# Patient Record
Sex: MALE | Race: WHITE | HISPANIC OR LATINO
[De-identification: names, ages, dates, MRNs, and addresses within clinical notes are randomized per-mention and may not be internally consistent; named-entity substitution may affect disease eponyms.]

---

## 2017-01-19 ENCOUNTER — APPOINTMENT (OUTPATIENT)
Dept: HEART AND VASCULAR | Facility: CLINIC | Age: 57
End: 2017-01-19

## 2017-01-22 ENCOUNTER — TRANSCRIPTION ENCOUNTER (OUTPATIENT)
Age: 57
End: 2017-01-22

## 2017-02-06 ENCOUNTER — APPOINTMENT (OUTPATIENT)
Dept: HEART AND VASCULAR | Facility: CLINIC | Age: 57
End: 2017-02-06

## 2017-08-28 ENCOUNTER — APPOINTMENT (OUTPATIENT)
Dept: HEART AND VASCULAR | Facility: CLINIC | Age: 57
End: 2017-08-28
Payer: COMMERCIAL

## 2017-08-28 ENCOUNTER — LABORATORY RESULT (OUTPATIENT)
Age: 57
End: 2017-08-28

## 2017-08-28 VITALS
HEART RATE: 78 BPM | TEMPERATURE: 98.4 F | HEIGHT: 68 IN | BODY MASS INDEX: 25.61 KG/M2 | WEIGHT: 169 LBS | RESPIRATION RATE: 15 BRPM | SYSTOLIC BLOOD PRESSURE: 160 MMHG | DIASTOLIC BLOOD PRESSURE: 84 MMHG | OXYGEN SATURATION: 97 %

## 2017-08-28 DIAGNOSIS — E78.5 HYPERLIPIDEMIA, UNSPECIFIED: ICD-10-CM

## 2017-08-28 DIAGNOSIS — K63.5 POLYP OF COLON: ICD-10-CM

## 2017-08-28 PROCEDURE — 99214 OFFICE O/P EST MOD 30 MIN: CPT | Mod: 25

## 2017-08-28 PROCEDURE — 96372 THER/PROPH/DIAG INJ SC/IM: CPT

## 2017-08-28 PROCEDURE — 93000 ELECTROCARDIOGRAM COMPLETE: CPT

## 2017-08-28 PROCEDURE — 36415 COLL VENOUS BLD VENIPUNCTURE: CPT

## 2017-08-29 PROBLEM — K63.5 HYPERPLASTIC POLYP OF LARGE INTESTINE: Status: ACTIVE | Noted: 2017-08-29

## 2017-08-29 LAB
25(OH)D3 SERPL-MCNC: 33.6 NG/ML
ALBUMIN SERPL ELPH-MCNC: 4.7 G/DL
ALP BLD-CCNC: 53 U/L
ALT SERPL-CCNC: 18 U/L
ANION GAP SERPL CALC-SCNC: 22 MMOL/L
APPEARANCE: CLEAR
AST SERPL-CCNC: 16 U/L
BASOPHILS # BLD AUTO: 0.02 K/UL
BASOPHILS NFR BLD AUTO: 0.3 %
BILIRUB SERPL-MCNC: 0.3 MG/DL
BILIRUBIN URINE: NEGATIVE
BLOOD URINE: ABNORMAL
BUN SERPL-MCNC: 9 MG/DL
CALCIUM SERPL-MCNC: 9.1 MG/DL
CHLORIDE SERPL-SCNC: 102 MMOL/L
CHOLEST SERPL-MCNC: 170 MG/DL
CHOLEST/HDLC SERPL: 3.9 RATIO
CO2 SERPL-SCNC: 18 MMOL/L
COLOR: YELLOW
CREAT SERPL-MCNC: 0.74 MG/DL
CRP SERPL HS-MCNC: 1 MG/L
EOSINOPHIL # BLD AUTO: 0.12 K/UL
EOSINOPHIL NFR BLD AUTO: 1.7 %
FOLATE SERPL-MCNC: 17.2 NG/ML
GLUCOSE QUALITATIVE U: NORMAL MG/DL
GLUCOSE SERPL-MCNC: 94 MG/DL
HBA1C MFR BLD HPLC: 5.8 %
HCT VFR BLD CALC: 41.6 %
HDLC SERPL-MCNC: 44 MG/DL
HGB BLD-MCNC: 13.6 G/DL
IMM GRANULOCYTES NFR BLD AUTO: 0.1 %
KETONES URINE: NEGATIVE
LDLC SERPL CALC-MCNC: 85 MG/DL
LEUKOCYTE ESTERASE URINE: NEGATIVE
LYMPHOCYTES # BLD AUTO: 1.34 K/UL
LYMPHOCYTES NFR BLD AUTO: 18.5 %
MAGNESIUM SERPL-MCNC: 2.2 MG/DL
MAN DIFF?: NORMAL
MCHC RBC-ENTMCNC: 29.7 PG
MCHC RBC-ENTMCNC: 32.7 GM/DL
MCV RBC AUTO: 90.8 FL
MONOCYTES # BLD AUTO: 0.59 K/UL
MONOCYTES NFR BLD AUTO: 8.1 %
NEUTROPHILS # BLD AUTO: 5.18 K/UL
NEUTROPHILS NFR BLD AUTO: 71.3 %
NITRITE URINE: NEGATIVE
PH URINE: 6
PLATELET # BLD AUTO: 309 K/UL
POTASSIUM SERPL-SCNC: 4.3 MMOL/L
PROT SERPL-MCNC: 7.4 G/DL
PROTEIN URINE: NEGATIVE MG/DL
PSA FREE FLD-MCNC: 25.5
PSA FREE SERPL-MCNC: 0.14 NG/ML
PSA SERPL-MCNC: 0.55 NG/ML
RBC # BLD: 4.58 M/UL
RBC # FLD: 13.8 %
SODIUM SERPL-SCNC: 142 MMOL/L
SPECIFIC GRAVITY URINE: 1.01
TRIGL SERPL-MCNC: 207 MG/DL
TSH SERPL-ACNC: 0.55 UIU/ML
UROBILINOGEN URINE: NORMAL MG/DL
VIT B12 SERPL-MCNC: 978 PG/ML
WBC # FLD AUTO: 7.26 K/UL

## 2017-08-30 ENCOUNTER — RX RENEWAL (OUTPATIENT)
Age: 57
End: 2017-08-30

## 2017-09-02 LAB — VIT C SERPL-MCNC: 1.2 MG/DL

## 2018-05-23 ENCOUNTER — RX RENEWAL (OUTPATIENT)
Age: 58
End: 2018-05-23

## 2018-11-19 ENCOUNTER — APPOINTMENT (OUTPATIENT)
Dept: HEART AND VASCULAR | Facility: CLINIC | Age: 58
End: 2018-11-19
Payer: COMMERCIAL

## 2018-11-19 VITALS
RESPIRATION RATE: 15 BRPM | HEART RATE: 84 BPM | BODY MASS INDEX: 26.37 KG/M2 | TEMPERATURE: 98 F | HEIGHT: 67 IN | SYSTOLIC BLOOD PRESSURE: 110 MMHG | WEIGHT: 168 LBS | DIASTOLIC BLOOD PRESSURE: 60 MMHG | OXYGEN SATURATION: 96 %

## 2018-11-19 DIAGNOSIS — K63.5 POLYP OF COLON: ICD-10-CM

## 2018-11-19 DIAGNOSIS — R20.0 ANESTHESIA OF SKIN: ICD-10-CM

## 2018-11-19 PROCEDURE — 93880 EXTRACRANIAL BILAT STUDY: CPT

## 2018-11-19 PROCEDURE — 99214 OFFICE O/P EST MOD 30 MIN: CPT | Mod: 25

## 2018-11-19 PROCEDURE — 36415 COLL VENOUS BLD VENIPUNCTURE: CPT

## 2018-11-19 PROCEDURE — 93306 TTE W/DOPPLER COMPLETE: CPT

## 2018-11-19 PROCEDURE — 93000 ELECTROCARDIOGRAM COMPLETE: CPT

## 2018-11-19 PROCEDURE — 90686 IIV4 VACC NO PRSV 0.5 ML IM: CPT

## 2018-11-19 PROCEDURE — G0008: CPT

## 2018-11-23 PROBLEM — K63.5 HYPERPLASTIC POLYP OF INTESTINE: Status: RESOLVED | Noted: 2018-11-23 | Resolved: 2018-11-23

## 2018-11-23 LAB
25(OH)D3 SERPL-MCNC: 31.6 NG/ML
ALBUMIN SERPL ELPH-MCNC: 4.7 G/DL
ALP BLD-CCNC: 52 U/L
ALT SERPL-CCNC: 17 U/L
ANION GAP SERPL CALC-SCNC: 12 MMOL/L
APPEARANCE: CLEAR
AST SERPL-CCNC: 14 U/L
BASOPHILS # BLD AUTO: 0.03 K/UL
BASOPHILS NFR BLD AUTO: 0.6 %
BILIRUB SERPL-MCNC: 0.3 MG/DL
BILIRUBIN URINE: NEGATIVE
BLOOD URINE: NEGATIVE
BUN SERPL-MCNC: 8 MG/DL
CALCIUM SERPL-MCNC: 9.7 MG/DL
CHLORIDE SERPL-SCNC: 102 MMOL/L
CHOLEST SERPL-MCNC: 178 MG/DL
CHOLEST/HDLC SERPL: 3.2 RATIO
CO2 SERPL-SCNC: 27 MMOL/L
COLOR: YELLOW
CREAT SERPL-MCNC: 0.75 MG/DL
EOSINOPHIL # BLD AUTO: 0.1 K/UL
EOSINOPHIL NFR BLD AUTO: 1.8 %
FOLATE SERPL-MCNC: 18.6 NG/ML
GLUCOSE QUALITATIVE U: NEGATIVE MG/DL
GLUCOSE SERPL-MCNC: 120 MG/DL
HBA1C MFR BLD HPLC: 5.7 %
HCT VFR BLD CALC: 46.2 %
HDLC SERPL-MCNC: 55 MG/DL
HGB BLD-MCNC: 14.4 G/DL
IMM GRANULOCYTES NFR BLD AUTO: 0.2 %
KETONES URINE: NEGATIVE
LDLC SERPL CALC-MCNC: 110 MG/DL
LEUKOCYTE ESTERASE URINE: NEGATIVE
LYMPHOCYTES # BLD AUTO: 1.46 K/UL
LYMPHOCYTES NFR BLD AUTO: 26.9 %
MAGNESIUM SERPL-MCNC: 2.3 MG/DL
MAN DIFF?: NORMAL
MCHC RBC-ENTMCNC: 30.6 PG
MCHC RBC-ENTMCNC: 31.2 GM/DL
MCV RBC AUTO: 98.3 FL
MONOCYTES # BLD AUTO: 0.44 K/UL
MONOCYTES NFR BLD AUTO: 8.1 %
NEUTROPHILS # BLD AUTO: 3.38 K/UL
NEUTROPHILS NFR BLD AUTO: 62.4 %
NITRITE URINE: NEGATIVE
PH URINE: 5.5
PLATELET # BLD AUTO: 287 K/UL
POTASSIUM SERPL-SCNC: 5.2 MMOL/L
PROT SERPL-MCNC: 7.5 G/DL
PROTEIN URINE: NEGATIVE MG/DL
PSA FREE FLD-MCNC: 23
PSA FREE SERPL-MCNC: 0.2 NG/ML
PSA SERPL-MCNC: 0.87 NG/ML
RBC # BLD: 4.7 M/UL
RBC # FLD: 13.1 %
SODIUM SERPL-SCNC: 141 MMOL/L
SPECIFIC GRAVITY URINE: 1.02
TRIGL SERPL-MCNC: 65 MG/DL
TSH SERPL-ACNC: 0.47 UIU/ML
URATE SERPL-MCNC: 5.9 MG/DL
UROBILINOGEN URINE: NEGATIVE MG/DL
VIT B12 SERPL-MCNC: 1067 PG/ML
WBC # FLD AUTO: 5.42 K/UL

## 2018-11-23 RX ORDER — LISINOPRIL 20 MG/1
20 TABLET ORAL
Qty: 30 | Refills: 6 | Status: DISCONTINUED | COMMUNITY
Start: 2017-08-30 | End: 2018-11-23

## 2018-11-23 NOTE — ASSESSMENT
[FreeTextEntry1] : Hypertensive heart disease \par \par chronic smoking addiction, refuses to quit\par \par hyperplastic colon polyps with family hx of colon cancer\par \par dyspnea on exertion

## 2018-11-23 NOTE — REASON FOR VISIT
[Follow-Up - Clinic] : a clinic follow-up of [Dyspnea] : dyspnea [Hyperlipidemia] : hyperlipidemia [Hypertension] : hypertension [Medication Management] : Medication management [FreeTextEntry1] : 58   year old  male active smoker with  HTN, hyperlipidemia and prediabetes HAS  low vitamin D low vitamin C  and B 12 deficiency with symptoms.  Not motivated to quit smoking despite my numerous appeals. \par \par He has no fevers but a chronic cough without hemoptysis. Last colonoscopy was in November 2018 showing  - 5 and 7  mm  hyperplastic polyps which  were  resected pathology results are pending. \par \par Hx of low B 12, vitamin C, vitamin D \par \par \par  \par \par \par \par

## 2018-11-23 NOTE — PHYSICAL EXAM
[General Appearance - Well Developed] : well developed [Normal Appearance] : normal appearance [Well Groomed] : well groomed [General Appearance - Well Nourished] : well nourished [No Deformities] : no deformities [General Appearance - In No Acute Distress] : no acute distress [Normal Conjunctiva] : the conjunctiva exhibited no abnormalities [Eyelids - No Xanthelasma] : the eyelids demonstrated no xanthelasmas [FreeTextEntry1] : anicteric  [Normal Oral Mucosa] : normal oral mucosa [No Oral Pallor] : no oral pallor [No Oral Cyanosis] : no oral cyanosis [Normal Jugular Venous A Waves Present] : normal jugular venous A waves present [Normal Jugular Venous V Waves Present] : normal jugular venous V waves present [No Jugular Venous Ordoñez A Waves] : no jugular venous ordoñez A waves [Respiration, Rhythm And Depth] : normal respiratory rhythm and effort [Exaggerated Use Of Accessory Muscles For Inspiration] : no accessory muscle use [Auscultation Breath Sounds / Voice Sounds] : lungs were clear to auscultation bilaterally [Heart Rate And Rhythm] : heart rate and rhythm were normal [Heart Sounds] : normal S1 and S2 [Murmurs] : no murmurs present [Edema] : no peripheral edema present [Veins - Varicosity Changes] : no varicosital changes were noted in the lower extremities [Bowel Sounds] : normal bowel sounds [Abdomen Soft] : soft [Abdomen Tenderness] : non-tender [Abdomen Mass (___ Cm)] : no abdominal mass palpated [Abnormal Walk] : normal gait [Gait - Sufficient For Exercise Testing] : the gait was sufficient for exercise testing [Nail Clubbing] : no clubbing of the fingernails [Cyanosis, Localized] : no localized cyanosis [Petechial Hemorrhages (___cm)] : no petechial hemorrhages [Skin Color & Pigmentation] : normal skin color and pigmentation [Skin Turgor] : normal skin turgor [] : no rash [No Venous Stasis] : no venous stasis [Skin Lesions] : no skin lesions [No Skin Ulcers] : no skin ulcer [No Xanthoma] : no  xanthoma was observed [Oriented To Time, Place, And Person] : oriented to person, place, and time [Affect] : the affect was normal [Mood] : the mood was normal [No Anxiety] : not feeling anxious

## 2018-11-23 NOTE — HISTORY OF PRESENT ILLNESS
[FreeTextEntry1] : EKG shows NSR 64 bpm without ectopy, ischemia or LVH \par \par No hematuria , syncope, claudication pains  his main complaint is dyspnea on exertion \par \par \par Needs flu vaccine and blood work today \par \par He continues to have some numbness of his upper extremities

## 2018-11-23 NOTE — DISCUSSION/SUMMARY
[Essential Hypertension] : essential hypertension [Stable] : stable [Responding to Treatment] : responding to treatment [None] : none [Smoking Cessation] : smoking cessation [___ Month(s)] : [unfilled] month(s) [With Me] : with me [FreeTextEntry1] : venipuncture performed with lipids, Hgba1c, vitamin D, vitamin B 12/folate, etc \par \par medications reconciled \par \par results of echocardiogram and carotid duplex reviewed with patient \par \par Flu vaccine administered right upper extremity by [facility] without incident

## 2018-11-26 LAB — UBIQUINONE10 SERPL-MCNC: 0.83 MG/L

## 2019-01-27 ENCOUNTER — FORM ENCOUNTER (OUTPATIENT)
Age: 59
End: 2019-01-27

## 2019-01-28 ENCOUNTER — OUTPATIENT (OUTPATIENT)
Dept: OUTPATIENT SERVICES | Facility: HOSPITAL | Age: 59
LOS: 1 days | End: 2019-01-28
Payer: COMMERCIAL

## 2019-01-28 ENCOUNTER — APPOINTMENT (OUTPATIENT)
Dept: HEART AND VASCULAR | Facility: CLINIC | Age: 59
End: 2019-01-28
Payer: COMMERCIAL

## 2019-01-28 VITALS
WEIGHT: 168 LBS | HEART RATE: 73 BPM | HEIGHT: 67 IN | TEMPERATURE: 98.2 F | DIASTOLIC BLOOD PRESSURE: 80 MMHG | OXYGEN SATURATION: 95 % | BODY MASS INDEX: 26.37 KG/M2 | SYSTOLIC BLOOD PRESSURE: 110 MMHG

## 2019-01-28 DIAGNOSIS — Z01.818 ENCOUNTER FOR OTHER PREPROCEDURAL EXAMINATION: ICD-10-CM

## 2019-01-28 PROCEDURE — 71046 X-RAY EXAM CHEST 2 VIEWS: CPT

## 2019-01-28 PROCEDURE — 71046 X-RAY EXAM CHEST 2 VIEWS: CPT | Mod: 26

## 2019-01-28 PROCEDURE — 99214 OFFICE O/P EST MOD 30 MIN: CPT | Mod: 57

## 2019-01-28 PROCEDURE — 93000 ELECTROCARDIOGRAM COMPLETE: CPT

## 2019-01-28 PROCEDURE — 36415 COLL VENOUS BLD VENIPUNCTURE: CPT

## 2019-01-28 NOTE — DISCUSSION/SUMMARY
[Procedure Low Risk] : the procedure risk is low [Patient Low Risk] : the patient is a low surgical risk [FreeTextEntry3] : hold aspirin for 7 days prior to surgery [FreeTextEntry1] : venipuncture performed \par \par Preop CXR requisition provided

## 2019-01-28 NOTE — REVIEW OF SYSTEMS
[Cough] : cough [Joint Pain] : joint pain [Joint Swelling] : joint swelling [Negative] : Heme/Lymph [Dyspnea on exertion] : not dyspnea during exertion [Joint Stiffness] : no joint stiffness [Muscle Cramps] : no muscle cramps [Limb Weakness (Paresis)] : no limb weakness

## 2019-01-28 NOTE — HISTORY OF PRESENT ILLNESS
[Preoperative Visit] : for a medical evaluation prior to surgery [Scheduled Procedure ___] : a [unfilled] [Date of Surgery ___] : on [unfilled] [Surgeon Name ___] : surgeon: [unfilled] [Stable] : Stable [Cough] : cough [Anti-Platelet Agents] : anti-platelet agents [Nicotine Dependence] : nicotine dependence [Alcohol Use] : alcohol use [Frequent Aspirin Use] : frequent aspirin use [Prior Anesthesia] : Prior anesthesia [Electrocardiogram] : ~T an ECG ~C was performed [Echocardiogram] : ~T an echocardiogram ~C was performed [Cardiovascular Stress Test] : a cardiac stress test ~T ~C was performed [Good] : Good [Fever] : no fever [Chills] : no chills [Fatigue] : no fatigue [Chest Pain] : no chest pain [Dyspnea] : no dyspnea [Dysuria] : no dysuria [Urinary Frequency] : no urinary frequency [Nausea] : no nausea [Vomiting] : no vomiting [Diarrhea] : no diarrhea [Abdominal Pain] : no abdominal pain [Easy Bruising] : no easy bruising [Lower Extremity Swelling] : no lower extremity swelling [Poor Exercise Tolerance] : no poor exercise tolerance [Diabetes] : no diabetes [Cardiovascular Disease] : no cardiovascular disease [Pulmonary Disease] : no pulmonary disease [Renal Disease] : no renal disease [GI Disease] : no gastrointestinal disease [Sleep Apnea] : no sleep apnea [Thromboembolic Problems] : no thromboembolic problems [Frequent use of NSAIDs] : no use of NSAIDs [Transfusion Reaction] : no transfusion reaction [Impaired Immunity] : no impaired immunity [Steroid Use in Last 6 Months] : no steroid use in the last six months [Prev Anesthesia Reaction] : no previous anesthesia reaction [Anesthesia Reaction] : no anesthesia reaction [Sudden Death] : no sudden death [Clotting Disorder] : no clotting disorder [Bleeding Disorder] : no bleeding disorder [de-identified] : chronic smokers cough [FreeTextEntry1] : 58 year old male , chronic smoker who refuses to quit has well  controlled HTN.  C/o chronic right foot hallux valgus with pain , now awaiting surgical correction.  Prior hx of low B 12  fully supplemented. \par No hx of MI, CHF, arrhythmias or asthma.  Hx of mild prediabetes \par \par Good exercise tolerance\par \par EKG shows NSR, mild sinus arrhythmia HR 69 bpm without ischemia , ectopy or LVH

## 2019-01-28 NOTE — PHYSICAL EXAM
[General Appearance - Well Developed] : well developed [Normal Appearance] : normal appearance [Well Groomed] : well groomed [General Appearance - Well Nourished] : well nourished [No Deformities] : no deformities [General Appearance - In No Acute Distress] : no acute distress [Normal Conjunctiva] : the conjunctiva exhibited no abnormalities [Eyelids - No Xanthelasma] : the eyelids demonstrated no xanthelasmas [Normal Oral Mucosa] : normal oral mucosa [No Oral Pallor] : no oral pallor [No Oral Cyanosis] : no oral cyanosis [Normal Jugular Venous A Waves Present] : normal jugular venous A waves present [Normal Jugular Venous V Waves Present] : normal jugular venous V waves present [No Jugular Venous Ordoñez A Waves] : no jugular venous ordoñez A waves [Respiration, Rhythm And Depth] : normal respiratory rhythm and effort [Exaggerated Use Of Accessory Muscles For Inspiration] : no accessory muscle use [Auscultation Breath Sounds / Voice Sounds] : lungs were clear to auscultation bilaterally [Heart Rate And Rhythm] : heart rate and rhythm were normal [Heart Sounds] : normal S1 and S2 [Murmurs] : no murmurs present [Edema] : no peripheral edema present [Veins - Varicosity Changes] : no varicosital changes were noted in the lower extremities [Bowel Sounds] : normal bowel sounds [Abdomen Soft] : soft [Abdomen Tenderness] : non-tender [Abdomen Mass (___ Cm)] : no abdominal mass palpated [Abnormal Walk] : normal gait [Gait - Sufficient For Exercise Testing] : the gait was sufficient for exercise testing [Nail Clubbing] : no clubbing of the fingernails [Cyanosis, Localized] : no localized cyanosis [Petechial Hemorrhages (___cm)] : no petechial hemorrhages [Skin Color & Pigmentation] : normal skin color and pigmentation [Skin Turgor] : normal skin turgor [] : no rash [No Venous Stasis] : no venous stasis [Skin Lesions] : no skin lesions [No Skin Ulcers] : no skin ulcer [No Xanthoma] : no  xanthoma was observed [Oriented To Time, Place, And Person] : oriented to person, place, and time [Affect] : the affect was normal [Mood] : the mood was normal [No Anxiety] : not feeling anxious [FreeTextEntry1] : anicteric

## 2019-01-29 LAB
ALBUMIN SERPL ELPH-MCNC: 4.9 G/DL
ALP BLD-CCNC: 55 U/L
ALT SERPL-CCNC: 19 U/L
ANION GAP SERPL CALC-SCNC: 12 MMOL/L
APPEARANCE: CLEAR
APTT BLD: 35.2 SEC
AST SERPL-CCNC: 14 U/L
BASOPHILS # BLD AUTO: 0.03 K/UL
BASOPHILS NFR BLD AUTO: 0.5 %
BILIRUB SERPL-MCNC: 0.4 MG/DL
BILIRUBIN URINE: NEGATIVE
BLOOD URINE: NEGATIVE
BUN SERPL-MCNC: 8 MG/DL
CALCIUM SERPL-MCNC: 9.4 MG/DL
CHLORIDE SERPL-SCNC: 101 MMOL/L
CHOLEST SERPL-MCNC: 169 MG/DL
CHOLEST/HDLC SERPL: 3.3 RATIO
CO2 SERPL-SCNC: 28 MMOL/L
COLOR: YELLOW
CREAT SERPL-MCNC: 0.66 MG/DL
EOSINOPHIL # BLD AUTO: 0.11 K/UL
EOSINOPHIL NFR BLD AUTO: 1.9 %
GLUCOSE QUALITATIVE U: NEGATIVE MG/DL
GLUCOSE SERPL-MCNC: 92 MG/DL
HBA1C MFR BLD HPLC: 5.8 %
HCT VFR BLD CALC: 43.4 %
HDLC SERPL-MCNC: 51 MG/DL
HGB BLD-MCNC: 14.1 G/DL
IMM GRANULOCYTES NFR BLD AUTO: 0.2 %
INR PPP: 0.94 RATIO
KETONES URINE: NEGATIVE
LDLC SERPL CALC-MCNC: 103 MG/DL
LEUKOCYTE ESTERASE URINE: NEGATIVE
LYMPHOCYTES # BLD AUTO: 1.56 K/UL
LYMPHOCYTES NFR BLD AUTO: 27.2 %
MAN DIFF?: NORMAL
MCHC RBC-ENTMCNC: 30.1 PG
MCHC RBC-ENTMCNC: 32.5 GM/DL
MCV RBC AUTO: 92.7 FL
MONOCYTES # BLD AUTO: 0.43 K/UL
MONOCYTES NFR BLD AUTO: 7.5 %
NEUTROPHILS # BLD AUTO: 3.59 K/UL
NEUTROPHILS NFR BLD AUTO: 62.7 %
NITRITE URINE: NEGATIVE
PH URINE: 5
PLATELET # BLD AUTO: 346 K/UL
POTASSIUM SERPL-SCNC: 4.6 MMOL/L
PROT SERPL-MCNC: 7.3 G/DL
PROTEIN URINE: NEGATIVE MG/DL
PT BLD: 10.7 SEC
RBC # BLD: 4.68 M/UL
RBC # FLD: 13.4 %
SODIUM SERPL-SCNC: 141 MMOL/L
SPECIFIC GRAVITY URINE: 1.02
TRIGL SERPL-MCNC: 77 MG/DL
UROBILINOGEN URINE: NEGATIVE MG/DL
WBC # FLD AUTO: 5.73 K/UL

## 2019-03-18 ENCOUNTER — APPOINTMENT (OUTPATIENT)
Dept: HEART AND VASCULAR | Facility: CLINIC | Age: 59
End: 2019-03-18

## 2019-10-14 ENCOUNTER — APPOINTMENT (OUTPATIENT)
Dept: HEART AND VASCULAR | Facility: CLINIC | Age: 59
End: 2019-10-14

## 2019-10-14 ENCOUNTER — APPOINTMENT (OUTPATIENT)
Dept: HEART AND VASCULAR | Facility: CLINIC | Age: 59
End: 2019-10-14
Payer: COMMERCIAL

## 2019-10-14 VITALS
HEIGHT: 67 IN | SYSTOLIC BLOOD PRESSURE: 132 MMHG | BODY MASS INDEX: 27.62 KG/M2 | WEIGHT: 176 LBS | DIASTOLIC BLOOD PRESSURE: 70 MMHG | HEART RATE: 70 BPM | RESPIRATION RATE: 14 BRPM | TEMPERATURE: 98.3 F | OXYGEN SATURATION: 96 %

## 2019-10-14 PROCEDURE — 90686 IIV4 VACC NO PRSV 0.5 ML IM: CPT

## 2019-10-14 PROCEDURE — 36415 COLL VENOUS BLD VENIPUNCTURE: CPT

## 2019-10-14 PROCEDURE — 93306 TTE W/DOPPLER COMPLETE: CPT

## 2019-10-14 PROCEDURE — 99214 OFFICE O/P EST MOD 30 MIN: CPT | Mod: 25

## 2019-10-14 PROCEDURE — 93015 CV STRESS TEST SUPVJ I&R: CPT

## 2019-10-14 PROCEDURE — G0008: CPT

## 2019-10-14 RX ORDER — MULTIVIT-MIN/IRON/FOLIC ACID/K 18-600-40
500 CAPSULE ORAL
Qty: 90 | Refills: 1 | Status: DISCONTINUED | COMMUNITY
Start: 2017-01-19 | End: 2019-10-14

## 2019-10-15 LAB
25(OH)D3 SERPL-MCNC: 26.5 NG/ML
ALBUMIN SERPL ELPH-MCNC: 4.9 G/DL
ALP BLD-CCNC: 62 U/L
ALT SERPL-CCNC: 20 U/L
ANION GAP SERPL CALC-SCNC: 13 MMOL/L
AST SERPL-CCNC: 23 U/L
BASOPHILS # BLD AUTO: 0.04 K/UL
BASOPHILS NFR BLD AUTO: 0.9 %
BILIRUB SERPL-MCNC: 0.4 MG/DL
BUN SERPL-MCNC: 10 MG/DL
CALCIUM SERPL-MCNC: 9.8 MG/DL
CHLORIDE SERPL-SCNC: 100 MMOL/L
CHOLEST SERPL-MCNC: 180 MG/DL
CHOLEST/HDLC SERPL: 3.6 RATIO
CO2 SERPL-SCNC: 25 MMOL/L
CREAT SERPL-MCNC: 0.7 MG/DL
EOSINOPHIL # BLD AUTO: 0.09 K/UL
EOSINOPHIL NFR BLD AUTO: 2 %
ESTIMATED AVERAGE GLUCOSE: 117 MG/DL
FOLATE SERPL-MCNC: 13.6 NG/ML
GLUCOSE SERPL-MCNC: 119 MG/DL
HBA1C MFR BLD HPLC: 5.7 %
HCT VFR BLD CALC: 47.7 %
HDLC SERPL-MCNC: 50 MG/DL
HGB BLD-MCNC: 15 G/DL
IMM GRANULOCYTES NFR BLD AUTO: 0.2 %
LDLC SERPL CALC-MCNC: 115 MG/DL
LYMPHOCYTES # BLD AUTO: 1.01 K/UL
LYMPHOCYTES NFR BLD AUTO: 22 %
MAN DIFF?: NORMAL
MCHC RBC-ENTMCNC: 30.1 PG
MCHC RBC-ENTMCNC: 31.4 GM/DL
MCV RBC AUTO: 95.8 FL
MONOCYTES # BLD AUTO: 0.33 K/UL
MONOCYTES NFR BLD AUTO: 7.2 %
NEUTROPHILS # BLD AUTO: 3.12 K/UL
NEUTROPHILS NFR BLD AUTO: 67.7 %
PLATELET # BLD AUTO: 313 K/UL
POTASSIUM SERPL-SCNC: 5.3 MMOL/L
PROT SERPL-MCNC: 7.5 G/DL
PSA FREE FLD-MCNC: 23 %
PSA FREE SERPL-MCNC: 0.15 NG/ML
PSA SERPL-MCNC: 0.63 NG/ML
RBC # BLD: 4.98 M/UL
RBC # FLD: 13.2 %
SODIUM SERPL-SCNC: 138 MMOL/L
TRIGL SERPL-MCNC: 74 MG/DL
TSH SERPL-ACNC: 0.36 UIU/ML
VIT B12 SERPL-MCNC: 654 PG/ML
WBC # FLD AUTO: 4.6 K/UL

## 2019-10-16 ENCOUNTER — TRANSCRIPTION ENCOUNTER (OUTPATIENT)
Age: 59
End: 2019-10-16

## 2019-10-18 LAB — UBIQUINONE10 SERPL-MCNC: 0.49 MG/L

## 2019-10-20 LAB — VIT C SERPL-MCNC: <0.1 MG/DL

## 2020-06-17 ENCOUNTER — APPOINTMENT (OUTPATIENT)
Dept: HEART AND VASCULAR | Facility: CLINIC | Age: 60
End: 2020-06-17
Payer: COMMERCIAL

## 2020-06-17 VITALS
HEART RATE: 72 BPM | BODY MASS INDEX: 24.64 KG/M2 | HEIGHT: 67 IN | RESPIRATION RATE: 13 BRPM | TEMPERATURE: 95.4 F | OXYGEN SATURATION: 96 % | DIASTOLIC BLOOD PRESSURE: 74 MMHG | WEIGHT: 157 LBS | SYSTOLIC BLOOD PRESSURE: 134 MMHG

## 2020-06-17 DIAGNOSIS — R42 DIZZINESS AND GIDDINESS: ICD-10-CM

## 2020-06-17 DIAGNOSIS — R05 COUGH: ICD-10-CM

## 2020-06-17 PROCEDURE — 93880 EXTRACRANIAL BILAT STUDY: CPT

## 2020-06-17 PROCEDURE — 93000 ELECTROCARDIOGRAM COMPLETE: CPT

## 2020-06-17 PROCEDURE — 36415 COLL VENOUS BLD VENIPUNCTURE: CPT

## 2020-06-17 PROCEDURE — 99215 OFFICE O/P EST HI 40 MIN: CPT

## 2020-06-18 LAB
25(OH)D3 SERPL-MCNC: 37.5 NG/ML
ALBUMIN SERPL ELPH-MCNC: 4.7 G/DL
ALP BLD-CCNC: 54 U/L
ALT SERPL-CCNC: 14 U/L
ANION GAP SERPL CALC-SCNC: 14 MMOL/L
APPEARANCE: CLEAR
AST SERPL-CCNC: 13 U/L
BASOPHILS # BLD AUTO: 0.03 K/UL
BASOPHILS NFR BLD AUTO: 0.6 %
BILIRUB SERPL-MCNC: 0.3 MG/DL
BILIRUBIN URINE: NEGATIVE
BLOOD URINE: NEGATIVE
BUN SERPL-MCNC: 13 MG/DL
CALCIUM SERPL-MCNC: 9.7 MG/DL
CHLORIDE SERPL-SCNC: 103 MMOL/L
CHOLEST SERPL-MCNC: 141 MG/DL
CHOLEST/HDLC SERPL: 3.1 RATIO
CO2 SERPL-SCNC: 25 MMOL/L
COLOR: YELLOW
CREAT SERPL-MCNC: 0.88 MG/DL
CREAT SPEC-SCNC: 195 MG/DL
EOSINOPHIL # BLD AUTO: 0.04 K/UL
EOSINOPHIL NFR BLD AUTO: 0.8 %
ESTIMATED AVERAGE GLUCOSE: 114 MG/DL
FOLATE SERPL-MCNC: >20 NG/ML
GLUCOSE QUALITATIVE U: NEGATIVE
GLUCOSE SERPL-MCNC: 172 MG/DL
HBA1C MFR BLD HPLC: 5.6 %
HCT VFR BLD CALC: 39.3 %
HDLC SERPL-MCNC: 46 MG/DL
HGB BLD-MCNC: 12.3 G/DL
IMM GRANULOCYTES NFR BLD AUTO: 0 %
KETONES URINE: NEGATIVE
LDLC SERPL CALC-MCNC: 78 MG/DL
LEUKOCYTE ESTERASE URINE: NEGATIVE
LYMPHOCYTES # BLD AUTO: 1.11 K/UL
LYMPHOCYTES NFR BLD AUTO: 21.7 %
MAN DIFF?: NORMAL
MCHC RBC-ENTMCNC: 29.3 PG
MCHC RBC-ENTMCNC: 31.3 GM/DL
MCV RBC AUTO: 93.6 FL
MICROALBUMIN 24H UR DL<=1MG/L-MCNC: <1.2 MG/DL
MICROALBUMIN/CREAT 24H UR-RTO: NORMAL MG/G
MONOCYTES # BLD AUTO: 0.32 K/UL
MONOCYTES NFR BLD AUTO: 6.3 %
NEUTROPHILS # BLD AUTO: 3.62 K/UL
NEUTROPHILS NFR BLD AUTO: 70.6 %
NITRITE URINE: NEGATIVE
PH URINE: 6.5
PLATELET # BLD AUTO: 320 K/UL
POTASSIUM SERPL-SCNC: 4.4 MMOL/L
PROT SERPL-MCNC: 7.1 G/DL
PROTEIN URINE: NORMAL
PSA FREE FLD-MCNC: 23 %
PSA FREE SERPL-MCNC: 0.13 NG/ML
PSA SERPL-MCNC: 0.58 NG/ML
RBC # BLD: 4.2 M/UL
RBC # FLD: 14.8 %
SARS-COV-2 IGG SERPL IA-ACNC: <0.1 INDEX
SARS-COV-2 IGG SERPL QL IA: NEGATIVE
SODIUM SERPL-SCNC: 142 MMOL/L
SPECIFIC GRAVITY URINE: 1.02
TRIGL SERPL-MCNC: 85 MG/DL
TSH SERPL-ACNC: 0.31 UIU/ML
UROBILINOGEN URINE: NORMAL
VIT B12 SERPL-MCNC: 589 PG/ML
WBC # FLD AUTO: 5.12 K/UL

## 2020-06-22 ENCOUNTER — OUTPATIENT (OUTPATIENT)
Dept: OUTPATIENT SERVICES | Facility: HOSPITAL | Age: 60
LOS: 1 days | End: 2020-06-22
Payer: COMMERCIAL

## 2020-06-22 ENCOUNTER — RESULT REVIEW (OUTPATIENT)
Age: 60
End: 2020-06-22

## 2020-06-22 PROCEDURE — 71046 X-RAY EXAM CHEST 2 VIEWS: CPT

## 2020-06-22 PROCEDURE — 71046 X-RAY EXAM CHEST 2 VIEWS: CPT | Mod: 26

## 2020-06-23 NOTE — ASSESSMENT
[FreeTextEntry1] : Chronic heavy smoker with cough, dyspnea\par \par Multiple vitamin deficiencies including vitamin D, B12,   and  vitamin C\par \par Excessive alcohol intake \par \par

## 2020-06-23 NOTE — DISCUSSION/SUMMARY
[Essential Hypertension] : essential hypertension [Stable] : stable [None] : none [___ Month(s)] : [unfilled] month(s) [Responding to Treatment] : responding to treatment [With Me] : with me [FreeTextEntry1] : Venipuncture performed with lipids, HgbA1c, Covid 19 antibody,  B12/folate, vitamin D , vitamin C,etc \par \par Rx provided for chest X ray \par \par Counseled to quit smoking but he is NOT motivated \par \par Reduce alcohol intake \par \par carotid duplex scan shows complex bilateral plaques without significant obstruction \par \par Renewed Rx for vitamin D3,  B 12 , folate  and vitamin C\par \par Will consider referral for coronary CTA with calcium /FFR \par \par

## 2020-06-23 NOTE — REASON FOR VISIT
[Dyspnea] : dyspnea [Follow-Up - Clinic] : a clinic follow-up of [Hyperlipidemia] : hyperlipidemia [FreeTextEntry1] : 59   year old  male active smoker with  HTN, hyperlipidemia and prediabetes presents for follow up. \par \par Hx of  low vitamin D low vitamin C  and B 12 deficiency with symptoms.  Not motivated to quit smoking despite my numerous appeals. \par \par He has no fevers but a chronic cough without hemoptysis. Last colonoscopy was in November 2018 showing  - 5 and 7  mm  hyperplastic polyps which  were  resected pathology results are negative for malignancy. \par \par \par \par \par  \par \par \par \par

## 2020-06-23 NOTE — HISTORY OF PRESENT ILLNESS
[FreeTextEntry1] : Wife notified me that he is more short of breath , and has impaired memory and appears distant and more withdrawn \par \par He denies hemoptysis or wheezing \par \par Denies known Covid 19 exposure \par \par Drinks a 6 pack daily \par \par Negative  submaximal  stress test  October 2019 \par \par EKG shows NSR 67 bpm without ectopy, ischemia or LVH

## 2020-06-23 NOTE — PHYSICAL EXAM
[Normal Appearance] : normal appearance [Well Groomed] : well groomed [General Appearance - Well Developed] : well developed [General Appearance - Well Nourished] : well nourished [General Appearance - In No Acute Distress] : no acute distress [Normal Conjunctiva] : the conjunctiva exhibited no abnormalities [No Deformities] : no deformities [Eyelids - No Xanthelasma] : the eyelids demonstrated no xanthelasmas [No Oral Cyanosis] : no oral cyanosis [Normal Jugular Venous V Waves Present] : normal jugular venous V waves present [Normal Jugular Venous A Waves Present] : normal jugular venous A waves present [No Jugular Venous Ordoñez A Waves] : no jugular venous ordoñez A waves [Respiration, Rhythm And Depth] : normal respiratory rhythm and effort [Exaggerated Use Of Accessory Muscles For Inspiration] : no accessory muscle use [Edema] : no peripheral edema present [Heart Rate And Rhythm] : heart rate and rhythm were normal [Heart Sounds] : normal S1 and S2 [Murmurs] : no murmurs present [Bowel Sounds] : normal bowel sounds [Abdomen Soft] : soft [Veins - Varicosity Changes] : no varicosital changes were noted in the lower extremities [Abdomen Tenderness] : non-tender [Abdomen Mass (___ Cm)] : no abdominal mass palpated [Nail Clubbing] : no clubbing of the fingernails [Abnormal Walk] : normal gait [Gait - Sufficient For Exercise Testing] : the gait was sufficient for exercise testing [Skin Color & Pigmentation] : normal skin color and pigmentation [Petechial Hemorrhages (___cm)] : no petechial hemorrhages [Cyanosis, Localized] : no localized cyanosis [Skin Turgor] : normal skin turgor [No Venous Stasis] : no venous stasis [] : no rash [Skin Lesions] : no skin lesions [No Skin Ulcers] : no skin ulcer [No Xanthoma] : no  xanthoma was observed [Oriented To Time, Place, And Person] : oriented to person, place, and time [No Anxiety] : not feeling anxious [Mood] : the mood was normal [FreeTextEntry1] : depressed affect

## 2020-06-23 NOTE — REVIEW OF SYSTEMS
[Joint Pain] : joint pain [Cough] : cough [Dyspnea on exertion] : dyspnea during exertion [Joint Swelling] : joint swelling [Memory Lapses Or Loss] : memory lapses or loss [Negative] : Endocrine [Joint Stiffness] : no joint stiffness [Muscle Cramps] : no muscle cramps [Limb Weakness (Paresis)] : no limb weakness

## 2020-06-29 ENCOUNTER — INPATIENT (INPATIENT)
Facility: HOSPITAL | Age: 60
LOS: 2 days | Discharge: HOME | DRG: 42 | End: 2020-07-02
Attending: PSYCHIATRY & NEUROLOGY | Admitting: PSYCHIATRY & NEUROLOGY
Payer: COMMERCIAL

## 2020-06-29 VITALS
WEIGHT: 160.06 LBS | OXYGEN SATURATION: 97 % | TEMPERATURE: 98 F | DIASTOLIC BLOOD PRESSURE: 96 MMHG | HEIGHT: 67 IN | SYSTOLIC BLOOD PRESSURE: 171 MMHG | HEART RATE: 91 BPM | RESPIRATION RATE: 17 BRPM

## 2020-06-29 DIAGNOSIS — G45.9 TRANSIENT CEREBRAL ISCHEMIC ATTACK, UNSPECIFIED: ICD-10-CM

## 2020-06-29 DIAGNOSIS — I10 ESSENTIAL (PRIMARY) HYPERTENSION: ICD-10-CM

## 2020-06-29 DIAGNOSIS — I67.2 CEREBRAL ATHEROSCLEROSIS: ICD-10-CM
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
## 2020-06-29 DIAGNOSIS — Z29.9 ENCOUNTER FOR PROPHYLACTIC MEASURES, UNSPECIFIED: ICD-10-CM

## 2020-06-29 LAB
ALBUMIN SERPL ELPH-MCNC: 4.5 G/DL — SIGNIFICANT CHANGE UP (ref 3.3–5)
ALP SERPL-CCNC: 51 U/L — SIGNIFICANT CHANGE UP (ref 40–120)
ALT FLD-CCNC: 12 U/L — SIGNIFICANT CHANGE UP (ref 10–45)
ANION GAP SERPL CALC-SCNC: 16 MMOL/L — SIGNIFICANT CHANGE UP (ref 5–17)
APPEARANCE UR: CLEAR — SIGNIFICANT CHANGE UP
APTT BLD: 29.4 SEC — SIGNIFICANT CHANGE UP (ref 27.5–35.5)
AST SERPL-CCNC: 13 U/L — SIGNIFICANT CHANGE UP (ref 10–40)
BASOPHILS # BLD AUTO: 0.02 K/UL — SIGNIFICANT CHANGE UP (ref 0–0.2)
BASOPHILS NFR BLD AUTO: 0.4 % — SIGNIFICANT CHANGE UP (ref 0–2)
BILIRUB SERPL-MCNC: 0.6 MG/DL — SIGNIFICANT CHANGE UP (ref 0.2–1.2)
BILIRUB UR-MCNC: NEGATIVE — SIGNIFICANT CHANGE UP
BUN SERPL-MCNC: 12 MG/DL — SIGNIFICANT CHANGE UP (ref 7–23)
CALCIUM SERPL-MCNC: 9.2 MG/DL — SIGNIFICANT CHANGE UP (ref 8.4–10.5)
CHLORIDE SERPL-SCNC: 106 MMOL/L — SIGNIFICANT CHANGE UP (ref 96–108)
CO2 SERPL-SCNC: 23 MMOL/L — SIGNIFICANT CHANGE UP (ref 22–31)
COLOR SPEC: YELLOW — SIGNIFICANT CHANGE UP
CREAT SERPL-MCNC: 0.81 MG/DL — SIGNIFICANT CHANGE UP (ref 0.5–1.3)
DIFF PNL FLD: ABNORMAL
EOSINOPHIL # BLD AUTO: 0.04 K/UL — SIGNIFICANT CHANGE UP (ref 0–0.5)
EOSINOPHIL NFR BLD AUTO: 0.7 % — SIGNIFICANT CHANGE UP (ref 0–6)
GLUCOSE SERPL-MCNC: 135 MG/DL — HIGH (ref 70–99)
GLUCOSE UR QL: NEGATIVE — SIGNIFICANT CHANGE UP
HCT VFR BLD CALC: 38 % — LOW (ref 39–50)
HGB BLD-MCNC: 12.8 G/DL — LOW (ref 13–17)
IMM GRANULOCYTES NFR BLD AUTO: 0.4 % — SIGNIFICANT CHANGE UP (ref 0–1.5)
INR BLD: 1.04 — SIGNIFICANT CHANGE UP (ref 0.88–1.16)
KETONES UR-MCNC: 15 MG/DL
LEUKOCYTE ESTERASE UR-ACNC: NEGATIVE — SIGNIFICANT CHANGE UP
LYMPHOCYTES # BLD AUTO: 1.16 K/UL — SIGNIFICANT CHANGE UP (ref 1–3.3)
LYMPHOCYTES # BLD AUTO: 21.5 % — SIGNIFICANT CHANGE UP (ref 13–44)
MCHC RBC-ENTMCNC: 30.1 PG — SIGNIFICANT CHANGE UP (ref 27–34)
MCHC RBC-ENTMCNC: 33.7 GM/DL — SIGNIFICANT CHANGE UP (ref 32–36)
MCV RBC AUTO: 89.4 FL — SIGNIFICANT CHANGE UP (ref 80–100)
MONOCYTES # BLD AUTO: 0.31 K/UL — SIGNIFICANT CHANGE UP (ref 0–0.9)
MONOCYTES NFR BLD AUTO: 5.8 % — SIGNIFICANT CHANGE UP (ref 2–14)
NEUTROPHILS # BLD AUTO: 3.84 K/UL — SIGNIFICANT CHANGE UP (ref 1.8–7.4)
NEUTROPHILS NFR BLD AUTO: 71.2 % — SIGNIFICANT CHANGE UP (ref 43–77)
NITRITE UR-MCNC: NEGATIVE — SIGNIFICANT CHANGE UP
NRBC # BLD: 0 /100 WBCS — SIGNIFICANT CHANGE UP (ref 0–0)
PH UR: 6 — SIGNIFICANT CHANGE UP (ref 5–8)
PLATELET # BLD AUTO: 271 K/UL — SIGNIFICANT CHANGE UP (ref 150–400)
POTASSIUM SERPL-MCNC: 3.8 MMOL/L — SIGNIFICANT CHANGE UP (ref 3.5–5.3)
POTASSIUM SERPL-SCNC: 3.8 MMOL/L — SIGNIFICANT CHANGE UP (ref 3.5–5.3)
PROT SERPL-MCNC: 7.3 G/DL — SIGNIFICANT CHANGE UP (ref 6–8.3)
PROT UR-MCNC: NEGATIVE MG/DL — SIGNIFICANT CHANGE UP
PROTHROM AB SERPL-ACNC: 12.4 SEC — SIGNIFICANT CHANGE UP (ref 10.6–13.6)
RBC # BLD: 4.25 M/UL — SIGNIFICANT CHANGE UP (ref 4.2–5.8)
RBC # FLD: 13.8 % — SIGNIFICANT CHANGE UP (ref 10.3–14.5)
SARS-COV-2 RNA SPEC QL NAA+PROBE: SIGNIFICANT CHANGE UP
SODIUM SERPL-SCNC: 145 MMOL/L — SIGNIFICANT CHANGE UP (ref 135–145)
SP GR SPEC: 1.02 — SIGNIFICANT CHANGE UP (ref 1–1.03)
TROPONIN T SERPL-MCNC: <0.01 NG/ML — SIGNIFICANT CHANGE UP (ref 0–0.01)
UROBILINOGEN FLD QL: 0.2 E.U./DL — SIGNIFICANT CHANGE UP
WBC # BLD: 5.39 K/UL — SIGNIFICANT CHANGE UP (ref 3.8–10.5)
WBC # FLD AUTO: 5.39 K/UL — SIGNIFICANT CHANGE UP (ref 3.8–10.5)

## 2020-06-29 PROCEDURE — 70450 CT HEAD/BRAIN W/O DYE: CPT | Mod: 26,59

## 2020-06-29 PROCEDURE — 70498 CT ANGIOGRAPHY NECK: CPT | Mod: 26

## 2020-06-29 PROCEDURE — 70551 MRI BRAIN STEM W/O DYE: CPT | Mod: 26

## 2020-06-29 PROCEDURE — 71045 X-RAY EXAM CHEST 1 VIEW: CPT | Mod: 26

## 2020-06-29 PROCEDURE — 70496 CT ANGIOGRAPHY HEAD: CPT | Mod: 26

## 2020-06-29 PROCEDURE — 99223 1ST HOSP IP/OBS HIGH 75: CPT

## 2020-06-29 PROCEDURE — 0042T: CPT

## 2020-06-29 PROCEDURE — 99285 EMERGENCY DEPT VISIT HI MDM: CPT | Mod: 25

## 2020-06-29 PROCEDURE — 93010 ELECTROCARDIOGRAM REPORT: CPT

## 2020-06-29 RX ORDER — CLOPIDOGREL BISULFATE 75 MG/1
300 TABLET, FILM COATED ORAL ONCE
Refills: 0 | Status: DISCONTINUED | OUTPATIENT
Start: 2020-06-29 | End: 2020-06-29

## 2020-06-29 RX ORDER — ATORVASTATIN CALCIUM 80 MG/1
80 TABLET, FILM COATED ORAL AT BEDTIME
Refills: 0 | Status: DISCONTINUED | OUTPATIENT
Start: 2020-06-29 | End: 2020-07-02

## 2020-06-29 RX ORDER — CLOPIDOGREL BISULFATE 75 MG/1
75 TABLET, FILM COATED ORAL DAILY
Refills: 0 | Status: DISCONTINUED | OUTPATIENT
Start: 2020-06-30 | End: 2020-07-02

## 2020-06-29 RX ORDER — ASPIRIN/CALCIUM CARB/MAGNESIUM 324 MG
325 TABLET ORAL ONCE
Refills: 0 | Status: COMPLETED | OUTPATIENT
Start: 2020-06-29 | End: 2020-06-29

## 2020-06-29 RX ORDER — CLOPIDOGREL BISULFATE 75 MG/1
300 TABLET, FILM COATED ORAL ONCE
Refills: 0 | Status: COMPLETED | OUTPATIENT
Start: 2020-06-29 | End: 2020-06-29

## 2020-06-29 RX ORDER — ASPIRIN/CALCIUM CARB/MAGNESIUM 324 MG
81 TABLET ORAL DAILY
Refills: 0 | Status: DISCONTINUED | OUTPATIENT
Start: 2020-06-30 | End: 2020-07-02

## 2020-06-29 RX ORDER — ASPIRIN/CALCIUM CARB/MAGNESIUM 324 MG
325 TABLET ORAL ONCE
Refills: 0 | Status: DISCONTINUED | OUTPATIENT
Start: 2020-06-29 | End: 2020-06-29

## 2020-06-29 RX ORDER — ENOXAPARIN SODIUM 100 MG/ML
40 INJECTION SUBCUTANEOUS EVERY 24 HOURS
Refills: 0 | Status: DISCONTINUED | OUTPATIENT
Start: 2020-06-29 | End: 2020-07-02

## 2020-06-29 RX ADMIN — ENOXAPARIN SODIUM 40 MILLIGRAM(S): 100 INJECTION SUBCUTANEOUS at 16:34

## 2020-06-29 RX ADMIN — CLOPIDOGREL BISULFATE 300 MILLIGRAM(S): 75 TABLET, FILM COATED ORAL at 16:34

## 2020-06-29 RX ADMIN — ATORVASTATIN CALCIUM 80 MILLIGRAM(S): 80 TABLET, FILM COATED ORAL at 21:24

## 2020-06-29 RX ADMIN — Medication 325 MILLIGRAM(S): at 16:34

## 2020-06-29 NOTE — ED PROVIDER NOTE - DIAGNOSTIC INTERPRETATION
ER Physician: Tess Meyer MD  CHEST XRAY INTERPRETATION: lungs clear, heart shadow normal, bony structures intact

## 2020-06-29 NOTE — ED PROVIDER NOTE - CLINICAL SUMMARY MEDICAL DECISION MAKING FREE TEXT BOX
Fall one hour pta, cannot say why he fell, cva as part of differential, so stroke code called, noted hypertensive vs dehydration vs other.  - labs, imaging, reassess Fall one hour pta, cannot say why he fell, cva as part of differential, so stroke code called, noted hypertensive vs dehydration vs other.  - labs, imaging, reassess  Disc with stroke team, not TPA candidate, but given risk factors, prior strokes seen in imaging, CTA results, will admit, load with asa/plavix. pt updated.  no other infectious signs, other labs wnl

## 2020-06-29 NOTE — H&P ADULT - ASSESSMENT
59 M PMH HTN, chronic b/l CVA, presenting with episode of fall to right, being evaluated in tele/stepdown for TIA workup. 59 M PMH HTN, chronic b/l CVA, presenting with episode of fall to right, being evaluated in tele/stepdown for TIA workup. CTH neg for acute infarct or ICH, b/l small vessel strokes, CTA was negative for large vessel occlusion, noted moderate stenosis of the proximal right M1 segment and the left P3 segment and mod to severe stenosis of the proximal left vertebral artery.

## 2020-06-29 NOTE — H&P ADULT - PROBLEM SELECTOR PLAN 1
Given pt's resolving course, his old b/l strokes and episode of unsteadiness, concern for TIA.  -Load  mg and plavix 300 mg once in ED  -Start ASA 81 mg, plavix 75 daily day after admission  -Start atorvastatin 80 mg daily  -Order MRI brain noncon  -Order ERICA Given pt's resolving course, his old b/l strokes and episode of unsteadiness, concern for TIA. On imaging, pt has moderate stenosis of proximal R M1 segment and L P3 segment, as well as mod-severe stenosis of prox L vertebral artery and mild stenosis of b/l proximal internal carotid arteries. It's possible that the stenosis of proximal L vertebral artery could have led to the pt's episode. Will need further imaging of posterior circulation.   -Load  mg and plavix 300 mg once in ED  -Start ASA 81 mg, plavix 75 daily day after admission  -Start atorvastatin 80 mg daily  -Order MRI brain noncon  -Order ERICA Given pt's resolving course, his old b/l strokes and episode of unsteadiness, concern for TIA. On imaging, pt has moderate stenosis of proximal R M1 segment and L P3 segment, as well as mod-severe stenosis of prox L vertebral artery and mild stenosis of b/l proximal internal carotid arteries. It's possible that the stenosis of proximal L vertebral artery could have led to the pt's episode. Will need further imaging of posterior circulation.   -Load  mg and plavix 300 mg once in ED  -Start ASA 81 mg, plavix 75 daily day after admission  -Start atorvastatin 80 mg daily  -Order MRI brain noncon  -Order ERICA  - PT/OT  -sBP goal 120-180

## 2020-06-29 NOTE — H&P ADULT - NSHPLABSRESULTS_GEN_ALL_CORE
PROCEDURE: CTA brain with and without intravenous contrast.  IMPRESSION: No large vessel occlusion. Moderate stenosis of the proximal right M1 segment and the left P3 segment.    PROCEDURE: CTA neck with and without intravenous contrast.  IMPRESSION: Moderate to severe stenosis of the proximal left vertebral artery. Mild stenosis of the bilateral proximal internal carotid arteries per NASCET criteria.    PROCEDURE: CT Perfusion with intravenous contrast.  IMPRESSION: Normal perfusion study.    PROCEDURE: CT head without intravenous contrast  IMPRESSION:   No acute intracranial hemorrhage or territorial infarction.  Age indeterminate lacunar infarctions in the bilateral thalami and internal capsules.  Slightly advanced parenchymal volume loss with long-standing small vessel ischemic changes. < from: Xray Chest 1 View- PORTABLE-Urgent (06.29.20 @ 14:43) >  PORTABLE CHEST X-RAY  FINDINGS: Lungs hyperaerated bilaterally, with flattening of the hemidiaphragms, consistent with emphysema. No infiltrate or effusion. Cardia mediastinal silhouette unremarkable.  IMPRESSION: Emphysema.    PROCEDURE: CTA brain with and without intravenous contrast.  IMPRESSION: No large vessel occlusion. Moderate stenosis of the proximal right M1 segment and the left P3 segment.    PROCEDURE: CTA neck with and without intravenous contrast.  IMPRESSION: Moderate to severe stenosis of the proximal left vertebral artery. Mild stenosis of the bilateral proximal internal carotid arteries per NASCET criteria.    PROCEDURE: CT Perfusion with intravenous contrast.  IMPRESSION: Normal perfusion study.    PROCEDURE: CT head without intravenous contrast  IMPRESSION:   No acute intracranial hemorrhage or territorial infarction.  Age indeterminate lacunar infarctions in the bilateral thalami and internal capsules.  Slightly advanced parenchymal volume loss with long-standing small vessel ischemic changes. PORTABLE CHEST X-RAY  FINDINGS: Lungs hyperaerated bilaterally, with flattening of the hemidiaphragms, consistent with emphysema. No infiltrate or effusion. Cardia mediastinal silhouette unremarkable.  IMPRESSION: Emphysema.    PROCEDURE: CTA brain with and without intravenous contrast.  IMPRESSION: No large vessel occlusion. Moderate stenosis of the proximal right M1 segment and the left P3 segment.    PROCEDURE: CTA neck with and without intravenous contrast.  IMPRESSION: Moderate to severe stenosis of the proximal left vertebral artery. Mild stenosis of the bilateral proximal internal carotid arteries per NASCET criteria.    PROCEDURE: CT Perfusion with intravenous contrast.  IMPRESSION: Normal perfusion study.    PROCEDURE: CT head without intravenous contrast  IMPRESSION:   No acute intracranial hemorrhage or territorial infarction.  Age indeterminate lacunar infarctions in the bilateral thalami and internal capsules.  Slightly advanced parenchymal volume loss with long-standing small vessel ischemic changes.

## 2020-06-29 NOTE — ED ADULT NURSE NOTE - CHPI ED NUR SYMPTOMS NEG
no blurred vision/no weakness/no change in level of consciousness/no confusion/no dizziness/no fever/no loss of consciousness/no nausea/no numbness/no vomiting

## 2020-06-29 NOTE — H&P ADULT - HISTORY OF PRESENT ILLNESS
59 M PMH HXN, b/l strokes on HCT (pt not aware of old strokes), p/w/ episode of fall to the right.  Patient was walking about 2 hours to and from to a dentist appt when he suddenly felt off balance and fell. He then felt normal again. Denied light-headedness and dizziness, denies knee buckling. EMS called but pt was up and walking by that time. Code called in ED, NIHSS1 (chronic right facial droop). Gait returned to n/l but he looks a little unsteady at baseline. 59 M PMH HXN, b/l strokes on HCT (pt not aware of old strokes), BIBA s/p episode of fall to the right in street.  Patient was walking about 2 hours to and from to a dentist appt when he suddenly felt off balance and fell 1 hour PTA. He then felt normal again. Denied light-headedness and dizziness, denies knee buckling. EMS called but pt was up and walking by that time. Code called in ED, NIHSS1 (chronic right facial droop). Gait returned to n/l but he looks a little unsteady at baseline.     Denies LOC/dizziness, unilateral weakness, vision changes, facial droop or slurred speech. pt is aox2, unable to obtain last known well.  mg/dl. Denies associated SOB, NVD, lightheadedness, diaphoresis, palpitations, cough/rhinorrhea, black/bloody stool, LE pain/swelling, focal weakness/numbness, recent travel/immobilization, abd pain, urinary complaint. 59 M PMH HTN, b/l strokes on HCT, BIBA s/p episode of fall to the right in street.  Patient was walking about 2 hours to and from to a dentist appt when he suddenly felt off balance and fell 1 hour PTA. He then felt normal again. Denied light-headedness and dizziness, denies knee buckling. EMS called but pt was up and walking by that time. Code called in ED, NIHSS1 (chronic right facial droop). Pt's Gait observed as returning to n/l but he looks a little unsteady at baseline.     Denies LOC/dizziness, unilateral weakness, vision changes, facial droop or slurred speech. pt is aox2, unable to obtain last known well.  mg/dl. Denies associated SOB, NVD, lightheadedness, diaphoresis, palpitations, cough/rhinorrhea, black/bloody stool, LE pain/swelling, focal weakness/numbness, recent travel/immobilization, abd pain, urinary complaint.

## 2020-06-29 NOTE — ED ADULT NURSE NOTE - OBJECTIVE STATEMENT
59 y.o M a&ox4 BIBA c.o fall. PT reports unsteady gait, and unwitnessed fall around x 1 hour prior to arrival. ems found pt standing when arriving at scene. . walks with steady gait. no arm pronator drift. symmetrical facial smile. denies numbness, tingling, weakness. speaking in clear sentences, no slurred speech. EKG complete.  PMH of HTN on lisinopril. no blood thinners.

## 2020-06-29 NOTE — CONSULT NOTE ADULT - SUBJECTIVE AND OBJECTIVE BOX
**STROKE CODE CONSULT NOTE**    Last known well time/Time of onset of symptoms: 6/29/2020 1300    HPI: 59y Male with PMHx     of 10 yo hx of htn w fall 1 hour pta, feeling off balance and fell. no other complaints right now.  Denies associated SOB, NVD, lightheaded, diaphoresis, palpitations, cough/rhinorrhea, black/bloody stool, LE pain/swelling, focal weakness/numbness, recent travel/immobilization, abd pain, urinary complaints,    T(C): 36.8 (06-29-20 @ 13:40), Max: 36.8 (06-29-20 @ 13:40)  HR: 95 (06-29-20 @ 14:08) (91 - 95)  BP: 158/60 (06-29-20 @ 14:08) (158/60 - 171/96)  RR: 18 (06-29-20 @ 14:08) (17 - 18)  SpO2: 99% (06-29-20 @ 14:08) (97% - 99%)    PAST MEDICAL & SURGICAL HISTORY:      FAMILY HISTORY:      SOCIAL HISTORY:    MEDICATIONS  (STANDING):    MEDICATIONS  (PRN):    Allergies    No Known Allergies    Intolerances      Vital Signs Last 24 Hrs  T(C): 36.8 (29 Jun 2020 13:40), Max: 36.8 (29 Jun 2020 13:40)  T(F): 98.3 (29 Jun 2020 13:40), Max: 98.3 (29 Jun 2020 13:40)  HR: 95 (29 Jun 2020 14:08) (91 - 95)  BP: 158/60 (29 Jun 2020 14:08) (158/60 - 171/96)  BP(mean): --  RR: 18 (29 Jun 2020 14:08) (17 - 18)  SpO2: 99% (29 Jun 2020 14:08) (97% - 99%)    Physical exam:  Neurologic:  -Mental status: Awake, alert, oriented to person, place, and time. Speech is fluent with intact naming, repetition, and comprehension, Hoarse voice no dysarthria. Recent and remote memory intact. Follows commands. Attention/concentration intact. Fund of knowledge appropriate.  -Cranial nerves:   II: Visual fields are full to confrontation.  III, IV, VI: Extraocular movements are intact without nystagmus. Pupils equally round and reactive to light  V:  Facial sensation V1-V3 equal and intact   VII: Right facial droop  VIII: Hearing is bilaterally intact to finger rub  IX, X: Uvula is midline and soft palate rises symmetrically  XI: Head turning and shoulder shrug are intact.  XII: Tongue protrudes midline  Motor: Normal bulk and tone. No pronator drift. Strength bilateral upper extremity 5/5, bilateral lower extremities 5/5.  Rapid alternating movements intact and symmetric  Sensation: Intact to light touch bilaterally. No neglect or extinction on double simultaneous testing.  Coordination: No dysmetria on finger-to-nose and heel-to-shin bilaterally  Reflexes: Downgoing toes bilaterally   Gait: Unsteady, wide based gait, able to walk on toes and heels. Able to walk heel to toe but slightly more unsteady.    NIHSS: 1    Fingerstick Blood Glucose: CAPILLARY BLOOD GLUCOSE      POCT Blood Glucose.: 155 mg/dL (29 Jun 2020 13:40)    LABS:                        12.8   5.39  )-----------( 271      ( 29 Jun 2020 14:04 )             38.0     06-29    145  |  106  |  12  ----------------------------<  135<H>  3.8   |  23  |  0.81    Ca    9.2      29 Jun 2020 14:04    TPro  7.3  /  Alb  4.5  /  TBili  0.6  /  DBili  x   /  AST  13  /  ALT  12  /  AlkPhos  51  06-29    PT/INR - ( 29 Jun 2020 14:04 )   PT: 12.4 sec;   INR: 1.04          PTT - ( 29 Jun 2020 14:04 )  PTT:29.4 sec  CARDIAC MARKERS ( 29 Jun 2020 14:04 )  x     / <0.01 ng/mL / x     / x     / x              RADIOLOGY & ADDITIONAL STUDIES:  < from: CT Brain Stroke Protocol (06.29.20 @ 14:15) >  No acute intracranial hemorrhage or territorial infarction.    Age indeterminate lacunar infarctions in the bilateral thalami and internal capsules.    Slightly advanced parenchymal volume loss with long-standing small vessel ischemic changes.    -----------------------------------------------------------------------------------------------------------------  IV-tPA (Y/N):    no  Reason IV-tPA not given: mild neurologic symptoms now resolved **STROKE CODE CONSULT NOTE**    Last known well time/Time of onset of symptoms: 6/29/2020 1300    HPI: 59y Male with PMHx HLD HTN, b/l strokes on HCT, BIBA s/p episode of fall to the right in street. Patient was walking about 2 hours to and from to a dentist appt when he suddenly felt off balance and fell 1 hour PTA. He then felt normal again. Denied light-headedness and dizziness, denies knee buckling. EMS called but pt was up and walking by that time. Pt's gait observed as returning to n/l but he looks a little unsteady at baseline upon arrival to ED. Stroke Code called in ED, NIHSS1 (chronic right facial droop). HCT showed no acute infarct or hemorrhage, noted bilateral small vessel strokes. CTA was negative for large vessel occlusion, noted moderate stenosis of the proximal right M1 segment and the left P3 segment and mod to severe stenosis of the proximal left vertebral artery. TPA was not given as patient with a return to baseline function. Denies LOC/dizziness, unilateral weakness, vision changes, facial droop or slurred speech. Patient is unaware that he previously had strokes, was never diagnosed.  mg/dl. Denies associated SOB, NVD, lightheadedness, diaphoresis, palpitations, cough/rhinorrhea, black/bloody stool, LE pain/swelling, focal weakness/numbness, recent travel/immobilization, abd pain, urinary complaint.     T(C): 36.8 (06-29-20 @ 13:40), Max: 36.8 (06-29-20 @ 13:40)  HR: 95 (06-29-20 @ 14:08) (91 - 95)  BP: 158/60 (06-29-20 @ 14:08) (158/60 - 171/96)  RR: 18 (06-29-20 @ 14:08) (17 - 18)  SpO2: 99% (06-29-20 @ 14:08) (97% - 99%)    PAST MEDICAL & SURGICAL HISTORY:      FAMILY HISTORY:      SOCIAL HISTORY:    MEDICATIONS  (STANDING):    MEDICATIONS  (PRN):    Allergies    No Known Allergies    Intolerances      Vital Signs Last 24 Hrs  T(C): 36.8 (29 Jun 2020 13:40), Max: 36.8 (29 Jun 2020 13:40)  T(F): 98.3 (29 Jun 2020 13:40), Max: 98.3 (29 Jun 2020 13:40)  HR: 95 (29 Jun 2020 14:08) (91 - 95)  BP: 158/60 (29 Jun 2020 14:08) (158/60 - 171/96)  BP(mean): --  RR: 18 (29 Jun 2020 14:08) (17 - 18)  SpO2: 99% (29 Jun 2020 14:08) (97% - 99%)    Physical exam:  Neurologic:  -Mental status: Awake, alert, oriented to person, place, and time. Speech is fluent with intact naming, repetition, and comprehension, Hoarse voice no dysarthria. Recent and remote memory intact. Follows commands. Attention/concentration intact. Fund of knowledge appropriate.  -Cranial nerves:   II: Visual fields are full to confrontation.  III, IV, VI: Extraocular movements are intact without nystagmus. Pupils equally round and reactive to light  V:  Facial sensation V1-V3 equal and intact   VII: Right facial droop  VIII: Hearing is bilaterally intact to finger rub  IX, X: Uvula is midline and soft palate rises symmetrically  XI: Head turning and shoulder shrug are intact.  XII: Tongue protrudes midline  Motor: Normal bulk and tone. No pronator drift. Strength bilateral upper extremity 5/5, bilateral lower extremities 5/5.  Rapid alternating movements intact and symmetric  Sensation: Intact to light touch bilaterally. No neglect or extinction on double simultaneous testing.  Coordination: No dysmetria on finger-to-nose and heel-to-shin bilaterally  Reflexes: Downgoing toes bilaterally   Gait: Unsteady, wide based gait, able to walk on toes and heels. Able to walk heel to toe but slightly more unsteady.    NIHSS: 1    Fingerstick Blood Glucose: CAPILLARY BLOOD GLUCOSE      POCT Blood Glucose.: 155 mg/dL (29 Jun 2020 13:40)    LABS:                        12.8   5.39  )-----------( 271      ( 29 Jun 2020 14:04 )             38.0     06-29    145  |  106  |  12  ----------------------------<  135<H>  3.8   |  23  |  0.81    Ca    9.2      29 Jun 2020 14:04    TPro  7.3  /  Alb  4.5  /  TBili  0.6  /  DBili  x   /  AST  13  /  ALT  12  /  AlkPhos  51  06-29    PT/INR - ( 29 Jun 2020 14:04 )   PT: 12.4 sec;   INR: 1.04          PTT - ( 29 Jun 2020 14:04 )  PTT:29.4 sec  CARDIAC MARKERS ( 29 Jun 2020 14:04 )  x     / <0.01 ng/mL / x     / x     / x              RADIOLOGY & ADDITIONAL STUDIES:  < from: CT Brain Stroke Protocol (06.29.20 @ 14:15) >  No acute intracranial hemorrhage or territorial infarction.    Age indeterminate lacunar infarctions in the bilateral thalami and internal capsules.    Slightly advanced parenchymal volume loss with long-standing small vessel ischemic changes.      < from: CT Angio Neck w/ IV Cont (06.29.20 @ 14:16) >  IMPRESSION: No large vessel occlusion. Moderate stenosis of the proximal right M1 segment and the left P3 segment.    < end of copied text >  < from: CT Angio Neck w/ IV Cont (06.29.20 @ 14:16) >  Moderate to severe stenosis of the proximal left vertebral artery. Mild stenosis of the bilateral proximal internal carotid arteries per NASCET criteria.    < end of copied text >    -----------------------------------------------------------------------------------------------------------------  IV-tPA (Y/N):    no  Reason IV-tPA not given: mild neurologic symptoms now resolved

## 2020-06-29 NOTE — ED PROVIDER NOTE - OBJECTIVE STATEMENT
60 yo hx of htn w fall 1 hour pta, feeling off balance and fell. no other complaints right now.  Denies associated SOB, NVD, lightheaded, diaphoresis, palpitations, cough/rhinorrhea, black/bloody stool, LE pain/swelling, focal weakness/numbness, recent travel/immobilization, abd pain, urinary complaints, f/c. 58 yo hx of htn w fall 1 hour pta, feeling off balance and fell. no other complaints right now. no chest pain, neck pain, denies hurting anywhere during injury. Cannot remember if he tripped. Denies associated SOB, NVD, lightheaded, diaphoresis, palpitations, cough/rhinorrhea, black/bloody stool, LE pain/swelling, focal weakness/numbness, recent travel/immobilization, abd pain, urinary complaints, f/c.

## 2020-06-29 NOTE — H&P ADULT - PROBLEM SELECTOR PLAN 2
Patient has what appears to be a chronic basal ganglia infarct on imaging.   -ASA 81 mg  -Plavix 75 mg   -Atorvastatin 80 mg Patient has age indeterminate lacunar infarctions in the bilateral thalami and internal capsules.   -ASA 81 mg  -Plavix 75 mg   -Atorvastatin 80 mg

## 2020-06-29 NOTE — ED ADULT TRIAGE NOTE - CHIEF COMPLAINT QUOTE
pt BIBA s/p witnessed fall on street, pt states he lost his balance. EMS states he was unable to steady himself while standing. denies LOC/dizziness, unilateral weakness, vision changes, facial droop or slurred speech. pt is aox2, unable to obtain last known well.  mg/dl.

## 2020-06-30 LAB
A1C WITH ESTIMATED AVERAGE GLUCOSE RESULT: 5.4 % — SIGNIFICANT CHANGE UP (ref 4–5.6)
ANION GAP SERPL CALC-SCNC: 11 MMOL/L — SIGNIFICANT CHANGE UP (ref 5–17)
BUN SERPL-MCNC: 12 MG/DL — SIGNIFICANT CHANGE UP (ref 7–23)
CALCIUM SERPL-MCNC: 9.2 MG/DL — SIGNIFICANT CHANGE UP (ref 8.4–10.5)
CHLORIDE SERPL-SCNC: 105 MMOL/L — SIGNIFICANT CHANGE UP (ref 96–108)
CHOLEST SERPL-MCNC: 112 MG/DL — SIGNIFICANT CHANGE UP (ref 10–199)
CO2 SERPL-SCNC: 29 MMOL/L — SIGNIFICANT CHANGE UP (ref 22–31)
CREAT SERPL-MCNC: 0.9 MG/DL — SIGNIFICANT CHANGE UP (ref 0.5–1.3)
ESTIMATED AVERAGE GLUCOSE: 108 MG/DL — SIGNIFICANT CHANGE UP (ref 68–114)
GLUCOSE SERPL-MCNC: 96 MG/DL — SIGNIFICANT CHANGE UP (ref 70–99)
HCT VFR BLD CALC: 38.2 % — LOW (ref 39–50)
HCV AB S/CO SERPL IA: 0.09 S/CO — SIGNIFICANT CHANGE UP
HCV AB SERPL-IMP: SIGNIFICANT CHANGE UP
HDLC SERPL-MCNC: 44 MG/DL — SIGNIFICANT CHANGE UP
HGB BLD-MCNC: 12.6 G/DL — LOW (ref 13–17)
LIPID PNL WITH DIRECT LDL SERPL: 50 MG/DL — SIGNIFICANT CHANGE UP
MAGNESIUM SERPL-MCNC: 2.2 MG/DL — SIGNIFICANT CHANGE UP (ref 1.6–2.6)
MCHC RBC-ENTMCNC: 29.4 PG — SIGNIFICANT CHANGE UP (ref 27–34)
MCHC RBC-ENTMCNC: 33 GM/DL — SIGNIFICANT CHANGE UP (ref 32–36)
MCV RBC AUTO: 89.3 FL — SIGNIFICANT CHANGE UP (ref 80–100)
NRBC # BLD: 0 /100 WBCS — SIGNIFICANT CHANGE UP (ref 0–0)
PHOSPHATE SERPL-MCNC: 5 MG/DL — HIGH (ref 2.5–4.5)
PLATELET # BLD AUTO: 276 K/UL — SIGNIFICANT CHANGE UP (ref 150–400)
POTASSIUM SERPL-MCNC: 3.8 MMOL/L — SIGNIFICANT CHANGE UP (ref 3.5–5.3)
POTASSIUM SERPL-SCNC: 3.8 MMOL/L — SIGNIFICANT CHANGE UP (ref 3.5–5.3)
RBC # BLD: 4.28 M/UL — SIGNIFICANT CHANGE UP (ref 4.2–5.8)
RBC # FLD: 13.7 % — SIGNIFICANT CHANGE UP (ref 10.3–14.5)
SODIUM SERPL-SCNC: 145 MMOL/L — SIGNIFICANT CHANGE UP (ref 135–145)
TOTAL CHOLESTEROL/HDL RATIO MEASUREMENT: 2.5 RATIO — LOW (ref 3.4–9.6)
TRIGL SERPL-MCNC: 92 MG/DL — SIGNIFICANT CHANGE UP (ref 10–149)
TSH SERPL-MCNC: 0.25 UIU/ML — LOW (ref 0.35–4.94)
WBC # BLD: 5.8 K/UL — SIGNIFICANT CHANGE UP (ref 3.8–10.5)
WBC # FLD AUTO: 5.8 K/UL — SIGNIFICANT CHANGE UP (ref 3.8–10.5)

## 2020-06-30 PROCEDURE — 99233 SBSQ HOSP IP/OBS HIGH 50: CPT

## 2020-06-30 PROCEDURE — 76376 3D RENDER W/INTRP POSTPROCES: CPT | Mod: 26

## 2020-06-30 PROCEDURE — 93312 ECHO TRANSESOPHAGEAL: CPT | Mod: 26

## 2020-06-30 RX ORDER — POTASSIUM CHLORIDE 20 MEQ
20 PACKET (EA) ORAL ONCE
Refills: 0 | Status: DISCONTINUED | OUTPATIENT
Start: 2020-06-30 | End: 2020-07-02

## 2020-06-30 RX ADMIN — Medication 81 MILLIGRAM(S): at 15:10

## 2020-06-30 RX ADMIN — ATORVASTATIN CALCIUM 80 MILLIGRAM(S): 80 TABLET, FILM COATED ORAL at 22:16

## 2020-06-30 RX ADMIN — CLOPIDOGREL BISULFATE 75 MILLIGRAM(S): 75 TABLET, FILM COATED ORAL at 15:10

## 2020-06-30 RX ADMIN — ENOXAPARIN SODIUM 40 MILLIGRAM(S): 100 INJECTION SUBCUTANEOUS at 20:50

## 2020-06-30 NOTE — PROGRESS NOTE ADULT - ASSESSMENT
59 M PMH HTN, chronic b/l CVA, presenting with episode of fall to right, being evaluated in tele/stepdown for TIA workup. CTH neg for acute infarct or ICH, b/l small vessel strokes, CTA was negative for large vessel occlusion, noted moderate stenosis of the proximal right M1 segment and the left P3 segment and mod to severe stenosis of the proximal left vertebral artery. 59 M PMH HTN, chronic b/l CVA, presenting with episode of fall to right, being evaluated in tele/stepdown for TIA workup. CTH neg for acute infarct or ICH, b/l small vessel strokes, CTA was negative for large vessel occlusion, noted moderate stenosis of the proximal right M1 segment and the left P3 segment and mod to severe stenosis of the proximal left vertebral artery. ERICA shows no LA/RA/GIRISH/RAA thrombus, and a non-mobile plaque in descending aorta, also findings suggestive of possible pulmonary AV malformation.

## 2020-06-30 NOTE — OCCUPATIONAL THERAPY INITIAL EVALUATION ADULT - DIAGNOSIS, OT EVAL
Pt presents as independent with all functional activities. No skilled OT intervention is warranted at this time. Pt to be DC from OT program.

## 2020-06-30 NOTE — PHYSICAL THERAPY INITIAL EVALUATION ADULT - PERTINENT HX OF CURRENT PROBLEM, REHAB EVAL
Pt. is a 59 y.o. male presenting s/p fall/LOC while ambulating on the street. MRI + for L corona radiata, L parietal and R frontal small infarcts. Pt. is asymptomatic at this time, cleared for ambulation with PT off monitor by JEREMIAH Ruiz - stroke team.

## 2020-06-30 NOTE — PROGRESS NOTE ADULT - PROBLEM SELECTOR PLAN 2
Patient has age indeterminate lacunar infarctions in the bilateral thalami and internal capsules.   -ASA 81 mg  -Plavix 75 mg   -Atorvastatin 80 mg

## 2020-06-30 NOTE — CONSULT NOTE ADULT - ASSESSMENT
per Neurology    59y Male with PMHx HLD HTN, b/l strokes on HCT, BIBA s/p episode of fall to the right in street. NIHSS1 (chronic right facial droop). HCT showed no acute infarct or hemorrhage, noted bilateral small vessel strokes. CTA was negative for large vessel occlusion, noted moderate stenosis of the proximal right M1 segment and the left P3 segment and mod to severe stenosis of the proximal left vertebral artery. TPA was not given as patient with a return to baseline function. Concern for stroke, admitted for further workup.     - Closely follow neuro checks every 2-4 hours   - Repeat HCT for acute changes in neuro exam  - Obtain ERICA with bubble  - Goal SBP < 180  - hold home lisinopril  - Bedside Dysphagia Screen  - PT/OT  - Obtain Hemoglobin A1C, Lipid Profile Panel, and TSH    Secondary Stroke Prevention Medication  - load asa 325mg and plavix 300mg once  - start asa 81mg and plavix 75mg daily starting 6/30  - Start atorvastatin 80mg PO daily- cholesterol and stroke prevention   - Start lovneox SQ and SCDs for DVT prophylaxis
59y Male with PMHx HLD HTN, b/l strokes on HCT, BIBA s/p episode of fall to the right in street. NIHSS1 (chronic right facial droop). HCT showed no acute infarct or hemorrhage, noted bilateral small vessel strokes. CTA was negative for large vessel occlusion, noted moderate stenosis of the proximal right M1 segment and the left P3 segment and mod to severe stenosis of the proximal left vertebral artery. TPA was not given as patient with a return to baseline function. Concern for TIA vs stroke, admitted for further workup.     - Closely follow neuro checks every 2-4 hours   - Repeat HCT for acute changes in neuro exam  - Obtain MRI Brain without contrast  - Obtain ERICA with bubble  - Goal SBP < 180  - hold home lisinopril  - Bedside Dysphagia Screen  - PT/OT  - Obtain Hemoglobin A1C, Lipid Profile Panel, and TSH    Secondary Stroke Prevention Medication  - load asa 325mg and plavix 300mg once  - start asa 81mg and plavix 75mg daily starting 6/30  - Start atorvastatin 80mg PO daily- cholesterol and stroke prevention   - Start lovneox SQ and SCDs for DVT prophylaxis

## 2020-06-30 NOTE — PROGRESS NOTE ADULT - SUBJECTIVE AND OBJECTIVE BOX
OVERNIGHT EVENTS: NAEO    SUBJECTIVE / INTERVAL HPI: Patient seen and examined at bedside. Denies HAs, changes in vision, numbness/weakness, fevers/ chills, SOB/CP/Palpitations, abd pain, pain/difficulty urinating.     VITAL SIGNS:  Vital Signs Last 24 Hrs  T(C): 36.7 (2020 13:45), Max: 37.4 (2020 00:43)  T(F): 98 (2020 13:45), Max: 99.3 (2020 00:43)  HR: 44 (2020 13:02) (44 - 88)  BP: 126/66 (2020 13:02) (126/66 - 165/80)  BP(mean): 91 (2020 13:) (91 - 108)  RR: 16 (2020 13:) (16 - 18)  SpO2: 99% (2020 13:) (95% - 99%)    PHYSICAL EXAM:    General: Middle-aged man, resting in bed, NAD  HEENT: NC/AT; PERRL, MMM  Neck: supple  Cardiovascular: +S1/S2; RRR  Respiratory: CTA B/L; no W/R/R  Gastrointestinal: soft, NT/ND; +BSx4  Extremities: WWP; no edema, clubbing or cyanosis  Vascular: 2+ radial, DP/PT pulses B/L  Neurological: AAOx3; CN 2-12 grossly intact. Strength in UE and LE 5/5; Sensation grossly intact b/l. No dysmetria or dysdiadochokinesia.     MEDICATIONS:  MEDICATIONS  (STANDING):  aspirin enteric coated 81 milliGRAM(s) Oral daily  atorvastatin 80 milliGRAM(s) Oral at bedtime  clopidogrel Tablet 75 milliGRAM(s) Oral daily  enoxaparin Injectable 40 milliGRAM(s) SubCutaneous every 24 hours  potassium chloride   Powder 20 milliEquivalent(s) Oral once    MEDICATIONS  (PRN):    ALLERGIES:  Allergies    No Known Allergies    Intolerances    LABS:                        12.6   5.80  )-----------( 276      ( 2020 05:55 )             38.2     06-30    145  |  105  |  12  ----------------------------<  96  3.8   |  29  |  0.90    Ca    9.2      2020 05:55  Phos  5.0     06-30  Mg     2.2     06-30    TPro  7.3  /  Alb  4.5  /  TBili  0.6  /  DBili  x   /  AST  13  /  ALT  12  /  AlkPhos  51  06-29    PT/INR - ( 2020 14:04 )   PT: 12.4 sec;   INR: 1.04       PTT - ( 2020 14:04 )  PTT:29.4 sec  Urinalysis Basic - ( 2020 17:54 )    Color: Yellow / Appearance: Clear / S.020 / pH: x  Gluc: x / Ketone: 15 mg/dL  / Bili: Negative / Urobili: 0.2 E.U./dL   Blood: x / Protein: NEGATIVE mg/dL / Nitrite: NEGATIVE   Leuk Esterase: NEGATIVE / RBC: < 5 /HPF / WBC < 5 /HPF   Sq Epi: x / Non Sq Epi: 0-5 /HPF / Bacteria: x    CAPILLARY BLOOD GLUCOSE    POCT Blood Glucose.: 155 mg/dL (2020 13:40)    RADIOLOGY & ADDITIONAL TESTS: Reviewed. OVERNIGHT EVENTS: NAEO    SUBJECTIVE / INTERVAL HPI: Patient seen and examined at bedside. Denies HAs, changes in vision, numbness/weakness, fevers/ chills, SOB/CP/Palpitations, abd pain, pain/difficulty urinating.     VITAL SIGNS:  Vital Signs Last 24 Hrs  T(C): 36.7 (2020 13:45), Max: 37.4 (2020 00:43)  T(F): 98 (2020 13:45), Max: 99.3 (2020 00:43)  HR: 44 (2020 13:02) (44 - 88)  BP: 126/66 (2020 13:02) (126/66 - 165/80)  BP(mean): 91 (2020 13:) (91 - 108)  RR: 16 (2020 13:) (16 - 18)  SpO2: 99% (2020 13:02) (95% - 99%)    PHYSICAL EXAM:    General: Middle-aged man, resting in bed, NAD  HEENT: NC/AT; PERRL, MMM. Hoarse voice.   Neck: supple  Cardiovascular: +S1/S2; RRR  Respiratory: CTA B/L; no W/R/R  Gastrointestinal: soft, NT/ND; +BSx4  Extremities: WWP; no edema, clubbing or cyanosis  Vascular: 2+ radial, DP/PT pulses B/L  Neurological: AAOx3; R facial droop; other CNs grossly intact. Strength in UE and LE 5/5; Sensation grossly intact b/l. No dysmetria or dysdiadochokinesia. Unsteady, wide-based gait    MEDICATIONS:  MEDICATIONS  (STANDING):  aspirin enteric coated 81 milliGRAM(s) Oral daily  atorvastatin 80 milliGRAM(s) Oral at bedtime  clopidogrel Tablet 75 milliGRAM(s) Oral daily  enoxaparin Injectable 40 milliGRAM(s) SubCutaneous every 24 hours  potassium chloride   Powder 20 milliEquivalent(s) Oral once    MEDICATIONS  (PRN):    ALLERGIES:  Allergies    No Known Allergies    Intolerances    LABS:                        12.6   5.80  )-----------( 276      ( 2020 05:55 )             38.2     06-30    145  |  105  |  12  ----------------------------<  96  3.8   |  29  |  0.90    Ca    9.2      2020 05:55  Phos  5.0     06-30  Mg     2.2     06-30    TPro  7.3  /  Alb  4.5  /  TBili  0.6  /  DBili  x   /  AST  13  /  ALT  12  /  AlkPhos  51  06-29    PT/INR - ( 2020 14:04 )   PT: 12.4 sec;   INR: 1.04       PTT - ( 2020 14:04 )  PTT:29.4 sec  Urinalysis Basic - ( 2020 17:54 )    Color: Yellow / Appearance: Clear / S.020 / pH: x  Gluc: x / Ketone: 15 mg/dL  / Bili: Negative / Urobili: 0.2 E.U./dL   Blood: x / Protein: NEGATIVE mg/dL / Nitrite: NEGATIVE   Leuk Esterase: NEGATIVE / RBC: < 5 /HPF / WBC < 5 /HPF   Sq Epi: x / Non Sq Epi: 0-5 /HPF / Bacteria: x    CAPILLARY BLOOD GLUCOSE    POCT Blood Glucose.: 155 mg/dL (2020 13:40)    RADIOLOGY & ADDITIONAL TESTS: Reviewed.

## 2020-06-30 NOTE — PHYSICAL THERAPY INITIAL EVALUATION ADULT - ADDITIONAL COMMENTS
Pt. denies prior use of assistive device; reports using stairs when using public transportation, denies steps to enter the building, + elevator access.

## 2020-06-30 NOTE — OCCUPATIONAL THERAPY INITIAL EVALUATION ADULT - PERTINENT HX OF CURRENT PROBLEM, REHAB EVAL
59 M PMH HTN, chronic b/l CVA, presenting with episode of fall to right, being evaluated in tele/stepdown for TIA workup. CTH neg for acute infarct or ICH, b/l small vessel strokes, CTA was negative for large vessel occlusion, noted moderate stenosis of the proximal right M1 segment and the left P3 segment and mod to severe stenosis of the proximal left vertebral artery.

## 2020-06-30 NOTE — CHART NOTE - NSCHARTNOTEFT_GEN_A_CORE
Goals reviewed at interdisciplinary rounds with case management, social work, physical therapy, occupational therapy, and speech language pathology.    Please see specific therapy  notes for in depth goals.    Highlights of goals are:    Patient will:   Physical therapy  [] remain on bedrest  [] get out of bed to chair [] ambulate with assistance [x] ambulate without assistance  []today       [x] by discharge    Occupational therapy  [x] N/a, independent [] balance training  []go from supine to seated independently [] balance sitting at the edge of the bed to do grooming task []stand at sink to perform grooming task  [] dress self   [] gain strength to feed self  [] other:  []today         [x]by discharge    Speech therapy  [x] N/a, no speech deficit [] vocalize [] respond to yes/no questions given cues with 80% accuracy [] name common objects [] express basic needs/wants  []other:  []today        [x] by discharge      Swallow therapy  [x] tolerate regular diet [] remain NPO, receive oral care to minimize aspirating bacteria mouth bacteria [] tolerate pureed diet  [] tolerate mechanical soft diet  [] tolerate tube feeds [] tolerate thin liquids [] tolerate nectar thick liquids [] other:  [] with assistance  [] today       [x] by discharge

## 2020-06-30 NOTE — OCCUPATIONAL THERAPY INITIAL EVALUATION ADULT - SHORT TERM MEMORY, REHAB EVAL
intact/immediate recall of 3 items with 3/3 accuracy; delayed recall of 3 items with 2/3 accuracy;  orientation to place, time and situation WFL

## 2020-06-30 NOTE — PROGRESS NOTE ADULT - PROBLEM SELECTOR PLAN 1
Given pt's resolving course, his old b/l strokes and episode of unsteadiness, concern for TIA. On imaging, pt has moderate stenosis of proximal R M1 segment and L P3 segment, as well as mod-severe stenosis of prox L vertebral artery and mild stenosis of b/l proximal internal carotid arteries. It's possible that the stenosis of proximal L vertebral artery could have led to the pt's episode. Will need further imaging of posterior circulation.   -Load  mg and plavix 300 mg once in ED  -Start ASA 81 mg, plavix 75 daily day after admission  -Start atorvastatin 80 mg daily  -Order MRI brain noncon  -Order ERICA  - PT/OT  -sBP goal 120-180 Given pt's resolving course, his old b/l strokes and episode of unsteadiness, concern for TIA. On imaging, pt has moderate stenosis of proximal R M1 segment and L P3 segment, as well as mod-severe stenosis of prox L vertebral artery and mild stenosis of b/l proximal internal carotid arteries. Analysis of pt's images today with Dr. Guillen showed 4 discrete hyperlucent foci on MRI. Cause is likely embolic vs hypoercoagulable vs vasculitis per neuro. ERICA results not as concerning for embolism.   -Obtain hypercoagulability studies for pt; if not revealing, per neuro attending may pursue further vasculitis w/u   -Continue ASA 81 mg, plavix 75 daily day after admission  -Continue atorvastatin 80 mg daily  -F/u MRI brain noncon  - PT/OT  -sBP goal 120-180

## 2020-06-30 NOTE — CONSULT NOTE ADULT - SUBJECTIVE AND OBJECTIVE BOX
Patient is a 59y old  Male who presents with a chief complaint of Fall (2020 16:16)       HPI:  59 M PMH HTN, b/l strokes on HCT, BIBA s/p episode of fall to the right in street.  Patient was walking about 2 hours to and from to a dentist appt when he suddenly felt off balance and fell 1 hour PTA. He then felt normal again. Denied light-headedness and dizziness, denies knee buckling. EMS called but pt was up and walking by that time. Code called in ED, NIHSS1 (chronic right facial droop). Pt's Gait observed as returning to n/l but he looks a little unsteady at baseline.     Denies LOC/dizziness, unilateral weakness, vision changes, facial droop or slurred speech. pt is aox2, unable to obtain last known well.  mg/dl. Denies associated SOB, NVD, lightheadedness, diaphoresis, palpitations, cough/rhinorrhea, black/bloody stool, LE pain/swelling, focal weakness/numbness, recent travel/immobilization, abd pain, urinary complaint. (2020 16:16)      PAST MEDICAL & SURGICAL HISTORY:  Hypertension      MEDICATIONS  (STANDING):  aspirin enteric coated 81 milliGRAM(s) Oral daily  atorvastatin 80 milliGRAM(s) Oral at bedtime  clopidogrel Tablet 75 milliGRAM(s) Oral daily  enoxaparin Injectable 40 milliGRAM(s) SubCutaneous every 24 hours  potassium chloride   Powder 20 milliEquivalent(s) Oral once    MEDICATIONS  (PRN):      Baseline Functional Level Prior to Admission:           - ADL's:  independent           - ambulated without assistive devices    FAMILY HISTORY:      CBC Full  -  ( 2020 05:55 )  WBC Count : 5.80 K/uL  RBC Count : 4.28 M/uL  Hemoglobin : 12.6 g/dL  Hematocrit : 38.2 %  Platelet Count - Automated : 276 K/uL  Mean Cell Volume : 89.3 fl  Mean Cell Hemoglobin : 29.4 pg  Mean Cell Hemoglobin Concentration : 33.0 gm/dL  Auto Neutrophil # : x  Auto Lymphocyte # : x  Auto Monocyte # : x  Auto Eosinophil # : x  Auto Basophil # : x  Auto Neutrophil % : x  Auto Lymphocyte % : x  Auto Monocyte % : x  Auto Eosinophil % : x  Auto Basophil % : x      06-30    145  |  105  |  12  ----------------------------<  96  3.8   |  29  |  0.90    Ca    9.2      2020 05:55  Phos  5.0     -  Mg     2.2     -    TPro  7.3  /  Alb  4.5  /  TBili  0.6  /  DBili  x   /  AST  13  /  ALT  12  /  AlkPhos  51        Urinalysis Basic - ( 2020 17:54 )    Color: Yellow / Appearance: Clear / S.020 / pH: x  Gluc: x / Ketone: 15 mg/dL  / Bili: Negative / Urobili: 0.2 E.U./dL   Blood: x / Protein: NEGATIVE mg/dL / Nitrite: NEGATIVE   Leuk Esterase: NEGATIVE / RBC: < 5 /HPF / WBC < 5 /HPF   Sq Epi: x / Non Sq Epi: 0-5 /HPF / Bacteria: x          Radiology:    < from: CT Brain Stroke Protocol (20 @ 14:15) >  EXAM:  CT BRAIN STROKE PROTOCOL                          PROCEDURE DATE:  2020          INTERPRETATION:  IGio MD, have reviewed the images and the report and agree with the findings, with the following modification:     There is mild mucosal thickening of the ethmoid air cells. There are bilateral maxillary sinus polyp/retention cysts.    PROCEDURE: CT head without intravenous contrast    INDICATIONS: Off balance.    TECHNIQUE:  Serial axial images were obtained from the skull base to the vertex without the use of intravenous contrast. Coronal and sagittal reconstructions were created.    COMPARISON EXAMINATION: None.    FINDINGS:    VENTRICLES AND SULCI: There is parenchymal volume loss, slightly advanced for patient's age. No hydrocephalus.   INTRA-AXIAL: No intracranial mass, acute hemorrhage, or midline shift is present. No acute transcortical infarction. There are age indeterminate lacunar infarctions in the bilateral thalami and bilateral internal capsules. There are scattered hypodensities throughout the periventricular white matter, likely the sequela of long-standing small vessel ischemic disease.  EXTRA-AXIAL: No extra-axial fluid collection is present.   VISUALIZED SINUSES: No air-fluid levels are identified. Partially visualized polyps versus retention cysts in the bilateral maxillary antra.  VISUALIZED MASTOIDS: Well aerated.  CALVARIUM: No fracture.    IMPRESSION:     No acute intracranial hemorrhage or territorial infarction.    Age indeterminate lacunar infarctions in the bilateral thalami and internal capsules.    Slightly advanced parenchymal volume loss with long-standing small vessel ischemic changes.        < from: CT Perfusion w/ Maps w/ IV Cont (20 @ 14:16) >    EXAM:  CT PERFUSION W MAPS IC                          PROCEDURE DATE:  2020          INTERPRETATION:  PROCEDURE: CT Perfusion with intravenous contrast.    INDICATION: Stroke code fall    TECHNIQUE: Following the intravenous administration of 40 ml of Optiray 350, serial axial images were obtained through the brain. The CT perfusion data set was post processed per [facility] protocol generating color maps of CBF, CBV, MTT, and Tmax.    COMPARISON: None    FINDINGS: The CBF <30% volume is 0 mL.  The Tmax > 6s volume is 0 mL.  The mismatch volume is 0 mL.    IMPRESSION: Normal perfusion study.        < from: CT Angio Head w/ IV Cont (20 @ 14:16) >  EXAM:  CT ANGIO NECK (W)AW IC                          EXAM:  CT ANGIO BRAIN (W)AW IC                          PROCEDURE DATE:  2020          INTERPRETATION:  PROCEDURE: CTA brain with and without intravenous contrast.    INDICATION: Stroke code.    TECHNIQUE: Multiple axial thin section were obtained through the [place] following the intravenous bolus injection of Optiray-350. Multiple 3-D reformatted images were generated from the axial cuts. Post-processing was performed on a independent workstation.    COMPARISON: None.    FINDINGS: The CTA examination of the [place] demonstrates the internal carotid arteries to be normal in caliber. There is calcified plaque of the cavernous and supraclinoid segments of the bilateral internal carotid arteries without significant stenosis. There is a normal bifurcation into the A1 and M1 segments. There is moderate stenosis of the proximal right M1 segment. The visualized vertebral arteries are normal in caliber. The basilar artery is normal in caliber. There is a normal bifurcation into the posterior cerebral arteries. There is moderate stenosis of the left P3 segment. There is no evidence of aneurysm.    IMPRESSION: No large vessel occlusion. Moderate stenosis of the proximal right M1 segment and the left P3 segment.      PROCEDURE: CTA neck with and without intravenous contrast.    INDICATION: Stroke code.    TECHNIQUE: Multiple axial thin section were obtained through the neck following the intravenous bolus injection of Optiray-350. Multiple 3-D reformatted images were generated from the axial cuts. Post-processing was performed on a independent workstation.    COMPARISON: None.    FINDINGS: The CTA examination demonstrates the right common carotid artery to be normal in caliber. There is a normal bifurcation into the right internal and external carotid arteries. There is atherosclerotic plaque at the carotid bifurcation without hemodynamically significant stenosis.     The left common carotid artery is normal in caliber. There is a normal bifurcation into the left internal and external carotid arteries. There is focal calcified and noncalcified plaque of the right carotid bifurcation and proximal right internal carotid artery resulting in mild stenosis per NASCET criteria.    There is focal calcified and noncalcified plaque of the proximal left vertebral artery resulting in moderate to severe stenosis. There is mild calcified plaque of the right vertebral artery origin without hemodynamically significant stenosis. There is contrast filling of the remainder cervical bilateral vertebral arteries without significant stenosis.    The aortic arch appears intact without narrowing of the origin of the great vessels. There is mild atherosclerosis of the aortic arch and the origins of the great vessels without hemodynamically significant stenosis.    Incidental findings: Moderate to severe centrilobular emphysema. There is a subcentimeter 0.6 x 0.5 cm left thyroid nodule.    IMPRESSION:     Moderate to severe stenosis of the proximal left vertebral artery. Mild stenosis of the bilateral proximal internal carotid arteries per NASCET criteria.      < from: MR Head No Cont (20 @ 22:11) >  EXAM:  MR BRAIN                          PROCEDURE DATE:  2020          INTERPRETATION:  PROCEDURE: MRI Brain without contrast    INDICATION: Rule out stroke.    TECHNIQUE: Sagittal volumetric T1 with reformatted axial T1 and coronal T1, axial T2, FLAIR, DWI, and T2-FFE images were obtained. No contrast was given.     COMPARISON: CT head from 2020.    FINDINGS:     There several punctate foci of increased signal on diffusion-weighted imaging with corresponding decreased signal on theapparent diffusion coefficient map in the left putamen and external capsule, left corona radiata, left parietal subcortical white matter, and a couple in the juxtacortical/subcortical white matter of the right frontal lobe, and which demonstrate T2 prolongation, consistent with acute/early subacute infarcts.    There is confluent, nonspecific T2 prolongation within the periventricular white matter extending into the corona radiata and centrum semiovale with discrete foci in the frontal and parietal subcortical white matter bilaterally with additional foci in the vicky. Additionally, there are multiple lacunar infarcts identified within the centrum semiovale bilaterally, corona radiata bilaterally, basal ganglia bilaterally, thalami bilaterally, external capsule bilaterally, and the vicky. There appear to be lacunar infarcts in the body and splenium of the corpus callosum. Overall, findings are consistent with moderate to severe chronic microangiopathic disease.    No parenchymal mass or extra-axial fluid collection is seen. There is no midline shift or focal mass effect. There are couple of susceptibility foci noted in the thalami bilaterally likely hemosiderin related to chronic microangiopathic disease (prior hemorrhagic lacunar infarcts) and/or hypertension. There is parenchymal volume loss present which is slightly greater than expected for patient age.    There is minor mucosal thickening in the ethmoid air cells maxillary antra bilaterally. No air-fluid levels are seen in the paranasal sinuses. There is minor opacification within the mastoid air cells bilaterally.    IMPRESSION    1. Acute/early subacute punctate infarcts in left putamen and external capsule, left corona radiata, left parietal subcortical white matter, juancho couple in the juxtacortical/subcortical white matter of the right frontal lobe.  2. Moderate to severe chronic periventricular, pontine, and subcortical microangiopathic disease including multiple bilateral lacunar infarcts in the basal ganglia, thalami, vicky, corona radiata, and centrum semiovale.  3. Parenchymal volume loss, slightly greater than expected for patient age.    < end of copied text >          Vital Signs Last 24 Hrs  T(C): 36.9 (2020 09:01), Max: 37.4 (2020 00:43)  T(F): 98.4 (2020 09:01), Max: 99.3 (2020 00:43)  HR: 60 (2020 08:28) (44 - 95)  BP: 149/75 (2020 08:28) (126/71 - 171/96)  BP(mean): 107 (2020 08:28) (93 - 108)  RR: 16 (2020 08:28) (16 - 18)  SpO2: 97% (2020 08:28) (95% - 99%)    REVIEW OF SYSTEMS: per HPI          Physical Exam:  60 yo  gentleman lying in semi Cerna's position, no acute complaints    Head: normocephalic, atraumatic    Eyes: PERRLA, EOMI, no nystagmus, sclera anicteric    ENT: nasal discharge, uvula midline, no oropharyngeal erythema/exudate    Neck: supple, negative JVD, negative carotid bruits, no thyromegaly    Chest: CTA bilaterally, neg wheeze/ rhonchi/ rales/ crackles/ egophany    Cardiovascular: regular rate and rhythm, neg murmurs/rubs/gallops    Abdomen: soft, non distended, non tender to palpation in all 4 quadrants, negative rebound/guarding, normal bowel sounds    Extremities: WWP, neg cyanosis/clubbing/edema, negative calf tenderness to palpation, negative Tresa's sign    Musculoskeletal:      :     Neurologic Exam:    Alert and oriented to person, place, date/year, speech fluent hoarse voice w/o dysarthria, recent and remote memory intact, repetition intact, comprehension intact    Cranial Nerves:     II:                       pupils equal, round and reactive to light, visual fields intact   III/ IV/VI:            extraocular movements intact, neg nystagmus, neg ptosis  V:                       facial sensation intact, V1-3 normal  VII:                      R droop, normal eye closure  VIII:                    hearing intact to finger rub bilaterally  IX/ X:                 soft palate rise symmetrical  XI:                      head turning, shoulder shrug normal  XII:                     tongue midline    Motor Exam:    Upper Extremities:     RIght:    no focal weakness               negative drift    Left :    no focal weakness               negative drift    Lower Extremities:                 Right:   no focal weakness                 Left:       no focal weakness                 Sensory:    intact to LT/PP in all UE/LE dermatomes                     DTR:             = biceps/     triceps/     brachioradialis                      = patella/   medial hamstring/ankle                      neg clonus                      neg Babinski                        Finger to Nose:  wnl    Heel to Shin:  wnl    Rapid Alternating movements:  wnl    Joint Position Sense:  intact    Romberg:  not tested    Tandem Walking:  not tested    Gait:  not tested        PM&R Impression:    1) Acute/early subacute punctate infarcts in left putamen and external capsule, left corona radiata, left parietal subcortical white matter, juancho couple in the juxtacortical/subcortical white matter of the right frontal lobe    Plan:    1) Physical therapy focusing on therapeutic exercises, bed mobility/transfer out of bed evaluation, progressive ambulation with assistive devices prn.    2) Anticipated Disposition Plan/Recs:   pending functional progress

## 2020-06-30 NOTE — PHYSICAL THERAPY INITIAL EVALUATION ADULT - MODALITIES TREATMENT COMMENTS
Facial strength/symmetry intact, tongue midline, no visual deficits, oculomotor - intact bilat, speech fluent, shoulder shrug 5/5 bilat.

## 2020-07-01 DIAGNOSIS — I63.9 CEREBRAL INFARCTION, UNSPECIFIED: ICD-10-CM

## 2020-07-01 LAB
ANION GAP SERPL CALC-SCNC: 11 MMOL/L — SIGNIFICANT CHANGE UP (ref 5–17)
BUN SERPL-MCNC: 13 MG/DL — SIGNIFICANT CHANGE UP (ref 7–23)
CALCIUM SERPL-MCNC: 9 MG/DL — SIGNIFICANT CHANGE UP (ref 8.4–10.5)
CHLORIDE SERPL-SCNC: 105 MMOL/L — SIGNIFICANT CHANGE UP (ref 96–108)
CO2 SERPL-SCNC: 27 MMOL/L — SIGNIFICANT CHANGE UP (ref 22–31)
CREAT SERPL-MCNC: 0.84 MG/DL — SIGNIFICANT CHANGE UP (ref 0.5–1.3)
GLUCOSE SERPL-MCNC: 106 MG/DL — HIGH (ref 70–99)
HCT VFR BLD CALC: 38.8 % — LOW (ref 39–50)
HCYS SERPL-MCNC: 8.5 UMOL/L — SIGNIFICANT CHANGE UP
HGB BLD-MCNC: 12.8 G/DL — LOW (ref 13–17)
MCHC RBC-ENTMCNC: 29.5 PG — SIGNIFICANT CHANGE UP (ref 27–34)
MCHC RBC-ENTMCNC: 33 GM/DL — SIGNIFICANT CHANGE UP (ref 32–36)
MCV RBC AUTO: 89.4 FL — SIGNIFICANT CHANGE UP (ref 80–100)
NRBC # BLD: 0 /100 WBCS — SIGNIFICANT CHANGE UP (ref 0–0)
PLATELET # BLD AUTO: 280 K/UL — SIGNIFICANT CHANGE UP (ref 150–400)
POTASSIUM SERPL-MCNC: 4 MMOL/L — SIGNIFICANT CHANGE UP (ref 3.5–5.3)
POTASSIUM SERPL-SCNC: 4 MMOL/L — SIGNIFICANT CHANGE UP (ref 3.5–5.3)
RBC # BLD: 4.34 M/UL — SIGNIFICANT CHANGE UP (ref 4.2–5.8)
RBC # FLD: 13.6 % — SIGNIFICANT CHANGE UP (ref 10.3–14.5)
SODIUM SERPL-SCNC: 143 MMOL/L — SIGNIFICANT CHANGE UP (ref 135–145)
T3 SERPL-MCNC: 121 NG/DL — SIGNIFICANT CHANGE UP (ref 80–200)
T3FREE SERPL-MCNC: 3.36 PG/ML — SIGNIFICANT CHANGE UP (ref 1.8–4.6)
T4 FREE SERPL-MCNC: 0.92 NG/DL — SIGNIFICANT CHANGE UP (ref 0.7–1.48)
WBC # BLD: 5.66 K/UL — SIGNIFICANT CHANGE UP (ref 3.8–10.5)
WBC # FLD AUTO: 5.66 K/UL — SIGNIFICANT CHANGE UP (ref 3.8–10.5)

## 2020-07-01 PROCEDURE — 71275 CT ANGIOGRAPHY CHEST: CPT | Mod: 26

## 2020-07-01 PROCEDURE — 99233 SBSQ HOSP IP/OBS HIGH 50: CPT

## 2020-07-01 RX ORDER — NICOTINE POLACRILEX 2 MG
1 GUM BUCCAL DAILY
Refills: 0 | Status: DISCONTINUED | OUTPATIENT
Start: 2020-07-01 | End: 2020-07-02

## 2020-07-01 RX ADMIN — Medication 1 PATCH: at 19:02

## 2020-07-01 RX ADMIN — Medication 81 MILLIGRAM(S): at 11:08

## 2020-07-01 RX ADMIN — Medication 1 PATCH: at 18:56

## 2020-07-01 RX ADMIN — CLOPIDOGREL BISULFATE 75 MILLIGRAM(S): 75 TABLET, FILM COATED ORAL at 11:08

## 2020-07-01 RX ADMIN — ATORVASTATIN CALCIUM 80 MILLIGRAM(S): 80 TABLET, FILM COATED ORAL at 22:20

## 2020-07-01 RX ADMIN — ENOXAPARIN SODIUM 40 MILLIGRAM(S): 100 INJECTION SUBCUTANEOUS at 18:56

## 2020-07-01 NOTE — PROGRESS NOTE ADULT - SUBJECTIVE AND OBJECTIVE BOX
OVERNIGHT EVENTS: NAEO    SUBJECTIVE / INTERVAL HPI: Patient seen and examined at bedside. Denies HAs, changes in vision, numbness/weakness, fevers/ chills, SOB/CP/Palpitations, abd pain, pain/difficulty urinating.   Neuro attending saw pt, given findings of potential AVM on earlier ERICA, wanted CTA chest to follow-up. Also wanted a loop recorder long-term for looking for paroxysmal afib as a cause for cryptogenic stroke.     VITAL SIGNS:  Vital Signs Last 24 Hrs  T(C): 36.7 (2020 13:45), Max: 37.4 (2020 00:43)  T(F): 98 (2020 13:45), Max: 99.3 (2020 00:43)  HR: 44 (:) (44 - 88)  BP: 126/66 (2020 13:) (126/66 - 165/80)  BP(mean): 91 (:) (91 - 108)  RR: 16 (:) (16 - 18)  SpO2: 99% (2020 13:02) (95% - 99%)    PHYSICAL EXAM:    General: Middle-aged man, resting in bed, NAD  HEENT: NC/AT; PERRL, MMM. Hoarse voice.   Neck: supple  Cardiovascular: +S1/S2; RRR  Respiratory: CTA B/L; no W/R/R  Gastrointestinal: soft, NT/ND; +BSx4  Extremities: WWP; no edema, clubbing or cyanosis  Vascular: 2+ radial, DP/PT pulses B/L  Neurological: AAOx3; R facial droop; other CNs grossly intact. Strength in UE and LE 5/5; Sensation grossly intact b/l. No dysmetria or dysdiadochokinesia. Unsteady, wide-based gait    MEDICATIONS:  MEDICATIONS  (STANDING):  aspirin enteric coated 81 milliGRAM(s) Oral daily  atorvastatin 80 milliGRAM(s) Oral at bedtime  clopidogrel Tablet 75 milliGRAM(s) Oral daily  enoxaparin Injectable 40 milliGRAM(s) SubCutaneous every 24 hours  potassium chloride   Powder 20 milliEquivalent(s) Oral once    MEDICATIONS  (PRN):    ALLERGIES:  Allergies    No Known Allergies    Intolerances    LABS:                        12.6   5.80  )-----------( 276      ( 2020 05:55 )             38.2     06-30    145  |  105  |  12  ----------------------------<  96  3.8   |  29  |  0.90    Ca    9.2      2020 05:55  Phos  5.0     06-30  Mg     2.2     06-30    TPro  7.3  /  Alb  4.5  /  TBili  0.6  /  DBili  x   /  AST  13  /  ALT  12  /  AlkPhos  51  06-29    PT/INR - ( 2020 14:04 )   PT: 12.4 sec;   INR: 1.04       PTT - ( 2020 14:04 )  PTT:29.4 sec  Urinalysis Basic - ( 2020 17:54 )    Color: Yellow / Appearance: Clear / S.020 / pH: x  Gluc: x / Ketone: 15 mg/dL  / Bili: Negative / Urobili: 0.2 E.U./dL   Blood: x / Protein: NEGATIVE mg/dL / Nitrite: NEGATIVE   Leuk Esterase: NEGATIVE / RBC: < 5 /HPF / WBC < 5 /HPF   Sq Epi: x / Non Sq Epi: 0-5 /HPF / Bacteria: x    CAPILLARY BLOOD GLUCOSE    POCT Blood Glucose.: 155 mg/dL (2020 13:40)    RADIOLOGY & ADDITIONAL TESTS: Reviewed.

## 2020-07-01 NOTE — PROGRESS NOTE ADULT - ASSESSMENT
59 M PMH HTN, chronic b/l CVA, presenting with episode of fall to right, in tele/stepdown for initial w/u for presumed TIA, with f/u analysis of MRI brain showing multiple foci in both hemispheres of an embolic quality, ERICA w/ no LA/RA/GIRISH/RAA thrombus, and a non-mobile plaque in descending aorta, also findings suggestive of possible pulmonary AV malformation. Pt currently undergoing further w/u for cryptogenic stroke.

## 2020-07-01 NOTE — PROGRESS NOTE ADULT - PROBLEM SELECTOR PLAN 2
Patient has age indeterminate lacunar infarctions in the bilateral thalami and internal capsules.   -ASA 81 mg  -Plavix 75 mg   -Atorvastatin 80 mg Patient has age indeterminate lacunar infarctions in the bilateral thalami and internal capsules.   -Med Mgmt as per above

## 2020-07-01 NOTE — CHART NOTE - NSCHARTNOTEFT_GEN_A_CORE
Goals reviewed at interdisciplinary rounds with case management, social work, physical therapy, occupational therapy, and speech language pathology.    Please see specific therapy  notes for in depth goals.    Highlights of goals are:    Patient will:   Physical therapy  [] remain on bedrest  [] get out of bed to chair [] ambulate with assistance [x] ambulate without assistance  []today       [x] by discharge    Occupational therapy  [x] N/a, independent [] balance training  []go from supine to seated independently [] balance sitting at the edge of the bed to do grooming task []stand at sink to perform grooming task  [] dress self   [] gain strength to feed self  [] other:  []today         [x]by discharge    Speech therapy  [x] N/a, no speech deficit [] vocalize [] respond to yes/no questions given cues with 80% accuracy [] name common objects [] express basic needs/wants [] improve enuncation of words and phrases []other:  []today        [] by discharge      Swallow therapy  [x] tolerate regular diet [] remain NPO, receive oral care to minimize aspirating bacteria mouth bacteria [] tolerate pureed diet  [] tolerate mechanical soft diet  [] tolerate tube feeds [] tolerate thin liquids [] tolerate nectar thick liquids [] other:  [] with assistance  [] today       [] by discharge    Patient is cleared for home no needs Epidermal Closure: simple interrupted

## 2020-07-01 NOTE — CONSULT NOTE ADULT - SUBJECTIVE AND OBJECTIVE BOX
Mr. Rojas is a 59 year old man with a history of hypertension who presented with focal neurologic deficits and is admitted for a transient ischemic attack. His diagnostic workup was significant for chronic CVAs seen on neurologic imagine, severe plaque in his descending aorta, pulmonary AVM on his ERICA, and mild internal carotid artery stenosis. Electrophysiology was consulted for placement of an implantable cardiac recorder to assess for occult atrial fibrillation.    Allergies: NKDA    PMHx: HTN    Surgical Hx: none    Family Hx: (+) for multiple first degree male family members with MIs in their 50s, no history of sudden death    ROS: (+) for focal neurologic deficits as noted in HPI. All other systems were reviewed and are negative.    V/S: 136/70 | 50 | 16 | AF    Physical Exam:  Gen - pleasant, no acute distress, speaks with stutter  Head - NC/AT  Neck - no JVD  Cardiac - rrr s1s2 no mgr  Pulm - CTAB  Abd - soft nt nd  Ext - no edema b/l LEs  Psych - AOx3    Labs: reviewed    ECG - sinus rhythm, normal ECG    Tele - no evidence of atrial fibrillation or any significant arrhythmias    ERICA - severe aortic plaque, (+) pulm AVM    A/P:  Mr. Rojas is a 59 year old man with a history of HTN who is admitted for a TIA and found with prior CVAs on neurologic imaging. His workup has been significant for (+) severe aortic plaque, mild ICA stenosis, and pulm AVM on ERICA bubble study. EP consulted for implant of cardiac monitor to assess for occult atrial fibrillation. I discussed the nature of occult atrial fibrillation with Mr. Rojas. I explained to him the risks and benefits of an implantable cardiac monitor and answered all of his questions. He would like to proceed with the monitor implant.  - Plan for implantable cardiac monitor tomorrow.

## 2020-07-01 NOTE — PROGRESS NOTE ADULT - PROBLEM SELECTOR PLAN 1
Given pt's resolving course, his old b/l strokes and episode of unsteadiness, concern for TIA. On imaging, pt has moderate stenosis of proximal R M1 segment and L P3 segment, as well as mod-severe stenosis of prox L vertebral artery and mild stenosis of b/l proximal internal carotid arteries. Analysis of pt's images today with Dr. Guillen showed 4 discrete hyperlucent foci on MRI. Cause is likely embolic vs hypoercoagulable vs vasculitis per neuro. ERICA results not as concerning for embolism.   -Obtain hypercoagulability studies for pt; if not revealing, per neuro attending may pursue further vasculitis w/u   -Continue ASA 81 mg, plavix 75 daily day after admission  -Continue atorvastatin 80 mg daily  -F/u MRI brain noncon  - PT/OT  -sBP goal 120-180 Given pt's resolving course, his old b/l strokes and episode of unsteadiness, initial concern for TIA. Analysis of pt's images with Dr. Guillen showed 4 discrete hyperlucent foci on MRI. Cause is likely embolic vs hypoercoagulable vs vasculitis per neuro. ERICA results not as concerning for embolism. Hypercoagulability studies obtained-   -Obtain hypercoagulability studies for pt; if not revealing, per neuro attending may pursue further vasculitis w/u   -Continue ASA 81 mg, plavix 75 daily day after admission  -Continue atorvastatin 80 mg daily  -F/u MRI brain noncon  - PT/OT  -sBP goal 120-180 Given pt's resolving course, his old b/l strokes and episode of unsteadiness, initial concern for TIA. Analysis of pt's images with Dr. Guillen showed 4 discrete hyperlucent foci on MRI. Cause is likely embolic vs hypoercoagulable vs vasculitis per neuro. ERICA results not as concerning for embolism, but showing pulmonary AVM, which could be a potential source for emboli.  -Obtain CTAC to check for pulmonary AVM  -Put pt on loop recorder to monitor for parosmal afib as a potential cause for his otherwise cryptogenic stroke  -Obtain hypercoagulability studies for pt; if not revealing, per neuro attending may pursue further vasculitis w/u   -Continue ASA 81 mg, plavix 75 daily day after admission  -Continue atorvastatin 80 mg daily  - PT/OT  -sBP goal 120-180

## 2020-07-01 NOTE — PROGRESS NOTE ADULT - ASSESSMENT
per Neurology    59 M PMH HTN, chronic b/l CVA, presenting with episode of fall to right, being evaluated in tele/stepdown for TIA workup. CTH neg for acute infarct or ICH, b/l small vessel strokes, CTA was negative for large vessel occlusion, noted moderate stenosis of the proximal right M1 segment and the left P3 segment and mod to severe stenosis of the proximal left vertebral artery. ERICA shows no LA/RA/GIRISH/RAA thrombus, and a non-mobile plaque in descending aorta, also findings suggestive of possible pulmonary AV malformation.     Problem/Plan - 1:  ·  Problem: TIA (transient ischemic attack).  Plan: Given pt's resolving course, his old b/l strokes and episode of unsteadiness, concern for TIA. On imaging, pt has moderate stenosis of proximal R M1 segment and L P3 segment, as well as mod-severe stenosis of prox L vertebral artery and mild stenosis of b/l proximal internal carotid arteries. Analysis of pt's images today with Dr. Guillen showed 4 discrete hyperlucent foci on MRI. Cause is likely embolic vs hypoercoagulable vs vasculitis per neuro. ERICA results not as concerning for embolism.   -Obtain hypercoagulability studies for pt; if not revealing, per neuro attending may pursue further vasculitis w/u   -Continue ASA 81 mg, plavix 75 daily day after admission  -Continue atorvastatin 80 mg daily  -F/u MRI brain noncon  - PT/OT  -sBP goal 120-180.     Problem/Plan - 2:  ·  Problem: Cerebrovascular disease, arteriosclerotic, post-stroke.  Plan: Patient has age indeterminate lacunar infarctions in the bilateral thalami and internal capsules.   -ASA 81 mg  -Plavix 75 mg   -Atorvastatin 80 mg.     Problem/Plan - 3:  ·  Problem: Hypertension, unspecified type.  Plan: -Hold Lisinopril. sBP goal 120-180.     Problem/Plan - 4:  ·  Problem: Preventive measure.  Plan: F: None  E: Maintain K >4, Mg > 2  N: DASH diet  GI: None  DVT: Lovenox subq QD + Scds.

## 2020-07-01 NOTE — PROGRESS NOTE ADULT - SUBJECTIVE AND OBJECTIVE BOX
Physical Medicine and Rehabilitation Progress Note:    Patient is a 59y old  Male who presents with a chief complaint of Fall (01 Jul 2020 07:15)      HPI:  59 M PMH HTN, b/l strokes on HCT, BIBA s/p episode of fall to the right in street.  Patient was walking about 2 hours to and from to a dentist appt when he suddenly felt off balance and fell 1 hour PTA. He then felt normal again. Denied light-headedness and dizziness, denies knee buckling. EMS called but pt was up and walking by that time. Code called in ED, NIHSS1 (chronic right facial droop). Pt's Gait observed as returning to n/l but he looks a little unsteady at baseline.     Denies LOC/dizziness, unilateral weakness, vision changes, facial droop or slurred speech. pt is aox2, unable to obtain last known well.  mg/dl. Denies associated SOB, NVD, lightheadedness, diaphoresis, palpitations, cough/rhinorrhea, black/bloody stool, LE pain/swelling, focal weakness/numbness, recent travel/immobilization, abd pain, urinary complaint. (29 Jun 2020 16:16)                            12.8   5.66  )-----------( 280      ( 01 Jul 2020 05:56 )             38.8       07-01    143  |  105  |  13  ----------------------------<  106<H>  4.0   |  27  |  0.84    Ca    9.0      01 Jul 2020 05:56  Phos  5.0     06-30  Mg     2.2     06-30    TPro  7.3  /  Alb  4.5  /  TBili  0.6  /  DBili  x   /  AST  13  /  ALT  12  /  AlkPhos  51  06-29    Vital Signs Last 24 Hrs  T(C): 36.9 (01 Jul 2020 09:43), Max: 37.4 (30 Jun 2020 21:24)  T(F): 98.5 (01 Jul 2020 09:43), Max: 99.4 (30 Jun 2020 21:24)  HR: 70 (01 Jul 2020 08:43) (44 - 70)  BP: 145/67 (01 Jul 2020 08:43) (125/62 - 162/73)  BP(mean): 97 (01 Jul 2020 08:43) (88 - 105)  RR: 20 (01 Jul 2020 06:23) (18 - 20)  SpO2: 93% (01 Jul 2020 08:43) (93% - 100%)    MEDICATIONS  (STANDING):  aspirin enteric coated 81 milliGRAM(s) Oral daily  atorvastatin 80 milliGRAM(s) Oral at bedtime  clopidogrel Tablet 75 milliGRAM(s) Oral daily  enoxaparin Injectable 40 milliGRAM(s) SubCutaneous every 24 hours  potassium chloride   Powder 20 milliEquivalent(s) Oral once    MEDICATIONS  (PRN):    Currently Undergoing Physical/ Occupational Therapy at bedside.    Initial Functional Status Assessment:   6/30/2020      Previous Level of Function:     · Ambulation Skills	independent	  · Transfer Skills	independent	  · ADL Skills	independent	  · Additional Comments	Pt. denies prior use of assistive device; reports using stairs when using public transportation, denies steps to enter the building, + elevator access.	    Cognitive Status Examination:   · Orientation	oriented to person, place, time and situation	  · Follows Commands and Answers Questions	100% of the time	  · Short Term Memory	impaired  2/3 object recall	    Range of Motion Exam:   · Range of Motion Examination	no ROM deficits were identified	    MMT Shoulder:     · Shoulder Flexion	(5) normal, left  (5) normal, right	  · Shoulder Extension	(5) normal, left  (5) normal, right	  · Shoulder ABduction	(5) normal, left  (5) normal, right	  · Shoulder Internal Rotation	(5) normal, left  (5) normal, right	  · Shoulder External Rotation	(5) normal, left  (5) normal, right	    MMT Elbow:     · Elbow Flexion	(5) normal, left  (5) normal, right	  · Elbow Extension	(5) normal, left  (5) normal, right	  · Forearm Supination	(5) normal, left  (5) normal, right	  · Forearm Pronation	(5) normal, left  (5) normal, right	    MMT Wrist:     · Wrist Flexion	(5) normal, left  (5) normal, right	  · Wrist Extension	4+ = good plus  (5) normal, right	    MMT Hip:     · Hip Flexion	(5) normal, left  (5) normal, right	    MMT Knee:     · Knee Flexion	(5) normal, left  (5) normal, right	  · Knee Extension	(5) normal, left  (5) normal, right	    MMT Ankle:     · Ankle Dorsiflexion	(5) normal, left  (5) normal, right	  · Ankle Plantarflexion	(5) normal, left  (5) normal, right	    Bed Mobility: Rolling/Turning:     · Level of [place]	independent	    Bed Mobility: Scooting/Bridging:     · Level of [place]	independent	    Bed Mobility: Sit to Supine:     · Level of [place]	independent	    Bed Mobility: Supine to Sit:     · Level of [place]	independent	    Transfer: Sit to Stand:     · Level of [place]	independent	  · Weight-Bearing Restrictions	weight-bearing as tolerated	    Transfer: Stand to Sit:     · Level of [place]	independent	  · Weight-Bearing Restrictions	weight-bearing as tolerated	    Gait Skills:     · Level of [place]	independent	  · Gait Distance	150 feet	    Gait Analysis:     · Gait Deviations Noted	decreased garrett; increased lateral sway w/o LOB	    Stair Negotiation:     · Level of [place]	independent	  · Weight-Bearing Restrictions	weight-bearing as tolerated	  · Assistive Device	left rail up; right rail down	  · Stair Pattern	step over step	  · Number of Stairs	12	    Balance Skills Assessment:     · Sitting Balance: Static	good balance	  · Sitting Balance: Dynamic	good balance	  · Sit-to-Stand Balance	good balance	  · Standing Balance: Static	good balance	  · Standing Balance: Dynamic	good minus	    Sensory Examination:   Sensory Examination:    Light Touch Sensation:   · Left UE	within normal limits	  · Right UE	within normal limits	  · Left LE	within normal limits	  · Right LE	within normal limits	  · Head/Neck	severe impairment	      Fine Motor Coordination:   Fine Motor Coordination Examination:    Fine Motor Coordination:   · Left Hand, Finger to Nose	normal performance	  · Right Hand, Finger to Nose	normal performance	  · Left Hand Thumb/Finger Opposition Skills	normal performance	  · Right Hand Thumb/Finger Opposition Skills	normal performance	  · Left Hand, Diadochokinesis Skills	normal performance	  · Right Hand, Diadochokinesis Skills	normal performance	    Treatment Location:   · Comments	Facial strength/symmetry intact, tongue midline, no visual deficits, oculomotor - intact bilat, speech fluent, shoulder shrug 5/5 bilat.	    Clinical Impressions:   · Criteria for Skilled Therapeutic Interventions	no new impairments were identified at this time	        PM&R Impression: as above    Current Disposition Plan Recommendations:    d/c home with no post discharge rehab needs

## 2020-07-02 ENCOUNTER — TRANSCRIPTION ENCOUNTER (OUTPATIENT)
Age: 60
End: 2020-07-02

## 2020-07-02 VITALS — TEMPERATURE: 98 F

## 2020-07-02 PROBLEM — I10 ESSENTIAL (PRIMARY) HYPERTENSION: Chronic | Status: ACTIVE | Noted: 2020-06-29

## 2020-07-02 LAB
ANION GAP SERPL CALC-SCNC: 11 MMOL/L — SIGNIFICANT CHANGE UP (ref 5–17)
AT III ACT/NOR PPP CHRO: 123 % — SIGNIFICANT CHANGE UP (ref 85–135)
B2 GLYCOPROT1 AB SER QL: NEGATIVE — SIGNIFICANT CHANGE UP
BUN SERPL-MCNC: 11 MG/DL — SIGNIFICANT CHANGE UP (ref 7–23)
CALCIUM SERPL-MCNC: 9.3 MG/DL — SIGNIFICANT CHANGE UP (ref 8.4–10.5)
CARDIOLIPIN AB SER-ACNC: NEGATIVE — SIGNIFICANT CHANGE UP
CHLORIDE SERPL-SCNC: 106 MMOL/L — SIGNIFICANT CHANGE UP (ref 96–108)
CO2 SERPL-SCNC: 25 MMOL/L — SIGNIFICANT CHANGE UP (ref 22–31)
CONFIRM APTT STACLOT: NEGATIVE — SIGNIFICANT CHANGE UP
CREAT SERPL-MCNC: 0.84 MG/DL — SIGNIFICANT CHANGE UP (ref 0.5–1.3)
DRVVT SCREEN TO CONFIRM RATIO: SIGNIFICANT CHANGE UP
GLUCOSE BLDC GLUCOMTR-MCNC: 140 MG/DL — HIGH (ref 70–99)
GLUCOSE SERPL-MCNC: 106 MG/DL — HIGH (ref 70–99)
HCT VFR BLD CALC: 39.1 % — SIGNIFICANT CHANGE UP (ref 39–50)
HGB BLD-MCNC: 12.9 G/DL — LOW (ref 13–17)
LA NT DPL PPP QL: 26.4 SEC — SIGNIFICANT CHANGE UP
MAGNESIUM SERPL-MCNC: 2 MG/DL — SIGNIFICANT CHANGE UP (ref 1.6–2.6)
MCHC RBC-ENTMCNC: 29.5 PG — SIGNIFICANT CHANGE UP (ref 27–34)
MCHC RBC-ENTMCNC: 33 GM/DL — SIGNIFICANT CHANGE UP (ref 32–36)
MCV RBC AUTO: 89.5 FL — SIGNIFICANT CHANGE UP (ref 80–100)
NRBC # BLD: 0 /100 WBCS — SIGNIFICANT CHANGE UP (ref 0–0)
PLATELET # BLD AUTO: 259 K/UL — SIGNIFICANT CHANGE UP (ref 150–400)
POTASSIUM SERPL-MCNC: 3.9 MMOL/L — SIGNIFICANT CHANGE UP (ref 3.5–5.3)
POTASSIUM SERPL-SCNC: 3.9 MMOL/L — SIGNIFICANT CHANGE UP (ref 3.5–5.3)
PROT C ACT/NOR PPP: 100 % — SIGNIFICANT CHANGE UP (ref 74–150)
RBC # BLD: 4.37 M/UL — SIGNIFICANT CHANGE UP (ref 4.2–5.8)
RBC # FLD: 13.6 % — SIGNIFICANT CHANGE UP (ref 10.3–14.5)
SODIUM SERPL-SCNC: 142 MMOL/L — SIGNIFICANT CHANGE UP (ref 135–145)
WBC # BLD: 5.93 K/UL — SIGNIFICANT CHANGE UP (ref 3.8–10.5)
WBC # FLD AUTO: 5.93 K/UL — SIGNIFICANT CHANGE UP (ref 3.8–10.5)

## 2020-07-02 PROCEDURE — 85027 COMPLETE CBC AUTOMATED: CPT

## 2020-07-02 PROCEDURE — 84484 ASSAY OF TROPONIN QUANT: CPT

## 2020-07-02 PROCEDURE — 80048 BASIC METABOLIC PNL TOTAL CA: CPT

## 2020-07-02 PROCEDURE — 82962 GLUCOSE BLOOD TEST: CPT

## 2020-07-02 PROCEDURE — C1764: CPT

## 2020-07-02 PROCEDURE — C8925: CPT

## 2020-07-02 PROCEDURE — 83735 ASSAY OF MAGNESIUM: CPT

## 2020-07-02 PROCEDURE — 83036 HEMOGLOBIN GLYCOSYLATED A1C: CPT

## 2020-07-02 PROCEDURE — 70551 MRI BRAIN STEM W/O DYE: CPT

## 2020-07-02 PROCEDURE — 36415 COLL VENOUS BLD VENIPUNCTURE: CPT

## 2020-07-02 PROCEDURE — 85306 CLOT INHIBIT PROT S FREE: CPT

## 2020-07-02 PROCEDURE — 86146 BETA-2 GLYCOPROTEIN ANTIBODY: CPT

## 2020-07-02 PROCEDURE — 84439 ASSAY OF FREE THYROXINE: CPT

## 2020-07-02 PROCEDURE — 85307 ASSAY ACTIVATED PROTEIN C: CPT

## 2020-07-02 PROCEDURE — 33285 INSJ SUBQ CAR RHYTHM MNTR: CPT

## 2020-07-02 PROCEDURE — 85300 ANTITHROMBIN III ACTIVITY: CPT

## 2020-07-02 PROCEDURE — 81001 URINALYSIS AUTO W/SCOPE: CPT

## 2020-07-02 PROCEDURE — 93005 ELECTROCARDIOGRAM TRACING: CPT

## 2020-07-02 PROCEDURE — 71045 X-RAY EXAM CHEST 1 VIEW: CPT

## 2020-07-02 PROCEDURE — 85303 CLOT INHIBIT PROT C ACTIVITY: CPT

## 2020-07-02 PROCEDURE — 0042T: CPT

## 2020-07-02 PROCEDURE — 99285 EMERGENCY DEPT VISIT HI MDM: CPT | Mod: 25

## 2020-07-02 PROCEDURE — 85025 COMPLETE CBC W/AUTO DIFF WBC: CPT

## 2020-07-02 PROCEDURE — 71275 CT ANGIOGRAPHY CHEST: CPT

## 2020-07-02 PROCEDURE — 85730 THROMBOPLASTIN TIME PARTIAL: CPT

## 2020-07-02 PROCEDURE — 86803 HEPATITIS C AB TEST: CPT

## 2020-07-02 PROCEDURE — 70496 CT ANGIOGRAPHY HEAD: CPT

## 2020-07-02 PROCEDURE — 80053 COMPREHEN METABOLIC PANEL: CPT

## 2020-07-02 PROCEDURE — 85613 RUSSELL VIPER VENOM DILUTED: CPT

## 2020-07-02 PROCEDURE — 84443 ASSAY THYROID STIM HORMONE: CPT

## 2020-07-02 PROCEDURE — 84481 FREE ASSAY (FT-3): CPT

## 2020-07-02 PROCEDURE — 70498 CT ANGIOGRAPHY NECK: CPT

## 2020-07-02 PROCEDURE — 96372 THER/PROPH/DIAG INJ SC/IM: CPT

## 2020-07-02 PROCEDURE — 84480 ASSAY TRIIODOTHYRONINE (T3): CPT

## 2020-07-02 PROCEDURE — 70450 CT HEAD/BRAIN W/O DYE: CPT

## 2020-07-02 PROCEDURE — 97161 PT EVAL LOW COMPLEX 20 MIN: CPT

## 2020-07-02 PROCEDURE — 99238 HOSP IP/OBS DSCHRG MGMT 30/<: CPT

## 2020-07-02 PROCEDURE — 83090 ASSAY OF HOMOCYSTEINE: CPT

## 2020-07-02 PROCEDURE — 87635 SARS-COV-2 COVID-19 AMP PRB: CPT

## 2020-07-02 PROCEDURE — 85610 PROTHROMBIN TIME: CPT

## 2020-07-02 PROCEDURE — 84100 ASSAY OF PHOSPHORUS: CPT

## 2020-07-02 PROCEDURE — 85598 HEXAGNAL PHOSPH PLTLT NEUTRL: CPT

## 2020-07-02 PROCEDURE — 86147 CARDIOLIPIN ANTIBODY EA IG: CPT

## 2020-07-02 PROCEDURE — 80061 LIPID PANEL: CPT

## 2020-07-02 RX ORDER — CLOPIDOGREL BISULFATE 75 MG/1
1 TABLET, FILM COATED ORAL
Qty: 30 | Refills: 0
Start: 2020-07-02 | End: 2020-07-31

## 2020-07-02 RX ORDER — ATORVASTATIN CALCIUM 80 MG/1
1 TABLET, FILM COATED ORAL
Qty: 0 | Refills: 0 | DISCHARGE
Start: 2020-07-02

## 2020-07-02 RX ORDER — ASPIRIN/CALCIUM CARB/MAGNESIUM 324 MG
1 TABLET ORAL
Qty: 30 | Refills: 0
Start: 2020-07-02 | End: 2020-07-31

## 2020-07-02 RX ORDER — ASPIRIN/CALCIUM CARB/MAGNESIUM 324 MG
1 TABLET ORAL
Qty: 0 | Refills: 0 | DISCHARGE
Start: 2020-07-02

## 2020-07-02 RX ORDER — CLOPIDOGREL BISULFATE 75 MG/1
1 TABLET, FILM COATED ORAL
Qty: 0 | Refills: 0 | DISCHARGE
Start: 2020-07-02

## 2020-07-02 RX ORDER — ATORVASTATIN CALCIUM 80 MG/1
1 TABLET, FILM COATED ORAL
Qty: 30 | Refills: 0
Start: 2020-07-02 | End: 2020-07-31

## 2020-07-02 RX ADMIN — Medication 81 MILLIGRAM(S): at 11:13

## 2020-07-02 RX ADMIN — CLOPIDOGREL BISULFATE 75 MILLIGRAM(S): 75 TABLET, FILM COATED ORAL at 11:13

## 2020-07-02 RX ADMIN — Medication 1 PATCH: at 07:48

## 2020-07-02 RX ADMIN — Medication 1 PATCH: at 11:14

## 2020-07-02 NOTE — DISCHARGE NOTE PROVIDER - NSDCCPCAREPLAN_GEN_ALL_CORE_FT
PRINCIPAL DISCHARGE DIAGNOSIS  Diagnosis: Stroke  Assessment and Plan of Treatment: During this hospital admission, you had an ischemic stroke. During an ischemic stroke, blood stops flowing to part of your brain because of a blockage in the blood vessel. This can damage areas in the brain that control other parts of the body.  Doing your regular tasks may be difficult after you've had a stroke, but you can learn new ways to manage your daily activities. In fact, doing daily activities may help you to regain muscle strength. Be patient, give yourself time to adjust, and appreciate the progress you make. When showering or bathing, test the water temperature with a hand or foot that was not affected by the stroke. Use grab bars, a shower seat, a hand-held showerhead, and a long-handled brush. For dressing, dress while sitting, starting with the affected side or limb. Wear shirts that pull easily over your head and pants or skirts with elastic waistbands. Use zippers with loops attached to the pull tabs. You will learn additional new ways to manage after a stroke in rehabilitation. It is normal to feel fatigue after a stroke, while some days may be worse than others, you will continue to improve. You have an appointment with Neurology on July 16, 10 AM. Please follow-up for further evaluation.   Call 911 right away if you have any of the following symptoms of another stroke:  B: Balance: Sudden: Dizziness, loss of balance, or a sense of falling, difficulty with coordinating movement  E: Eyes: Sudden double vision or trouble seeing in one or both eyes  F: Face: Sudden uneven face  A: Arms (Legs): Sudden weakness, tingling, or loss of feeling on one side of your face or body  S: Speech: Sudden trouble talking or slurred speech, sudden difficulty understanding others  T: Time: Please call 911 right away and go to the emergency room  •Sudden, severe headache  •Blackouts or seizures        SECONDARY DISCHARGE DIAGNOSES  Diagnosis: Hypertension  Assessment and Plan of Treatment: Hypertension is the medical term for high blood pressure. Blood pressure refers to the pressure that blood applies to the inner walls of the arteries. Arteries carry blood from the heart to other organs and parts of the body. Untreated high blood pressure increases the strain on the heart and arteries, eventually causing organ damage. High blood pressure increases the risk of heart failure, heart attack (myocardial infarction), stroke, and kidney failure. High blood pressure does not usually cause any symptoms. Treatment of hypertension usually begins with lifestyle changes. Making these lifestyle changes involves little or no risk. Recommended changes often include reducing the amount of salt in your diet, losing weight if you are overweight or obese, avoiding drinking too much alcohol, stopping smoking and exercising at least 30 minutes per day most days of the week. If you are prescribed medication for your hypertension (can resume Lisinopril) it is important to take these as prescribed to prevent the possible complications of uncontrolled hypertension.

## 2020-07-02 NOTE — PROGRESS NOTE ADULT - PROBLEM SELECTOR PLAN 1
Given pt's resolving course, his old b/l strokes and episode of unsteadiness, initial concern for TIA. Analysis of pt's images with Dr. Guillen showed 4 discrete hyperlucent foci on MRI. Cause is likely embolic vs hypoercoagulable vs vasculitis per neuro. ERICA results not as concerning for embolism, but showing pulmonary AVM, which could be a potential source for emboli.  -Obtain CTAC to check for pulmonary AVM  -Put pt on loop recorder to monitor for parosmal afib as a potential cause for his otherwise cryptogenic stroke  -Obtain hypercoagulability studies for pt; if not revealing, per neuro attending may pursue further vasculitis w/u   -Continue ASA 81 mg, plavix 75 daily day after admission  -Continue atorvastatin 80 mg daily  - PT/OT  -sBP goal 120-180

## 2020-07-02 NOTE — DISCHARGE NOTE PROVIDER - HOSPITAL COURSE
Mr. Rojas is a 59 M HTN, who presented with focal neurologic deficits and was adm for presumed transient ischemic attack. Dx W/u was significant for chronic CVAs seen on neurologic imaging (CT NON-con: No acute intracranial hemorrhage / territorial infarction. Age indeterminate lacunar infarctions in the bilateral thalami and internal capsules. CTA brain: Moderate stenosis of the proximal right M1 segment and the left P3 segment; CT neck: Moderate to severe stenosis of the proximal left vertebral artery. Mild stenosis of the bilateral proximal internal carotid arteries. MRI showed acute possible small vessel ischemia in subcortical R frontal lobe and L corona radiata), severe plaque in his descending aorta, pulmonary AVM on his ERICA; also significant on neuro attending read for multiple hyperlucent foci in b/l hemispheres signifying multiple small emboli. Electrophysiology was consulted for placement of an implantable cardiac recorder to to assess for paroxysmal afib potentially responsible for pt's cryptogenic stroke. CT angio of chest was also obtained for further dx w/u of  possible pulmonary malformation.    Pt was started on ASA 81 mg & plavix 75 and atorvastatin 80.     Pt is medically stable for discharge .        59 M PMH HTN, chronic b/l CVA, presenting with episode of fall to right, in tele/stepdown for initial w/u for presumed TIA, with f/u analysis of MRI brain showing multiple foci in both hemispheres of an embolic quality, ERICA w/ no LA/RA/GIRISH/RAA thrombus, and a non-mobile plaque in descending aorta, also findings suggestive of possible pulmonary AV malformation. Pt currently undergoing further w/u for cryptogenic stroke.         # Stroke    Given pt's resolving course, his old b/l strokes and episode of unsteadiness, initial concern for TIA. Analysis of pt's images with Dr. Guillen showed 4 discrete hyperlucent foci on MRI. Cause is likely embolic vs hypoercoagulable vs vasculitis per neuro. ERICA results not as concerning for embolism, but showing pulmonary AVM, which could be a potential source for emboli.    -Obtain CTAC to check for pulmonary AVM    -Put pt on loop recorder to monitor for parosmal afib as a potential cause for his otherwise cryptogenic stroke    -Obtain hypercoagulability studies for pt; if not revealing, per neuro attending may pursue further vasculitis w/u     -Continue ASA 81 mg, plavix 75 daily day after admission    -Continue atorvastatin 80 mg daily    - PT/OT    -sBP goal 120-180.         # Cerebrovascular disease, arteriosclerotic, post-stroke    - Patient has age indeterminate lacunar infarctions in the bilateral thalami and internal capsules.     - med Mgmt as per above.         # Hypertension    - Holding lisinopril during admission. sBP goal 120-180.         During this hospital course, patient had a ischemic stroke located in b/l hemispheres as seen on MRI.     The stroke etiology is likely secondary to:    []atrial fibrillation    []small vessel disease from atherosclerotic risk factors    []other:    [XX]etiology workup still in progress        Patient had the following workup done in house:    [XX]head imaging:    [XX]vessel imaging:    [XX]echo    [XX]labs    [XX]other        Physical exam at discharge:    Neurological: AAOx3; R facial droop; other CNs grossly intact. Strength in UE and LE 5/5; Sensation grossly intact b/l. No dysmetria or dysdiadochokinesia. Unsteady, wide-based gait        New medications on discharge: Aspirin 81 mg daily, Plavix 75 mg daily, Lipitor 80 mg daily    Labs to be followed up: None for labs    Imaging to be done as outpatient: None for now     Further outpatient workup:

## 2020-07-02 NOTE — DISCHARGE NOTE NURSING/CASE MANAGEMENT/SOCIAL WORK - NSDCPEPTSTRK_GEN_ALL_CORE
Stroke warning signs and symptoms/Risk factors for stroke/Need for follow up after discharge/Prescribed medications/Stroke education booklet/Stroke support groups for patients, families, and friends/Signs and symptoms of stroke/Call 911 for stroke

## 2020-07-02 NOTE — PROGRESS NOTE ADULT - PROBLEM SELECTOR PLAN 3
-Hold Lisinopril. sBP goal 120-180

## 2020-07-02 NOTE — DISCHARGE NOTE PROVIDER - NSDCMRMEDTOKEN_GEN_ALL_CORE_FT
lisinopril: aspirin 81 mg oral delayed release tablet: 1 tab(s) orally once a day  aspirin 81 mg oral delayed release tablet: 1 tab(s) orally once a day  atorvastatin 80 mg oral tablet: 1 tab(s) orally once a day (at bedtime)  atorvastatin 80 mg oral tablet: 1 tab(s) orally once a day (at bedtime)  clopidogrel 75 mg oral tablet: 1 tab(s) orally once a day  clopidogrel 75 mg oral tablet: 1 tab(s) orally once a day  lisinopril:

## 2020-07-02 NOTE — PROGRESS NOTE ADULT - PROBLEM SELECTOR PLAN 2
Patient has age indeterminate lacunar infarctions in the bilateral thalami and internal capsules.   -Med Mgmt as per above

## 2020-07-02 NOTE — DISCHARGE NOTE PROVIDER - CARE PROVIDER_API CALL
Geraldine Bergman)  Physician Assistant Services  [address] [place], [place] [address]  Phone: [phone]  Fax: [phone]  Follow Up Time:

## 2020-07-02 NOTE — PROGRESS NOTE ADULT - PROBLEM SELECTOR PLAN 4
F: None  E: Maintain K >4, Mg > 2  N: DASH diet  GI: None  DVT: Lovenox subq QD + Scds

## 2020-07-02 NOTE — PROGRESS NOTE ADULT - PROBLEM SELECTOR PROBLEM 2
Cerebrovascular disease, arteriosclerotic, post-stroke

## 2020-07-02 NOTE — PROGRESS NOTE ADULT - SUBJECTIVE AND OBJECTIVE BOX
KIMBERLY Bauer ILR was implanted 7/2/20  ILR was implanted to LACW  skin was cleaned with Chloraprep and sterile  draped   Lidocaine with  epinephrin  was used for local anesthetic  Incision was closed with 4-0 Vicryl and covered with Dermabond.  ClearSite dressing was  applied   Patient can follow up with EPS in 4 weeks.

## 2020-07-02 NOTE — DISCHARGE NOTE NURSING/CASE MANAGEMENT/SOCIAL WORK - PATIENT PORTAL LINK FT
You can access the FollowMyHealth Patient Portal offered by [facility] by registering at the following website: [email or web address]. By joining [facility]’s FollowMyHealth portal, you will also be able to view your health information using other applications (apps) compatible with our system.

## 2020-07-03 LAB — APCR PPP: 3 RATIO — SIGNIFICANT CHANGE UP

## 2020-07-07 LAB — PROT S FREE PPP-ACNC: 87 % NORMAL — SIGNIFICANT CHANGE UP (ref 70–150)

## 2020-07-08 DIAGNOSIS — I70.0 ATHEROSCLEROSIS OF AORTA: ICD-10-CM

## 2020-07-08 DIAGNOSIS — Q27.39 ARTERIOVENOUS MALFORMATION, OTHER SITE: ICD-10-CM

## 2020-07-08 DIAGNOSIS — G45.9 TRANSIENT CEREBRAL ISCHEMIC ATTACK, UNSPECIFIED: ICD-10-CM

## 2020-07-08 DIAGNOSIS — I10 ESSENTIAL (PRIMARY) HYPERTENSION: ICD-10-CM

## 2020-07-08 DIAGNOSIS — I69.392 FACIAL WEAKNESS FOLLOWING CEREBRAL INFARCTION: ICD-10-CM

## 2020-07-08 DIAGNOSIS — I63.212 CEREBRAL INFARCTION DUE TO UNSPECIFIED OCCLUSION OR STENOSIS OF LEFT VERTEBRAL ARTERY: ICD-10-CM

## 2020-07-08 DIAGNOSIS — J43.9 EMPHYSEMA, UNSPECIFIED: ICD-10-CM

## 2020-07-08 DIAGNOSIS — R29.701 NIHSS SCORE 1: ICD-10-CM

## 2020-07-09 NOTE — H&P ADULT - NSHPPHYSICALEXAM_GEN_ALL_CORE
Physical exam:  General: No acute distress, awake and alert  Cardiovascular: Regular rate and rhythm, no murmurs, rubs, or gallops. No bruits  Pulmonary: Anterior breath sounds clear bilaterally, no crackles or wheezing. No use of accessory muscles  Extremities: Radial and DP pulses +2, no edema    Neurologic:  -Mental status: Awake, alert, oriented to person, place, and time. Speech is fluent with intact naming, repetition, and comprehension, no dysarthria. Recent and remote memory intact. Follows commands. Attention/concentration intact. Fund of knowledge appropriate.  -Cranial nerves:   II: Visual fields are full to confrontation.  III, IV, VI: Extraocular movements are intact without nystagmus. Pupils equally round and reactive to light  V:  Facial sensation V1-V3 equal and intact   VII: Face is symmetric with normal eye closure and smile  VIII: Hearing is bilaterally intact to finger rub  IX, X: Uvula is midline and soft palate rises symmetrically  XI: Head turning and shoulder shrug are intact.  XII: Tongue protrudes midline  Motor: Normal bulk and tone. No pronator drift. Strength bilateral upper extremity 5/5, bilateral lower extremities 5/5.  Rapid alternating movements intact and symmetric  Sensation: Intact to light touch bilaterally. No neglect or extinction on double simultaneous testing.  Coordination: No dysmetria on finger-to-nose and heel-to-shin bilaterally  Reflexes: Downgoing toes bilaterally   Gait: Narrow gait and steady Physical exam:  Neurologic:  -Mental status: Awake, alert, oriented to person, place, and time. Speech is fluent with intact naming, repetition, and comprehension, Hoarse voice, no dysarthria. Recent and remote memory intact. Follows commands. Attention/concentration intact. Fund of knowledge appropriate.  -Cranial nerves:   II: Visual fields are full to confrontation.  III, IV, VI: Extraocular movements are intact without nystagmus. Pupils equally round and reactive to light  V:  Facial sensation V1-V3 equal and intact   VII: Right facial droop  VIII: Hearing is bilaterally intact to finger rub  IX, X: Uvula is midline and soft palate rises symmetrically  XI: Head turning and shoulder shrug are intact.  XII: Tongue protrudes midline  Motor: Normal bulk and tone. No pronator drift. Strength bilateral upper extremity 5/5, bilateral lower extremities 5/5.  Rapid alternating movements intact and symmetric  Sensation: Intact to light touch bilaterally. No neglect or extinction on double simultaneous testing.  Coordination: No dysmetria on finger-to-nose and heel-to-shin bilaterally  Reflexes: Downgoing toes bilaterally   Gait: Unsteady, wide based gait, able to walk on toes and heels. Able to walk heel to toe but slightly more unsteady.    NIHSS: 1 23.2

## 2020-07-14 ENCOUNTER — APPOINTMENT (OUTPATIENT)
Dept: HEART AND VASCULAR | Facility: CLINIC | Age: 60
End: 2020-07-14
Payer: COMMERCIAL

## 2020-07-14 ENCOUNTER — APPOINTMENT (OUTPATIENT)
Dept: HEART AND VASCULAR | Facility: CLINIC | Age: 60
End: 2020-07-14

## 2020-07-14 VITALS
RESPIRATION RATE: 12 BRPM | HEART RATE: 76 BPM | SYSTOLIC BLOOD PRESSURE: 110 MMHG | DIASTOLIC BLOOD PRESSURE: 62 MMHG | BODY MASS INDEX: 24.64 KG/M2 | HEIGHT: 67 IN | WEIGHT: 157 LBS | OXYGEN SATURATION: 97 % | TEMPERATURE: 98.4 F

## 2020-07-14 DIAGNOSIS — J44.9 CHRONIC OBSTRUCTIVE PULMONARY DISEASE, UNSPECIFIED: ICD-10-CM

## 2020-07-14 DIAGNOSIS — F17.200 NICOTINE DEPENDENCE, UNSPECIFIED, UNCOMPLICATED: ICD-10-CM

## 2020-07-14 PROCEDURE — 93306 TTE W/DOPPLER COMPLETE: CPT

## 2020-07-14 PROCEDURE — 99214 OFFICE O/P EST MOD 30 MIN: CPT

## 2020-07-16 ENCOUNTER — APPOINTMENT (OUTPATIENT)
Dept: NEUROLOGY | Facility: CLINIC | Age: 60
End: 2020-07-16
Payer: COMMERCIAL

## 2020-07-16 ENCOUNTER — NON-APPOINTMENT (OUTPATIENT)
Age: 60
End: 2020-07-16

## 2020-07-16 VITALS
BODY MASS INDEX: 24.64 KG/M2 | TEMPERATURE: 98.2 F | SYSTOLIC BLOOD PRESSURE: 112 MMHG | DIASTOLIC BLOOD PRESSURE: 69 MMHG | OXYGEN SATURATION: 100 % | WEIGHT: 157 LBS | HEIGHT: 67 IN | HEART RATE: 77 BPM

## 2020-07-16 PROCEDURE — 99214 OFFICE O/P EST MOD 30 MIN: CPT

## 2020-07-17 NOTE — HISTORY OF PRESENT ILLNESS
[FreeTextEntry1] : 59 year old right male patient w/pmh of HTN presents for a post hospital f/u visit for CVA where patient was admitted and discharged from 6/29- 7/2/2020. Present at the visit is his spouse.\par \par History of tobacco use: smoking since age 16- a pack a day. \par His spouse noted that his walking had slowed down back in late Feb or early March. \par \par PCP Dr. Shultz- followed up on June 17 who also said he's not ready to return back to work as a .\par Spouse says he is confused at times. \par \par Denies weakness. He has slowed down on his walking. Sleeps a lot during the day. \par Would like a ST referral- is stuttering a lot. \par \par Patient has a loop recorder in place- doesn't have a EP to f/u with. \par \par BP is well controlled: is on lisinopril 40mg daily. \par Daily adherence to atorvastatin 80mg- no muscle cramps or pain.\par Daily adherence to aspirin 81mg and plavix 75mg- no blood in urine and stool. Was on aspirin 81mg for years but not daily adherent in the past.\par \par Diet: has switched to whole foods; eat a good breakfast \par Exercise: none

## 2020-07-17 NOTE — ASSESSMENT
[FreeTextEntry1] : 59 year old right male patient w/pmh of HTN presents for a post hospital f/u visit for CVA where patient was admitted and discharged from 6/29- 7/2/2020. Present at the visit is his spouse.Neurological examination shows positive Romberg and ataxic gait.\par \par Plan for Stroke Factors\par -STOP Score/Sleep study: patient scored high risk for sleep apnea and will have a home sleep study done. Discussed the risks of untreated sleep apnea and one of the likelihood cause of strokes. \par -Exercise Rx ([place]): patient prescribed an 8 week regimen of exercises to improve strength and symptoms from the stroke. \par -Carotid Doppler ultrasound: discussed with patient the importance and need for ultrasound; if normal, repeat in a year. If abnormal, will notify patient. Repeat in 6 months. \par -Continue dual antiplatelet therapy: aspiring and plavix. Remain for 90 days and reassess with accumetrics. Monitor for any blood in urine or stool.\par -Continue on Atorvastatin 80mg; LDL goal to be <70. Monitor for any muscle cramps/pain. Reassess lipid profile after 3 months.\par -BP is well controlled; BP goal <130/80. Counseled on daily adherence to medications; defer to PCP if needed.\par -Counseled on diet and exercise.\par -PT/ST/OT referrals provided\par -Smoking cessation counseling provided.\par -FMLA forms to be completed in 5-7 business days; patient's wife will come to  once completed. \par \par f/u in 6 weeks with attending

## 2020-07-17 NOTE — DISCUSSION/SUMMARY
[FreeTextEntry1] : STOP/SCORE\par 1. Snoring\par Do you snore loudly (louder than talking or loud enough to be heard through closed doors? no- loudly only if drinking\par \par 2. Tired\par Do you often feel tired, fatigued, or sleepy during daytime? no\par \par 3. Observed\par Has anyone observed you stop breathing during your sleep? yes\par \par 4. Blood Pressure\par Do you have or are you being treated for high blood pressure? yes\par \par 5. BMI\par Is your BMI more than 35 kg/m2? no\par \par 6. Age\par Is your age over 50 years old? yes\par \par 7. Neck Circumference\par Is your neck circumference greater than 40cm? n/a\par \par 8. Gender\par Is your gender male? yes\par \par High Risk of Sleep Apnea >3\par \par

## 2020-07-17 NOTE — PHYSICAL EXAM
[FreeTextEntry1] : +ataxic gait\par +positive romberg\par \par Constitutional: alert, in no acute distress, well nourished and well developed.\par Psychiatric: insight and judgment were intact.\par \par Neurologic: \par Mental Status: The patient is alert, attentive, and oriented to person, place, and time. Speech is clear with good repetition, comprehension, and naming; however, has delay when talking. \par Cranial nerves:\par CN II: Visual fields are full to confrontation. Fundoscopic exam is normal with sharp discs and no vascular changes.Visual acuity is intact. \par CN III, IV, VI: EOMI, PERRLA\par CN V: Facial sensation is intact to pinprick in all 3 divisions bilaterally. Corneal responses are intact.\par CN VII: Face is symmetric with normal eye closure and smile.\par CN VIII: Hearing is normal to rubbing fingers\par CN IX, X: Palate elevates symmetrically. Phonation is normal.\par CN XI: Head turning and shoulder shrug are intact and symmetric.\par CN XII: Tongue is midline with normal movements and no atrophy and no deviation with protrusion.\par Motor: There is no pronator drift of out-stretched arms. Muscle bulk and tone is normal. Strength is full bilaterally 5/5 on both UE and both LE. \par Sensation: light touch is intact; pinprick intact; vibration b/l intact.\par Reflexes:Reflexes are 2+ and symmetric at the biceps, triceps, knees, and ankles.\par Coordination: Rapid alternating movements and fine finger movements are intact. There is no dysmetria on finger-to-nose and heel-knee-shin. There are no abnormal or extraneous movements. \par Gait/Stance: Posture is normal. \par \par

## 2020-07-18 PROBLEM — J44.9 CHRONIC OBSTRUCTIVE PULMONARY DISEASE: Status: ACTIVE | Noted: 2020-06-21

## 2020-07-18 NOTE — REVIEW OF SYSTEMS
[Dyspnea on exertion] : dyspnea during exertion [Cough] : no cough [Joint Pain] : joint pain [Joint Swelling] : joint swelling [Joint Stiffness] : no joint stiffness [Muscle Cramps] : no muscle cramps [Limb Weakness (Paresis)] : no limb weakness [Memory Lapses Or Loss] : memory lapses or loss [Negative] : Heme/Lymph

## 2020-07-18 NOTE — DISCUSSION/SUMMARY
[___ Month(s)] : [unfilled] month(s) [With Me] : with me [FreeTextEntry1] : nicotine patch 14 mg daily for smoking cessation (not a good candidate for Chantix) \par \par continue aspirin and plavix \par \par fill out disability forms (not expecting patient will be able to return to work )\par \par Continue  B12/folate,  vitamin C \par \par Echocardiogram results reviewed with patient \par \par \par \par

## 2020-07-18 NOTE — HISTORY OF PRESENT ILLNESS
[FreeTextEntry1] : Wife requests nicotine path for her  \par \par Denies bleeding, syncope, fevers, hemoptysis , palpitations \par \par Recent Covid 19 antibody test was negative\par \par

## 2020-07-18 NOTE — REASON FOR VISIT
[Follow-Up - From Hospitalization] : follow-up of a recent hospitalization for [Dyspnea] : dyspnea [Hyperlipidemia] : hyperlipidemia [FreeTextEntry2] : stroke syndrome  [FreeTextEntry1] : 59 year old make with emphysema, chronic smoking addiction  with diffuse atherosclerosis  and moderately severe  stenosis of the proximal left vertebral artery with moderate stenosis of the right M1  and left P3 segment . Severe atherosclerosis of the  descending aorta \par \par Loop recorder implanted to assess for PAF \par \par ERICA  suspicious for possible pulmonary AVM -  pulmonary CTA  was advised \par \par He was discharged on aspirin and plavix  and atorvastatin 80mg daily  [Spouse] : spouse

## 2020-07-18 NOTE — ASSESSMENT
[FreeTextEntry1] : chronic embolic  strokes \par \par smoking addiction\par \par Possible pulmonary AVM\par \par emphysema \par \par diffuse atherosclerosis

## 2020-07-18 NOTE — PHYSICAL EXAM
[General Appearance - Well Developed] : well developed [Normal Appearance] : normal appearance [Well Groomed] : well groomed [General Appearance - Well Nourished] : well nourished [No Deformities] : no deformities [General Appearance - In No Acute Distress] : no acute distress [Normal Conjunctiva] : the conjunctiva exhibited no abnormalities [Eyelids - No Xanthelasma] : the eyelids demonstrated no xanthelasmas [No Oral Cyanosis] : no oral cyanosis [Normal Jugular Venous A Waves Present] : normal jugular venous A waves present [Normal Jugular Venous V Waves Present] : normal jugular venous V waves present [No Jugular Venous Ordoñez A Waves] : no jugular venous ordoñez A waves [Respiration, Rhythm And Depth] : normal respiratory rhythm and effort [Exaggerated Use Of Accessory Muscles For Inspiration] : no accessory muscle use [Heart Rate And Rhythm] : heart rate and rhythm were normal [Heart Sounds] : normal S1 and S2 [Murmurs] : no murmurs present [Edema] : no peripheral edema present [Veins - Varicosity Changes] : no varicosital changes were noted in the lower extremities [Bowel Sounds] : normal bowel sounds [Abdomen Soft] : soft [Abdomen Tenderness] : non-tender [Abdomen Mass (___ Cm)] : no abdominal mass palpated [Abnormal Walk] : normal gait [Gait - Sufficient For Exercise Testing] : the gait was sufficient for exercise testing [Nail Clubbing] : no clubbing of the fingernails [Cyanosis, Localized] : no localized cyanosis [Petechial Hemorrhages (___cm)] : no petechial hemorrhages [Skin Color & Pigmentation] : normal skin color and pigmentation [Skin Turgor] : normal skin turgor [] : no rash [No Venous Stasis] : no venous stasis [Skin Lesions] : no skin lesions [No Skin Ulcers] : no skin ulcer [No Xanthoma] : no  xanthoma was observed [Oriented To Time, Place, And Person] : oriented to person, place, and time [Mood] : the mood was normal [No Anxiety] : not feeling anxious [FreeTextEntry1] : depressed affect

## 2020-07-28 ENCOUNTER — TRANSCRIPTION ENCOUNTER (OUTPATIENT)
Age: 60
End: 2020-07-28

## 2020-08-07 ENCOUNTER — APPOINTMENT (OUTPATIENT)
Dept: NEUROLOGY | Facility: CLINIC | Age: 60
End: 2020-08-07
Payer: COMMERCIAL

## 2020-08-07 VITALS
DIASTOLIC BLOOD PRESSURE: 64 MMHG | OXYGEN SATURATION: 100 % | HEIGHT: 67 IN | BODY MASS INDEX: 24.48 KG/M2 | HEART RATE: 73 BPM | SYSTOLIC BLOOD PRESSURE: 103 MMHG | TEMPERATURE: 97.9 F | WEIGHT: 156 LBS

## 2020-08-07 PROCEDURE — 99214 OFFICE O/P EST MOD 30 MIN: CPT

## 2020-08-12 ENCOUNTER — APPOINTMENT (OUTPATIENT)
Dept: OTOLARYNGOLOGY | Facility: CLINIC | Age: 60
End: 2020-08-12

## 2020-08-20 ENCOUNTER — APPOINTMENT (OUTPATIENT)
Dept: OTOLARYNGOLOGY | Facility: CLINIC | Age: 60
End: 2020-08-20

## 2020-08-20 ENCOUNTER — APPOINTMENT (OUTPATIENT)
Dept: OTOLARYNGOLOGY | Facility: CLINIC | Age: 60
End: 2020-08-20
Payer: COMMERCIAL

## 2020-08-20 PROCEDURE — 92523 SPEECH SOUND LANG COMPREHEN: CPT | Mod: GN

## 2020-08-21 ENCOUNTER — APPOINTMENT (OUTPATIENT)
Dept: NEUROLOGY | Facility: CLINIC | Age: 60
End: 2020-08-21
Payer: COMMERCIAL

## 2020-08-21 PROCEDURE — 93880 EXTRACRANIAL BILAT STUDY: CPT

## 2020-08-21 PROCEDURE — 95806 SLEEP STUDY UNATT&RESP EFFT: CPT

## 2020-08-24 ENCOUNTER — APPOINTMENT (OUTPATIENT)
Dept: NEUROLOGY | Facility: CLINIC | Age: 60
End: 2020-08-24

## 2020-08-26 ENCOUNTER — APPOINTMENT (OUTPATIENT)
Dept: OTOLARYNGOLOGY | Facility: CLINIC | Age: 60
End: 2020-08-26
Payer: COMMERCIAL

## 2020-08-26 PROCEDURE — 92507 TX SP LANG VOICE COMM INDIV: CPT | Mod: GN

## 2020-08-27 ENCOUNTER — APPOINTMENT (OUTPATIENT)
Dept: NEUROLOGY | Facility: CLINIC | Age: 60
End: 2020-08-27

## 2020-08-27 ENCOUNTER — APPOINTMENT (OUTPATIENT)
Dept: NEUROLOGY | Facility: CLINIC | Age: 60
End: 2020-08-27
Payer: COMMERCIAL

## 2020-08-27 VITALS
DIASTOLIC BLOOD PRESSURE: 59 MMHG | WEIGHT: 154 LBS | OXYGEN SATURATION: 100 % | SYSTOLIC BLOOD PRESSURE: 105 MMHG | TEMPERATURE: 97.2 F | HEIGHT: 67 IN | HEART RATE: 62 BPM | BODY MASS INDEX: 24.17 KG/M2

## 2020-08-27 PROCEDURE — 99213 OFFICE O/P EST LOW 20 MIN: CPT

## 2020-08-27 NOTE — PHYSICAL EXAM
[FreeTextEntry1] : The patient is alert and oriented x3, st\par Speech - still has paucity of speech and language and has nonfluent aphasia \par Memory is intact: Immediate recall 3 out of 3, short-term 3 out of 3, remote memory intact\par Cranial nerves II through XII intact\par Motor exam: Upper and lower extremities 5 out of 5 power, normal tone. No abnormal movements noted.\par Sensory exam: Intact to light touch and pinprick. Romberg negative.\par Coordination and vestibular exam: Finger to nose intact, no evidence of truncal or appendicular ataxia. No evidence of nystagmus. no vestibular symptoms elicited with head turning during ambulation.\par Gait: Normal stance and gait.\par Reflexes: One to 2+ in upper and lower extremities. No pathological reflexes. Downgoing toes.\par \par

## 2020-08-27 NOTE — HISTORY OF PRESENT ILLNESS
[FreeTextEntry1] : Interval course: Started speech therapy 2 weeks ago and is improved. already. Had his sleep apnea home study done - awaiting results. BP holding steady. \par Appetite good. \par \par \par HPI: 59 year old right male patient w/pmh of HTN presents for a post hospital f/u visit for CVA where patient was admitted and discharged from 6/29- 7/2/2020. Present at the visit is his spouse.\par \par History of tobacco use: smoking since age 16- a pack a day. \par His spouse noted that his walking had slowed down back in late Feb or early March. \par \par PCP Dr. Shultz- followed up on June 17 who also said he's not ready to return back to work as a .\par Spouse says he is confused at times. \par \par Denies weakness. He has slowed down on his walking. Sleeps a lot during the day. \par Would like a ST referral- is stuttering a lot. \par \par Patient has a loop recorder in place- doesn't have a EP to f/u with. \par \par BP is well controlled: is on lisinopril 40mg daily. \par Daily adherence to atorvastatin 80mg- no muscle cramps or pain.\par Daily adherence to aspirin 81mg and plavix 75mg- no blood in urine and stool. Was on aspirin 81mg for years but not daily adherent in the past.\par \par Diet: has switched to whole foods; eat a good breakfast \par Exercise: none

## 2020-08-27 NOTE — ASSESSMENT
[FreeTextEntry1] : Patient is more compliant with his medications. BP is over controlled right now so will drop down lisinopril 30 mg\par Cont speech therapy\par fu in 3 months

## 2020-08-27 NOTE — HISTORY OF PRESENT ILLNESS
[FreeTextEntry1] : Interval course: Patient is doing better but still not speaking well - not getting speech therapy. \par BP is much better controlled. \par \par \par HPI: 59 year old right male patient w/pmh of HTN presents for a post hospital f/u visit for CVA where patient was admitted and discharged from 6/29- 7/2/2020. Present at the visit is his spouse.\par \par History of tobacco use: smoking since age 16- a pack a day. \par His spouse noted that his walking had slowed down back in late Feb or early March. \par \par PCP Dr. Shultz- followed up on June 17 who also said he's not ready to return back to work as a .\par Spouse says he is confused at times. \par \par Denies weakness. He has slowed down on his walking. Sleeps a lot during the day. \par Would like a ST referral- is stuttering a lot. \par \par Patient has a loop recorder in place- doesn't have a EP to f/u with. \par \par BP is well controlled: is on lisinopril 40mg daily. \par Daily adherence to atorvastatin 80mg- no muscle cramps or pain.\par Daily adherence to aspirin 81mg and plavix 75mg- no blood in urine and stool. Was on aspirin 81mg for years but not daily adherent in the past.\par \par Diet: has switched to whole foods; eat a good breakfast \par Exercise: none

## 2020-08-27 NOTE — PHYSICAL EXAM
[FreeTextEntry1] : The patient is alert and oriented x3, naming intact x3, repetition normal, follows three-step commands, and is able to participate fully in the history taking.\par Speech - non fluent aphasia\par Memory is intact: Immediate recall 3 out of 3, short-term 3 out of 3, remote memory intact\par Cranial nerves II through XII intact\par Motor exam: Upper and lower extremities 5 out of 5 power, normal tone. No abnormal movements noted.\par Sensory exam: Intact to light touch and pinprick. Romberg negative.\par Coordination and vestibular exam: Finger to nose intact, no evidence of truncal or appendicular ataxia. No evidence of nystagmus. no vestibular symptoms elicited with head turning during ambulation.\par Gait: Normal stance and gait.\par Reflexes: One to 2+ in upper and lower extremities. No pathological reflexes. Downgoing toes.\par \par

## 2020-09-01 DIAGNOSIS — G47.30 SLEEP APNEA, UNSPECIFIED: ICD-10-CM

## 2020-09-09 ENCOUNTER — APPOINTMENT (OUTPATIENT)
Dept: OTOLARYNGOLOGY | Facility: CLINIC | Age: 60
End: 2020-09-09
Payer: COMMERCIAL

## 2020-09-09 PROCEDURE — 92507 TX SP LANG VOICE COMM INDIV: CPT | Mod: GN

## 2020-09-23 ENCOUNTER — APPOINTMENT (OUTPATIENT)
Dept: OTOLARYNGOLOGY | Facility: CLINIC | Age: 60
End: 2020-09-23
Payer: COMMERCIAL

## 2020-09-23 PROCEDURE — 92507 TX SP LANG VOICE COMM INDIV: CPT | Mod: GN

## 2020-09-25 ENCOUNTER — APPOINTMENT (OUTPATIENT)
Dept: HEART AND VASCULAR | Facility: CLINIC | Age: 60
End: 2020-09-25
Payer: COMMERCIAL

## 2020-09-25 VITALS
HEIGHT: 67 IN | TEMPERATURE: 97.7 F | OXYGEN SATURATION: 98 % | SYSTOLIC BLOOD PRESSURE: 110 MMHG | WEIGHT: 154 LBS | RESPIRATION RATE: 12 BRPM | HEART RATE: 62 BPM | DIASTOLIC BLOOD PRESSURE: 60 MMHG | BODY MASS INDEX: 24.17 KG/M2

## 2020-09-25 DIAGNOSIS — R94.31 ABNORMAL ELECTROCARDIOGRAM [ECG] [EKG]: ICD-10-CM

## 2020-09-25 DIAGNOSIS — Z23 ENCOUNTER FOR IMMUNIZATION: ICD-10-CM

## 2020-09-25 PROCEDURE — 90686 IIV4 VACC NO PRSV 0.5 ML IM: CPT

## 2020-09-25 PROCEDURE — 99214 OFFICE O/P EST MOD 30 MIN: CPT | Mod: 25

## 2020-09-25 PROCEDURE — G0008: CPT

## 2020-09-25 PROCEDURE — 93000 ELECTROCARDIOGRAM COMPLETE: CPT

## 2020-09-26 NOTE — REVIEW OF SYSTEMS
[Dyspnea on exertion] : dyspnea during exertion [Cough] : no cough [Joint Pain] : no joint pain [Joint Swelling] : no joint swelling [Joint Stiffness] : no joint stiffness [Muscle Cramps] : no muscle cramps [Limb Weakness (Paresis)] : no limb weakness [Confusion] : confusion was observed [Memory Lapses Or Loss] : memory lapses or loss [Negative] : Heme/Lymph

## 2020-09-26 NOTE — ASSESSMENT
[FreeTextEntry1] : Multi infarct dementia \par \par emphysema \par \par dysarthria \par \par \par Dysuria, incontinence \par

## 2020-09-26 NOTE — DISCUSSION/SUMMARY
[___ Month(s)] : [unfilled] month(s) [With Me] : with me [FreeTextEntry1] : Flu vaccine administered right upper extremity without incident\par \par Urinalysis and culture   r/o UTI \par \par

## 2020-09-26 NOTE — REASON FOR VISIT
[Follow-Up - Clinic] : a clinic follow-up of [Hyperlipidemia] : hyperlipidemia [Hypertension] : hypertension [FreeTextEntry1] : 59 year old make with emphysema, chronic smoking addiction  with diffuse atherosclerosis  and moderately severe  stenosis of the proximal left vertebral artery with moderate stenosis of the right M1  and left P3 segment  and severe atherosclerosis of the  descending aorta  has multi infarct dementia .\par \par Loop recorder was  implanted to assess for PAF \par \par ERICA  suspicious for possible pulmonary AVM -  pulmonary CTA  was advised \par \par He was discharged on aspirin and plavix  and atorvastatin 80mg daily  [Spouse] : spouse

## 2020-09-26 NOTE — HISTORY OF PRESENT ILLNESS
[FreeTextEntry1] : Still sneaking cigarettes according to wife but he is not consuming alcohol. He is taking prescribed vitamins . \par \par Recently , dose of lisinopril was decreased from 40mg to 30mg daily \par \par Requires flu vaccine today \par \par EKG shows NSR 63 bpm  without ectopy, ischemia or LVH \par \par Patient is receiving speech therapy \par \par \par Main complaint according to his wife is copious urination, loss of continence , now wearing Depends

## 2020-09-26 NOTE — PHYSICAL EXAM
[General Appearance - Well Developed] : well developed [Normal Appearance] : normal appearance [Well Groomed] : well groomed [General Appearance - Well Nourished] : well nourished [No Deformities] : no deformities [General Appearance - In No Acute Distress] : no acute distress [Normal Conjunctiva] : the conjunctiva exhibited no abnormalities [Eyelids - No Xanthelasma] : the eyelids demonstrated no xanthelasmas [No Oral Cyanosis] : no oral cyanosis [Normal Jugular Venous A Waves Present] : normal jugular venous A waves present [Normal Jugular Venous V Waves Present] : normal jugular venous V waves present [No Jugular Venous Ordoñez A Waves] : no jugular venous ordoñez A waves [Respiration, Rhythm And Depth] : normal respiratory rhythm and effort [Exaggerated Use Of Accessory Muscles For Inspiration] : no accessory muscle use [Auscultation Breath Sounds / Voice Sounds] : lungs were clear to auscultation bilaterally [Heart Rate And Rhythm] : heart rate and rhythm were normal [Heart Sounds] : normal S1 and S2 [Murmurs] : no murmurs present [Edema] : no peripheral edema present [Veins - Varicosity Changes] : no varicosital changes were noted in the lower extremities [Abnormal Walk] : normal gait [Gait - Sufficient For Exercise Testing] : the gait was sufficient for exercise testing [Nail Clubbing] : no clubbing of the fingernails [Cyanosis, Localized] : no localized cyanosis [Petechial Hemorrhages (___cm)] : no petechial hemorrhages [Skin Color & Pigmentation] : normal skin color and pigmentation [Skin Turgor] : normal skin turgor [] : no rash [No Venous Stasis] : no venous stasis [Skin Lesions] : no skin lesions [No Skin Ulcers] : no skin ulcer [No Xanthoma] : no  xanthoma was observed [Mood] : the mood was normal [No Anxiety] : not feeling anxious [Oriented to Person] : oriented to person [FreeTextEntry1] : depressed affect

## 2020-09-28 ENCOUNTER — EMERGENCY (EMERGENCY)
Facility: HOSPITAL | Age: 60
LOS: 1 days | Discharge: HOME | End: 2020-09-28
Attending: EMERGENCY MEDICINE | Admitting: EMERGENCY MEDICINE
Payer: COMMERCIAL

## 2020-09-28 VITALS
OXYGEN SATURATION: 99 % | HEART RATE: 80 BPM | SYSTOLIC BLOOD PRESSURE: 132 MMHG | DIASTOLIC BLOOD PRESSURE: 85 MMHG | RESPIRATION RATE: 16 BRPM | TEMPERATURE: 98 F

## 2020-09-28 VITALS
HEART RATE: 81 BPM | OXYGEN SATURATION: 95 % | HEIGHT: 67 IN | DIASTOLIC BLOOD PRESSURE: 99 MMHG | TEMPERATURE: 98 F | SYSTOLIC BLOOD PRESSURE: 140 MMHG | RESPIRATION RATE: 18 BRPM | WEIGHT: 156.97 LBS

## 2020-09-28 DIAGNOSIS — S50.312A ABRASION OF LEFT ELBOW, INITIAL ENCOUNTER: ICD-10-CM

## 2020-09-28 DIAGNOSIS — R79.89 OTHER SPECIFIED ABNORMAL FINDINGS OF BLOOD CHEMISTRY: ICD-10-CM
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
## 2020-09-28 DIAGNOSIS — D64.9 ANEMIA, UNSPECIFIED: ICD-10-CM
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
## 2020-09-28 DIAGNOSIS — R41.3 OTHER AMNESIA: ICD-10-CM

## 2020-09-28 LAB
25(OH)D3 SERPL-MCNC: 43.1 NG/ML
ALBUMIN SERPL ELPH-MCNC: 4.9 G/DL
ALP BLD-CCNC: 63 U/L
ALT SERPL-CCNC: 17 U/L
ANION GAP SERPL CALC-SCNC: 13 MMOL/L
APPEARANCE: CLEAR
AST SERPL-CCNC: 13 U/L
BACTERIA UR CULT: NORMAL
BACTERIA: NEGATIVE
BASOPHILS # BLD AUTO: 0.03 K/UL
BASOPHILS NFR BLD AUTO: 0.6 %
BILIRUB SERPL-MCNC: 0.3 MG/DL
BILIRUBIN URINE: NEGATIVE
BLOOD URINE: NEGATIVE
BUN SERPL-MCNC: 14 MG/DL
CALCIUM OXALATE CRYSTALS: ABNORMAL
CALCIUM SERPL-MCNC: 10 MG/DL
CHLORIDE SERPL-SCNC: 102 MMOL/L
CHOLEST SERPL-MCNC: 85 MG/DL
CHOLEST/HDLC SERPL: 2.2 RATIO
CO2 SERPL-SCNC: 26 MMOL/L
COLOR: YELLOW
CREAT SERPL-MCNC: 0.76 MG/DL
CREAT SPEC-SCNC: 224 MG/DL
EOSINOPHIL # BLD AUTO: 0.11 K/UL
EOSINOPHIL NFR BLD AUTO: 2.2 %
ESTIMATED AVERAGE GLUCOSE: 94 MG/DL
FOLATE SERPL-MCNC: >20 NG/ML
GLUCOSE QUALITATIVE U: NEGATIVE
GLUCOSE SERPL-MCNC: 195 MG/DL
HBA1C MFR BLD HPLC: 4.9 %
HCT VFR BLD CALC: 32.6 %
HDLC SERPL-MCNC: 40 MG/DL
HGB BLD-MCNC: 8.5 G/DL
HYALINE CASTS: 1 /LPF
IMM GRANULOCYTES NFR BLD AUTO: 0.2 %
KETONES URINE: NEGATIVE
LDLC SERPL CALC-MCNC: 33 MG/DL
LEUKOCYTE ESTERASE URINE: NEGATIVE
LYMPHOCYTES # BLD AUTO: 0.79 K/UL
LYMPHOCYTES NFR BLD AUTO: 16.1 %
MAN DIFF?: NORMAL
MCHC RBC-ENTMCNC: 21.3 PG
MCHC RBC-ENTMCNC: 26.1 GM/DL
MCV RBC AUTO: 81.7 FL
MICROALBUMIN 24H UR DL<=1MG/L-MCNC: 1.4 MG/DL
MICROALBUMIN/CREAT 24H UR-RTO: 6 MG/G
MICROSCOPIC-UA: NORMAL
MONOCYTES # BLD AUTO: 0.26 K/UL
MONOCYTES NFR BLD AUTO: 5.3 %
NEUTROPHILS # BLD AUTO: 3.7 K/UL
NEUTROPHILS NFR BLD AUTO: 75.6 %
NITRITE URINE: NEGATIVE
PH URINE: 5.5
PLATELET # BLD AUTO: 329 K/UL
POTASSIUM SERPL-SCNC: 4.4 MMOL/L
PROT SERPL-MCNC: 7.3 G/DL
PROTEIN URINE: NEGATIVE
RBC # BLD: 3.99 M/UL
RBC # FLD: 25 %
RED BLOOD CELLS URINE: 0 /HPF
SODIUM SERPL-SCNC: 142 MMOL/L
SPECIFIC GRAVITY URINE: 1.03
SQUAMOUS EPITHELIAL CELLS: 0 /HPF
TRIGL SERPL-MCNC: 65 MG/DL
TSH SERPL-ACNC: 0.43 UIU/ML
UROBILINOGEN URINE: NORMAL
VIT B12 SERPL-MCNC: 1226 PG/ML
WBC # FLD AUTO: 4.9 K/UL
WHITE BLOOD CELLS URINE: 1 /HPF

## 2020-09-28 PROCEDURE — 99285 EMERGENCY DEPT VISIT HI MDM: CPT

## 2020-09-28 PROCEDURE — 93010 ELECTROCARDIOGRAM REPORT: CPT

## 2020-09-28 NOTE — ED ADULT TRIAGE NOTE - OTHER COMPLAINTS
per brother in law, PCP called family and requested pt to go to the ER due to low H/H, pt has short term memory, Pt found wondering around. Per brother in law, PCP called family and requested pt to go to the ER due to low H/H, pt has short term memory and unbalanced gait per usual after stroke on June 29th per family report. Pt found wondering in the streets found by family.

## 2020-09-28 NOTE — ED ADULT NURSE NOTE - NSIMPLEMENTINTERV_GEN_ALL_ED
Implemented All Fall with Harm Risk Interventions:  [place] to call system. Call bell, personal items and telephone within reach. Instruct patient to call for assistance. Room bathroom lighting operational. Non-slip footwear when patient is off stretcher. Physically safe environment: no spills, clutter or unnecessary equipment. Stretcher in lowest position, wheels locked, appropriate side rails in place. Provide visual cue, wrist band, yellow gown, etc. Monitor gait and stability. Monitor for mental status changes and reorient to person, place, and time. Review medications for side effects contributing to fall risk. Reinforce activity limits and safety measures with patient and family. Provide visual clues: red socks.

## 2020-09-28 NOTE — ED ADULT NURSE NOTE - OTHER COMPLAINTS
Per brother in law, PCP called family and requested pt to go to the ER due to low H/H, pt has short term memory and unbalanced gait per usual after stroke on June 29th per family report. Pt found wondering in the streets found by family.

## 2020-09-29 LAB
ALBUMIN SERPL ELPH-MCNC: 4.9 G/DL — SIGNIFICANT CHANGE UP (ref 3.3–5)
ALP SERPL-CCNC: 60 U/L — SIGNIFICANT CHANGE UP (ref 40–120)
ALT FLD-CCNC: 17 U/L — SIGNIFICANT CHANGE UP (ref 10–45)
ANION GAP SERPL CALC-SCNC: 15 MMOL/L — SIGNIFICANT CHANGE UP (ref 5–17)
APPEARANCE UR: CLEAR — SIGNIFICANT CHANGE UP
APTT BLD: 29.3 SEC — SIGNIFICANT CHANGE UP (ref 27.5–35.5)
AST SERPL-CCNC: 18 U/L — SIGNIFICANT CHANGE UP (ref 10–40)
BASOPHILS # BLD AUTO: 0.06 K/UL — SIGNIFICANT CHANGE UP (ref 0–0.2)
BASOPHILS NFR BLD AUTO: 0.7 % — SIGNIFICANT CHANGE UP (ref 0–2)
BILIRUB SERPL-MCNC: 0.4 MG/DL — SIGNIFICANT CHANGE UP (ref 0.2–1.2)
BILIRUB UR-MCNC: NEGATIVE — SIGNIFICANT CHANGE UP
BLD GP AB SCN SERPL QL: NEGATIVE — SIGNIFICANT CHANGE UP
BUN SERPL-MCNC: 11 MG/DL — SIGNIFICANT CHANGE UP (ref 7–23)
CALCIUM SERPL-MCNC: 9.5 MG/DL — SIGNIFICANT CHANGE UP (ref 8.4–10.5)
CHLORIDE SERPL-SCNC: 104 MMOL/L — SIGNIFICANT CHANGE UP (ref 96–108)
CO2 SERPL-SCNC: 24 MMOL/L — SIGNIFICANT CHANGE UP (ref 22–31)
COLOR SPEC: YELLOW — SIGNIFICANT CHANGE UP
CREAT SERPL-MCNC: 0.75 MG/DL — SIGNIFICANT CHANGE UP (ref 0.5–1.3)
DIFF PNL FLD: NEGATIVE — SIGNIFICANT CHANGE UP
EOSINOPHIL # BLD AUTO: 0.03 K/UL — SIGNIFICANT CHANGE UP (ref 0–0.5)
EOSINOPHIL NFR BLD AUTO: 0.3 % — SIGNIFICANT CHANGE UP (ref 0–6)
GLUCOSE SERPL-MCNC: 120 MG/DL — HIGH (ref 70–99)
GLUCOSE UR QL: NEGATIVE — SIGNIFICANT CHANGE UP
HCT VFR BLD CALC: 29.3 % — LOW (ref 39–50)
HGB BLD-MCNC: 8.6 G/DL — LOW (ref 13–17)
IMM GRANULOCYTES NFR BLD AUTO: 0.3 % — SIGNIFICANT CHANGE UP (ref 0–1.5)
INR BLD: 1.14 — SIGNIFICANT CHANGE UP (ref 0.88–1.16)
KETONES UR-MCNC: 15 MG/DL
LEUKOCYTE ESTERASE UR-ACNC: NEGATIVE — SIGNIFICANT CHANGE UP
LYMPHOCYTES # BLD AUTO: 1.03 K/UL — SIGNIFICANT CHANGE UP (ref 1–3.3)
LYMPHOCYTES # BLD AUTO: 11.4 % — LOW (ref 13–44)
MCHC RBC-ENTMCNC: 22.3 PG — LOW (ref 27–34)
MCHC RBC-ENTMCNC: 29.4 GM/DL — LOW (ref 32–36)
MCV RBC AUTO: 76.1 FL — LOW (ref 80–100)
MONOCYTES # BLD AUTO: 0.65 K/UL — SIGNIFICANT CHANGE UP (ref 0–0.9)
MONOCYTES NFR BLD AUTO: 7.2 % — SIGNIFICANT CHANGE UP (ref 2–14)
NEUTROPHILS # BLD AUTO: 7.2 K/UL — SIGNIFICANT CHANGE UP (ref 1.8–7.4)
NEUTROPHILS NFR BLD AUTO: 80.1 % — HIGH (ref 43–77)
NITRITE UR-MCNC: NEGATIVE — SIGNIFICANT CHANGE UP
NRBC # BLD: 0 /100 WBCS — SIGNIFICANT CHANGE UP (ref 0–0)
PH UR: 6 — SIGNIFICANT CHANGE UP (ref 5–8)
PLATELET # BLD AUTO: 377 K/UL — SIGNIFICANT CHANGE UP (ref 150–400)
POTASSIUM SERPL-MCNC: 3.7 MMOL/L — SIGNIFICANT CHANGE UP (ref 3.5–5.3)
POTASSIUM SERPL-SCNC: 3.7 MMOL/L — SIGNIFICANT CHANGE UP (ref 3.5–5.3)
PROT SERPL-MCNC: 7.9 G/DL — SIGNIFICANT CHANGE UP (ref 6–8.3)
PROT UR-MCNC: NEGATIVE MG/DL — SIGNIFICANT CHANGE UP
PROTHROM AB SERPL-ACNC: 13.6 SEC — SIGNIFICANT CHANGE UP (ref 10.6–13.6)
RBC # BLD: 3.85 M/UL — LOW (ref 4.2–5.8)
RBC # FLD: 25.1 % — HIGH (ref 10.3–14.5)
RH IG SCN BLD-IMP: POSITIVE — SIGNIFICANT CHANGE UP
SODIUM SERPL-SCNC: 143 MMOL/L — SIGNIFICANT CHANGE UP (ref 135–145)
SP GR SPEC: >=1.03 — SIGNIFICANT CHANGE UP (ref 1–1.03)
UROBILINOGEN FLD QL: 0.2 E.U./DL — SIGNIFICANT CHANGE UP
WBC # BLD: 9 K/UL — SIGNIFICANT CHANGE UP (ref 3.8–10.5)
WBC # FLD AUTO: 9 K/UL — SIGNIFICANT CHANGE UP (ref 3.8–10.5)

## 2020-09-29 PROCEDURE — 81003 URINALYSIS AUTO W/O SCOPE: CPT

## 2020-09-29 PROCEDURE — 36415 COLL VENOUS BLD VENIPUNCTURE: CPT

## 2020-09-29 PROCEDURE — 86900 BLOOD TYPING SEROLOGIC ABO: CPT

## 2020-09-29 PROCEDURE — 70450 CT HEAD/BRAIN W/O DYE: CPT

## 2020-09-29 PROCEDURE — 82962 GLUCOSE BLOOD TEST: CPT

## 2020-09-29 PROCEDURE — 86901 BLOOD TYPING SEROLOGIC RH(D): CPT

## 2020-09-29 PROCEDURE — 85610 PROTHROMBIN TIME: CPT

## 2020-09-29 PROCEDURE — 70450 CT HEAD/BRAIN W/O DYE: CPT | Mod: 26

## 2020-09-29 PROCEDURE — 86850 RBC ANTIBODY SCREEN: CPT

## 2020-09-29 PROCEDURE — 93005 ELECTROCARDIOGRAM TRACING: CPT

## 2020-09-29 PROCEDURE — 80053 COMPREHEN METABOLIC PANEL: CPT

## 2020-09-29 PROCEDURE — 99284 EMERGENCY DEPT VISIT MOD MDM: CPT | Mod: 25

## 2020-09-29 PROCEDURE — 85730 THROMBOPLASTIN TIME PARTIAL: CPT

## 2020-09-29 PROCEDURE — 87086 URINE CULTURE/COLONY COUNT: CPT

## 2020-09-29 PROCEDURE — 85025 COMPLETE CBC W/AUTO DIFF WBC: CPT

## 2020-09-29 NOTE — ED PROVIDER NOTE - NSFOLLOWUPINSTRUCTIONS_ED_ALL_ED_FT
Follow up with your PMD for hematologist workup.    Return to ED with worsening anemia, weakness, dizziness, other concerns.    Follow up with neurologist regarding memory loss.        Anemia       Anemia is a condition in which you do not have enough red blood cells or hemoglobin. Hemoglobin is a substance in red blood cells that carries oxygen. When you do not have enough red blood cells or hemoglobin (are anemic), your body cannot get enough oxygen and your organs may not work properly. As a result, you may feel very tired or have other problems.      What are the causes?  Common causes of anemia include:  •Excessive bleeding. Anemia can be caused by excessive bleeding inside or outside the body, including bleeding from the intestine or from periods in women.      •Poor nutrition.      •Long-lasting (chronic) kidney, thyroid, and liver disease.      •Bone marrow disorders.      •Cancer and treatments for cancer.      •HIV (human immunodeficiency virus) and AIDS (acquired immunodeficiency syndrome).      •Treatments for HIV and AIDS.      •Spleen problems.      •Blood disorders.      •Infections, medicines, and autoimmune disorders that destroy red blood cells.        What are the signs or symptoms?  Symptoms of this condition include:  •Minor weakness.      •Dizziness.      •Headache.      •Feeling heartbeats that are irregular or faster than normal (palpitations).      •Shortness of breath, especially with exercise.      •Paleness.      •Cold sensitivity.      •Indigestion.      •Nausea.      •Difficulty sleeping.      •Difficulty concentrating.      Symptoms may occur suddenly or develop slowly. If your anemia is mild, you may not have symptoms.      How is this diagnosed?  This condition is diagnosed based on:  •Blood tests.      •Your medical history.      •A physical exam.      •Bone marrow biopsy.      Your health care provider may also check your stool (feces) for blood and may do additional testing to look for the cause of your bleeding.  You may also have other tests, including:  •Imaging tests, such as a CT scan or MRI.      •Endoscopy.      •Colonoscopy.        How is this treated?  Treatment for this condition depends on the cause. If you continue to lose a lot of blood, you may need to be treated at a hospital. Treatment may include:  •Taking supplements of iron, vitamin B12, or folic acid.      •Taking a hormone medicine (erythropoietin) that can help to stimulate red blood cell growth.      •Having a blood transfusion. This may be needed if you lose a lot of blood.      •Making changes to your diet.      •Having surgery to remove your spleen.        Follow these instructions at home:    •Take over-the-counter and prescription medicines only as told by your health care provider.      •Take supplements only as told by your health care provider.      •Follow any diet instructions that you were given.      •Keep all follow-up visits as told by your health care provider. This is important.        Contact a health care provider if:    •You develop new bleeding anywhere in the body.        Get help right away if:    •You are very weak.      •You are short of breath.      •You have pain in your abdomen or chest.      •You are dizzy or feel faint.      •You have trouble concentrating.      •You have bloody or black, tarry stools.      •You vomit repeatedly or you vomit up blood.        Summary    •Anemia is a condition in which you do not have enough red blood cells or enough of a substance in your red blood cells that carries oxygen (hemoglobin).      •Symptoms may occur suddenly or develop slowly.      •If your anemia is mild, you may not have symptoms.      •This condition is diagnosed with blood tests as well as a medical history and physical exam. Other tests may be needed.      •Treatment for this condition depends on the cause of the anemia.      This information is not intended to replace advice given to you by your health care provider. Make sure you discuss any questions you have with your health care provider.      Document Released: 01/25/2006 Document Revised: 11/30/2018 Document Reviewed: 01/19/2018    [employer] Patient Education © 2020 [employer]

## 2020-09-29 NOTE — ED PROVIDER NOTE - PATIENT PORTAL LINK FT
fall
You can access the FollowMyHealth Patient Portal offered by [facility] by registering at the following website: [email or web address]. By joining [employer]’s FollowMyHealth portal, you will also be able to view your health information using other applications (apps) compatible with our system.

## 2020-09-29 NOTE — ED PROVIDER NOTE - CLINICAL SUMMARY MEDICAL DECISION MAKING FREE TEXT BOX
Pt with anemia sent in by Dr. Shultz - no physical complaints - pt also wondered today with fall - wife complains of short term memory loss, hgb 8.6 in ED with no active bleeding - compared to July hgb 12 - no stool obtained on exam and pt does not complain of BRBPR or dark stool, no indication for transfusion at this time as pt is not symptomatic, not tachycardic or hypotensive, no dizziness upon standing - wife still concerned about continued short term memory loss, will obtain ct head.

## 2020-09-29 NOTE — ED PROVIDER NOTE - OBJECTIVE STATEMENT
58 y/o m presents sent by Dr. Shultz for low hgb as outpt.  As per wife (better historian secondary to patient's multiple strokes) Dr. Shultz called her today to tell her pt's hemoglobin was 8.6.  Pt denies bleeding, dark stool, BRBPR.  No hx of prior transfusion.  Last colonoscopy several years prior with polyp removal.  No dizziness, weakness.  NO chest pain or shortness of breath.  No dizziness upon standing.  As per wife, pt often wonders secondary to short term memory loss which has been worsening over past several months.  Wife states he left at 11 am this morning and was gone all day and then EMS found pt wondering.  Pt states he tripped and fell onto left elbow.  Denies hitting head, neck pain, back pain, other complaints.  No bony pain.  Pt has been compliant with plavix and aspirin.

## 2020-09-29 NOTE — ED PROVIDER NOTE - CPE EDP [facility] NORM
PLEASE FOLLOW UP WITH YOUR PRIMARY CARE PHYSICIAN IN REGARDS TO THIS URGENT CARE VISIT    TREATMENT PLAN FOR TODAY'S VISIT:        1. For ear pressure, sinus pressure: OTC Sudafed  Children 6 to <12 years: Immediate release: 30 mg every 4 to 6 hours; maximum daily dose: 120 mg/24 hours    2. For nasal discharge or postnasal drip: Flonase 2 puffs in each nostril once daily      3. For cough, consider OTC Delsym  7-12 years: 5-10 mg every 4 hours or 15 mg every 6-8 hours; extended release formulation: 30 mg twice daily (maximum: 60 mg/24 hours)   Children >12 years and Adults: 10-30 mg every 4-8 hours or extended release formulation: 60 mg twice daily (maximum: 120 mg/24 hours)       4. Humidifier 24/7, keep the head of the bed elevated.      Conservative measures first.....    Treatment of sore throat-go in step-wise fashion  1.  OTC Cepachol lozenges  2.  OTC Chloraseptic spray  3.  Warm salt water gargles  4.  Even Joyce's chicken noodle soup (the regular kind)-The salt in the soup will alleviate the pain like salt water gargles would, but then I also can ensure that you are drinking and eating also.    Chest/Nasal Congestion  Vicks/Eucalyptus inhalation  Place 1 teaspoon of Eucalyptus oil (or Vicks Vaporub in a pinch) on the floor of the shower and then allow hot water from the shower run over it.      Allow the steam and menthol to accumulate within the bathroom. Sit in the bathroom for approximately 15 minutes.  Inhale the menthol and the hot mist via the nose and mouth during this time.  Blow your nose periodically.  OR  Take an unused pot, fill with only 1/3rd of the pot with tap water.  Gently WARM the pot on the stove top, BUT ABSOLUTELY DO NOT BOIL.  Remove from the stove.  Insert a teaspoon of Eucalyptus oil (or 1/2 tsp of Vicks Vaporub) into the water.  Allow the mixture to melt.  Put a towel over your head, lean over the warm vapor.  Please be cautious so that the moist vapor or the hot water does not  burn you.  Breathe in through your nose, intermittently blowing your nose free of mucous.  Repeat several times daily.    Fever/aches and pains  Alternate OTC Acetaminophen with OTC Ibuprofen  Maximum Acetaminophen 3000 mg/24 hours.  Maximum Ibuprofen 2400 mg/24 hours (take this with food to reduce gastric irritation)       Colds    What are colds?   Colds are an infection of the head and chest caused by a virus. They are a type of upper respiratory infection (URI). They can affect your nose, throat, sinuses, and ears. A cold can also affect the tube that connects your middle ear and throat, as well as your windpipe, voice box, and airways.  How do they occur?   Over 200 different viruses can cause colds. The infection spreads when viruses are passed to others by sneezing, coughing, or personal contact. You may also become infected by handling objects that were touched by someone with a cold. Some of the cold viruses live up to 3 hours on the skin and on objects, such as telephones.  You are more likely to get a cold if:  • You are emotionally or physically stressed.   • You are tired.   • You do not have a healthy diet.   • You are a smoker.   • You are exposed to secondhand smoke.   • You are living or working in crowded conditions.   People tend to get fewer colds as they get older because they build up immunity to some of the viruses that can cause colds.  What are the symptoms?   You usually start having cold symptoms 1 to 3 days after contact with a cold virus. Symptoms may include:  • scratchy or sore throat   • sneezing   • runny or stuffy nose   • cough   • watery eyes   • ear congestion   • slight fever (99 to 100°F, or 37.2 to 37.8°C)   • tiredness   • headache   • loss of appetite   How are they diagnosed?   Colds can usually be diagnosed from your symptoms. Your healthcare provider may need to examine you to rule out other serious infections, such as strep throat and sinusitis.  Common colds are  different from influenza (flu), even though both are caused by viruses. Influenza usually develops more suddenly than a cold. When you have the flu, you develop fever and muscle aches within a few hours, even as few as 1 or 2 hours. The symptoms of a cold develop more slowly and are usually milder.  How are they treated?   There are no medicines that cure a cold. You can treat your symptoms with nonprescription medicines such as aspirin, acetaminophen, ibuprofen, nose drops or sprays, cough syrups and drops, throat lozenges, and decongestants. Check with your provider before you take any of these drugs if you are already taking other medicines.  Nonsteroidal anti-inflammatory medicines (NSAIDs), such as ibuprofen and aspirin, may cause stomach bleeding and other problems. These risks increase with age. Read the label and take as directed. Unless recommended by your healthcare provider, do not take for more than 10 days for any reason.   Check with your healthcare provider before you give any medicine that contains aspirin or salicylates to a child or teen. This includes medicines like baby aspirin, some cold medicines, and Pepto-Bismol. Children and teens who take aspirin are at risk for a serious illness called Reye's syndrome.   Do not give a child under age 4 any cough and cold medicines unless specifically instructed to do so by your healthcare provider. Children over 6 years of age may be given cough drops or hard candies to relieve a sore throat or cough.  Many cough and cold medicines may contain substances that should be avoided during pregnancy, such as caffeine, aspirin, and alcohol. Some cold remedies found in your local pharmacy can be safely used, but, if you are pregnant, check first with your healthcare provider before taking them. Most of the time, you can get by just by drinking plenty of fluids and getting extra rest.  How long do the effects last?   Colds usually last 1 to 2 weeks. Sometimes you  may get a bacterial infection after a cold, such as an ear infection or sinus infection.  How can I take care of myself?   • Get lots of rest.   • Drink lots of fluids, such as water, fruit juice, tea, and soda.   • Use a humidifier to increase air moisture, especially in your bedroom.   • Use nose drops to relieve nasal congestion. You can buy nose drops or make your own. To make a solution for nose drops, add 1 teaspoon of salt to a quart of water.   See your healthcare provider if you have any of the following symptoms:  • worsening earache   • trouble breathing, wheezing, shortness of breath   • swollen, tender lymph nodes (glands) in your neck   • chest pain   • skin rash   • worsening sore throat   • white or yellow spots on your tonsils or throat   • a cough that gets worse or becomes painful   • temperature of 101.5°F (38.6°C) or higher that lasts more than 2 days   • shaking chills   • headache that lasts several days   • confusion   • lips, skin, or nails that look blue   What can be done to help prevent the spread of colds?   The following suggestions may help prevent the spread of your cold to others.  • Wash your hands after coughing, sneezing, or blowing your nose.   • Wash your hands often and especially before touching food, dishes, glasses, silverware, or napkins.   • Turn away from others and use tissues when you cough or sneeze.   • Use paper cups and paper towels in bathrooms.   • Don't let your nose or mouth touch public telephones or drinking fountains.   • Don't share food or eating utensils with others.   • Avoid close contact with others until you no longer have coughing, sneezing, or a runny nose.   To lower your risk of catching a cold:  • Wash your hands often, especially after coming in contact with someone who has a cold.   • Wash your hands before eating and drinking.   • Avoid close contact with people who have a cold.   • Keep your hands away from your nose and mouth.   • Eat a  healthy diet.   • Get plenty of rest.   • Do not smoke.        normal...

## 2020-09-30 ENCOUNTER — APPOINTMENT (OUTPATIENT)
Dept: OTOLARYNGOLOGY | Facility: CLINIC | Age: 60
End: 2020-09-30
Payer: COMMERCIAL

## 2020-09-30 LAB
CULTURE RESULTS: SIGNIFICANT CHANGE UP
SPECIMEN SOURCE: SIGNIFICANT CHANGE UP

## 2020-09-30 PROCEDURE — 92507 TX SP LANG VOICE COMM INDIV: CPT | Mod: GN

## 2020-10-01 LAB — VIT C SERPL-MCNC: 0.6 MG/DL

## 2020-10-06 ENCOUNTER — APPOINTMENT (OUTPATIENT)
Dept: NEUROLOGY | Facility: CLINIC | Age: 60
End: 2020-10-06
Payer: COMMERCIAL

## 2020-10-06 VITALS
SYSTOLIC BLOOD PRESSURE: 132 MMHG | TEMPERATURE: 98.06 F | HEART RATE: 53 BPM | DIASTOLIC BLOOD PRESSURE: 70 MMHG | WEIGHT: 154 LBS | OXYGEN SATURATION: 100 % | BODY MASS INDEX: 24.17 KG/M2 | HEIGHT: 67 IN

## 2020-10-06 PROCEDURE — 99214 OFFICE O/P EST MOD 30 MIN: CPT

## 2020-10-09 ENCOUNTER — LABORATORY RESULT (OUTPATIENT)
Age: 60
End: 2020-10-09

## 2020-10-10 LAB
FERRITIN SERPL-MCNC: 32 NG/ML
FOLATE SERPL-MCNC: >20 NG/ML
IRON SATN MFR SERPL: 91 %
IRON SERPL-MCNC: 328 UG/DL
TIBC SERPL-MCNC: 361 UG/DL
UIBC SERPL-MCNC: 34 UG/DL
VIT B12 SERPL-MCNC: 1434 PG/ML

## 2020-10-13 LAB
ALBUMIN MFR SERPL ELPH: 59.9 %
ALBUMIN SERPL-MCNC: 4.3 G/DL
ALBUMIN/GLOB SERPL: 1.5 RATIO
ALPHA1 GLOB MFR SERPL ELPH: 4.4 %
ALPHA1 GLOB SERPL ELPH-MCNC: 0.3 G/DL
ALPHA2 GLOB MFR SERPL ELPH: 9.2 %
ALPHA2 GLOB SERPL ELPH-MCNC: 0.7 G/DL
B-GLOBULIN MFR SERPL ELPH: 10.9 %
B-GLOBULIN SERPL ELPH-MCNC: 0.8 G/DL
GAMMA GLOB FLD ELPH-MCNC: 1.1 G/DL
GAMMA GLOB MFR SERPL ELPH: 15.6 %
INTERPRETATION SERPL IEP-IMP: NORMAL
PROT SERPL-MCNC: 7.1 G/DL
PROT SERPL-MCNC: 7.1 G/DL

## 2020-10-20 ENCOUNTER — LABORATORY RESULT (OUTPATIENT)
Age: 60
End: 2020-10-20

## 2020-10-20 ENCOUNTER — APPOINTMENT (OUTPATIENT)
Dept: HEMATOLOGY ONCOLOGY | Facility: CLINIC | Age: 60
End: 2020-10-20
Payer: COMMERCIAL

## 2020-10-20 VITALS
HEART RATE: 56 BPM | DIASTOLIC BLOOD PRESSURE: 65 MMHG | TEMPERATURE: 97.5 F | OXYGEN SATURATION: 99 % | SYSTOLIC BLOOD PRESSURE: 126 MMHG | HEIGHT: 67 IN | WEIGHT: 162 LBS | BODY MASS INDEX: 25.43 KG/M2

## 2020-10-20 PROCEDURE — 99204 OFFICE O/P NEW MOD 45 MIN: CPT | Mod: 25

## 2020-10-20 PROCEDURE — 36415 COLL VENOUS BLD VENIPUNCTURE: CPT

## 2020-10-20 RX ORDER — LISINOPRIL 40 MG/1
40 TABLET ORAL
Qty: 30 | Refills: 0 | Status: DISCONTINUED | COMMUNITY
Start: 2019-10-14 | End: 2020-10-20

## 2020-10-20 NOTE — REASON FOR VISIT
[Initial Consultation] : an initial consultation for [Blood Count Assessment] : blood count assessment [Spouse] : spouse

## 2020-10-23 NOTE — CONSULT LETTER
[Dear  ___] : Dear  [unfilled], [Consult Letter:] : I had the pleasure of evaluating your patient, [unfilled]. [Please see my note below.] : Please see my note below. [Consult Closing:] : Thank you very much for allowing me to participate in the care of this patient.  If you have any questions, please do not hesitate to contact me. [Sincerely,] : Sincerely, [FreeTextEntry3] : Magaly Hoffman MD\par  [DrMina  ___] : Dr. ELLIOTT

## 2020-10-23 NOTE — ASSESSMENT
[FreeTextEntry1] : We will do repeat blood work to assess the effectiveness of oral iron therapy...\par \par If patient's hemoglobin remains below 10 we will arrange for intravenous iron...\par \par I explained to patient and his wife the need to get the results of upper and lower endoscopy as well as small bowel through if necessary, to us, as the procedure is being done outside our institution.\par \par October 23, 2020 patient's hemoglobin is below 10 we will arrange for iron infusions for patient.\par \par Of note patient's beta-2 glycoprotein IgG became elevated... We will discuss with Dr. Guillen need for anticoagulation.

## 2020-10-23 NOTE — HISTORY OF PRESENT ILLNESS
[de-identified] : 60 years old  male history of EtOH and smoking developed strokes in 6/29/2020... Patient was discharged home on aspirin and clopidogrel and developed anemia... His hemoglobin went down from 12 to 8 g/dL... Iron studies were done while patient was taking p.o. iron, and are therefore not supportive of iron deficiency state... Patient is here with his wife to discuss treatment for his anemia.... Patient had upper and lower endoscopy with Dr. Negrete from [facility] 3 to 4 years ago and he is planning to have repeat upper and lower endoscopy with the same doctor on November 16th...

## 2020-10-30 ENCOUNTER — APPOINTMENT (OUTPATIENT)
Age: 60
End: 2020-10-30

## 2020-10-30 ENCOUNTER — OUTPATIENT (OUTPATIENT)
Dept: OUTPATIENT SERVICES | Facility: HOSPITAL | Age: 60
LOS: 1 days | End: 2020-10-30
Payer: COMMERCIAL

## 2020-10-30 VITALS
WEIGHT: 160.06 LBS | DIASTOLIC BLOOD PRESSURE: 63 MMHG | SYSTOLIC BLOOD PRESSURE: 135 MMHG | RESPIRATION RATE: 18 BRPM | HEART RATE: 58 BPM | HEIGHT: 66 IN | OXYGEN SATURATION: 99 % | TEMPERATURE: 98 F

## 2020-10-30 VITALS
DIASTOLIC BLOOD PRESSURE: 70 MMHG | HEART RATE: 60 BPM | SYSTOLIC BLOOD PRESSURE: 138 MMHG | TEMPERATURE: 98 F | OXYGEN SATURATION: 98 % | RESPIRATION RATE: 16 BRPM

## 2020-10-30 DIAGNOSIS — D50.9 IRON DEFICIENCY ANEMIA, UNSPECIFIED: ICD-10-CM

## 2020-10-30 PROCEDURE — 96365 THER/PROPH/DIAG IV INF INIT: CPT

## 2020-10-30 RX ORDER — FERUMOXYTOL 510 MG/17ML
510 INJECTION INTRAVENOUS ONCE
Refills: 0 | Status: COMPLETED | OUTPATIENT
Start: 2020-10-30 | End: 2020-10-30

## 2020-10-30 RX ADMIN — FERUMOXYTOL 117 MILLIGRAM(S): 510 INJECTION INTRAVENOUS at 11:55

## 2020-10-30 RX ADMIN — FERUMOXYTOL 510 MILLIGRAM(S): 510 INJECTION INTRAVENOUS at 13:00

## 2020-11-04 ENCOUNTER — OUTPATIENT (OUTPATIENT)
Dept: OUTPATIENT SERVICES | Facility: HOSPITAL | Age: 60
LOS: 1 days | End: 2020-11-04
Payer: COMMERCIAL

## 2020-11-04 ENCOUNTER — APPOINTMENT (OUTPATIENT)
Age: 60
End: 2020-11-04

## 2020-11-04 VITALS
TEMPERATURE: 98 F | SYSTOLIC BLOOD PRESSURE: 106 MMHG | OXYGEN SATURATION: 99 % | RESPIRATION RATE: 16 BRPM | DIASTOLIC BLOOD PRESSURE: 57 MMHG | HEART RATE: 50 BPM

## 2020-11-04 VITALS
DIASTOLIC BLOOD PRESSURE: 71 MMHG | OXYGEN SATURATION: 96 % | TEMPERATURE: 98 F | SYSTOLIC BLOOD PRESSURE: 120 MMHG | HEART RATE: 50 BPM | RESPIRATION RATE: 16 BRPM

## 2020-11-04 DIAGNOSIS — D50.9 IRON DEFICIENCY ANEMIA, UNSPECIFIED: ICD-10-CM

## 2020-11-04 PROCEDURE — 96365 THER/PROPH/DIAG IV INF INIT: CPT

## 2020-11-04 RX ORDER — FERUMOXYTOL 510 MG/17ML
510 INJECTION INTRAVENOUS ONCE
Refills: 0 | Status: COMPLETED | OUTPATIENT
Start: 2020-11-04 | End: 2020-11-04

## 2020-11-04 RX ADMIN — FERUMOXYTOL 117 MILLIGRAM(S): 510 INJECTION INTRAVENOUS at 10:30

## 2020-11-04 RX ADMIN — FERUMOXYTOL 510 MILLIGRAM(S): 510 INJECTION INTRAVENOUS at 11:30

## 2020-12-04 ENCOUNTER — NON-APPOINTMENT (OUTPATIENT)
Age: 60
End: 2020-12-04

## 2020-12-08 ENCOUNTER — APPOINTMENT (OUTPATIENT)
Dept: NEUROLOGY | Facility: CLINIC | Age: 60
End: 2020-12-08

## 2020-12-08 ENCOUNTER — APPOINTMENT (OUTPATIENT)
Dept: NEUROLOGY | Facility: CLINIC | Age: 60
End: 2020-12-08
Payer: COMMERCIAL

## 2020-12-08 VITALS
HEIGHT: 67 IN | WEIGHT: 160 LBS | SYSTOLIC BLOOD PRESSURE: 129 MMHG | HEART RATE: 57 BPM | BODY MASS INDEX: 25.11 KG/M2 | TEMPERATURE: 98.2 F | OXYGEN SATURATION: 98 % | DIASTOLIC BLOOD PRESSURE: 69 MMHG

## 2020-12-08 DIAGNOSIS — Z02.9 ENCOUNTER FOR ADMINISTRATIVE EXAMINATIONS, UNSPECIFIED: ICD-10-CM

## 2020-12-08 PROCEDURE — 99072 ADDL SUPL MATRL&STAF TM PHE: CPT

## 2020-12-08 PROCEDURE — 99213 OFFICE O/P EST LOW 20 MIN: CPT

## 2020-12-09 ENCOUNTER — APPOINTMENT (OUTPATIENT)
Dept: OTOLARYNGOLOGY | Facility: CLINIC | Age: 60
End: 2020-12-09

## 2020-12-10 PROBLEM — Z02.9 ENCOUNTERS FOR ADMINISTRATIVE PURPOSE: Status: ACTIVE | Noted: 2020-12-10

## 2020-12-23 ENCOUNTER — APPOINTMENT (OUTPATIENT)
Dept: OTOLARYNGOLOGY | Facility: CLINIC | Age: 60
End: 2020-12-23

## 2021-01-09 LAB
APTT IMM NP/PRE NP PPP: NORMAL
APTT INV RATIO PPP: 32.7 SEC
B2 GLYCOPROT1 IGA SERPL IA-ACNC: 6.8 SAU
B2 GLYCOPROT1 IGG SER-ACNC: 90.2 SGU
B2 GLYCOPROT1 IGM SER-ACNC: 5.6 SMU
BASOPHILS # BLD AUTO: 0 K/UL
BASOPHILS NFR BLD AUTO: 0 %
CARDIOLIPIN AB SER IA-ACNC: NEGATIVE
CONFIRM: 30.5 SEC
DRVVT IMM 1:2 NP PPP: NORMAL
DRVVT SCREEN TO CONFIRM RATIO: 0.92 RATIO
EOSINOPHIL # BLD AUTO: 0.05 K/UL
EOSINOPHIL NFR BLD AUTO: 0.9 %
HAPTOGLOB SERPL-MCNC: 162 MG/DL
HCT VFR BLD CALC: 31.3 %
HGB BLD-MCNC: 9.3 G/DL
LDH SERPL-CCNC: 124 U/L
LYMPHOCYTES # BLD AUTO: 0.7 K/UL
LYMPHOCYTES NFR BLD AUTO: 13 %
MAN DIFF?: NORMAL
MCHC RBC-ENTMCNC: 24.9 PG
MCHC RBC-ENTMCNC: 29.7 GM/DL
MCV RBC AUTO: 83.9 FL
MONOCYTES # BLD AUTO: 0.3 K/UL
MONOCYTES NFR BLD AUTO: 5.6 %
NEUTROPHILS # BLD AUTO: 4.33 K/UL
NEUTROPHILS NFR BLD AUTO: 80.5 %
NPP NORMAL POOLED PLASMA: NORMAL SECS
PLATELET # BLD AUTO: 297 K/UL
RBC # BLD: 3.73 M/UL
RBC # FLD: 26 %
SARS-COV-2 IGG SERPL IA-ACNC: 0.1 INDEX
SARS-COV-2 IGG SERPL QL IA: NEGATIVE
SCREEN DRVVT: 31.1 SEC
SILICA CLOTTING TIME INTERPRETATION: NORMAL
SILICA CLOTTING TIME S/C: 0.71 RATIO
WBC # FLD AUTO: 5.38 K/UL

## 2021-01-13 ENCOUNTER — APPOINTMENT (OUTPATIENT)
Dept: OTOLARYNGOLOGY | Facility: CLINIC | Age: 61
End: 2021-01-13
Payer: COMMERCIAL

## 2021-01-13 PROCEDURE — 92507 TX SP LANG VOICE COMM INDIV: CPT | Mod: GN

## 2021-01-13 PROCEDURE — 99072 ADDL SUPL MATRL&STAF TM PHE: CPT

## 2021-01-27 ENCOUNTER — APPOINTMENT (OUTPATIENT)
Dept: HEART AND VASCULAR | Facility: CLINIC | Age: 61
End: 2021-01-27
Payer: COMMERCIAL

## 2021-01-27 VITALS
DIASTOLIC BLOOD PRESSURE: 72 MMHG | SYSTOLIC BLOOD PRESSURE: 108 MMHG | HEART RATE: 55 BPM | RESPIRATION RATE: 13 BRPM | HEIGHT: 67 IN | BODY MASS INDEX: 25.6 KG/M2 | WEIGHT: 163.13 LBS | TEMPERATURE: 97.1 F | OXYGEN SATURATION: 96 %

## 2021-01-27 DIAGNOSIS — R06.00 DYSPNEA, UNSPECIFIED: ICD-10-CM

## 2021-01-27 DIAGNOSIS — J43.9 EMPHYSEMA, UNSPECIFIED: ICD-10-CM

## 2021-01-27 DIAGNOSIS — R79.89 OTHER SPECIFIED ABNORMAL FINDINGS OF BLOOD CHEMISTRY: ICD-10-CM

## 2021-01-27 DIAGNOSIS — I65.29 OCCLUSION AND STENOSIS OF UNSPECIFIED CAROTID ARTERY: ICD-10-CM

## 2021-01-27 PROCEDURE — 99072 ADDL SUPL MATRL&STAF TM PHE: CPT

## 2021-01-27 PROCEDURE — 93000 ELECTROCARDIOGRAM COMPLETE: CPT

## 2021-01-27 PROCEDURE — 99214 OFFICE O/P EST MOD 30 MIN: CPT

## 2021-01-27 PROCEDURE — 36415 COLL VENOUS BLD VENIPUNCTURE: CPT

## 2021-01-29 LAB
25(OH)D3 SERPL-MCNC: 36.6 NG/ML
ALBUMIN SERPL ELPH-MCNC: 4.8 G/DL
ALP BLD-CCNC: 61 U/L
ALT SERPL-CCNC: 22 U/L
ANION GAP SERPL CALC-SCNC: 11 MMOL/L
APPEARANCE: CLEAR
AST SERPL-CCNC: 15 U/L
BASOPHILS # BLD AUTO: 0.04 K/UL
BASOPHILS NFR BLD AUTO: 0.9 %
BILIRUB SERPL-MCNC: 0.3 MG/DL
BILIRUBIN URINE: NEGATIVE
BLOOD URINE: NEGATIVE
BUN SERPL-MCNC: 12 MG/DL
CALCIUM SERPL-MCNC: 9.9 MG/DL
CHLORIDE SERPL-SCNC: 104 MMOL/L
CHOLEST SERPL-MCNC: 80 MG/DL
CO2 SERPL-SCNC: 26 MMOL/L
COLOR: YELLOW
CREAT SERPL-MCNC: 0.96 MG/DL
CREAT SPEC-SCNC: 108 MG/DL
EOSINOPHIL # BLD AUTO: 0.08 K/UL
EOSINOPHIL NFR BLD AUTO: 1.7 %
ESTIMATED AVERAGE GLUCOSE: 100 MG/DL
FOLATE SERPL-MCNC: >20 NG/ML
GLUCOSE QUALITATIVE U: NEGATIVE
GLUCOSE SERPL-MCNC: 83 MG/DL
HBA1C MFR BLD HPLC: 5.1 %
HCT VFR BLD CALC: 32.1 %
HDLC SERPL-MCNC: 40 MG/DL
HGB BLD-MCNC: 9.4 G/DL
IMM GRANULOCYTES NFR BLD AUTO: 0.2 %
KETONES URINE: NEGATIVE
LDLC SERPL CALC-MCNC: 30 MG/DL
LEUKOCYTE ESTERASE URINE: NEGATIVE
LYMPHOCYTES # BLD AUTO: 1.01 K/UL
LYMPHOCYTES NFR BLD AUTO: 21.5 %
MAN DIFF?: NORMAL
MCHC RBC-ENTMCNC: 27.8 PG
MCHC RBC-ENTMCNC: 29.3 GM/DL
MCV RBC AUTO: 95 FL
MICROALBUMIN 24H UR DL<=1MG/L-MCNC: <1.2 MG/DL
MICROALBUMIN/CREAT 24H UR-RTO: NORMAL MG/G
MONOCYTES # BLD AUTO: 0.39 K/UL
MONOCYTES NFR BLD AUTO: 8.3 %
NEUTROPHILS # BLD AUTO: 3.17 K/UL
NEUTROPHILS NFR BLD AUTO: 67.4 %
NITRITE URINE: NEGATIVE
NONHDLC SERPL-MCNC: 39 MG/DL
PH URINE: 7.5
PLATELET # BLD AUTO: 323 K/UL
POTASSIUM SERPL-SCNC: 4.5 MMOL/L
PROT SERPL-MCNC: 7.1 G/DL
PROTEIN URINE: NEGATIVE
RBC # BLD: 3.38 M/UL
RBC # FLD: 13.6 %
SARS-COV-2 IGG SERPL IA-ACNC: 0.07 INDEX
SARS-COV-2 IGG SERPL QL IA: NEGATIVE
SODIUM SERPL-SCNC: 141 MMOL/L
SPECIFIC GRAVITY URINE: 1.02
TRIGL SERPL-MCNC: 46 MG/DL
TSH SERPL-ACNC: 0.36 UIU/ML
UROBILINOGEN URINE: NORMAL
VIT B12 SERPL-MCNC: 1888 PG/ML
WBC # FLD AUTO: 4.7 K/UL

## 2021-02-03 PROBLEM — I65.29 CAROTID ARTERY STENOSIS: Status: ACTIVE | Noted: 2021-02-03

## 2021-02-03 PROBLEM — J43.9 PULMONARY EMPHYSEMA: Status: ACTIVE | Noted: 2020-07-18

## 2021-02-03 PROBLEM — R06.00 DYSPNEA: Status: ACTIVE | Noted: 2020-06-21

## 2021-02-03 PROBLEM — J43.9 EMPHYSEMA: Status: ACTIVE | Noted: 2021-02-03

## 2021-02-03 RX ORDER — UBIDECARENONE 200 MG
200 TABLET ORAL
Qty: 100 | Refills: 0 | Status: DISCONTINUED | COMMUNITY
Start: 2019-10-18 | End: 2021-02-03

## 2021-02-03 NOTE — REVIEW OF SYSTEMS
[Recent Weight Gain (___ Lbs)] : recent [unfilled] ~Ulb weight gain [Dyspnea on exertion] : dyspnea during exertion [Confusion] : confusion was observed [Memory Lapses Or Loss] : memory lapses or loss [Negative] : Heme/Lymph [Cough] : no cough [Joint Pain] : no joint pain [Joint Swelling] : no joint swelling [Joint Stiffness] : no joint stiffness [Muscle Cramps] : no muscle cramps [Limb Weakness (Paresis)] : no limb weakness

## 2021-02-03 NOTE — ASSESSMENT
[FreeTextEntry1] : stable hemodynamics \par \par mild cognitive improvement  with vitamin supplements and Donepezil \par \par \par Loop recorder in place to assess for paroxysmal afib \par

## 2021-02-03 NOTE — HISTORY OF PRESENT ILLNESS
[FreeTextEntry1] : Since stopping all alcohol and smoking, and supplementing vitamins that he was deficient in , he has improved markedly \par \par He received 2 iron infusions for marked anemia \par \par Appetite is good  and he has gained weight \par \par No falls reported , no further incidence of wandering \par \par He exercises with his adult son regularly \par \par He denies chest pain, syncope, palpitations, cough, fevers, chills , dysphagia , hematuria or rectal bleeding\par \par EKG shows sinus bradycardia 51 bpm  without ectopy, ischemia or LVH

## 2021-02-03 NOTE — REASON FOR VISIT
[Follow-Up - Clinic] : a clinic follow-up of [Hyperlipidemia] : hyperlipidemia [Hypertension] : hypertension [Spouse] : spouse [FreeTextEntry1] : 60 year old make with emphysema, chronic smoking addiction  with diffuse atherosclerosis  and moderately severe  stenosis of the proximal left vertebral artery with moderate stenosis of the right M1  and left P3 segment  and severe atherosclerosis of the  descending aorta  has multi infarct dementia .\par \par Loop recorder was  implanted to assess for PAF \par \par ERICA  suspicious for possible pulmonary AVM -  pulmonary CTA  was advised \par \par He was discharged on aspirin and plavix  and atorvastatin 80mg daily

## 2021-02-03 NOTE — PHYSICAL EXAM
[General Appearance - Well Developed] : well developed [Normal Appearance] : normal appearance [Well Groomed] : well groomed [General Appearance - Well Nourished] : well nourished [No Deformities] : no deformities [General Appearance - In No Acute Distress] : no acute distress [Normal Conjunctiva] : the conjunctiva exhibited no abnormalities [Eyelids - No Xanthelasma] : the eyelids demonstrated no xanthelasmas [Normal Jugular Venous A Waves Present] : normal jugular venous A waves present [Normal Jugular Venous V Waves Present] : normal jugular venous V waves present [No Jugular Venous Ordoñez A Waves] : no jugular venous ordoñez A waves [Respiration, Rhythm And Depth] : normal respiratory rhythm and effort [Exaggerated Use Of Accessory Muscles For Inspiration] : no accessory muscle use [Auscultation Breath Sounds / Voice Sounds] : lungs were clear to auscultation bilaterally [Heart Rate And Rhythm] : heart rate and rhythm were normal [Heart Sounds] : normal S1 and S2 [Murmurs] : no murmurs present [Edema] : no peripheral edema present [Veins - Varicosity Changes] : no varicosital changes were noted in the lower extremities [Abnormal Walk] : normal gait [Gait - Sufficient For Exercise Testing] : the gait was sufficient for exercise testing [Nail Clubbing] : no clubbing of the fingernails [Cyanosis, Localized] : no localized cyanosis [Petechial Hemorrhages (___cm)] : no petechial hemorrhages [Skin Color & Pigmentation] : normal skin color and pigmentation [Skin Turgor] : normal skin turgor [] : no rash [No Venous Stasis] : no venous stasis [Skin Lesions] : no skin lesions [No Skin Ulcers] : no skin ulcer [No Xanthoma] : no  xanthoma was observed [Mood] : the mood was normal [No Anxiety] : not feeling anxious [Oriented to Person] : oriented to person [FreeTextEntry1] : depressed affect

## 2021-02-03 NOTE — DISCUSSION/SUMMARY
[Hyperlipidemia] : hyperlipidemia [Improving] : improving [de-identified] : decrease dose of  statin to 40mg daily  [FreeTextEntry1] : venipuncture performed  \par \par medications reconciled \par \par will decrease dose of lisinopril to 20mg daily \par \par if anemia persists , will need to continue iron supplements  or possible iron infusions\par \par advised to obtain Covid 19 vaccination as soon as possible

## 2021-02-06 LAB — VIT B1 SERPL-MCNC: 134.5 NMOL/L

## 2021-02-09 ENCOUNTER — APPOINTMENT (OUTPATIENT)
Dept: HEMATOLOGY ONCOLOGY | Facility: CLINIC | Age: 61
End: 2021-02-09
Payer: COMMERCIAL

## 2021-02-09 VITALS
TEMPERATURE: 97.2 F | DIASTOLIC BLOOD PRESSURE: 81 MMHG | HEART RATE: 76 BPM | HEIGHT: 67 IN | SYSTOLIC BLOOD PRESSURE: 144 MMHG | WEIGHT: 164 LBS | BODY MASS INDEX: 25.74 KG/M2 | OXYGEN SATURATION: 98 %

## 2021-02-09 PROCEDURE — 36415 COLL VENOUS BLD VENIPUNCTURE: CPT

## 2021-02-09 PROCEDURE — 99214 OFFICE O/P EST MOD 30 MIN: CPT | Mod: 25

## 2021-02-09 PROCEDURE — 99072 ADDL SUPL MATRL&STAF TM PHE: CPT

## 2021-02-10 ENCOUNTER — APPOINTMENT (OUTPATIENT)
Dept: OTOLARYNGOLOGY | Facility: CLINIC | Age: 61
End: 2021-02-10
Payer: COMMERCIAL

## 2021-02-10 LAB
BASOPHILS # BLD AUTO: 0.04 K/UL
BASOPHILS NFR BLD AUTO: 0.6 %
EOSINOPHIL # BLD AUTO: 0.06 K/UL
EOSINOPHIL NFR BLD AUTO: 1 %
ERYTHROCYTE [SEDIMENTATION RATE] IN BLOOD BY WESTERGREN METHOD: 10 MM/HR
FERRITIN SERPL-MCNC: 15 NG/ML
HAPTOGLOB SERPL-MCNC: 141 MG/DL
HCT VFR BLD CALC: 31.1 %
HGB BLD-MCNC: 9.4 G/DL
IMM GRANULOCYTES NFR BLD AUTO: 0.3 %
IRON SATN MFR SERPL: 8 %
IRON SERPL-MCNC: 33 UG/DL
LDH SERPL-CCNC: 215 U/L
LYMPHOCYTES # BLD AUTO: 1.17 K/UL
LYMPHOCYTES NFR BLD AUTO: 18.7 %
MAN DIFF?: NORMAL
MCHC RBC-ENTMCNC: 26.6 PG
MCHC RBC-ENTMCNC: 30.2 GM/DL
MCV RBC AUTO: 87.9 FL
MONOCYTES # BLD AUTO: 0.42 K/UL
MONOCYTES NFR BLD AUTO: 6.7 %
NEUTROPHILS # BLD AUTO: 4.55 K/UL
NEUTROPHILS NFR BLD AUTO: 72.7 %
PLATELET # BLD AUTO: 377 K/UL
RBC # BLD: 3.54 M/UL
RBC # FLD: 13.5 %
TIBC SERPL-MCNC: 386 UG/DL
UIBC SERPL-MCNC: 353 UG/DL
WBC # FLD AUTO: 6.26 K/UL

## 2021-02-10 PROCEDURE — 99072 ADDL SUPL MATRL&STAF TM PHE: CPT

## 2021-02-10 PROCEDURE — 92507 TX SP LANG VOICE COMM INDIV: CPT | Mod: GN

## 2021-02-10 NOTE — CONSULT LETTER
[Dear  ___] : Dear  [unfilled], [Consult Letter:] : I had the pleasure of evaluating your patient, [unfilled]. [Please see my note below.] : Please see my note below. [Consult Closing:] : Thank you very much for allowing me to participate in the care of this patient.  If you have any questions, please do not hesitate to contact me. [Sincerely,] : Sincerely, [DrMina  ___] : Dr. ELLIOTT [FreeTextEntry3] : Magaly Hoffman MD\par

## 2021-02-10 NOTE — ASSESSMENT
[FreeTextEntry1] : Repeat blood work done for iron studies... If patient remains iron deficient we will arrange for intravenous iron...\par \par We are trying to obtain results of upper and lower endoscopy done by Dr. Negrete...\par \par \par Patient has anticardiolipin antibodies and technically he should be on anticoagulation, if okay with GI.\par \par \par February 10, 2021 patient's hemoglobin is again 9.4 and he has evidence of severe iron deficiency with TIBC saturation of 10% and ferritin of 15... Patient was treated for H. pylori... Upper endoscopy had several superficial ulcers... I am not clear as to the origin of the persistent iron deficiency anemia, and I placed a call to Dr. Negrete patient's gastroenterologist.\par \par \par I ordered intravenous iron for patient.\par \par

## 2021-02-10 NOTE — HISTORY OF PRESENT ILLNESS
[de-identified] : 60 years old  male history of EtOH and smoking developed strokes in 6/29/2020... Patient was discharged home on aspirin and clopidogrel and developed anemia... His hemoglobin went down from 12 to 8 g/dL... Iron studies were done while patient was taking p.o. iron, and are therefore not supportive of iron deficiency state... Patient is here with his wife to discuss treatment for his anemia.... Patient had upper and lower endoscopy with Dr. Negrete from [facility] 3 to 4 years ago and he is planning to have repeat upper and lower endoscopy with the same doctor on November 16th... [de-identified] : February 9, 2021 patient returns for follow-up he is again anemic... He states he had upper and lower endoscopy by Dr. Negrete however wife forgot to bring results...

## 2021-02-16 ENCOUNTER — APPOINTMENT (OUTPATIENT)
Age: 61
End: 2021-02-16

## 2021-02-17 ENCOUNTER — APPOINTMENT (OUTPATIENT)
Dept: OTOLARYNGOLOGY | Facility: CLINIC | Age: 61
End: 2021-02-17

## 2021-02-21 ENCOUNTER — TRANSCRIPTION ENCOUNTER (OUTPATIENT)
Age: 61
End: 2021-02-21

## 2021-02-21 ENCOUNTER — INPATIENT (INPATIENT)
Facility: HOSPITAL | Age: 61
LOS: 0 days | Discharge: HOME | DRG: 312 | End: 2021-02-21
Attending: INTERNAL MEDICINE | Admitting: INTERNAL MEDICINE
Payer: COMMERCIAL

## 2021-02-21 VITALS
HEART RATE: 57 BPM | OXYGEN SATURATION: 100 % | DIASTOLIC BLOOD PRESSURE: 73 MMHG | SYSTOLIC BLOOD PRESSURE: 136 MMHG | RESPIRATION RATE: 16 BRPM

## 2021-02-21 VITALS
SYSTOLIC BLOOD PRESSURE: 156 MMHG | RESPIRATION RATE: 18 BRPM | WEIGHT: 162.92 LBS | TEMPERATURE: 98 F | OXYGEN SATURATION: 98 % | HEIGHT: 66 IN | HEART RATE: 61 BPM | DIASTOLIC BLOOD PRESSURE: 84 MMHG

## 2021-02-21 DIAGNOSIS — R55 SYNCOPE AND COLLAPSE: ICD-10-CM

## 2021-02-21 DIAGNOSIS — I10 ESSENTIAL (PRIMARY) HYPERTENSION: ICD-10-CM

## 2021-02-21 DIAGNOSIS — D64.9 ANEMIA, UNSPECIFIED: ICD-10-CM

## 2021-02-21 DIAGNOSIS — I63.9 CEREBRAL INFARCTION, UNSPECIFIED: ICD-10-CM

## 2021-02-21 DIAGNOSIS — F03.90 UNSPECIFIED DEMENTIA, UNSPECIFIED SEVERITY, WITHOUT BEHAVIORAL DISTURBANCE, PSYCHOTIC DISTURBANCE, MOOD DISTURBANCE, AND ANXIETY: ICD-10-CM

## 2021-02-21 DIAGNOSIS — I48.0 PAROXYSMAL ATRIAL FIBRILLATION: ICD-10-CM

## 2021-02-21 LAB
ALBUMIN SERPL ELPH-MCNC: 4.2 G/DL — SIGNIFICANT CHANGE UP (ref 3.3–5)
ALP SERPL-CCNC: 49 U/L — SIGNIFICANT CHANGE UP (ref 40–120)
ALT FLD-CCNC: 15 U/L — SIGNIFICANT CHANGE UP (ref 10–45)
ANION GAP SERPL CALC-SCNC: 10 MMOL/L — SIGNIFICANT CHANGE UP (ref 5–17)
APPEARANCE UR: CLEAR — SIGNIFICANT CHANGE UP
APTT BLD: 29.4 SEC — SIGNIFICANT CHANGE UP (ref 27.5–35.5)
AST SERPL-CCNC: 13 U/L — SIGNIFICANT CHANGE UP (ref 10–40)
BASOPHILS # BLD AUTO: 0.04 K/UL — SIGNIFICANT CHANGE UP (ref 0–0.2)
BASOPHILS NFR BLD AUTO: 0.4 % — SIGNIFICANT CHANGE UP (ref 0–2)
BILIRUB SERPL-MCNC: 0.3 MG/DL — SIGNIFICANT CHANGE UP (ref 0.2–1.2)
BILIRUB UR-MCNC: NEGATIVE — SIGNIFICANT CHANGE UP
BLD GP AB SCN SERPL QL: NEGATIVE — SIGNIFICANT CHANGE UP
BUN SERPL-MCNC: 21 MG/DL — SIGNIFICANT CHANGE UP (ref 7–23)
CALCIUM SERPL-MCNC: 9.2 MG/DL — SIGNIFICANT CHANGE UP (ref 8.4–10.5)
CHLORIDE SERPL-SCNC: 102 MMOL/L — SIGNIFICANT CHANGE UP (ref 96–108)
CK SERPL-CCNC: 56 U/L — SIGNIFICANT CHANGE UP (ref 30–200)
CO2 SERPL-SCNC: 26 MMOL/L — SIGNIFICANT CHANGE UP (ref 22–31)
COLOR SPEC: YELLOW — SIGNIFICANT CHANGE UP
CREAT SERPL-MCNC: 0.84 MG/DL — SIGNIFICANT CHANGE UP (ref 0.5–1.3)
DIFF PNL FLD: NEGATIVE — SIGNIFICANT CHANGE UP
EOSINOPHIL # BLD AUTO: 0.12 K/UL — SIGNIFICANT CHANGE UP (ref 0–0.5)
EOSINOPHIL NFR BLD AUTO: 1.2 % — SIGNIFICANT CHANGE UP (ref 0–6)
GLUCOSE SERPL-MCNC: 87 MG/DL — SIGNIFICANT CHANGE UP (ref 70–99)
GLUCOSE UR QL: NEGATIVE — SIGNIFICANT CHANGE UP
HCT VFR BLD CALC: 28.4 % — LOW (ref 39–50)
HGB BLD-MCNC: 8.8 G/DL — LOW (ref 13–17)
IMM GRANULOCYTES NFR BLD AUTO: 0.4 % — SIGNIFICANT CHANGE UP (ref 0–1.5)
INR BLD: 1.09 — SIGNIFICANT CHANGE UP (ref 0.88–1.16)
KETONES UR-MCNC: NEGATIVE — SIGNIFICANT CHANGE UP
LEUKOCYTE ESTERASE UR-ACNC: NEGATIVE — SIGNIFICANT CHANGE UP
LYMPHOCYTES # BLD AUTO: 0.96 K/UL — LOW (ref 1–3.3)
LYMPHOCYTES # BLD AUTO: 9.8 % — LOW (ref 13–44)
MCHC RBC-ENTMCNC: 26.5 PG — LOW (ref 27–34)
MCHC RBC-ENTMCNC: 31 GM/DL — LOW (ref 32–36)
MCV RBC AUTO: 85.5 FL — SIGNIFICANT CHANGE UP (ref 80–100)
MONOCYTES # BLD AUTO: 0.62 K/UL — SIGNIFICANT CHANGE UP (ref 0–0.9)
MONOCYTES NFR BLD AUTO: 6.3 % — SIGNIFICANT CHANGE UP (ref 2–14)
NEUTROPHILS # BLD AUTO: 8.04 K/UL — HIGH (ref 1.8–7.4)
NEUTROPHILS NFR BLD AUTO: 81.9 % — HIGH (ref 43–77)
NITRITE UR-MCNC: NEGATIVE — SIGNIFICANT CHANGE UP
NRBC # BLD: 0 /100 WBCS — SIGNIFICANT CHANGE UP (ref 0–0)
PH UR: 6 — SIGNIFICANT CHANGE UP (ref 5–8)
PLATELET # BLD AUTO: 271 K/UL — SIGNIFICANT CHANGE UP (ref 150–400)
POTASSIUM SERPL-MCNC: 4 MMOL/L — SIGNIFICANT CHANGE UP (ref 3.5–5.3)
POTASSIUM SERPL-SCNC: 4 MMOL/L — SIGNIFICANT CHANGE UP (ref 3.5–5.3)
PROT SERPL-MCNC: 7 G/DL — SIGNIFICANT CHANGE UP (ref 6–8.3)
PROT UR-MCNC: NEGATIVE MG/DL — SIGNIFICANT CHANGE UP
PROTHROM AB SERPL-ACNC: 13 SEC — SIGNIFICANT CHANGE UP (ref 10.6–13.6)
RBC # BLD: 3.32 M/UL — LOW (ref 4.2–5.8)
RBC # FLD: 15.3 % — HIGH (ref 10.3–14.5)
RH IG SCN BLD-IMP: POSITIVE — SIGNIFICANT CHANGE UP
SARS-COV-2 RNA SPEC QL NAA+PROBE: SIGNIFICANT CHANGE UP
SODIUM SERPL-SCNC: 138 MMOL/L — SIGNIFICANT CHANGE UP (ref 135–145)
SP GR SPEC: 1.02 — SIGNIFICANT CHANGE UP (ref 1–1.03)
TROPONIN T SERPL-MCNC: <0.01 NG/ML — SIGNIFICANT CHANGE UP (ref 0–0.01)
TROPONIN T SERPL-MCNC: <0.01 NG/ML — SIGNIFICANT CHANGE UP (ref 0–0.01)
TSH SERPL-MCNC: 0.18 UIU/ML — LOW (ref 0.35–4.94)
UROBILINOGEN FLD QL: 0.2 E.U./DL — SIGNIFICANT CHANGE UP
WBC # BLD: 9.82 K/UL — SIGNIFICANT CHANGE UP (ref 3.8–10.5)
WBC # FLD AUTO: 9.82 K/UL — SIGNIFICANT CHANGE UP (ref 3.8–10.5)

## 2021-02-21 PROCEDURE — 84443 ASSAY THYROID STIM HORMONE: CPT

## 2021-02-21 PROCEDURE — 36415 COLL VENOUS BLD VENIPUNCTURE: CPT

## 2021-02-21 PROCEDURE — 86900 BLOOD TYPING SEROLOGIC ABO: CPT

## 2021-02-21 PROCEDURE — 70450 CT HEAD/BRAIN W/O DYE: CPT | Mod: 26,MA

## 2021-02-21 PROCEDURE — 70450 CT HEAD/BRAIN W/O DYE: CPT

## 2021-02-21 PROCEDURE — 71045 X-RAY EXAM CHEST 1 VIEW: CPT | Mod: 26

## 2021-02-21 PROCEDURE — 86901 BLOOD TYPING SEROLOGIC RH(D): CPT

## 2021-02-21 PROCEDURE — 99223 1ST HOSP IP/OBS HIGH 75: CPT

## 2021-02-21 PROCEDURE — 81003 URINALYSIS AUTO W/O SCOPE: CPT

## 2021-02-21 PROCEDURE — 85730 THROMBOPLASTIN TIME PARTIAL: CPT

## 2021-02-21 PROCEDURE — U0005: CPT

## 2021-02-21 PROCEDURE — 71045 X-RAY EXAM CHEST 1 VIEW: CPT

## 2021-02-21 PROCEDURE — 82962 GLUCOSE BLOOD TEST: CPT

## 2021-02-21 PROCEDURE — 85610 PROTHROMBIN TIME: CPT

## 2021-02-21 PROCEDURE — U0003: CPT

## 2021-02-21 PROCEDURE — 86850 RBC ANTIBODY SCREEN: CPT

## 2021-02-21 PROCEDURE — 84484 ASSAY OF TROPONIN QUANT: CPT

## 2021-02-21 PROCEDURE — 82550 ASSAY OF CK (CPK): CPT

## 2021-02-21 PROCEDURE — 99285 EMERGENCY DEPT VISIT HI MDM: CPT

## 2021-02-21 PROCEDURE — 85025 COMPLETE CBC W/AUTO DIFF WBC: CPT

## 2021-02-21 PROCEDURE — 87086 URINE CULTURE/COLONY COUNT: CPT

## 2021-02-21 PROCEDURE — 80053 COMPREHEN METABOLIC PANEL: CPT

## 2021-02-21 PROCEDURE — 99285 EMERGENCY DEPT VISIT HI MDM: CPT | Mod: 25

## 2021-02-21 PROCEDURE — G0378: CPT

## 2021-02-21 RX ORDER — ASPIRIN/CALCIUM CARB/MAGNESIUM 324 MG
81 TABLET ORAL DAILY
Refills: 0 | Status: DISCONTINUED | OUTPATIENT
Start: 2021-02-21 | End: 2021-02-21

## 2021-02-21 RX ORDER — LISINOPRIL 2.5 MG/1
30 TABLET ORAL DAILY
Refills: 0 | Status: DISCONTINUED | OUTPATIENT
Start: 2021-02-21 | End: 2021-02-21

## 2021-02-21 RX ORDER — CLOPIDOGREL BISULFATE 75 MG/1
75 TABLET, FILM COATED ORAL DAILY
Refills: 0 | Status: DISCONTINUED | OUTPATIENT
Start: 2021-02-21 | End: 2021-02-21

## 2021-02-21 RX ORDER — DONEPEZIL HYDROCHLORIDE 10 MG/1
1 TABLET, FILM COATED ORAL
Qty: 0 | Refills: 0 | DISCHARGE

## 2021-02-21 RX ORDER — ATORVASTATIN CALCIUM 80 MG/1
40 TABLET, FILM COATED ORAL AT BEDTIME
Refills: 0 | Status: DISCONTINUED | OUTPATIENT
Start: 2021-02-21 | End: 2021-02-21

## 2021-02-21 RX ORDER — LISINOPRIL 2.5 MG/1
0 TABLET ORAL
Qty: 0 | Refills: 0 | DISCHARGE

## 2021-02-21 RX ORDER — IRON SUCROSE 20 MG/ML
200 INJECTION, SOLUTION INTRAVENOUS EVERY 24 HOURS
Refills: 0 | Status: DISCONTINUED | OUTPATIENT
Start: 2021-02-21 | End: 2021-02-21

## 2021-02-21 RX ORDER — ATORVASTATIN CALCIUM 80 MG/1
1 TABLET, FILM COATED ORAL
Qty: 0 | Refills: 0 | DISCHARGE

## 2021-02-21 RX ORDER — INFLUENZA VIRUS VACCINE 15; 15; 15; 15 UG/.5ML; UG/.5ML; UG/.5ML; UG/.5ML
0.5 SUSPENSION INTRAMUSCULAR ONCE
Refills: 0 | Status: DISCONTINUED | OUTPATIENT
Start: 2021-02-21 | End: 2021-02-21

## 2021-02-21 RX ADMIN — Medication 81 MILLIGRAM(S): at 11:57

## 2021-02-21 RX ADMIN — CLOPIDOGREL BISULFATE 75 MILLIGRAM(S): 75 TABLET, FILM COATED ORAL at 11:57

## 2021-02-21 NOTE — H&P ADULT - HISTORY OF PRESENT ILLNESS
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 Patient wife heard a thump and found him on the floor. He was watching TV in the living room and went to bed. On ambulating to the bed room he lost consciousness He denies any dizziness or lightheadedness prior to LOC. He does not recall episode very well and is a poor historian. He denies any chest pain, sob, orthopnea, pnd. No prior syncope. 2D echo was done June 2020 at time of CVA and revealed normal LVEF. 12 lead EKG reveals SB 55 BPM NSST changes. Troponin was negative. Hgb was 8.8. COVID-19 was negative. His ILR was interrogated by the cardiology fellow and found intermittent episodes of PAF but no arrythmia correlating to LOC.  He denies any melena or BRBPR.

## 2021-02-21 NOTE — H&P ADULT - NSHPLABSRESULTS_GEN_ALL_CORE
Troponin T, Serum (02.21.21 @ 03:09)    Troponin T, Serum: <0.01: Reference interval for troponin T is </= 0.01 ng/mL which includes the  99th percentile of a healthy population. Troponin T results are not  interchangeable with troponin I results. ng/mL  COVID-19 PCR . (02.21.21 @ 03:09)    COVID-19 PCR: NotDetec: You can help in the fight against COVID-19. [facility] may contact  you to see if you are interested in voluntarily participating in one of  our clinical trials.  Testing is performed using polymerase chain reaction (PCR) or  transcription mediated amplification (TMA). This COVID-19 (SARS-CoV-2)  nucleic acid amplification test was validated by [facility] and is in use under the FDA Emergency Use Authorization  (EUA) for clinical labs CLIA-certified to perform high complexity  testing. Test results should be correlated with clinical presentation,  patient history, and epidemiology.

## 2021-02-21 NOTE — H&P ADULT - PROBLEM SELECTOR PLAN 1
Being admitted following a LOC  Vasovagal Vs Orthostasis?  Check orthostatics  Hold Donepezil as it can cause orthostasis   Monitor on tele  HR in 50s

## 2021-02-21 NOTE — DISCHARGE NOTE PROVIDER - HOSPITAL COURSE
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 Hoffman), mild dementia (on Donepezil) presented to ED following a syncopal episode. Pt states he got up from the chair to walk to the bed and passed out. He denies any dizziness, lightheadedness, CP/SOB, palpitations prior to LOC. Patient wife heard a thump and found him on the floor (denies any seizure activity at this point). No prior syncope or head trauma. On arrival, VS significant for HR 55. Labs significant for Troponin negative x2, Hgb 8.8. ECG: SB 55 BPM NSST changes. Echocardiogram 6/2020: normal LVEF, no valvular disorders. His ILR was interrogated by the cardiology fellow and found intermittent episodes of pAF but no arrhythmia correlating to LOC.    On arrival to cardiac tele, pt continued to remain bradycardic to high 40s-mid 50s. Per AllScripts review baseline HR is mid 50s. EP (Dr. Jay) reviewed loop recorder interrogation and felt rhythm was actually SR w/ PACs not pAF and thus pt did not warrant AC or Watchman device. No PPM indicated at this time. Plan was made to discharge patient home on ASA 81mg QD, Plavix 75mg QD, Lisinopril 30mg QD, Atorvastatin 40mg QD, CoQ10 200mg QD, B12 2500mcg QD, Folic Acid 1mg QD, Thiamine 100mg QD, Vitamin C 1000mg QD, Vitamin V3 125mcg QD. Decision was made to HOLD home Donzepil 5mg QD as this can cause bradycardia. As per Dr. Tony, patient is stable for discharge home with instruction to follow up with Dr. Iglesia Shultz and Dr. Jay as an outpatient.   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 Hoffman), mild dementia (on Donepezil) presented to ED following a syncopal episode. Pt states he got up from the chair to walk to the bed and passed out. He denies any dizziness, lightheadedness, CP/SOB, palpitations prior to LOC. Patient wife heard a thump and found him on the floor (denies any seizure activity at this point). No prior syncope or head trauma. On arrival, VS significant for HR 55. Labs significant for Troponin negative x2, Hgb 8.8. ECG: SB 55 BPM NSST changes. Echocardiogram 6/2020: normal LVEF, no valvular disorders. His ILR was interrogated by the cardiology fellow and found intermittent episodes of pAF but no arrhythmia correlating to LOC.    On arrival to cardiac tele, pt continued to remain bradycardic to high 40s-mid 50s. Per AllScripts review baseline HR is mid 50s. Orthostatics negative. EP (Dr. Jay) reviewed loop recorder interrogation and felt rhythm was actually SR w/ PACs not pAF and thus pt did not warrant AC or Watchman device. No PPM indicated at this time. Plan was made to discharge patient home on ASA 81mg QD, Plavix 75mg QD, Lisinopril 30mg QD, Atorvastatin 40mg QD, CoQ10 200mg QD, B12 2500mcg QD, Folic Acid 1mg QD, Thiamine 100mg QD, Vitamin C 1000mg QD, Vitamin V3 125mcg QD. Decision was made to HOLD home Donzepil 5mg QD as this can cause bradycardia. As per Dr. Tony, patient is stable for discharge home with instruction to follow up with Dr. Iglesia Shultz and Dr. Jay as an outpatient.   59 yo M, poor historian, w/ a PMH of HTN, CVA in June 2020 (residual aphasia), pAF s/p ILR (last interrogated 11/2020 w/ pAF x40 episodes), severe GREGOR on IV and PO Iron (baseline Hb 8.5-9.5, recent EGD/Colonoscopy w/ 5 non-bleeding gastric ulcers, follows w/ Dr. Hoffman), mild dementia (on Donepezil) presented to ED following a syncopal episode. Pt states he got up from the chair to walk to the bed and passed out. He denies any dizziness, lightheadedness, CP/SOB, palpitations prior to LOC. Patient wife heard a thump and found him on the floor (denies any seizure activity at this point). No prior syncope or head trauma. On arrival, VS significant for HR 55. Labs significant for Troponin negative x2, Hgb 8.8. ECG: SB 55 BPM NSST changes. Echocardiogram 6/2020: normal LVEF, no valvular disorders. His ILR was interrogated by the cardiology fellow and found intermittent episodes of pAF but no arrhythmia correlating to LOC.    On arrival to cardiac tele, pt continued to remain bradycardic to high 40s-mid 50s. Per AllScripts review baseline HR is mid 50s. Orthostatics negative. EP (Dr. Jay) reviewed loop recorder interrogation and felt rhythm was actually SR w/ PACs not pAF and thus pt did not warrant AC or Watchman device. No PPM indicated at this time. Plan was made to discharge patient home on ASA 81mg QD, Plavix 75mg QD, Lisinopril 30mg QD, Atorvastatin 40mg QD, CoQ10 200mg QD, B12 2500mcg QD, Folic Acid 1mg QD, Thiamine 100mg QD, Vitamin C 1000mg QD, Vitamin V3 125mcg QD. Decision was made to HOLD home Donzepil 5mg QD as this can cause bradycardia. As per Dr. Tony, patient is stable for discharge home with instruction to follow up with Dr. Iglesia Shultz and Dr. Jay as an outpatient.    I was physically present for the key portions of the evaluation and management (E/M) service provided.  I have personally seen and examined this patient.  I have reviewed vitals, labs, medications, cardiac studies and remaining additional imaging.  I agree with the above discharge documentation which I have reviewed and edited where appropriate. Patient is hemodynamically stable and appropriate for discharge home on the above prescribed medications.  Close outpatient cardiology follow up is recommended within 1-2 weeks.    Monserrat Tony MD   Cardiology Attending    35 minutes spent on total encounter; more than 50% of the visit was spent counseling and/or coordinating care by the attending physician, with plan of care discussed with the patient and cardiac team.

## 2021-02-21 NOTE — DISCHARGE NOTE NURSING/CASE MANAGEMENT/SOCIAL WORK - PATIENT PORTAL LINK FT
You can access the FollowMyHealth Patient Portal offered by [facility] by registering at the following website: [email or web address]. By joining [facility]’s FollowMyHealth portal, you will also be able to view your health information using other applications (apps) compatible with our system.

## 2021-02-21 NOTE — H&P ADULT - PROBLEM SELECTOR PLAN 3
Occult AF detected on ILR earlier today  Currently in SR  Will likely need Eliquis initiated. Can be on ASA/Eliquis to prevent triple therapy

## 2021-02-21 NOTE — ED ADULT NURSE NOTE - OBJECTIVE STATEMENT
Patient to the ED with complaint of syncopal episode, as per spouse she heard him fall around 1 am , patient reported weakness of legs, patient has hx of multiple strokes, residual speech and memory difficulties, and anemia, due for infusion next week, HGB 1 month ago 9.3. Denies chest pain SOB.

## 2021-02-21 NOTE — CHART NOTE - NSCHARTNOTEFT_GEN_A_CORE
CARDIOLOGY FELLOW INTERROGATION NOTE    Called to evaluate device in setting of syncope    Device: KIMBERLY Bauer ILR    Battery Status: Good    Events:   episodes, % time in AT/AF 0.3%. There are no events on 2/20/21-2/21/21    No programming changes were made during this interrogation.    Will discuss with EP attending.

## 2021-02-21 NOTE — DISCHARGE NOTE PROVIDER - CARE PROVIDERS DIRECT ADDRESSES
[email or web address]

## 2021-02-21 NOTE — H&P ADULT - NSICDXPASTMEDICALHX_GEN_ALL_CORE_FT
PAST MEDICAL HISTORY:  Anemia     CVA (cerebral vascular accident)     Dementia, old age     Hypertension

## 2021-02-21 NOTE — ED PROVIDER NOTE - CLINICAL SUMMARY MEDICAL DECISION MAKING FREE TEXT BOX
59M PMH dementia, HTN, chronic b/l CVA (?cryptogenic strokes), pt has implantable cardiac recorder, on asa and plavix, chronic anemia who p/w syncope at home at 1am, pt now back to baseline mental status, VSS, EKG no stemi, plan for labs including trop, CT head to r/o ICH, cards to interrogate loop recorder, pt will likely require admission for syncope workup. 59M PMH dementia, HTN, chronic b/l CVA (?cryptogenic strokes), pt has implantable cardiac recorder, on asa and plavix, chronic anemia who p/w syncope at home at 1am, pt now back to baseline mental status, VSS, EKG no stemi, plan for labs including trop, CT head to r/o ICH, cards to interrogate loop recorder, pt will likely require admission for syncope workup.    CT head with no new findings, labs show baseline anemia, cards fellow did not find arrythmia correlating with episode of LOC but did not possible pAfib. Plan for admit to cards tele for further workup.

## 2021-02-21 NOTE — H&P ADULT - ATTENDING COMMENTS
discussed with EPS there is no afib on ILR  he is bradycardic on donepezil will stop  he will follow up with Dr. Jay and his cardiologist

## 2021-02-21 NOTE — ED PROVIDER NOTE - PHYSICAL EXAMINATION
GEN: Well developed, well nourished, awake, alert, oriented to person, place, time/situation and in no apparent distress. NTAF  ENT: Airway patent, Nasal mucosa clear. Mouth with normal mucosa.  EYES: Clear bilaterally. PERRL, EOMI  RESPIRATORY: Breathing comfortably with normal RR. No W/C/R, no hypoxia or resp distress.  CARDIAC: Regular rate and rhythm, no M/R/G  ABDOMEN: Soft, nontender, +bowel sounds, no rebound, rigidity, or guarding.  MSK: Range of motion is not limited, no deformities noted.  NEURO: Alert and oriented x 3. Cn 2-12 intact. Slight LUE weakness noted otherwise strength 5/5 and sensation intact in all 4 extremities. no pronator drift. FTN normal. Wide based gait.   SKIN: Skin normal color for race, warm, dry and intact. No evidence of rash.  PSYCH: Alert and oriented to person, place, time/situation. normal mood and affect. no apparent risk to self or others.

## 2021-02-21 NOTE — DISCHARGE NOTE PROVIDER - NSDCMRMEDTOKEN_GEN_ALL_CORE_FT
aspirin 81 mg oral delayed release tablet: 1 tab(s) orally once a day  aspirin 81 mg oral delayed release tablet: 1 tab(s) orally once a day  atorvastatin 80 mg oral tablet: 1 tab(s) orally once a day (at bedtime)  atorvastatin 80 mg oral tablet: 1 tab(s) orally once a day (at bedtime)  clopidogrel 75 mg oral tablet: 1 tab(s) orally once a day  clopidogrel 75 mg oral tablet: 1 tab(s) orally once a day  lisinopril:    aspirin 81 mg oral delayed release tablet: 1 tab(s) orally once a day  atorvastatin 40 mg oral tablet: 1 tab(s) orally once a day  clopidogrel 75 mg oral tablet: 1 tab(s) orally once a day  CoQ10 300 mg oral capsule: 1 cap(s) orally once a day  folic acid 1 mg oral tablet: 1 tab(s) orally once a day  lisinopril 30 mg oral tablet: 1 tab(s) orally once a day  thiamine 100 mg oral tablet: 1 tab(s) orally once a day  Vitamin B12: 2500 microgram(s) orally once a day  Vitamin C 1000 mg oral tablet: 1 tab(s) orally once a day  Vitamin D3 5000 intl units (125 mcg) oral tablet: 1 tab(s) orally once a day

## 2021-02-21 NOTE — ED PROVIDER NOTE - CARE PLAN
Principal Discharge DX:	Syncope   Principal Discharge DX:	Syncope  Secondary Diagnosis:	Paroxysmal A-fib

## 2021-02-21 NOTE — DISCHARGE NOTE PROVIDER - PROVIDER TOKENS
PROVIDER:[TOKEN:[[id number]],FOLLOWUP:[2 weeks],ESTABLISHEDPATIENT:[T]],PROVIDER:[TOKEN:[[id number]],FOLLOWUP:[2 weeks],ESTABLISHEDPATIENT:[T]],PROVIDER:[TOKEN:[[id number]],FOLLOWUP:[2 weeks],ESTABLISHEDPATIENT:[T]],PROVIDER:[TOKEN:[[id number]],FOLLOWUP:[2 weeks],ESTABLISHEDPATIENT:[T]]

## 2021-02-21 NOTE — DISCHARGE NOTE PROVIDER - NSDCCPCAREPLAN_GEN_ALL_CORE_FT
PRINCIPAL DISCHARGE DIAGNOSIS  Diagnosis: Syncope  Assessment and Plan of Treatment: You came to the hospital because you passed out at home. When you got here, your heart rate was low but your loop recorder interrogation showed no underlying rhythms. This means you did not have any heary arrhythmias that may have caused you to pass out. Dr. Jay (your EP or heart rhythm doctor) saw you and felt you are in a normal heart rhythm and are okay to be discharged home with cardiology follow up.  -You likely passed out due to your blood pressure dropping when you stood up too quickly. It is recommended you take your time getting up and stay hydrated to avoid this happening again  -F/u w/ Dr. Shultz in 1-2 weeks as well as Dr. Jay regurally to monitor your loop recorder      SECONDARY DISCHARGE DIAGNOSES  Diagnosis: Essential hypertension  Assessment and Plan of Treatment: Please continue your home Lisinopril 30mg daily. 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   -We have decided to HOLD your home Donzepil 5mg daily. This medication can cause a low heart rate which is possibly the reason for your low heart rate and a contributing factor to you passing out    Diagnosis: Anemia, unspecified type  Assessment and Plan of Treatment: Your blood count was around its baseline on admission (8.8). Please continue regular follow up with Dr. Hoffman and Dr. Negrete    Diagnosis: Cerebrovascular accident (CVA), unspecified mechanism  Assessment and Plan of Treatment: Please continue your home Plavix 75mg daily, Aspirin 81mg daily and Atorvastatin 40mg daily. There have been no changes

## 2021-02-21 NOTE — DISCHARGE NOTE PROVIDER - CARE PROVIDER_API CALL
Iglesia Shultz  CARDIOVASCULAR DISEASE  [address] [facility] [place] [address] [place] [address]  Phone: [phone]  Fax: [phone]  Established Patient  Follow Up Time: 2 weeks    Magaly oHffman)  Internal Medicine  [address] [place] [address]  Phone: [phone]  Fax: [phone]  Established Patient  Follow Up Time: 2 weeks    Catherine Jay)  Cardiac Electrophysiology; Cardiovascular Disease; Internal Medicine  [address] [place] [address]  Phone: [phone]  Fax: [phone]  Established Patient  Follow Up Time: 2 weeks    Tresa Easley (NP; RN; DNP)  NP in [facility]  [address] [place] [address]  Phone: [phone]  Fax: [phone]  Established Patient  Follow Up Time: 2 weeks

## 2021-02-21 NOTE — H&P ADULT - ASSESSMENT
Patient is a 60 year old male with PMHx of HTN, Anemia, mild dementia and previous CVA with residual asphasia who is being admitted following a syncopal episode

## 2021-02-21 NOTE — ED ADULT TRIAGE NOTE - CHIEF COMPLAINT QUOTE
bilateral lower leg weakness and near syncope about an hour ago, hx stroke 6 months  ago and anemia last hgb 9

## 2021-02-21 NOTE — DISCHARGE NOTE PROVIDER - NSDCFUSCHEDAPPT_GEN_ALL_CORE_FT
ALMAS TOMTOR ; 02/22/2021 ; [facility]  TOMALMASDARIELA ; 02/25/2021 ; [facility]  DARIELA TOM ; 02/25/2021 ; [facility]  DARIELA TOM ; 03/02/2021 ; [facility]  DARIELA TOM ; 03/02/2021 ; [facility]  DARIELA TOM ; 03/03/2021 ; [facility] OtoLaryng [address] [facility] ; 03/23/2021 ; [facility] Neuro [address] [facility] ; 05/19/2021 ; [facility]PP Heartvasc [address] [place] [address]

## 2021-02-22 ENCOUNTER — APPOINTMENT (OUTPATIENT)
Age: 61
End: 2021-02-22

## 2021-02-22 LAB
CULTURE RESULTS: SIGNIFICANT CHANGE UP
SPECIMEN SOURCE: SIGNIFICANT CHANGE UP

## 2021-02-23 PROBLEM — D64.9 ANEMIA, UNSPECIFIED: Chronic | Status: ACTIVE | Noted: 2021-02-21

## 2021-02-23 PROBLEM — F03.90 UNSPECIFIED DEMENTIA, UNSPECIFIED SEVERITY, WITHOUT BEHAVIORAL DISTURBANCE, PSYCHOTIC DISTURBANCE, MOOD DISTURBANCE, AND ANXIETY: Chronic | Status: ACTIVE | Noted: 2021-02-21

## 2021-02-23 PROBLEM — I63.9 CEREBRAL INFARCTION, UNSPECIFIED: Chronic | Status: ACTIVE | Noted: 2021-02-21

## 2021-02-25 ENCOUNTER — APPOINTMENT (OUTPATIENT)
Age: 61
End: 2021-02-25

## 2021-02-25 ENCOUNTER — OUTPATIENT (OUTPATIENT)
Dept: OUTPATIENT SERVICES | Facility: HOSPITAL | Age: 61
LOS: 1 days | End: 2021-02-25
Payer: COMMERCIAL

## 2021-02-25 VITALS
DIASTOLIC BLOOD PRESSURE: 72 MMHG | RESPIRATION RATE: 18 BRPM | SYSTOLIC BLOOD PRESSURE: 128 MMHG | TEMPERATURE: 98 F | OXYGEN SATURATION: 98 % | HEART RATE: 48 BPM

## 2021-02-25 DIAGNOSIS — D50.9 IRON DEFICIENCY ANEMIA, UNSPECIFIED: ICD-10-CM

## 2021-02-25 DIAGNOSIS — F03.90 UNSPECIFIED DEMENTIA, UNSPECIFIED SEVERITY, WITHOUT BEHAVIORAL DISTURBANCE, PSYCHOTIC DISTURBANCE, MOOD DISTURBANCE, AND ANXIETY: ICD-10-CM

## 2021-02-25 DIAGNOSIS — R55 SYNCOPE AND COLLAPSE: ICD-10-CM

## 2021-02-25 DIAGNOSIS — I48.0 PAROXYSMAL ATRIAL FIBRILLATION: ICD-10-CM

## 2021-02-25 DIAGNOSIS — I69.920 APHASIA FOLLOWING UNSPECIFIED CEREBROVASCULAR DISEASE: ICD-10-CM

## 2021-02-25 DIAGNOSIS — Z79.82 LONG TERM (CURRENT) USE OF ASPIRIN: ICD-10-CM

## 2021-02-25 DIAGNOSIS — Z79.899 OTHER LONG TERM (CURRENT) DRUG THERAPY: ICD-10-CM

## 2021-02-25 PROCEDURE — 96365 THER/PROPH/DIAG IV INF INIT: CPT

## 2021-02-25 RX ORDER — FERUMOXYTOL 510 MG/17ML
510 INJECTION INTRAVENOUS ONCE
Refills: 0 | Status: COMPLETED | OUTPATIENT
Start: 2021-02-25 | End: 2021-02-25

## 2021-02-25 RX ADMIN — FERUMOXYTOL 117 MILLIGRAM(S): 510 INJECTION INTRAVENOUS at 11:10

## 2021-02-25 RX ADMIN — FERUMOXYTOL 510 MILLIGRAM(S): 510 INJECTION INTRAVENOUS at 12:10

## 2021-02-26 ENCOUNTER — APPOINTMENT (OUTPATIENT)
Dept: HEART AND VASCULAR | Facility: CLINIC | Age: 61
End: 2021-02-26
Payer: COMMERCIAL

## 2021-02-26 VITALS
DIASTOLIC BLOOD PRESSURE: 70 MMHG | HEIGHT: 67 IN | WEIGHT: 163 LBS | HEART RATE: 48 BPM | RESPIRATION RATE: 14 BRPM | TEMPERATURE: 96.4 F | BODY MASS INDEX: 25.58 KG/M2 | OXYGEN SATURATION: 96 % | SYSTOLIC BLOOD PRESSURE: 120 MMHG

## 2021-02-26 DIAGNOSIS — R55 SYNCOPE AND COLLAPSE: ICD-10-CM

## 2021-02-26 DIAGNOSIS — R00.1 BRADYCARDIA, UNSPECIFIED: ICD-10-CM

## 2021-02-26 PROCEDURE — 99213 OFFICE O/P EST LOW 20 MIN: CPT

## 2021-02-26 PROCEDURE — 99072 ADDL SUPL MATRL&STAF TM PHE: CPT

## 2021-02-26 NOTE — PHYSICAL EXAM
[General Appearance - Well Developed] : well developed [Normal Appearance] : normal appearance [Well Groomed] : well groomed [General Appearance - Well Nourished] : well nourished [No Deformities] : no deformities [General Appearance - In No Acute Distress] : no acute distress [Normal Conjunctiva] : the conjunctiva exhibited no abnormalities [Eyelids - No Xanthelasma] : the eyelids demonstrated no xanthelasmas [Normal Jugular Venous A Waves Present] : normal jugular venous A waves present [Normal Jugular Venous V Waves Present] : normal jugular venous V waves present [No Jugular Venous Ordoñez A Waves] : no jugular venous ordoñez A waves [Respiration, Rhythm And Depth] : normal respiratory rhythm and effort [Exaggerated Use Of Accessory Muscles For Inspiration] : no accessory muscle use [Auscultation Breath Sounds / Voice Sounds] : lungs were clear to auscultation bilaterally [Heart Rate And Rhythm] : heart rate and rhythm were normal [Heart Sounds] : normal S1 and S2 [Murmurs] : no murmurs present [Edema] : no peripheral edema present [Veins - Varicosity Changes] : no varicosital changes were noted in the lower extremities [Abnormal Walk] : normal gait [Gait - Sufficient For Exercise Testing] : the gait was sufficient for exercise testing [Nail Clubbing] : no clubbing of the fingernails [Cyanosis, Localized] : no localized cyanosis [Petechial Hemorrhages (___cm)] : no petechial hemorrhages [Skin Color & Pigmentation] : normal skin color and pigmentation [Skin Turgor] : normal skin turgor [] : no rash [No Venous Stasis] : no venous stasis [Skin Lesions] : no skin lesions [No Skin Ulcers] : no skin ulcer [No Xanthoma] : no  xanthoma was observed [Mood] : the mood was normal [No Anxiety] : not feeling anxious [Oriented to Person] : oriented to person [Oriented to Time] : oriented to time [FreeTextEntry1] : depressed affect

## 2021-02-26 NOTE — REVIEW OF SYSTEMS
[Recent Weight Gain (___ Lbs)] : no recent weight gain [Recent Weight Loss (___ Lbs)] : recent [unfilled] ~Ulb weight loss [Dyspnea on exertion] : dyspnea during exertion [Cough] : no cough [Joint Pain] : no joint pain [Joint Swelling] : no joint swelling [Joint Stiffness] : no joint stiffness [Muscle Cramps] : no muscle cramps [Limb Weakness (Paresis)] : no limb weakness [Confusion] : confusion was observed [Memory Lapses Or Loss] : memory lapses or loss [Negative] : Heme/Lymph

## 2021-02-26 NOTE — ASSESSMENT
General New:    History of Present Illness:   Consultation from: Dr. Soriano  Reason for consultation: Thyroid nodule  Ms. Loyola is a 77 year old female who presents today for evaluation of thyroid nodule.  She noticed some swelling on the right side of her neck that began at the end of March.  Denies any trauma to this area.  She underwent a CT scan which showed enlarged right thyroid lobe and thyroid nodule.  Thyroid ultrasound subsequently showed a 2.4 cm cystic nodule.  She denies any family history of thyroid cancer.  Denies any personal history of head neck radiation.  Denies any dysphasia or odynophagia.  No fevers chills or weight loss.  Does note some occasional dysphonia/raspiness to her voice.  Denies any throat clearing or globus sensation. Denies hemoptysis, neck pain, neck mass, decreased range of motion of neck, shortness of breath, fevers, chills, weight loss, nausea/vomiting, numbness/tingling.         REVIEW OF SYSTEMS:   --General: Pt denies problems with fever, night sweats, weight changes, appetite changes and sleep problems  --HEET: see HPI  --Respiratory: Pt denies problems with coughing, wheezing,  nor shortness of breath    --Cardiovascular: Pt denies problems with chest pain, palpitations or other cardiac complaints  --Gastrointestinal: Pt denies problems with diarrhea, constipation, abdominal pain or other complaints  --Genitourinary: Pt denies problems with urinary pain, bleeding and voiding problems  --Musculoskeletal: Pt denies problems with pain, swelling, weakness or limitation of motion  --Neurologic:Pt denies problems with syncope, seizures, paralysis, involuntary movements or gait  --Psychiatric: Pt denies problems with sleep, anxiety, or depression  --Hematologic/Lymphatic/Immunologic: Pt. denies hematological, lymphatic and immunological problems  --Skin: No problems with scalp lesions. No rash    Past Medical History:  No past medical history on file.     Past Surgical History:  [FreeTextEntry1] : Recent syncope\par \par sinus bradycardia \par \par COPD\par \par dementia    No past surgical history on file.    ALLERGIES:  No Known Allergies     Medications:   Current Outpatient Medications   Medication Sig Dispense Refill   • raloxifene (EVISTA) 60 MG tablet Take 1 tablet by mouth daily.     • Calcium Carb-Cholecalciferol 1000-800 MG-UNIT Tab Take by mouth daily.     • Biotin 1000 MCG Chew Tab        No current facility-administered medications for this visit.         Social History:   Social History     Socioeconomic History   • Marital status: /Civil Union     Spouse name: Not on file   • Number of children: Not on file   • Years of education: Not on file   • Highest education level: Not on file   Occupational History   • Not on file   Social Needs   • Financial resource strain: Not on file   • Food insecurity:     Worry: Not on file     Inability: Not on file   • Transportation needs:     Medical: Not on file     Non-medical: Not on file   Tobacco Use   • Smoking status: Never Smoker   • Smokeless tobacco: Never Used   Substance and Sexual Activity   • Alcohol use: Yes     Comment: occ   • Drug use: No   • Sexual activity: Never   Lifestyle   • Physical activity:     Days per week: Not on file     Minutes per session: Not on file   • Stress: Not on file   Relationships   • Social connections:     Talks on phone: Not on file     Gets together: Not on file     Attends [employer] service: Not on file     Active member of club or organization: Not on file     Attends meetings of clubs or organizations: Not on file     Relationship status: Not on file   • Intimate partner violence:     Fear of current or ex partner: Not on file     Emotionally abused: Not on file     Physically abused: Not on file     Forced sexual activity: Not on file   Other Topics Concern   • Not on file   Social History Narrative   • Not on file       Family History   Problem Relation Age of Onset   • Heart Mother    • Heart Father    • Patient is unaware of any medical problems Daughter    • Patient is  unaware of any medical problems Son         Physical Examination:  Visit Vitals  Ht 5' 5\" (1.651 m)   Wt 54.4 kg (120 lb)   BMI 19.97 kg/m²     General:  alert, oriented, NAD  Skin: warm & dry  Head & Face:  no bony step-offs, no sinus tenderness, salivary glands without masses  Eyes:  PERRL, EOMI, no diplopia or proptosis, no spontaneous nystagmus, no periorbital edema  External ears and nose:  normal  Right ear:  EAC normal  Left ear:  EAC normal  Nose: Pink mucosa  Oral cavity:  lips, gums, hard palate and tongue normal  Oropharynx:  palate symmetric without masses or exudates  Neck:  Without  Masses   Lymphatics:  no cervical adenopathy  Neurologic:  cranial nerves 2-12 grossly intact  Psychiatric:  mood normal  Respiratory:  breathing unlabored; no stridor    Procedure Note: Flexible Fiberoptic Laryngoscopy    Indication: dysphonia    Procedure Details:    After informed consent was obtained, the patient's nostrils were sprayed with a oxymetazoline/Lidocaine mixture. A flexible endoscope was then passed along the floor of the nose into the nasopharynx, oropharynx, and larynx examining the structures in detail. The vocal folds were examined during rest, spontaneous respiration, and phonation.     Findings:    Nasopharynx: Normal   Eustachian Tube Orifices: normal   Post pharyngeal wall: normal   Vallecula: normal   Epiglottis: normal   Pyriform Sinuses: normal   Base of Tongue: normal   False vocal folds: normal   Bilateral True vocal fold movement:  Bilateral bowing true vocal folds     Other Findings:   Condition:  The patient tolerated the procedure well.  Complications:  None    Manuel Dorsey MD        US Thyroiud 4/16/2019  EXAM: US THYROID        CLINICAL INDICATION: Thyroid mass.  Follow up prior abnormality.        COMPARISON STUDIES: CT of the neck dated 04/12/2019        FINDINGS: The right lobe measures 57 x 26 x 23 mm in size.     The left lobe measures 50 x 13 x 11 mm in size.     The isthmus  measures 3 mm in thickness.       There are multiple hypoechoic nodules measuring less than 5 mm in size.     Corresponding to the CT abnormality, there is a large complex cyst in the midpole of the right lobe measuring 24 x 19 x 21 mm in size.  Increased echogenicity with punctate foci demonstrated within the posterior aspect of this mass probably just some   proteinaceous debris.  A solid mural component however cannot be excluded..        IMPRESSION:  1.  Indeterminant 24 mm mass in the midpole of the right lobe.  Consideration for biopsy recommended.  Please see below recommendations.     Recommendations for management of thyroid nodules 1 cm or larger in maximum diameter (SRU consensus conference statement, radiology 2005:237:[id number] [id number] (:     US feature                                                     Recommendation     Solitary nodule microcalcifications              Strongly consider US-guided FNA if >= 1 cm     Solid (are almost entirely solid)                    Strongly consider US-guided FNA if .>= 1.5 cm  or coarse calcification     Next solid and cystic or almost                     Consider US-guided FNA if >= 2 cm  Early cystic with solid mural component or     None of the above but substantial                Consider US-guided FNA  Growth since prior US exam     Almost entirely cystic and none                     US-guided FNA probably unnecessary  of the above and no substantial  growth (or no prior US )        Multiple nodules                                             Consider US-guided FNA of one or more nodules,                                                                            with selection prioritized on basis of criteria                                                                           ( in order listed ) for solitary nodule                                                                               CT Neck 4/12/2019  EXAM:  CT NECK SOFT TISSUE W WO CONTRAST     CLINICAL  INDICATION: Right neck mass      COMPARISON:  No previous exams available for review.     TECHNIQUE:  Helical acquisition with multiple reformats. kVp and mA adjusted for patient size.      CONTRAST: 100 cc of Novaplus-350 are administered intravenously.     FINDINGS:  The palpable mass in the right neck is an enlarged right lobe of thyroid gland with a dominant mass.  There is a 2.3 cm in maximum dimension sharply circumscribed but slightly macrolobulated lesion in the right lobe of the thyroid gland.  It   demonstrates homogeneous relatively low signal but Hounsfield measurements are greater than simple water.  This could be a protein or blood containing cyst but a solid lesion cannot be excluded.  Subtle enhancement cannot be excluded after measuring   Hounsfield units pre and postcontrast.  There are other less than 1 cm lesions in both lobes of the thyroid gland.  I recommend thyroid ultrasound for more sensitive evaluation.  No adenopathy is seen and no other abnormalities are noted.     IMPRESSION:  There is a 2.3 cm in maximum dimension sharply circumscribed macrolobulated homogeneously relatively low density mass in the right lobe of the thyroid gland.  It may be a cyst but if so is protein or blood containing because of Hounsfield   unit measurements.  Subtle enhancement cannot be excluded.  There are other less than 1 cm lesions in both lobes of the thyroid gland.  I recommend thyroid ultrasound for more sensitive evaluation.     This was referred to the significant nonacute reporting protocol along with recommendation for thyroid ultrasound.      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  Discussed the most are benign. Based on the size of her nodules I do recommend proceeding with FNA biopsy at this time based on AUBREY guidelines.   These guidelines include FNA biopsy for solid nodules with high risk features measuring >1 cm, solid nodules with typical features measuring >1.5 cm, and cystic nodules measuring >2 cm. Patient elects to proceed with FNA biopsy. She has been counseled on the risks and benefits of this procedure. Patient has likewise been counseled on treatment for benign, indeterminate, or malignant diagnoses, with consideration for hemithyroidectomy vs total thyroidectomy, pending biopsy results.  -Reassured patient regarding presbyphonia    Follow up after FNA    Manuel Dorsey MD

## 2021-02-26 NOTE — DISCUSSION/SUMMARY
[Essential Hypertension] : essential hypertension [Stable] : stable [Responding to Treatment] : responding to treatment [Sinus Bradycardia] : sinus bradycardia [Syncope of Unknown Origin] : syncope of unknown origin [None] : none [With ___] : with [unfilled] [de-identified] : Zio patch 14 days [FreeTextEntry1] : Adult daughter and patient  understand directions for Zio patch use \par \par Continue present therapy \par \par Follow up with hematologist for iron infusions \par \par EP consultation pending

## 2021-02-26 NOTE — HISTORY OF PRESENT ILLNESS
[FreeTextEntry1] : s/p recent syncope /collapse at home  without prodrome \par \par No MI\par \par \par chronic anemia , receiving iron infusions with hematologist\par \par No acute bleeding\par \par \par Advised to come in for 14 day Zio patch \par \par Denies chest pain or palpitations\par \par No seizures

## 2021-02-26 NOTE — REASON FOR VISIT
[Follow-Up - Clinic] : a clinic follow-up of [Hyperlipidemia] : hyperlipidemia [Syncope] : syncope [FreeTextEntry1] : 60 year old make with emphysema, chronic smoking addiction  with diffuse atherosclerosis  and moderately severe  stenosis of the proximal left vertebral artery with moderate stenosis of the right M1  and left P3 segment  and severe atherosclerosis of the  descending aorta  has multi infarct dementia .\par \par Loop recorder was  implanted to assess for PAF \par \par ERICA  suspicious for possible pulmonary AVM -  pulmonary CTA  was advised \par \par He was discharged on aspirin and plavix  and atorvastatin 80mg daily  [Family Member] : family member

## 2021-03-03 ENCOUNTER — APPOINTMENT (OUTPATIENT)
Dept: OTOLARYNGOLOGY | Facility: CLINIC | Age: 61
End: 2021-03-03

## 2021-03-04 ENCOUNTER — APPOINTMENT (OUTPATIENT)
Age: 61
End: 2021-03-04

## 2021-03-04 ENCOUNTER — OUTPATIENT (OUTPATIENT)
Dept: OUTPATIENT SERVICES | Facility: HOSPITAL | Age: 61
LOS: 1 days | End: 2021-03-04
Payer: COMMERCIAL

## 2021-03-04 VITALS
TEMPERATURE: 98 F | SYSTOLIC BLOOD PRESSURE: 144 MMHG | DIASTOLIC BLOOD PRESSURE: 78 MMHG | RESPIRATION RATE: 18 BRPM | OXYGEN SATURATION: 98 % | HEART RATE: 51 BPM

## 2021-03-04 DIAGNOSIS — D50.9 IRON DEFICIENCY ANEMIA, UNSPECIFIED: ICD-10-CM

## 2021-03-04 PROCEDURE — 96365 THER/PROPH/DIAG IV INF INIT: CPT

## 2021-03-04 RX ORDER — FERUMOXYTOL 510 MG/17ML
510 INJECTION INTRAVENOUS ONCE
Refills: 0 | Status: COMPLETED | OUTPATIENT
Start: 2021-03-04 | End: 2021-03-04

## 2021-03-04 RX ADMIN — FERUMOXYTOL 117 MILLIGRAM(S): 510 INJECTION INTRAVENOUS at 12:24

## 2021-03-04 RX ADMIN — FERUMOXYTOL 510 MILLIGRAM(S): 510 INJECTION INTRAVENOUS at 13:34

## 2021-03-15 ENCOUNTER — APPOINTMENT (OUTPATIENT)
Dept: HEART AND VASCULAR | Facility: CLINIC | Age: 61
End: 2021-03-15
Payer: COMMERCIAL

## 2021-03-15 VITALS
SYSTOLIC BLOOD PRESSURE: 170 MMHG | DIASTOLIC BLOOD PRESSURE: 77 MMHG | HEIGHT: 67 IN | WEIGHT: 160 LBS | BODY MASS INDEX: 25.11 KG/M2 | HEART RATE: 52 BPM | TEMPERATURE: 97.2 F

## 2021-03-15 PROCEDURE — 93285 PRGRMG DEV EVAL SCRMS IP: CPT

## 2021-03-15 PROCEDURE — 99072 ADDL SUPL MATRL&STAF TM PHE: CPT

## 2021-03-16 ENCOUNTER — RESULT CHARGE (OUTPATIENT)
Age: 61
End: 2021-03-16

## 2021-03-18 ENCOUNTER — FORM ENCOUNTER (OUTPATIENT)
Age: 61
End: 2021-03-18

## 2021-03-18 ENCOUNTER — INPATIENT (INPATIENT)
Facility: HOSPITAL | Age: 61
LOS: 2 days | Discharge: HOME | DRG: 101 | End: 2021-03-21
Attending: PSYCHIATRY & NEUROLOGY | Admitting: PSYCHIATRY & NEUROLOGY
Payer: COMMERCIAL

## 2021-03-18 VITALS
HEART RATE: 60 BPM | TEMPERATURE: 99 F | WEIGHT: 160.06 LBS | OXYGEN SATURATION: 98 % | HEIGHT: 66 IN | DIASTOLIC BLOOD PRESSURE: 71 MMHG | SYSTOLIC BLOOD PRESSURE: 127 MMHG | RESPIRATION RATE: 18 BRPM

## 2021-03-18 LAB
ALBUMIN SERPL ELPH-MCNC: 4.8 G/DL — SIGNIFICANT CHANGE UP (ref 3.3–5)
ALP SERPL-CCNC: 55 U/L — SIGNIFICANT CHANGE UP (ref 40–120)
ALT FLD-CCNC: 65 U/L — HIGH (ref 10–45)
ANION GAP SERPL CALC-SCNC: 13 MMOL/L — SIGNIFICANT CHANGE UP (ref 5–17)
AST SERPL-CCNC: 48 U/L — HIGH (ref 10–40)
BASOPHILS # BLD AUTO: 0.03 K/UL — SIGNIFICANT CHANGE UP (ref 0–0.2)
BASOPHILS NFR BLD AUTO: 0.4 % — SIGNIFICANT CHANGE UP (ref 0–2)
BILIRUB SERPL-MCNC: 0.3 MG/DL — SIGNIFICANT CHANGE UP (ref 0.2–1.2)
BUN SERPL-MCNC: 12 MG/DL — SIGNIFICANT CHANGE UP (ref 7–23)
CALCIUM SERPL-MCNC: 9.5 MG/DL — SIGNIFICANT CHANGE UP (ref 8.4–10.5)
CHLORIDE SERPL-SCNC: 98 MMOL/L — SIGNIFICANT CHANGE UP (ref 96–108)
CO2 SERPL-SCNC: 25 MMOL/L — SIGNIFICANT CHANGE UP (ref 22–31)
CREAT SERPL-MCNC: 0.67 MG/DL — SIGNIFICANT CHANGE UP (ref 0.5–1.3)
EOSINOPHIL # BLD AUTO: 0.02 K/UL — SIGNIFICANT CHANGE UP (ref 0–0.5)
EOSINOPHIL NFR BLD AUTO: 0.2 % — SIGNIFICANT CHANGE UP (ref 0–6)
GLUCOSE SERPL-MCNC: 109 MG/DL — HIGH (ref 70–99)
HCT VFR BLD CALC: 37.9 % — LOW (ref 39–50)
HGB BLD-MCNC: 12.3 G/DL — LOW (ref 13–17)
IMM GRANULOCYTES NFR BLD AUTO: 0.4 % — SIGNIFICANT CHANGE UP (ref 0–1.5)
LIDOCAIN IGE QN: 14 U/L — SIGNIFICANT CHANGE UP (ref 7–60)
LYMPHOCYTES # BLD AUTO: 0.88 K/UL — LOW (ref 1–3.3)
LYMPHOCYTES # BLD AUTO: 10.3 % — LOW (ref 13–44)
MCHC RBC-ENTMCNC: 28.2 PG — SIGNIFICANT CHANGE UP (ref 27–34)
MCHC RBC-ENTMCNC: 32.5 GM/DL — SIGNIFICANT CHANGE UP (ref 32–36)
MCV RBC AUTO: 86.9 FL — SIGNIFICANT CHANGE UP (ref 80–100)
MONOCYTES # BLD AUTO: 0.41 K/UL — SIGNIFICANT CHANGE UP (ref 0–0.9)
MONOCYTES NFR BLD AUTO: 4.8 % — SIGNIFICANT CHANGE UP (ref 2–14)
NEUTROPHILS # BLD AUTO: 7.19 K/UL — SIGNIFICANT CHANGE UP (ref 1.8–7.4)
NEUTROPHILS NFR BLD AUTO: 83.9 % — HIGH (ref 43–77)
NRBC # BLD: 0 /100 WBCS — SIGNIFICANT CHANGE UP (ref 0–0)
PLATELET # BLD AUTO: 301 K/UL — SIGNIFICANT CHANGE UP (ref 150–400)
POTASSIUM SERPL-MCNC: 4 MMOL/L — SIGNIFICANT CHANGE UP (ref 3.5–5.3)
POTASSIUM SERPL-SCNC: 4 MMOL/L — SIGNIFICANT CHANGE UP (ref 3.5–5.3)
PROT SERPL-MCNC: 7.4 G/DL — SIGNIFICANT CHANGE UP (ref 6–8.3)
RBC # BLD: 4.36 M/UL — SIGNIFICANT CHANGE UP (ref 4.2–5.8)
RBC # FLD: 18.4 % — HIGH (ref 10.3–14.5)
SODIUM SERPL-SCNC: 136 MMOL/L — SIGNIFICANT CHANGE UP (ref 135–145)
TROPONIN T SERPL-MCNC: 0.01 NG/ML — SIGNIFICANT CHANGE UP (ref 0–0.01)
WBC # BLD: 8.56 K/UL — SIGNIFICANT CHANGE UP (ref 3.8–10.5)
WBC # FLD AUTO: 8.56 K/UL — SIGNIFICANT CHANGE UP (ref 3.8–10.5)

## 2021-03-18 PROCEDURE — 93010 ELECTROCARDIOGRAM REPORT: CPT

## 2021-03-18 PROCEDURE — 99285 EMERGENCY DEPT VISIT HI MDM: CPT | Mod: 25

## 2021-03-18 PROCEDURE — 70450 CT HEAD/BRAIN W/O DYE: CPT | Mod: 26,MC

## 2021-03-18 PROCEDURE — 71045 X-RAY EXAM CHEST 1 VIEW: CPT | Mod: 26

## 2021-03-18 RX ORDER — ENOXAPARIN SODIUM 100 MG/ML
40 INJECTION SUBCUTANEOUS AT BEDTIME
Refills: 0 | Status: DISCONTINUED | OUTPATIENT
Start: 2021-03-18 | End: 2021-03-21

## 2021-03-18 RX ORDER — LEVETIRACETAM 250 MG/1
500 TABLET, FILM COATED ORAL
Refills: 0 | Status: DISCONTINUED | OUTPATIENT
Start: 2021-03-19 | End: 2021-03-21

## 2021-03-18 RX ORDER — ATORVASTATIN CALCIUM 80 MG/1
40 TABLET, FILM COATED ORAL DAILY
Refills: 0 | Status: DISCONTINUED | OUTPATIENT
Start: 2021-03-18 | End: 2021-03-18

## 2021-03-18 RX ORDER — LISINOPRIL 2.5 MG/1
30 TABLET ORAL DAILY
Refills: 0 | Status: DISCONTINUED | OUTPATIENT
Start: 2021-03-18 | End: 2021-03-21

## 2021-03-18 RX ORDER — LEVETIRACETAM 250 MG/1
1000 TABLET, FILM COATED ORAL ONCE
Refills: 0 | Status: COMPLETED | OUTPATIENT
Start: 2021-03-18 | End: 2021-03-18

## 2021-03-18 RX ORDER — ATORVASTATIN CALCIUM 80 MG/1
40 TABLET, FILM COATED ORAL AT BEDTIME
Refills: 0 | Status: DISCONTINUED | OUTPATIENT
Start: 2021-03-18 | End: 2021-03-21

## 2021-03-18 RX ORDER — ATORVASTATIN CALCIUM 80 MG/1
80 TABLET, FILM COATED ORAL AT BEDTIME
Refills: 0 | Status: DISCONTINUED | OUTPATIENT
Start: 2021-03-18 | End: 2021-03-18

## 2021-03-18 RX ORDER — CLOPIDOGREL BISULFATE 75 MG/1
75 TABLET, FILM COATED ORAL DAILY
Refills: 0 | Status: DISCONTINUED | OUTPATIENT
Start: 2021-03-18 | End: 2021-03-21

## 2021-03-18 RX ORDER — ASPIRIN/CALCIUM CARB/MAGNESIUM 324 MG
81 TABLET ORAL DAILY
Refills: 0 | Status: DISCONTINUED | OUTPATIENT
Start: 2021-03-18 | End: 2021-03-18

## 2021-03-18 RX ORDER — ASPIRIN/CALCIUM CARB/MAGNESIUM 324 MG
81 TABLET ORAL DAILY
Refills: 0 | Status: DISCONTINUED | OUTPATIENT
Start: 2021-03-18 | End: 2021-03-21

## 2021-03-18 RX ADMIN — LEVETIRACETAM 400 MILLIGRAM(S): 250 TABLET, FILM COATED ORAL at 22:26

## 2021-03-18 NOTE — ED ADULT NURSE NOTE - OBJECTIVE STATEMENT
Patient alert and oriented x 3 came brought by his wife for syncopal episode and seizure like episode at home ( whole body was shaking and bouncing )  around 3:30pm earlier . History of CVA 8 mos ago . Pt.  Denies any pain at this time ,. No dizziness , headache ,blurry vision , chest pain nor sob prior to syncope . Feels better at this time . Was sent by Dr. Chery for evaluation . safety maintained .

## 2021-03-18 NOTE — ED ADULT NURSE NOTE - CHIEF COMPLAINT QUOTE
Pt a&ox3 brought in by wife c.o seizure event at 3:30pm. witnessed by son. wife reports " he was shaking on the ground and my son couldn't wake him up." no seizures in the past. no hitting head. walked in.

## 2021-03-18 NOTE — ED ADULT TRIAGE NOTE - CHIEF COMPLAINT QUOTE
Pt a&ox3 brought in by wife c.o seizure event at 3:30pm. witnessed by son.  wife reports " he was shaking on the ground and my son couldn't wake him up." no seizures in the past. no hitting head. Pt a&ox3 brought in by wife c.o seizure event at 3:30pm. witnessed by son. wife reports " he was shaking on the ground and my son couldn't wake him up." no seizures in the past. no hitting head. walked in.

## 2021-03-18 NOTE — ED PROVIDER NOTE - OBJECTIVE STATEMENT
61 y/o M pt with PMHx of CVA (on Plavix and ASA) and HTN presents to ED c/o possible seizures. Per pt's wife, pt was sitting at home when he stood up then fell to the ground with a shaking episode of the entire body lasting for a few seconds. Afterwards, pt took about 10minutes to fully return to baseline. Pt in ED is ao x 3 and not in any acute distress. Pt's neurologist is Dr. Guillen, and his PCP is Dr. Shultz.

## 2021-03-18 NOTE — HISTORY OF PRESENT ILLNESS
[de-identified] : Mr. Rojas is a 60 year-old gentleman with recurrent CVA and TIA s/p ILR placement 7/2020.  He presents for device follow-up today.

## 2021-03-18 NOTE — ED ADULT TRIAGE NOTE - OTHER COMPLAINTS
PMH of CVA in june 2020, residual slurred speech deficit , HTN, HLD, dementia,. on plavix and asa daily

## 2021-03-18 NOTE — ED ADULT NURSE NOTE - CHPI ED NUR SYMPTOMS NEG
no blurred vision/no change in level of consciousness/no confusion/no dizziness/no fever/no nausea/no numbness/no vomiting/no weakness

## 2021-03-18 NOTE — PROCEDURE
[de-identified] : KIMBERLY LINQ\par Battery status Good\par Sensing 0.3 mV\par AF events c/w SR/SB with PACs\par

## 2021-03-18 NOTE — ED PROVIDER NOTE - PROGRESS NOTE DETAILS
Pt had second episode of seizure while walking to bathroom. Episode lasted 10seconds and resolved without intervention.

## 2021-03-18 NOTE — DISCUSSION/SUMMARY
[FreeTextEntry1] : Mr. Rojas is a pleasant 60 year-old gentleman with recurrent CVA s/p ILR placement.  He has had no atrial fibrillation detected since device implant.  He is enrolled in remote monitoring and was asked to return for follow-up in six months.

## 2021-03-19 DIAGNOSIS — D64.9 ANEMIA, UNSPECIFIED: ICD-10-CM

## 2021-03-19 DIAGNOSIS — I63.9 CEREBRAL INFARCTION, UNSPECIFIED: ICD-10-CM

## 2021-03-19 DIAGNOSIS — R56.9 UNSPECIFIED CONVULSIONS: ICD-10-CM

## 2021-03-19 DIAGNOSIS — Z29.9 ENCOUNTER FOR PROPHYLACTIC MEASURES, UNSPECIFIED: ICD-10-CM

## 2021-03-19 DIAGNOSIS — I10 ESSENTIAL (PRIMARY) HYPERTENSION: ICD-10-CM

## 2021-03-19 LAB
A1C WITH ESTIMATED AVERAGE GLUCOSE RESULT: 4.4 % — SIGNIFICANT CHANGE UP (ref 4–5.6)
ANION GAP SERPL CALC-SCNC: 12 MMOL/L — SIGNIFICANT CHANGE UP (ref 5–17)
BUN SERPL-MCNC: 10 MG/DL — SIGNIFICANT CHANGE UP (ref 7–23)
CALCIUM SERPL-MCNC: 9.4 MG/DL — SIGNIFICANT CHANGE UP (ref 8.4–10.5)
CHLORIDE SERPL-SCNC: 100 MMOL/L — SIGNIFICANT CHANGE UP (ref 96–108)
CHOLEST SERPL-MCNC: 76 MG/DL — SIGNIFICANT CHANGE UP
CO2 SERPL-SCNC: 26 MMOL/L — SIGNIFICANT CHANGE UP (ref 22–31)
CREAT SERPL-MCNC: 0.74 MG/DL — SIGNIFICANT CHANGE UP (ref 0.5–1.3)
ESTIMATED AVERAGE GLUCOSE: 80 MG/DL — SIGNIFICANT CHANGE UP (ref 68–114)
GLUCOSE SERPL-MCNC: 96 MG/DL — SIGNIFICANT CHANGE UP (ref 70–99)
HCT VFR BLD CALC: 36.7 % — LOW (ref 39–50)
HDLC SERPL-MCNC: 44 MG/DL — SIGNIFICANT CHANGE UP
HGB BLD-MCNC: 11.6 G/DL — LOW (ref 13–17)
LIPID PNL WITH DIRECT LDL SERPL: 24 MG/DL — SIGNIFICANT CHANGE UP
MAGNESIUM SERPL-MCNC: 2.1 MG/DL — SIGNIFICANT CHANGE UP (ref 1.6–2.6)
MCHC RBC-ENTMCNC: 27.8 PG — SIGNIFICANT CHANGE UP (ref 27–34)
MCHC RBC-ENTMCNC: 31.6 GM/DL — LOW (ref 32–36)
MCV RBC AUTO: 87.8 FL — SIGNIFICANT CHANGE UP (ref 80–100)
NON HDL CHOLESTEROL: 32 MG/DL — SIGNIFICANT CHANGE UP
NRBC # BLD: 0 /100 WBCS — SIGNIFICANT CHANGE UP (ref 0–0)
PLATELET # BLD AUTO: 288 K/UL — SIGNIFICANT CHANGE UP (ref 150–400)
POTASSIUM SERPL-MCNC: 3.4 MMOL/L — LOW (ref 3.5–5.3)
POTASSIUM SERPL-SCNC: 3.4 MMOL/L — LOW (ref 3.5–5.3)
RBC # BLD: 4.18 M/UL — LOW (ref 4.2–5.8)
RBC # FLD: 18.6 % — HIGH (ref 10.3–14.5)
SARS-COV-2 RNA SPEC QL NAA+PROBE: SIGNIFICANT CHANGE UP
SODIUM SERPL-SCNC: 138 MMOL/L — SIGNIFICANT CHANGE UP (ref 135–145)
T4 FREE SERPL-MCNC: 1.06 NG/DL — SIGNIFICANT CHANGE UP (ref 0.7–1.48)
TRIGL SERPL-MCNC: 42 MG/DL — SIGNIFICANT CHANGE UP
WBC # BLD: 5.58 K/UL — SIGNIFICANT CHANGE UP (ref 3.8–10.5)
WBC # FLD AUTO: 5.58 K/UL — SIGNIFICANT CHANGE UP (ref 3.8–10.5)

## 2021-03-19 PROCEDURE — 99223 1ST HOSP IP/OBS HIGH 75: CPT

## 2021-03-19 RX ORDER — INFLUENZA VIRUS VACCINE 15; 15; 15; 15 UG/.5ML; UG/.5ML; UG/.5ML; UG/.5ML
0.5 SUSPENSION INTRAMUSCULAR ONCE
Refills: 0 | Status: DISCONTINUED | OUTPATIENT
Start: 2021-03-19 | End: 2021-03-21

## 2021-03-19 RX ADMIN — ENOXAPARIN SODIUM 40 MILLIGRAM(S): 100 INJECTION SUBCUTANEOUS at 22:04

## 2021-03-19 RX ADMIN — LEVETIRACETAM 500 MILLIGRAM(S): 250 TABLET, FILM COATED ORAL at 22:03

## 2021-03-19 RX ADMIN — LISINOPRIL 30 MILLIGRAM(S): 2.5 TABLET ORAL at 06:24

## 2021-03-19 RX ADMIN — ATORVASTATIN CALCIUM 40 MILLIGRAM(S): 80 TABLET, FILM COATED ORAL at 22:03

## 2021-03-19 RX ADMIN — CLOPIDOGREL BISULFATE 75 MILLIGRAM(S): 75 TABLET, FILM COATED ORAL at 11:02

## 2021-03-19 RX ADMIN — Medication 81 MILLIGRAM(S): at 11:02

## 2021-03-19 RX ADMIN — LEVETIRACETAM 500 MILLIGRAM(S): 250 TABLET, FILM COATED ORAL at 11:00

## 2021-03-19 NOTE — OCCUPATIONAL THERAPY INITIAL EVALUATION ADULT - PERSONAL SAFETY AND JUDGMENT, REHAB EVAL
impulsivity during functional mobility; episode of urinary incontinence during assessment/impaired/at risk behaviors demonstrated

## 2021-03-19 NOTE — OCCUPATIONAL THERAPY INITIAL EVALUATION ADULT - DIAGNOSIS, OT EVAL
Pt presents with dysarthria, deficits in cognition, functional balance, coordination, resulting in decreased ADLs/functional mobility.

## 2021-03-19 NOTE — SWALLOW BEDSIDE ASSESSMENT ADULT - SWALLOW EVAL: PROGNOSIS
Good, anticipate return to unrestricted diet over the next 2-3 days w symptom mgmt & spontaneous recovery

## 2021-03-19 NOTE — CONSULT NOTE ADULT - ASSESSMENT
This is a 59 y/o male w/ hx of CVA (on ASA/ plavix), iron deficiency anemia, loop recorder in place who arrived to the ED for seizure like activity today. Patient denies hx of seizures. Patient found to have repeat episode of seizure activity in ED.  Loaded with 1 g IV keppra.     Admitted to [facility] for VEEG, loop interrogation and continued monitoring.     Recommend:  - q4h general neuro checks   - VEEG to assess for epileptiform activity   - 500 mg Keppra BID  - loop recorder interrogation by EP to assess for cardiac cause of episodes

## 2021-03-19 NOTE — PROGRESS NOTE ADULT - ATTENDING COMMENTS
Jeromy Rojas: 60 yr h/o bihemispheric small scattered strokes-6/2020 (prior CTA h/n-mod.right M1 & left P2 stenosis with mild diffuse athero+, ERICA-60%, pulm.shunt+, no AVM seen on CTA-chest), HTN, iron def; admitted 3/18 with seizures x2. CTH-nothing acute.   Evaluate for new stroke vs. seizure   –started on keppra, on aspirin+Plavix, check FT3/4-hyperthyroid labs, MRI brain, ILR-check, remaining hypercoag panel

## 2021-03-19 NOTE — CHART NOTE - NSCHARTNOTEFT_GEN_A_CORE
Goals reviewed at interdisciplinary rounds with case management, social work, physical therapy, occupational therapy, and speech language pathology.    Please see specific therapy  notes for in depth goals.    Highlights of goals are:    Patient will:   Physical therapy  [] remain on bedrest  [] get out of bed to chair [] ambulate with assistance [x] ambulate without assistance  []today       [x] by discharge    Occupational therapy  [] N/a, independent [x] balance training  []go from supine to seated independently [] balance sitting at the edge of the bed to do grooming task []stand at sink to perform grooming task  [] dress self   [] gain strength to feed self  [] other:  []today         [x]by discharge    Speech therapy  [x] N/a, no speech deficit [] vocalize [] respond to yes/no questions given cues with 80% accuracy [] name common objects [] express basic needs/wants  []other:  []today        [] by discharge      Swallow therapy  [x] tolerate regular diet [] remain NPO, receive oral care to minimize aspirating bacteria mouth bacteria [] tolerate pureed diet  [] tolerate mechanical soft diet  [] tolerate tube feeds [] tolerate thin liquids [] tolerate nectar thick liquids [] other:  [] with assistance  [x] today       [] by discharge      Pending PT and OT assessment.

## 2021-03-19 NOTE — OCCUPATIONAL THERAPY INITIAL EVALUATION ADULT - PLANNED THERAPY INTERVENTIONS, OT EVAL
ADL retraining/balance training/bed mobility training/cognitive, visual perceptual/neuromuscular re-education/parent/caregiver training.../strengthening/transfer training

## 2021-03-19 NOTE — H&P ADULT - HISTORY OF PRESENT ILLNESS
61 y/o M PMH CVA (on Plavix and ASA), HTN p/w possible seizures. Per pt's wife, he was sitting at home when he stood up then fell to the ground with a shaking episode of the entire body lasting for a few seconds. Afterwards, pt took about 10minutes to fully return to baseline. Pt in ED is ao x 3 and not in any acute distress. Pt's neurologist is Dr. Guillen, and his PCP is Dr. Shultz.     In ED, pt had another seizure like episode where he shook and then fell to the ground. The patient did not hit his head.  59 y/o M PMH CVA (on Plavix and ASA), HTN, iron deficiency anemia p/w possible seizures. Per pt's wife, he was sitting at home when he stood up then fell to the ground with a shaking episode of the entire body lasting for a few seconds. Afterwards, pt took about 10minutes to fully return to baseline. Pt in ED is ao x 3 and not in any acute distress. Pt's neurologist is Dr. Guillen, and his PCP is Dr. Shultz.     In ED, pt had another seizure like episode where he shook and then fell to the ground. The patient did not hit his head. Resolved without medication. Loaded with keppra 1g. Admitted to [facility] for further monitoring.    ED Vitals: 99.2F, HR 60, 127/71, RR 18, SPO2 98 on room air  ED Labs: Hgb 12.3, AST 48, ALT 65, trop<.01, UA neg,  59 y/o M PMH CVA (on Plavix and ASA), HTN, iron deficiency anemia p/w possible seizures. Per pt's wife, he was sitting at home when he stood up then fell to the ground with a shaking episode of the entire body lasting for a few seconds. Afterwards, pt took about 10minutes to fully return to baseline. Pt in ED is ao x 3 and not in any acute distress. Pt's neurologist is Dr. Guillen, and his PCP is Dr. Shultz.     In ED, pt had another seizure like episode where he shook and then fell to the ground. The patient did not hit his head. Resolved without medication. Loaded with keppra 1g. Admitted to [facility] for further monitoring.    ED Vitals: 99.2F, HR 60, 127/71, RR 18, SPO2 98 on room air  ED Labs: Hgb 12.3, AST 48, ALT 65, trop<.01, UA neg  ED Imaging: CTH negative  ED Course: Keppra loaded 1g 61 y/o M PMH CVA (on Plavix and ASA), HTN, iron deficiency anemia p/w possible seizures. Per pt's wife, he was sitting at home when he stood up then fell to the ground with a shaking episode of the entire body lasting for a few seconds. Afterwards, pt took about 10minutes to fully return to baseline. Pt in ED is ao x 3 and not in any acute distress. Pt's neurologist is Dr. Guillen, and his PCP is Dr. Shultz. On our interview, patient with    In ED, pt had another seizure like episode where he shook and then fell to the ground. The patient did not hit his head. Resolved without medication. Loaded with keppra 1g. Admitted to [facility] for further monitoring.    ED Vitals: 99.2F, HR 60, 127/71, RR 18, SPO2 98 on room air  ED Labs: Hgb 12.3, AST 48, ALT 65, trop<.01, UA neg  ED Imaging: CTH negative  ED Course: Keppra loaded 1g 61 y/o M PMH CVA (on Plavix and ASA), HTN, iron deficiency anemia p/w possible seizures. Per pt's wife, he was sitting at home when he stood up then fell to the ground with a shaking episode of the entire body lasting for a few seconds. Afterwards, pt took about 10minutes to fully return to baseline. Pt in ED is ao x 3 and not in any acute distress. Pt's neurologist is Dr. Guillen, and his PCP is Dr. Shultz.      In ED, pt had another seizure like episode where he shook and then fell to the ground. The patient did not hit his head. Resolved without medication. Loaded with keppra 1g. Admitted to [facility] for further monitoring.    ED Vitals: 99.2F, HR 60, 127/71, RR 18, SPO2 98 on room air  ED Labs: Hgb 12.3, AST 48, ALT 65, trop<.01, UA neg  ED Imaging: CTH negative  ED Course: Keppra loaded 1g

## 2021-03-19 NOTE — SWALLOW BEDSIDE ASSESSMENT ADULT - PHARYNGEAL PHASE
Hyolaryngeal excursion palpated during swallow trigger. Suspect mistimed airway closure AEB +cough on 2/4 trials thin liquid suggestive of aspiration. No cough, throat clear or change in voice with NTL or solids.

## 2021-03-19 NOTE — H&P ADULT - PROBLEM SELECTOR PLAN 1
pt w/ witnessed seizure at home lasting 10 seconds with full body shaking. Also had same seizure like activity in ED. Loaded w/ Keppra 1g  -c/w keppra 500mg BID  -monitor on veeg

## 2021-03-19 NOTE — SWALLOW BEDSIDE ASSESSMENT ADULT - NS SPL SWALLOW CLINIC TRIAL FT
Consider instrumental assessment in 2-3 days if there are persistent signs of aspiration with thin liquid.

## 2021-03-19 NOTE — CONSULT NOTE ADULT - SUBJECTIVE AND OBJECTIVE BOX
Neurology Stroke Consult Note    Chief Complaint: seizure like activity     HPI:  61 y/o M PMH CVA (on Plavix and ASA), HTN, iron deficiency anemia, loop recorder in place,  p/w possible seizures. Per pt's wife, he was sitting at home when he stood up then fell to the ground with a shaking episode of the entire body lasting for a few seconds. Afterwards, pt took about 10minutes to fully return to baseline. Patient does not take any AEDs.  Pt in ED is ao x 3 and not in any acute distress. Pt's neurologist is Dr. Guillen, and his PCP is Dr. Shultz.      In ED, pt had another seizure like episode where he shook and then fell to the ground. The patient did not hit his head. Resolved without medication. Loaded with keppra 1g. Admitted to [facility] for VEEG, loop recorder interrogation and further monitoring.    PAST MEDICAL & SURGICAL HISTORY:  Anemia    Dementia, old age    CVA (cerebral vascular accident)    Hypertension    No significant past surgical history        FAMILY HISTORY:  Family history of early CAD        SOCIAL HISTORY:  Denies smoking, drinking, or drug use    ROS:  As above    MEDICATIONS  (STANDING):  aspirin  chewable 81 milliGRAM(s) Enteral Tube daily  atorvastatin 40 milliGRAM(s) Oral at bedtime  clopidogrel Tablet 75 milliGRAM(s) Oral daily  enoxaparin Injectable 40 milliGRAM(s) SubCutaneous at bedtime  levETIRAcetam 500 milliGRAM(s) Oral two times a day  lisinopril 30 milliGRAM(s) Oral daily  LORazepam   Injectable 2 milliGRAM(s) IV Push once    MEDICATIONS  (PRN):      Allergies    No Known Allergies    Intolerances        Vital Signs Last 24 Hrs  T(C): 37 (19 Mar 2021 08:00), Max: 37.3 (18 Mar 2021 18:57)  T(F): 98.6 (19 Mar 2021 08:00), Max: 99.2 (18 Mar 2021 18:57)  HR: 47 (19 Mar 2021 08:00) (47 - 69)  BP: 120/55 (19 Mar 2021 08:00) (120/55 - 157/71)  BP(mean): --  RR: 16 (19 Mar 2021 08:00) (16 - 18)  SpO2: 93% (19 Mar 2021 08:00) (93% - 100%)    Physical exam:  General: No acute distress, awake and alert  Cardiovascular: Regular rate and rhythm, no murmurs, rubs, or gallops. No carotid bruits.   Pulmonary: Anterior breath sounds clear bilaterally, no crackles or wheezing. No use of accessory muscles  GI: Abdomen soft, non-distended, non-tender  Extremities: Radial and DP pulses +2, no edema    Neurologic:  -Mental status: S/p seizure, patient awake and alert but confused. Patient mildly dysarthric. Following commands but with repeated commands.   -Cranial nerves:   II: Visual fields are full to confrontation.  III, IV, VI: Extraocular movements are intact without nystagmus. Pupils equally round and reactive to light  V:  Facial sensation V1-V3 equal and intact   VII: Face is symmetric with normal eye closure and smile  VIII: Hearing is bilaterally intact to finger rub  Motor: Normal bulk and tone. No pronator drift. Strength bilateral upper extremity 5/5, bilateral lower extremities 5/5  Sensation: Intact to light touch bilaterally. No neglect or extinction on double simultaneous testing.  Coordination: No dysmetria on finger-to-nose and heel-to-shin bilaterally  Reflexes: Downgoing toes bilaterally     LABS:                        11.6   5.58  )-----------( 288      ( 19 Mar 2021 06:45 )             36.7     03-19    138  |  100  |  10  ----------------------------<  96  3.4<L>   |  26  |  0.74    Ca    9.4      19 Mar 2021 06:45  Mg     2.1     03-19    TPro  7.4  /  Alb  4.8  /  TBili  0.3  /  DBili  x   /  AST  48<H>  /  ALT  65<H>  /  AlkPhos  55  03-18

## 2021-03-19 NOTE — OCCUPATIONAL THERAPY INITIAL EVALUATION ADULT - NS ASR FOLLOW COMMAND OT EVAL
requires repetition due to delayed processing speed/75% of the time/able to follow multistep instructions

## 2021-03-19 NOTE — H&P ADULT - NSHPLABSRESULTS_GEN_ALL_CORE
.  LABS:                         12.3   8.56  )-----------( 301      ( 18 Mar 2021 20:11 )             37.9     03-18    136  |  98  |  12  ----------------------------<  109<H>  4.0   |  25  |  0.67    Ca    9.5      18 Mar 2021 20:11    TPro  7.4  /  Alb  4.8  /  TBili  0.3  /  DBili  x   /  AST  48<H>  /  ALT  65<H>  /  AlkPhos  55  03-18        CARDIAC MARKERS ( 18 Mar 2021 20:11 )  x     / 0.01 ng/mL / x     / x     / x                RADIOLOGY, EKG & ADDITIONAL TESTS: Reviewed.

## 2021-03-19 NOTE — OCCUPATIONAL THERAPY INITIAL EVALUATION ADULT - MODALITIES TREATMENT COMMENTS
Cranial Nerves II - XII: II: PERRLA; visual fields are full to confrontation. III, IV, VI: EOMI, no nystagmus appreciated. V: facial sensation intact to light touch V1-V3 b/l. VII: no ptosis, no facial droop, symmetric eyebrow raise and closure.  VIII: hearing intact to finger rub b/l. XI: head turning and shoulder shrug intact b/l.XII: tongue protrusion midline.

## 2021-03-19 NOTE — H&P ADULT - PROBLEM SELECTOR PLAN 5
F  E  N  A  [facility]  Full code Fluids: none  E: replete mag>2 and K>4  N: NPO  A: lovenox  [facility]  Full code

## 2021-03-19 NOTE — H&P ADULT - PROBLEM SELECTOR PLAN 2
patient w/ hx of CVA on home aspirin and plavix. Has a loop recorder in place from outpatient  -EP consult in AM to interrogate loop recorder for any type of arrythmia  -c/w home aspirin and plavix

## 2021-03-19 NOTE — SWALLOW BEDSIDE ASSESSMENT ADULT - COMMENTS
Received sleeping, awakened to a sleepy state. Language skills appear intact for at least basic conversation. Speech is mildly dysarthric but intelligible. Voice quality is mildly hypophonic and breathy.

## 2021-03-19 NOTE — SWALLOW BEDSIDE ASSESSMENT ADULT - SWALLOW EVAL: DIAGNOSIS
Mild oropharyngeal dysphagia characterized by mildly prolonged bolus prep in the oral stage & inconsistent signs of aspiration with thin liquid.

## 2021-03-19 NOTE — PROGRESS NOTE ADULT - SUBJECTIVE AND OBJECTIVE BOX
INTERVAL HPI/OVERNIGHT EVENTS: see H+P    SUBJECTIVE:   Patient seen and examined at bedside. no complaints    OBJECTIVE:    VITAL SIGNS:  ICU Vital Signs Last 24 Hrs  T(C): 37 (19 Mar 2021 08:00), Max: 37.3 (18 Mar 2021 18:57)  T(F): 98.6 (19 Mar 2021 08:00), Max: 99.2 (18 Mar 2021 18:57)  HR: 46 (19 Mar 2021 10:55) (46 - 69)  BP: 119/64 (19 Mar 2021 10:55) (119/64 - 157/71)  BP(mean): --  ABP: --  ABP(mean): --  RR: 16 (19 Mar 2021 10:55) (16 - 18)  SpO2: 95% (19 Mar 2021 10:55) (93% - 100%)        CAPILLARY BLOOD GLUCOSE          PHYSICAL EXAM:    Constitutional: NAD, resting comfortable in bed  Head: NC/AT  Eyes: PERRL, EOMI, anicteric sclera  ENT: no nasal discharge; uvula midline, no oropharyngeal erythema or exudates; MMM  Neck: supple  Respiratory: CTA B/L; no W/R/R, no retractions  Cardiac: +S1/S2; RRR; no M/R/G;   Gastrointestinal: abdomen soft, NT/ND; no rebound or guarding; +BSx4  Extremities: WWP, no clubbing or cyanosis; no peripheral edema  Musculoskeletal: NROM x4; no joint swelling, tenderness or erythema  Vascular: 2+ radial, femoral, DP/PT pulses B/L  Dermatologic: skin warm, dry and intact; no rashes, wounds, or scars  Lymphatic: no submandibular or cervical LAD  Neurologic: AAOx3;lethargic, CN 2-12 intact, strength 4/5 b/l upper and lower extremities, sensation intact, expressive aphasia   Psychiatric: affect and characteristics of appearance, verbalizations, behaviors are appropriate      MEDICATIONS:  MEDICATIONS  (STANDING):  aspirin  chewable 81 milliGRAM(s) Enteral Tube daily  atorvastatin 40 milliGRAM(s) Oral at bedtime  clopidogrel Tablet 75 milliGRAM(s) Oral daily  enoxaparin Injectable 40 milliGRAM(s) SubCutaneous at bedtime  levETIRAcetam 500 milliGRAM(s) Oral two times a day  lisinopril 30 milliGRAM(s) Oral daily  LORazepam   Injectable 2 milliGRAM(s) IV Push once    MEDICATIONS  (PRN):      ALLERGIES:  Allergies    No Known Allergies    Intolerances        LABS:                        11.6   5.58  )-----------( 288      ( 19 Mar 2021 06:45 )             36.7     03-19    138  |  100  |  10  ----------------------------<  96  3.4<L>   |  26  |  0.74    Ca    9.4      19 Mar 2021 06:45  Mg     2.1     03-19    TPro  7.4  /  Alb  4.8  /  TBili  0.3  /  DBili  x   /  AST  48<H>  /  ALT  65<H>  /  AlkPhos  55  03-18          RADIOLOGY & ADDITIONAL TESTS: Reviewed. INTERVAL HPI/OVERNIGHT EVENTS: see H+P    SUBJECTIVE:   Patient seen and examined at bedside. no complaints    OBJECTIVE:    VITAL SIGNS:  ICU Vital Signs Last 24 Hrs  T(C): 37 (19 Mar 2021 08:00), Max: 37.3 (18 Mar 2021 18:57)  T(F): 98.6 (19 Mar 2021 08:00), Max: 99.2 (18 Mar 2021 18:57)  HR: 46 (19 Mar 2021 10:55) (46 - 69)  BP: 119/64 (19 Mar 2021 10:55) (119/64 - 157/71)  BP(mean): --  ABP: --  ABP(mean): --  RR: 16 (19 Mar 2021 10:55) (16 - 18)  SpO2: 95% (19 Mar 2021 10:55) (93% - 100%)        CAPILLARY BLOOD GLUCOSE          PHYSICAL EXAM:    Constitutional: NAD, resting comfortable in bed  Head: NC/AT  Eyes: PERRL, EOMI, anicteric sclera  ENT: no nasal discharge; uvula midline, no oropharyngeal erythema or exudates; MMM  Neck: supple  Respiratory: CTA B/L; no W/R/R, no retractions  Cardiac: +S1/S2; RRR; no M/R/G;   Gastrointestinal: abdomen soft, NT/ND; no rebound or guarding; +BSx4  Extremities: WWP, no clubbing or cyanosis; no peripheral edema  Musculoskeletal: NROM x4; no joint swelling, tenderness or erythema  Vascular: 2+ radial, femoral, DP/PT pulses B/L  Dermatologic: skin warm, dry and intact; no rashes, wounds, or scars  Lymphatic: no submandibular or cervical LAD  Neurologic: AAOx3;lethargic, CN 2-12 intact w/ exception of slight left facial drop, strength 4/5 b/l upper and lower extremities, sensation intact, expressive aphasia   Psychiatric: affect and characteristics of appearance, verbalizations, behaviors are appropriate      MEDICATIONS:  MEDICATIONS  (STANDING):  aspirin  chewable 81 milliGRAM(s) Enteral Tube daily  atorvastatin 40 milliGRAM(s) Oral at bedtime  clopidogrel Tablet 75 milliGRAM(s) Oral daily  enoxaparin Injectable 40 milliGRAM(s) SubCutaneous at bedtime  levETIRAcetam 500 milliGRAM(s) Oral two times a day  lisinopril 30 milliGRAM(s) Oral daily  LORazepam   Injectable 2 milliGRAM(s) IV Push once    MEDICATIONS  (PRN):      ALLERGIES:  Allergies    No Known Allergies    Intolerances        LABS:                        11.6   5.58  )-----------( 288      ( 19 Mar 2021 06:45 )             36.7     03-19    138  |  100  |  10  ----------------------------<  96  3.4<L>   |  26  |  0.74    Ca    9.4      19 Mar 2021 06:45  Mg     2.1     03-19    TPro  7.4  /  Alb  4.8  /  TBili  0.3  /  DBili  x   /  AST  48<H>  /  ALT  65<H>  /  AlkPhos  55  03-18          RADIOLOGY & ADDITIONAL TESTS: Reviewed.

## 2021-03-19 NOTE — H&P ADULT - NSHPPHYSICALEXAM_GEN_ALL_CORE
.  VITAL SIGNS:  T(C): 37.1 (03-19-21 @ 06:35), Max: 37.3 (03-18-21 @ 18:57)  T(F): 98.7 (03-19-21 @ 06:35), Max: 99.2 (03-18-21 @ 18:57)  HR: 53 (03-19-21 @ 06:35) (53 - 69)  BP: 128/78 (03-19-21 @ 06:35) (127/71 - 157/71)  BP(mean): --  RR: 16 (03-19-21 @ 06:35) (16 - 18)  SpO2: 95% (03-19-21 @ 06:35) (95% - 100%)  Wt(kg): --    PHYSICAL EXAM:    Constitutional: WDWN resting comfortably in bed; NAD  Head: NC/AT  Eyes: PERRL, EOMI, anicteric sclera  ENT: no nasal discharge; uvula midline, no oropharyngeal erythema or exudates; MMM  Neck: supple  Respiratory: CTA B/L; no W/R/R, no retractions  Cardiac: +S1/S2; RRR; no M/R/G;   Gastrointestinal: abdomen soft, NT/ND; no rebound or guarding; +BSx4  Extremities: WWP, no clubbing or cyanosis; no peripheral edema  Musculoskeletal: NROM x4; no joint swelling, tenderness or erythema  Vascular: 2+ radial, femoral, DP/PT pulses B/L  Dermatologic: skin warm, dry and intact; no rashes, wounds, or scars  Lymphatic: no submandibular or cervical LAD  Neurologic: AAOx3; CN 2-12 intact, strength 5/5 b/l upper and lower extremities, sensation intact, expressive aphasia   Psychiatric: affect and characteristics of appearance, verbalizations, behaviors are appropriate

## 2021-03-19 NOTE — OCCUPATIONAL THERAPY INITIAL EVALUATION ADULT - PERTINENT HX OF CURRENT PROBLEM, REHAB EVAL
59 y/o male w/ hx of CVA (on ASA/ plavix), iron deficiency anemia, loop recorder in place who arrived to the ED for seizure like activity today. Patient denies hx of seizures. Patient found to have repeat episode of seizure activity in ED.  Loaded with 1 g IV keppra. Admitted to [facility] for VEEG, loop interrogation and continued monitoring.

## 2021-03-19 NOTE — PROGRESS NOTE ADULT - SUBJECTIVE AND OBJECTIVE BOX
EPS brief note:     ILR interrogation today showed NSR.  No arrhythmia / pause / tachycardia.  Unable to explain his symptoms from rhythm perspective.

## 2021-03-20 ENCOUNTER — TRANSCRIPTION ENCOUNTER (OUTPATIENT)
Age: 61
End: 2021-03-20

## 2021-03-20 LAB
ANION GAP SERPL CALC-SCNC: 9 MMOL/L — SIGNIFICANT CHANGE UP (ref 5–17)
BUN SERPL-MCNC: 13 MG/DL — SIGNIFICANT CHANGE UP (ref 7–23)
CALCIUM SERPL-MCNC: 9.1 MG/DL — SIGNIFICANT CHANGE UP (ref 8.4–10.5)
CHLORIDE SERPL-SCNC: 103 MMOL/L — SIGNIFICANT CHANGE UP (ref 96–108)
CO2 SERPL-SCNC: 27 MMOL/L — SIGNIFICANT CHANGE UP (ref 22–31)
CREAT SERPL-MCNC: 0.73 MG/DL — SIGNIFICANT CHANGE UP (ref 0.5–1.3)
GLUCOSE SERPL-MCNC: 99 MG/DL — SIGNIFICANT CHANGE UP (ref 70–99)
HCT VFR BLD CALC: 38.6 % — LOW (ref 39–50)
HCYS SERPL-MCNC: 7 UMOL/L — SIGNIFICANT CHANGE UP
HGB BLD-MCNC: 12.2 G/DL — LOW (ref 13–17)
MAGNESIUM SERPL-MCNC: 2.3 MG/DL — SIGNIFICANT CHANGE UP (ref 1.6–2.6)
MCHC RBC-ENTMCNC: 27.7 PG — SIGNIFICANT CHANGE UP (ref 27–34)
MCHC RBC-ENTMCNC: 31.6 GM/DL — LOW (ref 32–36)
MCV RBC AUTO: 87.5 FL — SIGNIFICANT CHANGE UP (ref 80–100)
NRBC # BLD: 0 /100 WBCS — SIGNIFICANT CHANGE UP (ref 0–0)
PHOSPHATE SERPL-MCNC: 4.9 MG/DL — HIGH (ref 2.5–4.5)
PLATELET # BLD AUTO: 283 K/UL — SIGNIFICANT CHANGE UP (ref 150–400)
POTASSIUM SERPL-MCNC: 3.8 MMOL/L — SIGNIFICANT CHANGE UP (ref 3.5–5.3)
POTASSIUM SERPL-SCNC: 3.8 MMOL/L — SIGNIFICANT CHANGE UP (ref 3.5–5.3)
RBC # BLD: 4.41 M/UL — SIGNIFICANT CHANGE UP (ref 4.2–5.8)
RBC # FLD: 18.6 % — HIGH (ref 10.3–14.5)
SODIUM SERPL-SCNC: 139 MMOL/L — SIGNIFICANT CHANGE UP (ref 135–145)
T3FREE SERPL-MCNC: 3.55 PG/ML — SIGNIFICANT CHANGE UP (ref 1.8–4.6)
T4 AB SER-ACNC: 8.36 UG/DL — SIGNIFICANT CHANGE UP (ref 3.17–11.72)
T4 FREE SERPL-MCNC: 1.12 NG/DL — SIGNIFICANT CHANGE UP (ref 0.7–1.48)
WBC # BLD: 5.43 K/UL — SIGNIFICANT CHANGE UP (ref 3.8–10.5)
WBC # FLD AUTO: 5.43 K/UL — SIGNIFICANT CHANGE UP (ref 3.8–10.5)

## 2021-03-20 PROCEDURE — 95720 EEG PHY/QHP EA INCR W/VEEG: CPT

## 2021-03-20 RX ADMIN — ATORVASTATIN CALCIUM 40 MILLIGRAM(S): 80 TABLET, FILM COATED ORAL at 22:04

## 2021-03-20 RX ADMIN — LEVETIRACETAM 500 MILLIGRAM(S): 250 TABLET, FILM COATED ORAL at 11:17

## 2021-03-20 RX ADMIN — LISINOPRIL 30 MILLIGRAM(S): 2.5 TABLET ORAL at 06:13

## 2021-03-20 RX ADMIN — CLOPIDOGREL BISULFATE 75 MILLIGRAM(S): 75 TABLET, FILM COATED ORAL at 11:18

## 2021-03-20 RX ADMIN — LEVETIRACETAM 500 MILLIGRAM(S): 250 TABLET, FILM COATED ORAL at 22:04

## 2021-03-20 RX ADMIN — ENOXAPARIN SODIUM 40 MILLIGRAM(S): 100 INJECTION SUBCUTANEOUS at 22:04

## 2021-03-20 RX ADMIN — Medication 81 MILLIGRAM(S): at 11:18

## 2021-03-20 NOTE — PROGRESS NOTE ADULT - SUBJECTIVE AND OBJECTIVE BOX
INTERVAL HPI/OVERNIGHT EVENTS: NADIA    SUBJECTIVE:   Patient seen and examined at bedside. no complaints    OBJECTIVE:    VITAL SIGNS:  ICU Vital Signs Last 24 Hrs  T(C): 36.5 (20 Mar 2021 09:23), Max: 37 (19 Mar 2021 23:56)  T(F): 97.7 (20 Mar 2021 09:23), Max: 98.6 (19 Mar 2021 23:56)  HR: 48 (20 Mar 2021 12:27) (48 - 60)  BP: 108/59 (20 Mar 2021 12:27) (108/59 - 152/64)  BP(mean): 77 (20 Mar 2021 12:27) (77 - 92)  ABP: --  ABP(mean): --  RR: 18 (20 Mar 2021 12:27) (17 - 18)  SpO2: 98% (20 Mar 2021 12:27) (91% - 98%)        CAPILLARY BLOOD GLUCOSE          PHYSICAL EXAM:    Constitutional: NAD, resting comfortable in bed  Head: NC/AT  Eyes: PERRL, EOMI, anicteric sclera  ENT: no nasal discharge; uvula midline, no oropharyngeal erythema or exudates; MMM  Neck: supple  Respiratory: CTA B/L; no W/R/R, no retractions  Cardiac: +S1/S2; RRR; no M/R/G;   Gastrointestinal: abdomen soft, NT/ND; no rebound or guarding; +BSx4  Extremities: WWP, no clubbing or cyanosis; no peripheral edema  Musculoskeletal: NROM x4; no joint swelling, tenderness or erythema  Vascular: 2+ radial, femoral, DP/PT pulses B/L  Dermatologic: skin warm, dry and intact; no rashes, wounds, or scars  Lymphatic: no submandibular or cervical LAD  Neurologic: AAOx3;lethargic, CN 2-12 intact w/ exception of slight left facial drop, strength 4/5 b/l upper and lower extremities, sensation intact, expressive aphasia   Psychiatric: affect and characteristics of appearance, verbalizations, behaviors are appropriate    MEDICATIONS:  MEDICATIONS  (STANDING):  aspirin  chewable 81 milliGRAM(s) Enteral Tube daily  atorvastatin 40 milliGRAM(s) Oral at bedtime  clopidogrel Tablet 75 milliGRAM(s) Oral daily  enoxaparin Injectable 40 milliGRAM(s) SubCutaneous at bedtime  influenza   Vaccine 0.5 milliLiter(s) IntraMuscular once  levETIRAcetam 500 milliGRAM(s) Oral two times a day  lisinopril 30 milliGRAM(s) Oral daily  LORazepam   Injectable 2 milliGRAM(s) IV Push once    MEDICATIONS  (PRN):      ALLERGIES:  Allergies    No Known Allergies    Intolerances        LABS:                        12.2   5.43  )-----------( 283      ( 20 Mar 2021 06:44 )             38.6     03-20    139  |  103  |  13  ----------------------------<  99  3.8   |  27  |  0.73    Ca    9.1      20 Mar 2021 06:44  Phos  4.9     03-20  Mg     2.3     03-20    TPro  7.4  /  Alb  4.8  /  TBili  0.3  /  DBili  x   /  AST  48<H>  /  ALT  65<H>  /  AlkPhos  55  03-18          RADIOLOGY & ADDITIONAL TESTS: Reviewed.

## 2021-03-20 NOTE — PROGRESS NOTE ADULT - ASSESSMENT
61 y/o M PMH CVA (on Plavix and ASA), HTN, Iron deficiency anemia p/w possible seizures     Problem/Plan - 1: Seizure   - pt w/ witnessed seizure at home lasting 10 seconds with full body shaking. Also had same seizure like activity in ED. Loaded w/ Keppra 1g  - c/w keppra 500mg BID  - monitor on veeg.      Problem/Plan - 2: CVA (cerebral vascular accident).  Plan: patient w/ hx of CVA on home aspirin and plavix. Has a loop recorder in place from outpatient  - EP consult in AM to interrogate loop recorder for any type of arrythmia  - c/w home aspirin and plavix.      Problem/Plan - 3: Hypertension   -c/w home lisinopril 30mg qd.      Problem/Plan - 4: Anemia  - hx of iron deficiency anemia  -active type and screen  -no signs of bleeding.      Problem/Plan - 5:  ·  Problem: Prophylactic measure.  Plan: Fluids: none  E: replete mag>2 and K>4  N: NPO  A: lovenox  [facility]  Full code.   
59 y/o M PMH CVA (on Plavix and ASA), HTN, Iron deficiency anemia p/w possible seizures     Problem/Plan - 1: Seizure   - pt w/ witnessed seizure at home lasting 10 seconds with full body shaking. Also had same seizure like activity in ED. Loaded w/ Keppra 1g  - c/w keppra 500mg BID  - monitor on veeg: Frequent right hemisphere slowing, suggestive of focal cerebral dysfunction; Mild generalized  slowing suggestive of a similar degree of diffuse or multifocal  dysfunction.     Problem/Plan - 2: CVA (cerebral vascular accident).  Plan: patient w/ hx of CVA on home aspirin and plavix. Has a loop recorder in place from outpatient  - EP consult w/ no acute events on loop recorder  - c/w home aspirin and plavix.      Problem/Plan - 3: Hypertension   -c/w home lisinopril 30mg qd.      Problem/Plan - 4: Anemia  - hx of iron deficiency anemia  -active type and screen  -no signs of bleeding.      Problem/Plan - 5:  ·  Problem: Prophylactic measure.  Plan: Fluids: none  E: replete mag>2 and K>4  N: NPO  A: lovenox  7[facility]  Full code.

## 2021-03-20 NOTE — EEG REPORT - NS EEG TEXT BOX
Day 1: 3/19/2021 @ 5:00:20 PM to next morning @ 07:00 am  Background:  continuous, with predominantly alpha and beta frequencies.  Symmetry:  Frequent (10-49%)polymorphic theta and delta frequencies over the right hemisphere.  Posterior Dominant Rhythm:  8 Hz symmetric, well-organized, and well-modulated.  Organization: Rudimentary.  Voltage:  Normal (20+ uV)  Variability: Yes. 		Reactivity: Yes.  N2 sleep: Symmetric, synchronous spindles and K complexes.  Spontaneous Activity:  No epileptiform discharges.  Periodic/rhythmic activity:  None.  Events:  No electrographic seizures or significant clinical events.  Provocations:  Hyperventilation and Photic stimulation: was not performed.    Daily Summary:    Frequent right hemisphere slowing, suggestive of focal cerebral dysfunction.  Mild generalized  slowing suggestive of a similar degree of diffuse or multifocal  dysfunction.  No epileptiform activity and no significant clinical events occurred.

## 2021-03-21 ENCOUNTER — TRANSCRIPTION ENCOUNTER (OUTPATIENT)
Age: 61
End: 2021-03-21

## 2021-03-21 VITALS
HEART RATE: 72 BPM | DIASTOLIC BLOOD PRESSURE: 73 MMHG | RESPIRATION RATE: 18 BRPM | SYSTOLIC BLOOD PRESSURE: 120 MMHG | OXYGEN SATURATION: 95 %

## 2021-03-21 LAB
ANION GAP SERPL CALC-SCNC: 10 MMOL/L — SIGNIFICANT CHANGE UP (ref 5–17)
BUN SERPL-MCNC: 16 MG/DL — SIGNIFICANT CHANGE UP (ref 7–23)
CALCIUM SERPL-MCNC: 9.3 MG/DL — SIGNIFICANT CHANGE UP (ref 8.4–10.5)
CHLORIDE SERPL-SCNC: 103 MMOL/L — SIGNIFICANT CHANGE UP (ref 96–108)
CO2 SERPL-SCNC: 27 MMOL/L — SIGNIFICANT CHANGE UP (ref 22–31)
CREAT SERPL-MCNC: 0.69 MG/DL — SIGNIFICANT CHANGE UP (ref 0.5–1.3)
GLUCOSE SERPL-MCNC: 109 MG/DL — HIGH (ref 70–99)
HCT VFR BLD CALC: 40.6 % — SIGNIFICANT CHANGE UP (ref 39–50)
HGB BLD-MCNC: 12.6 G/DL — LOW (ref 13–17)
MAGNESIUM SERPL-MCNC: 2.2 MG/DL — SIGNIFICANT CHANGE UP (ref 1.6–2.6)
MCHC RBC-ENTMCNC: 27.6 PG — SIGNIFICANT CHANGE UP (ref 27–34)
MCHC RBC-ENTMCNC: 31 GM/DL — LOW (ref 32–36)
MCV RBC AUTO: 88.8 FL — SIGNIFICANT CHANGE UP (ref 80–100)
NRBC # BLD: 0 /100 WBCS — SIGNIFICANT CHANGE UP (ref 0–0)
PHOSPHATE SERPL-MCNC: 4.6 MG/DL — HIGH (ref 2.5–4.5)
PLATELET # BLD AUTO: 281 K/UL — SIGNIFICANT CHANGE UP (ref 150–400)
POTASSIUM SERPL-MCNC: 3.8 MMOL/L — SIGNIFICANT CHANGE UP (ref 3.5–5.3)
POTASSIUM SERPL-SCNC: 3.8 MMOL/L — SIGNIFICANT CHANGE UP (ref 3.5–5.3)
RBC # BLD: 4.57 M/UL — SIGNIFICANT CHANGE UP (ref 4.2–5.8)
RBC # FLD: 18.5 % — HIGH (ref 10.3–14.5)
SODIUM SERPL-SCNC: 140 MMOL/L — SIGNIFICANT CHANGE UP (ref 135–145)
WBC # BLD: 5.78 K/UL — SIGNIFICANT CHANGE UP (ref 3.8–10.5)
WBC # FLD AUTO: 5.78 K/UL — SIGNIFICANT CHANGE UP (ref 3.8–10.5)

## 2021-03-21 PROCEDURE — 84439 ASSAY OF FREE THYROXINE: CPT

## 2021-03-21 PROCEDURE — U0003: CPT

## 2021-03-21 PROCEDURE — 70450 CT HEAD/BRAIN W/O DYE: CPT

## 2021-03-21 PROCEDURE — 83735 ASSAY OF MAGNESIUM: CPT

## 2021-03-21 PROCEDURE — 86147 CARDIOLIPIN ANTIBODY EA IG: CPT

## 2021-03-21 PROCEDURE — U0005: CPT

## 2021-03-21 PROCEDURE — 84100 ASSAY OF PHOSPHORUS: CPT

## 2021-03-21 PROCEDURE — 71045 X-RAY EXAM CHEST 1 VIEW: CPT

## 2021-03-21 PROCEDURE — 84481 FREE ASSAY (FT-3): CPT

## 2021-03-21 PROCEDURE — 83036 HEMOGLOBIN GLYCOSYLATED A1C: CPT

## 2021-03-21 PROCEDURE — 86146 BETA-2 GLYCOPROTEIN ANTIBODY: CPT

## 2021-03-21 PROCEDURE — 36415 COLL VENOUS BLD VENIPUNCTURE: CPT

## 2021-03-21 PROCEDURE — 85303 CLOT INHIBIT PROT C ACTIVITY: CPT

## 2021-03-21 PROCEDURE — 85027 COMPLETE CBC AUTOMATED: CPT

## 2021-03-21 PROCEDURE — 85300 ANTITHROMBIN III ACTIVITY: CPT

## 2021-03-21 PROCEDURE — 80048 BASIC METABOLIC PNL TOTAL CA: CPT

## 2021-03-21 PROCEDURE — 83690 ASSAY OF LIPASE: CPT

## 2021-03-21 PROCEDURE — 97161 PT EVAL LOW COMPLEX 20 MIN: CPT

## 2021-03-21 PROCEDURE — 80053 COMPREHEN METABOLIC PANEL: CPT

## 2021-03-21 PROCEDURE — 92610 EVALUATE SWALLOWING FUNCTION: CPT

## 2021-03-21 PROCEDURE — 95700 EEG CONT REC W/VID EEG TECH: CPT

## 2021-03-21 PROCEDURE — 99285 EMERGENCY DEPT VISIT HI MDM: CPT | Mod: XU

## 2021-03-21 PROCEDURE — 85613 RUSSELL VIPER VENOM DILUTED: CPT

## 2021-03-21 PROCEDURE — 95714 VEEG EA 12-26 HR UNMNTR: CPT

## 2021-03-21 PROCEDURE — 85025 COMPLETE CBC W/AUTO DIFF WBC: CPT

## 2021-03-21 PROCEDURE — 85306 CLOT INHIBIT PROT S FREE: CPT

## 2021-03-21 PROCEDURE — 83090 ASSAY OF HOMOCYSTEINE: CPT

## 2021-03-21 PROCEDURE — 85598 HEXAGNAL PHOSPH PLTLT NEUTRL: CPT

## 2021-03-21 PROCEDURE — 84484 ASSAY OF TROPONIN QUANT: CPT

## 2021-03-21 PROCEDURE — 84436 ASSAY OF TOTAL THYROXINE: CPT

## 2021-03-21 PROCEDURE — 85307 ASSAY ACTIVATED PROTEIN C: CPT

## 2021-03-21 PROCEDURE — 80061 LIPID PANEL: CPT

## 2021-03-21 RX ORDER — LEVETIRACETAM 250 MG/1
1 TABLET, FILM COATED ORAL
Qty: 60 | Refills: 0
Start: 2021-03-21 | End: 2021-04-19

## 2021-03-21 RX ORDER — POTASSIUM CHLORIDE 20 MEQ
20 PACKET (EA) ORAL ONCE
Refills: 0 | Status: COMPLETED | OUTPATIENT
Start: 2021-03-21 | End: 2021-03-21

## 2021-03-21 RX ADMIN — CLOPIDOGREL BISULFATE 75 MILLIGRAM(S): 75 TABLET, FILM COATED ORAL at 11:45

## 2021-03-21 RX ADMIN — Medication 81 MILLIGRAM(S): at 11:45

## 2021-03-21 RX ADMIN — LEVETIRACETAM 500 MILLIGRAM(S): 250 TABLET, FILM COATED ORAL at 09:16

## 2021-03-21 RX ADMIN — Medication 20 MILLIEQUIVALENT(S): at 11:45

## 2021-03-21 RX ADMIN — LISINOPRIL 30 MILLIGRAM(S): 2.5 TABLET ORAL at 06:45

## 2021-03-21 NOTE — DISCHARGE NOTE PROVIDER - NSDCFUSCHEDAPPT_GEN_ALL_CORE_FT
[facility] ; 03/23/2021 ; [facility] [address] [place] [address] ; 03/23/2021 ; [facility] [address] [place] [address] ; 05/19/2021 ; [facility] [address] [place] [address]

## 2021-03-21 NOTE — DISCHARGE NOTE PROVIDER - NSDCCPCAREPLAN_GEN_ALL_CORE_FT
PRINCIPAL DISCHARGE DIAGNOSIS  Diagnosis: Seizure  Assessment and Plan of Treatment: WHAT YOU NEED TO KNOW:  A seizure is a burst of electrical activity in your brain. A seizure may start in one part of your brain, or both sides may be affected. The seizure may last a few seconds or up to 5 minutes. A new-onset seizure is a seizure that happens for the first time. Some common triggers are alcohol, drugs, lack of sleep, fever, or an infection. High or low blood sugar levels, pregnancy, a head injury, or a stroke could also trigger a seizure. The cause of your seizure may not be known. You have a higher risk for another seizure within the next 2 years.  DISCHARGE INSTRUCTIONS:  Call your local emergency number (911 in the [place]) or have someone else call for any of the following:  Your seizure lasts longer than 5 minutes.  You have a second seizure that happens within 24 hours of your first.  You have trouble breathing after a seizure.  You cannot be woken after your seizure.  You have more than 1 seizure before you are fully awake or aware.  Call your doctor if:  You are injured during a seizure.  You have a fever.  You are planning to get pregnant or are currently pregnant.  You have questions or concerns about your condition or care.  Please continue to take your Keppra and follow up with neurologist.

## 2021-03-21 NOTE — DISCHARGE NOTE PROVIDER - HOSPITAL COURSE
This is a 61 y/o M PMH CVA (on Plavix and ASA), HTN, iron deficiency anemia, loop recorder in place who came to the ED for possible seizures. Per pt's wife, he was sitting at home when he stood up then fell to the ground with a shaking episode of the entire body lasting for a few seconds. Afterwards, pt took about 10minutes to fully return to baseline. While in ED, patient had another episode of seizure like activity. Patient loaded with 1 gram Keppra IV and started on 500 mg Keppra BID daily. Video EEG was placed and found frequent right hemisphere slowing, suggestive of focal cerebral dysfunction and mild generalized  slowing suggestive of a similar degree of diffuse or multifocal  dysfunction. Patient to be discharged home on keppra and to follow up with Dr. Guillen outpatient. This is a 59 y/o M PMH CVA (on Plavix and ASA), HTN, iron deficiency anemia, loop recorder in place who came to the ED for possible seizures. Per pt's wife, he was sitting at home when he stood up then fell to the ground with a shaking episode of the entire body lasting for a few seconds. Afterwards, pt took about 10minutes to fully return to baseline. While in ED, patient had another episode of seizure like activity. Patient loaded with 1 gram Keppra IV and started on 500 mg Keppra BID daily. Video EEG was placed and found frequent right hemisphere slowing, suggestive of focal cerebral dysfunction and mild generalized  slowing suggestive of a similar degree of diffuse or multifocal  dysfunction. Patient to be discharged home on keppra and to follow up with Dr. Guillen outpatient.     Problem/Plan - 1: Seizure   - pt w/ witnessed seizure at home lasting 10 seconds with full body shaking. Also had same seizure like activity in ED. Loaded w/ Keppra 1g  - c/w keppra 500mg BID  - monitor on veeg: Frequent right hemisphere slowing, suggestive of focal cerebral dysfunction; Mild generalized  slowing suggestive of a similar degree of diffuse or multifocal  dysfunction.     Problem/Plan - 2: CVA (cerebral vascular accident).  Plan: patient w/ hx of CVA on home aspirin and plavix. Has a loop recorder in place from outpatient  - EP consult w/ no acute events on loop recorder  - c/w home aspirin and plavix.      Problem/Plan - 3: Hypertension   -c/w home lisinopril 30mg qd.      Problem/Plan - 4: Anemia  - hx of iron deficiency anemia  -active type and screen  -no signs of bleeding.

## 2021-03-21 NOTE — DISCHARGE NOTE PROVIDER - NSDCMRMEDTOKEN_GEN_ALL_CORE_FT
aspirin 81 mg oral delayed release tablet: 1 tab(s) orally once a day  atorvastatin 40 mg oral tablet: 1 tab(s) orally once a day  clopidogrel 75 mg oral tablet: 1 tab(s) orally once a day  CoQ10 300 mg oral capsule: 1 cap(s) orally once a day  folic acid 1 mg oral tablet: 1 tab(s) orally once a day  lisinopril 30 mg oral tablet: 1 tab(s) orally once a day  thiamine 100 mg oral tablet: 1 tab(s) orally once a day  Vitamin B12: 2500 microgram(s) orally once a day  Vitamin C 1000 mg oral tablet: 1 tab(s) orally once a day  Vitamin D3 5000 intl units (125 mcg) oral tablet: 1 tab(s) orally once a day   aspirin 81 mg oral delayed release tablet: 1 tab(s) orally once a day  atorvastatin 40 mg oral tablet: 1 tab(s) orally once a day  clopidogrel 75 mg oral tablet: 1 tab(s) orally once a day  CoQ10 300 mg oral capsule: 1 cap(s) orally once a day  folic acid 1 mg oral tablet: 1 tab(s) orally once a day  levETIRAcetam 500 mg oral tablet: 1 tab(s) orally 2 times a day  lisinopril 30 mg oral tablet: 1 tab(s) orally once a day  thiamine 100 mg oral tablet: 1 tab(s) orally once a day  Vitamin B12: 2500 microgram(s) orally once a day  Vitamin C 1000 mg oral tablet: 1 tab(s) orally once a day  Vitamin D3 5000 intl units (125 mcg) oral tablet: 1 tab(s) orally once a day

## 2021-03-21 NOTE — DISCHARGE NOTE NURSING/CASE MANAGEMENT/SOCIAL WORK - NURSING SECTION COMPLETE
Patient/Caregiver provided printed discharge information. pt will return to er because not feeling well.  pt called back 4/26/18

## 2021-03-21 NOTE — DISCHARGE NOTE NURSING/CASE MANAGEMENT/SOCIAL WORK - PATIENT PORTAL LINK FT
You can access the FollowMyHealth Patient Portal offered by [facility] by registering at the following website: [email or web address]. By joining [employer]’s FollowMyHealth portal, you will also be able to view your health information using other applications (apps) compatible with our system.

## 2021-03-22 LAB
AT III ACT/NOR PPP CHRO: 92 % — SIGNIFICANT CHANGE UP (ref 85–135)
PROT C ACT/NOR PPP: 80 % — SIGNIFICANT CHANGE UP (ref 74–150)
PROT S FREE PPP-ACNC: 50 % — LOW (ref 63–140)

## 2021-03-23 ENCOUNTER — APPOINTMENT (OUTPATIENT)
Dept: NEUROLOGY | Facility: CLINIC | Age: 61
End: 2021-03-23
Payer: COMMERCIAL

## 2021-03-23 VITALS
BODY MASS INDEX: 25.11 KG/M2 | HEART RATE: 55 BPM | TEMPERATURE: 98.6 F | WEIGHT: 160 LBS | OXYGEN SATURATION: 97 % | SYSTOLIC BLOOD PRESSURE: 130 MMHG | HEIGHT: 67 IN | DIASTOLIC BLOOD PRESSURE: 83 MMHG

## 2021-03-23 VITALS — SYSTOLIC BLOOD PRESSURE: 122 MMHG | DIASTOLIC BLOOD PRESSURE: 79 MMHG | HEART RATE: 54 BPM

## 2021-03-23 VITALS — SYSTOLIC BLOOD PRESSURE: 109 MMHG | HEART RATE: 51 BPM | DIASTOLIC BLOOD PRESSURE: 71 MMHG

## 2021-03-23 VITALS — SYSTOLIC BLOOD PRESSURE: 117 MMHG | DIASTOLIC BLOOD PRESSURE: 76 MMHG | HEART RATE: 52 BPM

## 2021-03-23 DIAGNOSIS — G40.909 EPILEPSY, UNSPECIFIED, NOT INTRACTABLE, W/OUT STATUS EPILEPTICUS: ICD-10-CM

## 2021-03-23 LAB
APCR PPP: 2.83 RATIO — SIGNIFICANT CHANGE UP
CARDIOLIPIN AB SER-ACNC: POSITIVE

## 2021-03-23 PROCEDURE — 99072 ADDL SUPL MATRL&STAF TM PHE: CPT

## 2021-03-23 PROCEDURE — 93880 EXTRACRANIAL BILAT STUDY: CPT

## 2021-03-23 PROCEDURE — 99214 OFFICE O/P EST MOD 30 MIN: CPT

## 2021-03-23 RX ORDER — FERUMOXYTOL 510 MG/17ML
510 INJECTION INTRAVENOUS
Qty: 2 | Refills: 0 | Status: DISCONTINUED | COMMUNITY
Start: 2020-10-23 | End: 2021-03-23

## 2021-03-23 RX ORDER — NICOTINE TRANSDERMAL SYSTEM 21 MG/24H
21 PATCH, EXTENDED RELEASE TRANSDERMAL DAILY
Qty: 14 | Refills: 2 | Status: DISCONTINUED | COMMUNITY
Start: 2020-07-18 | End: 2021-03-23

## 2021-03-23 RX ORDER — VITAMIN B COMPLEX
2500 TABLET ORAL DAILY
Qty: 90 | Refills: 1 | Status: DISCONTINUED | COMMUNITY
Start: 2017-01-19 | End: 2021-03-23

## 2021-03-23 NOTE — PHYSICAL EXAM
[FreeTextEntry1] : The patient is alert and oriented x3, naming intact x3, repetition normal, follows three-step commands, and is able to participate fully in the history taking.\par Speech -  dysarthria.\par Memory is  not intact: Immediate recall 3 out of 3, short-term 3 out of 3, remote memory intact\par Cranial nerves II through XII intact\par Motor exam: Upper and lower extremities 5 out of 5 power, normal tone. No abnormal movements noted.\par Sensory exam: Intact to light touch and pinprick. Romberg negative.\par Coordination and vestibular exam: Finger to nose intact, no evidence of truncal or appendicular ataxia. No evidence of nystagmus. no vestibular symptoms elicited with head turning during ambulation.\par Gait: slow . no ataxia. \par Reflexes: One to 2+ in upper and lower extremities. No pathological reflexes. Downgoing toes.\par \par

## 2021-03-23 NOTE — HISTORY OF PRESENT ILLNESS
[FreeTextEntry1] : 61yo R-handed M PMH CVA (06/2020) with residual L sided weakness, trouble walking, speech difficulties, COPD, HTN HLD, iron deficiency anemia, dementia, presents for follow-up visit s/p CVA and hospitalization on 3/21, had new-onset seizures x 2, first one at home lasting 10seconds , second was witnessed in ER with shaking of all extremities. Video EEG placed during admission, found to have frequent right hemisphere slowing suggestive of focal cerebral dysfunction and mild generalized slowing suggestive of a similar degree of diffuse or multifocal dysfunction. Pt also had other admission in 02/2021 s/p syncopal episode, found to have bradycardia. Pt follows with EP, has ILR in place from his CVA, interogated on last admission with no findings of any afib/flutter/ other arrythmias. Pt also follows as outpatient with hematology Dr Hoffman, has hx of iron deficiency anemia, has been transfused as an outpatient, last hgb on discharge last week 12.1. \par \par Pt states he is doing well in general and has no concerns currently. Has been going to the gym 1-3x weekly (unable to make appt with OT/ PT due to scheduling difficulties. Does speech therapy 2x weekly with some improvement. Denies any general fatigue. Has been going on long walks with his wife and looks forward to more walks as weather improves. \par Has been taking all of his medications and tolerating them well. Denies muscle pain. Denies blood in urine or stool. \par \par cryptogenic stroke? \par Feb 21 syncope 2/21- bradycardia\par ILR- interogted last admission, no arrhythmias\par speech therapy 1-2x week\par unable to make appt for PT/OT due to covid trouble booking, so now gym. \par \par \par atorvastatin 40mg\par aspirin 81mg\par [id number] 1xday (changed from 40-30, now 20)\par plavix 75mg 1xday \par Donepezil\par keppra 500mg BID\par \par month- february\par expr aphagi, weakness 4/5 LUE. Circumducting gait, slow and hesistant. \par No blood in stool urine\par \par \par smoke 1.5 ppd x 45 years\par pFMH DM\par brother- colon CA\par mom breast CA DM CAD\par Dad CAD MI \par 4 brothers MI \par last colonoscopy Nov \par H Pylori February- 5 days abx (not 10- admission) GI- originally capsule test valerie cancelled will see him next month \par \par plavix\par EEG \par \par \par Chaim Carrington PCP May 19th\par [facility] hematology another appt \par \par March 31 2nd covid vaccine\par \par gym bike and treadmill 1xweek (was 3x a week) but recently been feeling week, \par january found to be anemic Hgb 8.3 rec'd transfusions last 12.6 upon discharge

## 2021-03-23 NOTE — ASSESSMENT
[FreeTextEntry1] : Seizure - 2 episodes of witnessed seizure - started on Keppra 500 bid. Most likely from his stroke. AED for two years before trying to stop it. Repeat EEG\par Stroke stable - will get carotid duplex. cont asa and Plavix\par Dementia - improved. Continue donepezil. 5 mg daily. will increase next visit. \par \par Follow up in 6 weeks

## 2021-03-24 LAB
B2 GLYCOPROT1 AB SER QL: NEGATIVE — SIGNIFICANT CHANGE UP
CARDIOLIPIN IGM SER-MCNC: 16.7 MPL — HIGH (ref 0–12.5)
CARDIOLIPIN IGM SER-MCNC: 7.5 GPL — SIGNIFICANT CHANGE UP (ref 0–12.5)
CONFIRM APTT STACLOT: NEGATIVE — SIGNIFICANT CHANGE UP
DEPRECATED CARDIOLIPIN IGA SER: <5 APL — SIGNIFICANT CHANGE UP (ref 0–12.5)
DRVVT SCREEN TO CONFIRM RATIO: SIGNIFICANT CHANGE UP
LA NT DPL PPP QL: 25 SEC — SIGNIFICANT CHANGE UP

## 2021-03-28 DIAGNOSIS — Z79.82 LONG TERM (CURRENT) USE OF ASPIRIN: ICD-10-CM

## 2021-03-28 DIAGNOSIS — R56.9 UNSPECIFIED CONVULSIONS: ICD-10-CM

## 2021-03-28 DIAGNOSIS — I10 ESSENTIAL (PRIMARY) HYPERTENSION: ICD-10-CM

## 2021-03-28 DIAGNOSIS — F03.90 UNSPECIFIED DEMENTIA WITHOUT BEHAVIORAL DISTURBANCE: ICD-10-CM

## 2021-03-28 DIAGNOSIS — Z79.02 LONG TERM (CURRENT) USE OF ANTITHROMBOTICS/ANTIPLATELETS: ICD-10-CM

## 2021-03-28 DIAGNOSIS — D50.9 IRON DEFICIENCY ANEMIA, UNSPECIFIED: ICD-10-CM

## 2021-03-28 DIAGNOSIS — Z86.73 PERSONAL HISTORY OF TRANSIENT ISCHEMIC ATTACK (TIA), AND CEREBRAL INFARCTION WITHOUT RESIDUAL DEFICITS: ICD-10-CM

## 2021-03-28 DIAGNOSIS — Z45.09 ENCOUNTER FOR ADJUSTMENT AND MANAGEMENT OF OTHER CARDIAC DEVICE: ICD-10-CM

## 2021-04-19 ENCOUNTER — APPOINTMENT (OUTPATIENT)
Dept: HEART AND VASCULAR | Facility: CLINIC | Age: 61
End: 2021-04-19
Payer: COMMERCIAL

## 2021-04-19 VITALS
BODY MASS INDEX: 25.15 KG/M2 | TEMPERATURE: 97.1 F | HEART RATE: 49 BPM | OXYGEN SATURATION: 96 % | SYSTOLIC BLOOD PRESSURE: 116 MMHG | HEIGHT: 67 IN | DIASTOLIC BLOOD PRESSURE: 80 MMHG | RESPIRATION RATE: 14 BRPM | WEIGHT: 160.25 LBS

## 2021-04-19 DIAGNOSIS — K21.9 GASTRO-ESOPHAGEAL REFLUX DISEASE W/OUT ESOPHAGITIS: ICD-10-CM

## 2021-04-19 DIAGNOSIS — I34.0 NONRHEUMATIC MITRAL (VALVE) INSUFFICIENCY: ICD-10-CM

## 2021-04-19 DIAGNOSIS — I10 ESSENTIAL (PRIMARY) HYPERTENSION: ICD-10-CM

## 2021-04-19 PROCEDURE — 99214 OFFICE O/P EST MOD 30 MIN: CPT

## 2021-04-19 PROCEDURE — 99072 ADDL SUPL MATRL&STAF TM PHE: CPT

## 2021-05-03 PROBLEM — I34.0 MITRAL REGURGITATION: Status: ACTIVE | Noted: 2021-05-03

## 2021-05-03 NOTE — PHYSICAL EXAM
[Well Developed] : well developed [Well Nourished] : well nourished [No Acute Distress] : no acute distress [Normal Conjunctiva] : normal conjunctiva [No Xanthelasma] : no xanthelasma [Normal Venous Pressure] : normal venous pressure [No Carotid Bruit] : no carotid bruit [Normal S1, S2] : normal S1, S2 [No Murmur] : no murmur [No Rub] : no rub [No Gallop] : no gallop [Clear Lung Fields] : clear lung fields [Good Air Entry] : good air entry [No Respiratory Distress] : no respiratory distress  [Soft] : abdomen soft [Non Tender] : non-tender [No Masses/organomegaly] : no masses/organomegaly [Normal Bowel Sounds] : normal bowel sounds [Normal Gait] : normal gait [Gait - Sufficient for Exercise Testing] : gait - sufficient for exercise testing [No Edema] : no edema [No Cyanosis] : no cyanosis [No Clubbing] : no clubbing [No Varicosities] : no varicosities [No Rash] : no rash [No Skin Lesions] : no skin lesions [Moves all extremities] : moves all extremities [No Focal Deficits] : no focal deficits [Normal Speech] : normal speech [Cognitive Impairment] : cognitive impairment [de-identified] : anicteric  [de-identified] : oriented to person  and place

## 2021-05-03 NOTE — DISCUSSION/SUMMARY
[With Me] : with me [___ Month(s)] : in [unfilled] month(s) [FreeTextEntry1] : No arrhythmias identified on recent ILR interrogation\par \par Seizures secondary to old stroke \par \par Advised to get covid vaccine

## 2021-05-03 NOTE — HISTORY OF PRESENT ILLNESS
[FreeTextEntry1] : Patient admitted for seizures March 21, 2021 and loaded with keppra \par \par Loop recorder detected no significant arrhythmias\par \par Seizure episodes were at home and witnessed by his children,  an episode also occurred in the ER \par \par Patient has received iron infusions to manage anemia \par \par Normal echocardiogram  except for mild MR  in July 2020\par \par \par Denies hematuria, rectal bleeding or dysphagia \par \par \par

## 2021-05-03 NOTE — REASON FOR VISIT
[Symptom and Test Evaluation] : symptom and test evaluation [Spouse] : spouse [FreeTextEntry1] : 60 year old make with emphysema, chronic smoking addiction  with diffuse atherosclerosis  and moderately severe  stenosis of the proximal left vertebral artery with moderate stenosis of the right M1  and left P3 segment  and severe atherosclerosis of the  descending aorta  has multi infarct dementia .\par \par Loop recorder was  implanted to assess for PAF \par \par ERICA  suspicious for possible pulmonary AVM -  pulmonary CTA  was advised \par \par He was discharged on aspirin and plavix  and atorvastatin 80mg daily

## 2021-05-04 ENCOUNTER — APPOINTMENT (OUTPATIENT)
Dept: HEMATOLOGY ONCOLOGY | Facility: CLINIC | Age: 61
End: 2021-05-04
Payer: COMMERCIAL

## 2021-05-04 ENCOUNTER — NON-APPOINTMENT (OUTPATIENT)
Age: 61
End: 2021-05-04

## 2021-05-04 VITALS
SYSTOLIC BLOOD PRESSURE: 136 MMHG | HEART RATE: 81 BPM | OXYGEN SATURATION: 98 % | WEIGHT: 164 LBS | BODY MASS INDEX: 25.74 KG/M2 | HEIGHT: 67 IN | DIASTOLIC BLOOD PRESSURE: 76 MMHG | TEMPERATURE: 97.2 F

## 2021-05-04 PROCEDURE — 36415 COLL VENOUS BLD VENIPUNCTURE: CPT

## 2021-05-04 PROCEDURE — 99214 OFFICE O/P EST MOD 30 MIN: CPT | Mod: 25

## 2021-05-04 PROCEDURE — 99072 ADDL SUPL MATRL&STAF TM PHE: CPT

## 2021-05-13 ENCOUNTER — APPOINTMENT (OUTPATIENT)
Dept: NEUROLOGY | Facility: CLINIC | Age: 61
End: 2021-05-13
Payer: COMMERCIAL

## 2021-05-13 PROCEDURE — 95819 EEG AWAKE AND ASLEEP: CPT

## 2021-05-13 PROCEDURE — 99072 ADDL SUPL MATRL&STAF TM PHE: CPT

## 2021-05-19 ENCOUNTER — APPOINTMENT (OUTPATIENT)
Dept: HEART AND VASCULAR | Facility: CLINIC | Age: 61
End: 2021-05-19
Payer: COMMERCIAL

## 2021-05-19 VITALS
DIASTOLIC BLOOD PRESSURE: 84 MMHG | OXYGEN SATURATION: 97 % | TEMPERATURE: 97.2 F | BODY MASS INDEX: 25.74 KG/M2 | HEART RATE: 52 BPM | WEIGHT: 164 LBS | RESPIRATION RATE: 14 BRPM | HEIGHT: 67 IN | SYSTOLIC BLOOD PRESSURE: 150 MMHG

## 2021-05-19 DIAGNOSIS — R30.0 DYSURIA: ICD-10-CM

## 2021-05-19 PROCEDURE — 99072 ADDL SUPL MATRL&STAF TM PHE: CPT

## 2021-05-19 PROCEDURE — 99213 OFFICE O/P EST LOW 20 MIN: CPT

## 2021-05-19 PROCEDURE — 36415 COLL VENOUS BLD VENIPUNCTURE: CPT

## 2021-05-20 LAB
APPEARANCE: CLEAR
BACTERIA: NEGATIVE
BILIRUBIN URINE: NEGATIVE
BLOOD URINE: NEGATIVE
COLOR: YELLOW
GLUCOSE QUALITATIVE U: NEGATIVE
HYALINE CASTS: 1 /LPF
KETONES URINE: NEGATIVE
LEUKOCYTE ESTERASE URINE: NEGATIVE
MICROSCOPIC-UA: NORMAL
NITRITE URINE: NEGATIVE
PH URINE: 5.5
PROTEIN URINE: NEGATIVE
PSA FREE FLD-MCNC: 25 %
PSA FREE SERPL-MCNC: 0.13 NG/ML
PSA SERPL-MCNC: 0.52 NG/ML
RED BLOOD CELLS URINE: 1 /HPF
SPECIFIC GRAVITY URINE: 1.02
SQUAMOUS EPITHELIAL CELLS: 0 /HPF
UROBILINOGEN URINE: NORMAL
WHITE BLOOD CELLS URINE: 2 /HPF

## 2021-05-24 ENCOUNTER — TRANSCRIPTION ENCOUNTER (OUTPATIENT)
Age: 61
End: 2021-05-24

## 2021-05-25 LAB
BASOPHILS # BLD AUTO: 0.03 K/UL
BASOPHILS NFR BLD AUTO: 0.6 %
EOSINOPHIL # BLD AUTO: 0.09 K/UL
EOSINOPHIL NFR BLD AUTO: 1.9 %
ERYTHROCYTE [SEDIMENTATION RATE] IN BLOOD BY WESTERGREN METHOD: 3 MM/HR
FERRITIN SERPL-MCNC: 15 NG/ML
HAPTOGLOB SERPL-MCNC: 146 MG/DL
HCT VFR BLD CALC: 33.7 %
HGB BLD-MCNC: 10.7 G/DL
IMM GRANULOCYTES NFR BLD AUTO: 0.4 %
IRON SATN MFR SERPL: 16 %
IRON SERPL-MCNC: 52 UG/DL
LDH SERPL-CCNC: 121 U/L
LYMPHOCYTES # BLD AUTO: 0.86 K/UL
LYMPHOCYTES NFR BLD AUTO: 18.1 %
MAN DIFF?: NORMAL
MCHC RBC-ENTMCNC: 28.5 PG
MCHC RBC-ENTMCNC: 31.8 GM/DL
MCV RBC AUTO: 89.6 FL
MONOCYTES # BLD AUTO: 0.33 K/UL
MONOCYTES NFR BLD AUTO: 6.9 %
NEUTROPHILS # BLD AUTO: 3.43 K/UL
NEUTROPHILS NFR BLD AUTO: 72.1 %
PLATELET # BLD AUTO: 279 K/UL
RBC # BLD: 3.76 M/UL
RBC # FLD: 16.5 %
TIBC SERPL-MCNC: 327 UG/DL
UIBC SERPL-MCNC: 275 UG/DL
UREA BREATH TEST QL: POSITIVE
VIT B12 SERPL-MCNC: >2000 PG/ML
WBC # FLD AUTO: 4.76 K/UL

## 2021-05-25 NOTE — HISTORY OF PRESENT ILLNESS
[de-identified] : 60 years old  male history of EtOH and smoking developed strokes in 6/29/2020... Patient was discharged home on aspirin and clopidogrel and developed anemia... His hemoglobin went down from 12 to 8 g/dL... Iron studies were done while patient was taking p.o. iron, and are therefore not supportive of iron deficiency state... Patient is here with his wife to discuss treatment for his anemia.... Patient had upper and lower endoscopy with Dr. Negrete from [facility] 3 to 4 years ago and he is planning to have repeat upper and lower endoscopy with the same doctor on November 16th... [de-identified] : February 9, 2021 patient returns for follow-up he is again anemic... He states he had upper and lower endoscopy by Dr. Negrete however wife forgot to bring results...\par \par May 4, 2021 returns for follow-up for his iron deficiency anemia... Patient again was found to have H. pylori infection documented by breast test, he was restarted on antibiotics by Dr. Negrete, however 5 days into his antibiotic treatment, he had a seizure and ended up at [facility]... Upon discharge from [facility] his hemoglobin was 12.6 g/dL...

## 2021-05-25 NOTE — CONSULT LETTER
[Dear  ___] : Dear  [unfilled], [Consult Letter:] : I had the pleasure of evaluating your patient, [unfilled]. [Please see my note below.] : Please see my note below. [Consult Closing:] : Thank you very much for allowing me to participate in the care of this patient.  If you have any questions, please do not hesitate to contact me. [Sincerely,] : Sincerely, [DrMina  ___] : Dr. ELLIOTT [FreeTextEntry3] : Magaly Hoffman MD\par  [DrMina ___] : Dr. ELLIOTT

## 2021-05-25 NOTE — ASSESSMENT
[FreeTextEntry1] : Repeat blood work done ...\par \par Repeat breast test done... Patient continues to have H. pylori infection, I will call Dr. Negrete to renew patient's antibiotics...

## 2021-06-10 NOTE — PHYSICAL EXAM
[Well Developed] : well developed [Well Nourished] : well nourished [No Acute Distress] : no acute distress [Normal Conjunctiva] : normal conjunctiva [No Xanthelasma] : no xanthelasma [Normal Venous Pressure] : normal venous pressure [No Carotid Bruit] : no carotid bruit [Normal S1, S2] : normal S1, S2 [No Murmur] : no murmur [No Rub] : no rub [No Gallop] : no gallop [Clear Lung Fields] : clear lung fields [Good Air Entry] : good air entry [No Respiratory Distress] : no respiratory distress  [Soft] : abdomen soft [Non Tender] : non-tender [No Masses/organomegaly] : no masses/organomegaly [Normal Bowel Sounds] : normal bowel sounds [Normal Gait] : normal gait [No Edema] : no edema [Gait - Sufficient for Exercise Testing] : gait - sufficient for exercise testing [No Cyanosis] : no cyanosis [No Clubbing] : no clubbing [No Varicosities] : no varicosities [No Rash] : no rash [No Skin Lesions] : no skin lesions [Moves all extremities] : moves all extremities [No Focal Deficits] : no focal deficits [Normal Speech] : normal speech [Cognitive Impairment] : cognitive impairment [de-identified] : anicteric  [de-identified] : oriented to person  and place

## 2021-06-10 NOTE — DISCUSSION/SUMMARY
[Essential Hypertension] : essential hypertension [Stable] : stable [None] : There are no changes in medication management [With Me] : with me [___ Month(s)] : in [unfilled] month(s) [FreeTextEntry1] : venipuncture with   PSA  and urinalysis /micro      r/o UTI \par \par See GI specialist , Dr. Torres , for  retreatment of H. pylori \par \par

## 2021-06-10 NOTE — REVIEW OF SYSTEMS
[Weight Gain (___ Lbs)] : [unfilled] ~Ulb weight gain [Convulsions] : convulsions [Speech Disturbance] : speech disturbance [Confusion] : confusion was observed [Memory Lapses Or Loss] : memory lapses or loss [Depression] : no depression [Anxiety] : no anxiety [Under Stress] : not under stress [Suicidal] : not suicidal [Negative] : Heme/Lymph [FreeTextEntry8] : enuresis

## 2021-06-10 NOTE — HISTORY OF PRESENT ILLNESS
[FreeTextEntry1] : Recent admission for witnessed seizure , he is now on Keppra  \par \par Wife reports patient had recent episode of  enuresis  without seizure , thinks he may have a UTI\par \par No fevers, chills cough\par \par Up to date with Covid vaccine \par \par No hematuria \par \par He is attending speech pathology \par \par Hx of anemia  with H. pylori peptic ulcer disease , previously treated but NOT eradicated  as recent H. pylori breath test was positive May 4th

## 2021-06-10 NOTE — REASON FOR VISIT
[Hypertension] : hypertension [Other: ____] : [unfilled] [Spouse] : spouse [FreeTextEntry1] : 60 year old make with emphysema, chronic smoking addiction  with diffuse atherosclerosis  and moderately severe  stenosis of the proximal left vertebral artery with moderate stenosis of the right M1  and left P3 segment  and severe atherosclerosis of the  descending aorta  has multi infarct dementia .\par \par Loop recorder was  implanted to assess for PAF \par \par ERICA  suspicious for possible pulmonary AVM -  pulmonary CTA  was advised \par \par He was discharged on aspirin and plavix  and atorvastatin 80mg daily

## 2021-06-15 ENCOUNTER — APPOINTMENT (OUTPATIENT)
Dept: NEUROLOGY | Facility: CLINIC | Age: 61
End: 2021-06-15
Payer: COMMERCIAL

## 2021-06-15 VITALS
TEMPERATURE: 97.6 F | BODY MASS INDEX: 25.74 KG/M2 | DIASTOLIC BLOOD PRESSURE: 67 MMHG | WEIGHT: 164 LBS | OXYGEN SATURATION: 96 % | HEART RATE: 53 BPM | HEIGHT: 67 IN | SYSTOLIC BLOOD PRESSURE: 130 MMHG

## 2021-06-15 PROCEDURE — 99213 OFFICE O/P EST LOW 20 MIN: CPT

## 2021-06-15 PROCEDURE — 99072 ADDL SUPL MATRL&STAF TM PHE: CPT

## 2021-06-23 NOTE — REVIEW OF SYSTEMS
[Difficulty Walking] : difficulty walking [As Noted in HPI] : as noted in HPI [Negative] : Psychiatric

## 2021-07-12 NOTE — HISTORY OF PRESENT ILLNESS
[FreeTextEntry1] : Interval hx: \par Reports a couple episodes of unresponsiveness associated with urinary incontinence, lasted for few seconds, says last episode was just prior to his EEG\par \par EEG- non-specific mild focal cerebral dysfunction, possible hyperexcitability in L temporal head region\par Has been taking his medications as prescribed with no missed doses \par \par \par

## 2021-07-12 NOTE — ASSESSMENT
[FreeTextEntry1] : if another seizure-like episode pt instructed to increase Keppra to 750mg- can give 1.5 tabs keppra BID. \par rtc 2 months->will get EEG at that visit

## 2021-07-12 NOTE — PHYSICAL EXAM
[FreeTextEntry1] : The patient is alert and oriented x2/4- oriented to self, , city, situation, unable to state date/ day of the week. Naming intact x3, repetition normal, follows three-step commands, limited participation in history-taking. \par Speech- dysarthria.\par Memory: Immediate recall 3 out of 3, short-term 0 out of 3, remote memory intact\par Cranial nerves II through XII intact\par Motor exam: Upper and lower extremities 5 out of 5 power, normal tone. No abnormal movements noted.\par Sensory exam: Intact to light touch and pinprick. Romberg negative.\par Coordination and vestibular exam: Mild sway when stands with his eyes closed. Finger to nose intact, no evidence of truncal or appendicular ataxia. No evidence of nystagmus. no vestibular symptoms elicited with head turning during ambulation.\par Gait: No ataxia. \par Reflexes: One to 2+ in upper and lower extremities. No pathological reflexes. Downgoing toes.\par \par

## 2021-07-30 ENCOUNTER — NON-APPOINTMENT (OUTPATIENT)
Age: 61
End: 2021-07-30

## 2021-08-02 ENCOUNTER — NON-APPOINTMENT (OUTPATIENT)
Age: 61
End: 2021-08-02

## 2021-08-12 ENCOUNTER — APPOINTMENT (OUTPATIENT)
Dept: NEUROLOGY | Facility: CLINIC | Age: 61
End: 2021-08-12
Payer: COMMERCIAL

## 2021-08-12 VITALS
HEIGHT: 67 IN | OXYGEN SATURATION: 97 % | TEMPERATURE: 98.4 F | WEIGHT: 167 LBS | HEART RATE: 56 BPM | BODY MASS INDEX: 26.21 KG/M2 | DIASTOLIC BLOOD PRESSURE: 69 MMHG | SYSTOLIC BLOOD PRESSURE: 118 MMHG

## 2021-08-12 PROCEDURE — 99214 OFFICE O/P EST MOD 30 MIN: CPT

## 2021-08-12 PROCEDURE — 95819 EEG AWAKE AND ASLEEP: CPT

## 2021-08-13 ENCOUNTER — NON-APPOINTMENT (OUTPATIENT)
Age: 61
End: 2021-08-13

## 2021-08-13 LAB
ALBUMIN SERPL ELPH-MCNC: 4.9 G/DL
ALP BLD-CCNC: 61 U/L
ALT SERPL-CCNC: 16 U/L
ANION GAP SERPL CALC-SCNC: 11 MMOL/L
AST SERPL-CCNC: 12 U/L
BASOPHILS # BLD AUTO: 0.03 K/UL
BASOPHILS NFR BLD AUTO: 0.6 %
BILIRUB SERPL-MCNC: 0.3 MG/DL
BUN SERPL-MCNC: 14 MG/DL
CALCIUM SERPL-MCNC: 9.3 MG/DL
CHLORIDE SERPL-SCNC: 105 MMOL/L
CO2 SERPL-SCNC: 26 MMOL/L
CREAT SERPL-MCNC: 0.72 MG/DL
EOSINOPHIL # BLD AUTO: 0.12 K/UL
EOSINOPHIL NFR BLD AUTO: 2.4 %
GLUCOSE SERPL-MCNC: 116 MG/DL
HCT VFR BLD CALC: 30.4 %
HGB BLD-MCNC: 9.1 G/DL
IMM GRANULOCYTES NFR BLD AUTO: 0.2 %
LYMPHOCYTES # BLD AUTO: 1.17 K/UL
LYMPHOCYTES NFR BLD AUTO: 23.5 %
MAN DIFF?: NORMAL
MCHC RBC-ENTMCNC: 23 PG
MCHC RBC-ENTMCNC: 29.9 GM/DL
MCV RBC AUTO: 77 FL
MONOCYTES # BLD AUTO: 0.43 K/UL
MONOCYTES NFR BLD AUTO: 8.6 %
NEUTROPHILS # BLD AUTO: 3.22 K/UL
NEUTROPHILS NFR BLD AUTO: 64.7 %
PLATELET # BLD AUTO: 315 K/UL
POTASSIUM SERPL-SCNC: 4.2 MMOL/L
PROT SERPL-MCNC: 7.3 G/DL
RBC # BLD: 3.95 M/UL
RBC # FLD: 15.1 %
SODIUM SERPL-SCNC: 142 MMOL/L
WBC # FLD AUTO: 4.98 K/UL

## 2021-08-17 ENCOUNTER — TRANSCRIPTION ENCOUNTER (OUTPATIENT)
Age: 61
End: 2021-08-17

## 2021-08-19 LAB — LEVETIRACETAM SERPL-MCNC: 24.7 UG/ML

## 2021-08-23 ENCOUNTER — NON-APPOINTMENT (OUTPATIENT)
Age: 61
End: 2021-08-23

## 2021-08-31 ENCOUNTER — TRANSCRIPTION ENCOUNTER (OUTPATIENT)
Age: 61
End: 2021-08-31

## 2021-09-07 NOTE — ED ADULT TRIAGE NOTE - ADDITIONAL COMPLAINTS
Local Anesthesia Pre Procedure Assessment    Informed Consent:    Consent Obtained: Yes     Procedure Assessment:    Preop Diagnosis/Indications for Procedure: Pain  Planned Procedure: C7-T1 ILESI  Planned Anesthetic: Local    Medical History/Comorbid Conditions:    Significant Medical/Surgical History: Yes (comment) (See H&P Note)  Normal Mental Status: Yes    Examination Pertinent to Procedure Being Performed:    Evaluation of Operative Site: Clean, no deformity, redness/warmth/swelling, no masses    Other Findings:    Reviewed Current Medications and Allergies: Yes    Pertinent Lab/Diagnostic Tests:    Pertinent Lab / Diagnostic Tests: Other (comment) (See Imaging section)    Acacia Mark MD  Interventional Pain Management  [facility]  09/07/21  
Additional Complaints

## 2021-09-16 ENCOUNTER — EMERGENCY (EMERGENCY)
Facility: HOSPITAL | Age: 61
LOS: 1 days | Discharge: HOME | End: 2021-09-16
Attending: EMERGENCY MEDICINE | Admitting: EMERGENCY MEDICINE
Payer: COMMERCIAL

## 2021-09-16 VITALS
RESPIRATION RATE: 16 BRPM | HEART RATE: 62 BPM | HEIGHT: 66 IN | WEIGHT: 166.89 LBS | OXYGEN SATURATION: 98 % | DIASTOLIC BLOOD PRESSURE: 77 MMHG | SYSTOLIC BLOOD PRESSURE: 127 MMHG | TEMPERATURE: 98 F

## 2021-09-16 DIAGNOSIS — Z23 ENCOUNTER FOR IMMUNIZATION: ICD-10-CM

## 2021-09-16 DIAGNOSIS — I10 ESSENTIAL (PRIMARY) HYPERTENSION: ICD-10-CM

## 2021-09-16 DIAGNOSIS — Z79.899 OTHER LONG TERM (CURRENT) DRUG THERAPY: ICD-10-CM

## 2021-09-16 DIAGNOSIS — F03.90 UNSPECIFIED DEMENTIA WITHOUT BEHAVIORAL DISTURBANCE: ICD-10-CM

## 2021-09-16 DIAGNOSIS — G40.909 EPILEPSY, UNSPECIFIED, NOT INTRACTABLE, WITHOUT STATUS EPILEPTICUS: ICD-10-CM

## 2021-09-16 DIAGNOSIS — R56.9 UNSPECIFIED CONVULSIONS: ICD-10-CM

## 2021-09-16 DIAGNOSIS — S01.81XA LACERATION WITHOUT FOREIGN BODY OF OTHER PART OF HEAD, INITIAL ENCOUNTER: ICD-10-CM
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
## 2021-09-16 DIAGNOSIS — Z86.2 PERSONAL HISTORY OF DISEASES OF THE BLOOD AND BLOOD-FORMING ORGANS AND CERTAIN DISORDERS INVOLVING THE IMMUNE MECHANISM: ICD-10-CM

## 2021-09-16 DIAGNOSIS — Z86.73 PERSONAL HISTORY OF TRANSIENT ISCHEMIC ATTACK (TIA), AND CEREBRAL INFARCTION WITHOUT RESIDUAL DEFICITS: ICD-10-CM
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
## 2021-09-16 PROCEDURE — 99284 EMERGENCY DEPT VISIT MOD MDM: CPT | Mod: 25

## 2021-09-16 PROCEDURE — 90471 IMMUNIZATION ADMIN: CPT

## 2021-09-16 PROCEDURE — 70450 CT HEAD/BRAIN W/O DYE: CPT | Mod: 26,ME

## 2021-09-16 PROCEDURE — 12011 RPR F/E/E/N/L/M 2.5 CM/<: CPT

## 2021-09-16 PROCEDURE — G1004: CPT

## 2021-09-16 PROCEDURE — 70450 CT HEAD/BRAIN W/O DYE: CPT | Mod: ME

## 2021-09-16 PROCEDURE — 90715 TDAP VACCINE 7 YRS/> IM: CPT

## 2021-09-16 RX ORDER — TETANUS TOXOID, REDUCED DIPHTHERIA TOXOID AND ACELLULAR PERTUSSIS VACCINE, ADSORBED 5; 2.5; 8; 8; 2.5 [IU]/.5ML; [IU]/.5ML; UG/.5ML; UG/.5ML; UG/.5ML
0.5 SUSPENSION INTRAMUSCULAR ONCE
Refills: 0 | Status: COMPLETED | OUTPATIENT
Start: 2021-09-16 | End: 2021-09-16

## 2021-09-16 RX ADMIN — TETANUS TOXOID, REDUCED DIPHTHERIA TOXOID AND ACELLULAR PERTUSSIS VACCINE, ADSORBED 0.5 MILLILITER(S): 5; 2.5; 8; 8; 2.5 SUSPENSION INTRAMUSCULAR at 03:57

## 2021-09-16 NOTE — ED ADULT NURSE NOTE - NSIMPLEMENTINTERV_GEN_ALL_ED
Implemented All Fall Risk Interventions:  [place] to call system. Call bell, personal items and telephone within reach. Instruct patient to call for assistance. Room bathroom lighting operational. Non-slip footwear when patient is off stretcher. Physically safe environment: no spills, clutter or unnecessary equipment. Stretcher in lowest position, wheels locked, appropriate side rails in place. Provide visual cue, wrist band, yellow gown, etc. Monitor gait and stability. Monitor for mental status changes and reorient to person, place, and time. Review medications for side effects contributing to fall risk. Reinforce activity limits and safety measures with patient and family.

## 2021-09-16 NOTE — ED PROVIDER NOTE - PROGRESS NOTE DETAILS
dermabond to laceration steady gait, recommend f/u with Dr. Guillen  I have discussed the discharge plan with the patient. The patient agrees with the plan, as discussed.  The patient understands Emergency Department diagnosis is a preliminary diagnosis often based on limited information and that the patient must adhere to the follow-up plan as discussed.  The patient understands that if the symptoms worsen the patient may return to the Emergency Department at any time for further evaluation and treatment.

## 2021-09-16 NOTE — ED PROVIDER NOTE - NSFOLLOWUPINSTRUCTIONS_ED_ALL_ED_FT
Follow-up with Dr. Guillen      Facial Laceration    WHAT YOU NEED TO KNOW:    A facial laceration is a tear or cut in the skin. Facial lacerations may be closed within 24 hours of injury.    DISCHARGE INSTRUCTIONS:    Return to the emergency department if:   •You have a fever and the wound is painful, warm, or swollen. The wound area may be red, or fluid may come out of it.    •You have heavy bleeding or bleeding that does not stop after 10 minutes of holding firm, direct pressure over the wound.    Call your doctor if:   •Your wound reopens or your tape comes off.    •Your wound is very painful.    •Your wound is not healing, or you think there is an object in the wound.    •The skin around your wound stays numb.    •You have questions or concerns about your condition or care.    Medicines:   •Antibiotics may be given to prevent an infection if your wound was deep and had to be cleaned out.    •Take your medicine as directed. Contact your healthcare provider if you think your medicine is not helping or if you have side effects. Tell him of her if you are allergic to any medicine. Keep a list of the medicines, vitamins, and herbs you take. Include the amounts, and when and why you take them. Bring the list or the pill bottles to follow-up visits. Carry your medicine list with you in case of an emergency.    Care for your wound: Care for your wound as directed to prevent infection and help it heal. Wash your hands with soap and warm water before and after you care for your wound. You may need to keep the wound dry for the first 24 to 48 hours. When your healthcare provider says it is okay, wash around your wound with soap and water, or as directed. Gently pat the area dry. Do not use alcohol or hydrogen peroxide to clean your wound unless you are directed to.  •Do not take  NSAIDs for 24 hours after being injured. NSAIDs can increase blood flow. Your laceration may continue to bleed.     •Do not take hot showers, eat or drink hot foods and liquids for 48 hours after being injured. Also, do not use a heating pad near your laceration. The heat can cause swelling in and around your laceration.    •If your wound was covered with a bandage, leave your bandage on as long as directed. Bandages keep your wound clean and protected. They can also prevent swelling. Ask when and how to change your bandage. Be careful not to apply the bandage or tape too tightly. This could cut off blood flow and cause more injury.     •Your wound was closed with tissue glue, do not use any ointments or lotions on the area. You may shower, but do not swim or soak in a bathtub. Gently pat the area dry after you take a shower. Do not pick at or scrub the glue area.     Decrease scarring: The skin in the area of your wound may turn a different color if it is exposed to direct sunlight. After your wound is healed, use sunscreen over the area when you are out in the sun. You should do this for at least 6 months to 1 year after your injury. Some wounds scar less if they are covered while they heal.    Follow up with your doctor as directed: You may need to follow up with your healthcare provider in 24 to 48 hours to have your wound checked for infection. You may need to return in 3 to 5 days if you have stitches that need to be removed. Write down your questions so you remember to ask them during your visits.      Head Injury    WHAT YOU NEED TO KNOW:    A head injury can include your scalp, face, skull, or brain and range from mild to severe. Effects can appear immediately after the injury or develop later. The effects may last a short time or be permanent. Healthcare providers may want to check your recovery over time. Treatment may change as you recover or develop new health problems from the head injury.    DISCHARGE INSTRUCTIONS:    Call your local emergency number (911 in the [place]), or have someone else call if:   •You cannot be woken.    •You have a seizure.    •You stop responding to others or you faint.    •You have blurry or double vision.    •Your speech becomes slurred or confused.    •You have arm or leg weakness, loss of feeling, or new problems with coordination.    •Your pupils are larger than usual, or one pupil is a different size than the other.    •You have blood or clear fluid coming out of your ears or nose.    Return to the emergency department if:   •You have repeated or forceful vomiting.    •You feel confused.    •Your headache gets worse or becomes severe.    •You or someone caring for you notices that you are harder to wake than usual.    Call your doctor if:   •Your symptoms last longer than 6 weeks after the injury.    •You have questions or concerns about your condition or care.    Medicines:   •Acetaminophen decreases pain and fever. It is available without a doctor's order. Ask how much to take and how often to take it. Follow directions. Read the labels of all other medicines you are using to see if they also contain acetaminophen, or ask your doctor or pharmacist. Acetaminophen can cause liver damage if not taken correctly. Do not use more than 4 grams (4,000 milligrams) total of acetaminophen in one day.     •Take your medicine as directed. Contact your healthcare provider if you think your medicine is not helping or if you have side effects. Tell him or her if you are allergic to any medicine. Keep a list of the medicines, vitamins, and herbs you take. Include the amounts, and when and why you take them. Bring the list or the pill bottles to follow-up visits. Carry your medicine list with you in case of an emergency.    Self-care:   •Rest or do quiet activities. Limit your time watching TV, using the computer, or doing tasks that require a lot of thinking. Slowly return to your normal activities as directed. Do not play sports or do activities that may cause you to get hit in the head. Ask your healthcare provider when you can return to sports.    •Apply ice on your head for 15 to 20 minutes every hour or as directed. Use an ice pack, or put crushed ice in a plastic bag. Cover it with a towel before you apply it to your skin. Ice helps prevent tissue damage and decreases swelling and pain.    •Have someone stay with you for 24 hours , or as directed. This person can monitor you for problems and call for help if needed. When you are awake, the person should ask you a few questions every few hours to see if you are thinking clearly. An example is to ask your name or address.    Prevent another head injury:   •Wear a helmet that fits properly. Do this when you play sports, or ride a bike, scooter, or skateboard. Helmets help decrease your risk for a serious head injury. Talk to your healthcare provider about other ways you can protect yourself if you play sports.    •Wear your seatbelt every time you are in a car. This helps lower your risk for a head injury if you are in a car accident.    Follow up with your doctor as directed: Write down your questions so you remember to ask them during your visits.

## 2021-09-16 NOTE — ED PROVIDER NOTE - PATIENT PORTAL LINK FT
You can access the FollowMyHealth Patient Portal offered by [facility] by registering at the following website: [email or web address]. By joining [employer]’s FollowMyHealth portal, you will also be able to view your health information using other applications (apps) compatible with our system.

## 2021-09-16 NOTE — ED ADULT NURSE NOTE - CHIEF COMPLAINT QUOTE
Pt presents accompanied by wife who reports pt has "seizure" (w/history) at approx 0100, lasting one minute. Per wife, pt began shaking and fell forward, hitting his head on door, small approx 2 cm lac noted, bleeding controlled, pt on plavix for hx of CVA 2020, with residual expressive aphasia per pt wife. Pt is at baseline mentation status per same, able to follow simple commands. Pt given additional Keppra 500 mg PO immediately after episode per Dr. Pryor (neurologist) able to swallow without difficulty.

## 2021-09-16 NOTE — ED PROVIDER NOTE - OBJECTIVE STATEMENT
60M hx cva, seizures (on keppra), s/p seizure tonight.  per wife pt recently had keppra dose increased from 750mg bid to 1000mg bid.  states tonight had seizure, only lasted ~5minutes.  states he fell face first and hit head.  no vomiting. wife gave him keppra dose and seizure stopped. +lac to left forehead. wife states pt now at his baseline.

## 2021-09-16 NOTE — ED PROVIDER NOTE - NSICDXPASTMEDICALHX_GEN_ALL_CORE_FT
PAST MEDICAL HISTORY:  Anemia     CVA (cerebral vascular accident)     Dementia, old age     Hypertension     Seizure

## 2021-09-16 NOTE — ED ADULT NURSE NOTE - OBJECTIVE STATEMENT
Pt's wife states he has history of seizures, being seen by MD in [facility]. Pt was getting ready to go to bed and had a seizure at the side of the bed. Fell forward and hit his head on the window sill. Denies LOC. Small lac noted on L forehead. Was told by MD to take 500 mg of Keppra whenever he has a seizure, was able to do so with no difficulty.

## 2021-09-16 NOTE — ED PROVIDER NOTE - CLINICAL SUMMARY MEDICAL DECISION MAKING FREE TEXT BOX
s/p seizure tonight, recently had keppra dose increased, laceration to left upper forehead. no new focal neuro deficits  -check CT  -tetanus  -repair lac

## 2021-09-16 NOTE — ED ADULT TRIAGE NOTE - CHIEF COMPLAINT QUOTE
Pt presents accompanied by wife who reports pt has "seizure" at approx 0100, lasting one minute. Per wife, pt began shaking and fell forward, hitting his head on door, small approx 2 cm lac noted, bleeding controlled, pt on plavix for hx of CVA 2020, with residual expressive aphasia per pt wife. Pt is at baseline mentation status per same, able to follow simple commands. Pt given additional Keppra 500 mg PO immediately after episode per Dr. Pryor (neurologist) able to swallow without difficulty. Pt presents accompanied by wife who reports pt has "seizure" (w/history) at approx 0100, lasting one minute. Per wife, pt began shaking and fell forward, hitting his head on door, small approx 2 cm lac noted, bleeding controlled, pt on plavix for hx of CVA 2020, with residual expressive aphasia per pt wife. Pt is at baseline mentation status per same, able to follow simple commands. Pt given additional Keppra 500 mg PO immediately after episode per Dr. Pryor (neurologist) able to swallow without difficulty. Pt presents accompanied by wife who reports pt had "seizure" (w/history) at approx 0100, lasting one minute. Per wife, pt began shaking and fell forward, hitting his head on door, small, approx 2 cm lac noted to left forehead, bleeding controlled, pt on plavix for hx of CVA 2020, with residual expressive aphasia per pt wife. Pt is at baseline mentation status per same, able to follow simple commands. Pt given additional Keppra 500 mg PO immediately after episode per Dr. Pryor (neurologist) able to swallow without difficulty.

## 2021-09-20 ENCOUNTER — APPOINTMENT (OUTPATIENT)
Dept: HEMATOLOGY ONCOLOGY | Facility: CLINIC | Age: 61
End: 2021-09-20
Payer: COMMERCIAL

## 2021-09-20 ENCOUNTER — APPOINTMENT (OUTPATIENT)
Dept: HEART AND VASCULAR | Facility: CLINIC | Age: 61
End: 2021-09-20
Payer: COMMERCIAL

## 2021-09-20 VITALS
TEMPERATURE: 97.3 F | OXYGEN SATURATION: 98 % | HEIGHT: 67 IN | WEIGHT: 166 LBS | HEART RATE: 63 BPM | DIASTOLIC BLOOD PRESSURE: 74 MMHG | BODY MASS INDEX: 26.06 KG/M2 | SYSTOLIC BLOOD PRESSURE: 130 MMHG

## 2021-09-20 VITALS
WEIGHT: 167.99 LBS | SYSTOLIC BLOOD PRESSURE: 112 MMHG | HEIGHT: 67 IN | OXYGEN SATURATION: 98 % | HEART RATE: 57 BPM | TEMPERATURE: 97.3 F | BODY MASS INDEX: 26.37 KG/M2 | DIASTOLIC BLOOD PRESSURE: 60 MMHG

## 2021-09-20 DIAGNOSIS — K29.70 GASTRITIS, UNSPECIFIED, W/OUT BLEEDING: ICD-10-CM

## 2021-09-20 DIAGNOSIS — B96.81 GASTRITIS, UNSPECIFIED, W/OUT BLEEDING: ICD-10-CM

## 2021-09-20 DIAGNOSIS — I65.29 OCCLUSION AND STENOSIS OF UNSPECIFIED CAROTID ARTERY: ICD-10-CM

## 2021-09-20 DIAGNOSIS — F01.50 VASCULAR DEMENTIA W/OUT BEHAVIORAL DISTURBANCE: ICD-10-CM

## 2021-09-20 PROBLEM — R56.9 UNSPECIFIED CONVULSIONS: Chronic | Status: ACTIVE | Noted: 2021-09-16

## 2021-09-20 PROCEDURE — 99214 OFFICE O/P EST MOD 30 MIN: CPT | Mod: 25

## 2021-09-20 PROCEDURE — 90662 IIV NO PRSV INCREASED AG IM: CPT

## 2021-09-20 PROCEDURE — G0008: CPT

## 2021-09-20 PROCEDURE — 36415 COLL VENOUS BLD VENIPUNCTURE: CPT

## 2021-09-21 PROBLEM — I65.29 CAROTID ATHEROSCLEROSIS: Status: ACTIVE | Noted: 2021-05-03

## 2021-09-21 PROBLEM — F01.50 MULTI-INFARCT DEMENTIA: Status: ACTIVE | Noted: 2020-07-18

## 2021-09-21 LAB
BASOPHILS # BLD AUTO: 0.05 K/UL
BASOPHILS NFR BLD AUTO: 1 %
EOSINOPHIL # BLD AUTO: 0.22 K/UL
EOSINOPHIL NFR BLD AUTO: 4.4 %
ERYTHROCYTE [SEDIMENTATION RATE] IN BLOOD BY WESTERGREN METHOD: 26 MM/HR
FERRITIN SERPL-MCNC: 9 NG/ML
HAPTOGLOB SERPL-MCNC: 191 MG/DL
HCT VFR BLD CALC: 28.1 %
HGB BLD-MCNC: 8 G/DL
IMM GRANULOCYTES NFR BLD AUTO: 0 %
IRON SATN MFR SERPL: 4 %
IRON SERPL-MCNC: 18 UG/DL
LDH SERPL-CCNC: 223 U/L
LYMPHOCYTES # BLD AUTO: 1.07 K/UL
LYMPHOCYTES NFR BLD AUTO: 21.4 %
MAN DIFF?: NORMAL
MCHC RBC-ENTMCNC: 20.1 PG
MCHC RBC-ENTMCNC: 28.5 GM/DL
MCV RBC AUTO: 70.6 FL
MONOCYTES # BLD AUTO: 0.4 K/UL
MONOCYTES NFR BLD AUTO: 8 %
NEUTROPHILS # BLD AUTO: 3.27 K/UL
NEUTROPHILS NFR BLD AUTO: 65.2 %
PLATELET # BLD AUTO: 300 K/UL
RBC # BLD: 3.98 M/UL
RBC # FLD: 16.7 %
TIBC SERPL-MCNC: 423 UG/DL
UIBC SERPL-MCNC: 405 UG/DL
VIT B12 SERPL-MCNC: >2000 PG/ML
WBC # FLD AUTO: 5.01 K/UL

## 2021-09-21 NOTE — HISTORY OF PRESENT ILLNESS
[FreeTextEntry1] : Seizure disorder , he had witnessed seizure at home  last week  with mild laceration of his forehead \par \par Keppra dose was increased to 1,000 mg bid \par \par s/p Pfizer covid vaccine series March 2021\par \par Anemia with  Hgb 9.1gm/dL \par \par Appetite is good \par \par Requires  flu vaccine today \par \par Not using alcohol  and drinks minimal caffeine\par \par Receives iron infusions  to manage anemia \par \par Never performed capsule swallow small bowel evaluation for bleeding source \par \par Chronic low back pain  which has altered his gait \par \par

## 2021-09-21 NOTE — DISCUSSION/SUMMARY
[FreeTextEntry1] : HD fluzone administered right arm\par \par advised  covid booster in November \par \par hematology for iron infusions\par \par advised to see GI specialist for capsule small bowel study  to investigate  potential source of anemia \par \par H pylori breath test performed today

## 2021-09-21 NOTE — REASON FOR VISIT
[Symptom and Test Evaluation] : symptom and test evaluation [Hyperlipidemia] : hyperlipidemia [Carotid, Aortic and Peripheral Vascular Disease] : carotid, aortic and peripheral vascular disease [Spouse] : spouse [FreeTextEntry1] : 60 year old make with emphysema, chronic smoking addiction  with diffuse atherosclerosis  and moderately severe  stenosis of the proximal left vertebral artery with moderate stenosis of the right M1  and left P3 segment  and severe atherosclerosis of the  descending aorta  has multi infarct dementia .\par \par Loop recorder was  implanted to assess for PAF \par \par ERICA  suspicious for possible pulmonary AVM -  pulmonary CTA  was advised \par \par He was discharged on aspirin and plavix  and atorvastatin 80mg daily

## 2021-09-21 NOTE — ASSESSMENT
[FreeTextEntry1] : Repeat blood work done ... Repeat breath test done... Patient again was found to have iron deficiency anemia and I ordered intravenous iron infusions for him to be done at our infusion center once authorization is obtained from the insurance\par \par \par \par \par Referral given to patient for our gastroenterologist following the results of the breath test...\par \par \par Follow-up here in 2 to 3 months or sooner if needed.

## 2021-09-21 NOTE — PHYSICAL EXAM
[Well Developed] : well developed [Well Nourished] : well nourished [No Acute Distress] : no acute distress [Normal Conjunctiva] : normal conjunctiva [Normal Venous Pressure] : normal venous pressure [No Xanthelasma] : no xanthelasma [No Carotid Bruit] : no carotid bruit [Normal S1, S2] : normal S1, S2 [No Murmur] : no murmur [No Rub] : no rub [No Gallop] : no gallop [Clear Lung Fields] : clear lung fields [Good Air Entry] : good air entry [No Respiratory Distress] : no respiratory distress  [Soft] : abdomen soft [Non Tender] : non-tender [No Masses/organomegaly] : no masses/organomegaly [Normal Bowel Sounds] : normal bowel sounds [Abnormal Gait] : abnormal gait [No Edema] : no edema [No Cyanosis] : no cyanosis [No Clubbing] : no clubbing [No Varicosities] : no varicosities [No Rash] : no rash [No Skin Lesions] : no skin lesions [Moves all extremities] : moves all extremities [No Focal Deficits] : no focal deficits [Normal Speech] : normal speech [Cognitive Impairment] : cognitive impairment [de-identified] : anicteric  [de-identified] : limping  [de-identified] : oriented to person  and place

## 2021-09-21 NOTE — CONSULT LETTER
[Dear  ___] : Dear  [unfilled], [Consult Letter:] : I had the pleasure of evaluating your patient, [unfilled]. [Please see my note below.] : Please see my note below. [Consult Closing:] : Thank you very much for allowing me to participate in the care of this patient.  If you have any questions, please do not hesitate to contact me. [Sincerely,] : Sincerely, [DrMina  ___] : Dr. ELLIOTT [DrMina ___] : Dr. ELLIOTT [FreeTextEntry3] : Magaly Hoffman MD\par

## 2021-09-21 NOTE — HISTORY OF PRESENT ILLNESS
[de-identified] : 60 years old  male history of EtOH and smoking developed strokes in 6/29/2020... Patient was discharged home on aspirin and clopidogrel and developed anemia... His hemoglobin went down from 12 to 8 g/dL... Iron studies were done while patient was taking p.o. iron, and are therefore not supportive of iron deficiency state... Patient is here with his wife to discuss treatment for his anemia.... Patient had upper and lower endoscopy with Dr. Negrete from [facility] 3 to 4 years ago and he is planning to have repeat upper and lower endoscopy with the same doctor on November 16th... [de-identified] : February 9, 2021 patient returns for follow-up he is again anemic... He states he had upper and lower endoscopy by Dr. Negrete however wife forgot to bring results...\par \par May 4, 2021 returns for follow-up for his iron deficiency anemia... Patient again was found to have H. pylori infection documented by breast test, he was restarted on antibiotics by Dr. Negrete, however 5 days into his antibiotic treatment, he had a seizure and ended up at [facility]... Upon discharge from [facility] his hemoglobin was 12.6 g/dL...\par \par \par September 20, 2021 patient continues to have anemia.. Iron deficient?...  Wife states he was treated for H. pylori infection... Wife now agreeable to have him be seen by our gastroenterologist...

## 2021-09-22 ENCOUNTER — RX RENEWAL (OUTPATIENT)
Age: 61
End: 2021-09-22

## 2021-09-26 NOTE — HISTORY OF PRESENT ILLNESS
[FreeTextEntry1] : Pt here for f/u\par \par Had 1 seizure last week, increased Keppra to 1G BID. Has been tolerating the higher dose with no ADEs. Since increasing dose- no new seizure activity. \par Wife says he may be slightly dehydrated and has been encouraging to drink more. Denies any signs of infection including dysuria cough etc. \par \par Had EEG this AM- pending results \par

## 2021-09-26 NOTE — ASSESSMENT
[FreeTextEntry1] : Plan for seizures- \par \par F/u on EEG result- if Keppra insufficient may need to add additional agent \par Will check labs including CBC chemistry, keppra level \par Wife advised- If pt has focal seizure and is alert and can swallow, ok to give him additional pill. If generalized seizure/ lasts more than few minutes- etc go to ED immediately.

## 2021-10-01 ENCOUNTER — APPOINTMENT (OUTPATIENT)
Age: 61
End: 2021-10-01

## 2021-10-01 ENCOUNTER — APPOINTMENT (OUTPATIENT)
Dept: GASTROENTEROLOGY | Facility: CLINIC | Age: 61
End: 2021-10-01
Payer: COMMERCIAL

## 2021-10-01 ENCOUNTER — OUTPATIENT (OUTPATIENT)
Dept: OUTPATIENT SERVICES | Facility: HOSPITAL | Age: 61
LOS: 1 days | End: 2021-10-01
Payer: COMMERCIAL

## 2021-10-01 VITALS
RESPIRATION RATE: 18 BRPM | TEMPERATURE: 98 F | SYSTOLIC BLOOD PRESSURE: 120 MMHG | OXYGEN SATURATION: 97 % | HEART RATE: 55 BPM | DIASTOLIC BLOOD PRESSURE: 76 MMHG

## 2021-10-01 VITALS
SYSTOLIC BLOOD PRESSURE: 110 MMHG | WEIGHT: 167 LBS | TEMPERATURE: 95 F | RESPIRATION RATE: 14 BRPM | OXYGEN SATURATION: 94 % | HEIGHT: 67 IN | HEART RATE: 59 BPM | DIASTOLIC BLOOD PRESSURE: 70 MMHG | BODY MASS INDEX: 26.21 KG/M2

## 2021-10-01 DIAGNOSIS — D50.9 IRON DEFICIENCY ANEMIA, UNSPECIFIED: ICD-10-CM

## 2021-10-01 DIAGNOSIS — A04.8 OTHER SPECIFIED BACTERIAL INTESTINAL INFECTIONS: ICD-10-CM

## 2021-10-01 DIAGNOSIS — D64.9 ANEMIA, UNSPECIFIED: ICD-10-CM

## 2021-10-01 PROCEDURE — 96365 THER/PROPH/DIAG IV INF INIT: CPT

## 2021-10-01 PROCEDURE — 99203 OFFICE O/P NEW LOW 30 MIN: CPT

## 2021-10-01 RX ORDER — FERUMOXYTOL 510 MG/17ML
510 INJECTION INTRAVENOUS ONCE
Refills: 0 | Status: COMPLETED | OUTPATIENT
Start: 2021-10-01 | End: 2021-10-01

## 2021-10-01 RX ADMIN — FERUMOXYTOL 117 MILLIGRAM(S): 510 INJECTION INTRAVENOUS at 11:45

## 2021-10-01 RX ADMIN — FERUMOXYTOL 510 MILLIGRAM(S): 510 INJECTION INTRAVENOUS at 12:45

## 2021-10-05 PROBLEM — D64.9 ANEMIA: Status: ACTIVE | Noted: 2021-10-05

## 2021-10-05 NOTE — HISTORY OF PRESENT ILLNESS
[FreeTextEntry1] : 61 yo male with a hx of CVA and dementia.  Hx of CVA 6/29/20, d/c'ed on ASA and plavix -- developed anemia -- Hgb from 12 to 8.  9/20/21 Hgb was 8 with MCV of 70.6.  9/20/21 H. pylori breath test was positive. No dark stools, no BRBPR.  No abdominal pain.  No N/V/D, no constipation.  No weight loss, no change in appetite.  + heartburn/reflux -- takes omeprazole.  Pt was going to start abx for H. pylori he had a seizure so stopped them. after 5 days  Then was supposed to get a ?capsule study and had another seizure so not performed.  \par \par 2/16/21 EGD by Dr. Torres showed a small hiatal hernia, patchy mild inflammation characterized by erosions, friability and granularity in the duodenal bulb.   \par \par 11/16/20 colonoscopy showed hypertrophied anal papillae, two sessile polyps in the mid-AC -- 3 to 4mm in size that were excised, diverticulosis of sigmoid, DC, TC and AC, mid-transverse colon tattoo (repeat in 5 years) (bx --> TA).\par \par 11/16/20 EGD showed a small hiatal hernia, four non-bleeding gastric antral ulcers, superficial gastroc ulcer with no stigmata in the gastric body, duodenal bulb erosions and a single 4 mm angiectasia found in the 2nd portion of the duodenum that was ablated (bx --> chronic active gastritis + for H. pylori).  \par \par Brother -- colon cancer, 46\par No stomach or pancreatic cancer.  No IBD.\par \par Vaccinated -- Pfizer, second dose in March 2021\par \par [employer], cleaning -- disability

## 2021-10-05 NOTE — ASSESSMENT
[FreeTextEntry1] : 59 yo male with hx of  H. pylori as well as anemia\par \par - Starting Pylera for 10 days with omeprazole 20 mg po bid\par - Will arrange an EGD to f/u in 6 weeks\par - Will determine further f/u pending results

## 2021-10-07 PROBLEM — I65.29 CAROTID ATHEROSCLEROSIS, UNSPECIFIED LATERALITY: Status: ACTIVE | Noted: 2020-06-17

## 2021-10-08 ENCOUNTER — APPOINTMENT (OUTPATIENT)
Age: 61
End: 2021-10-08

## 2021-10-08 ENCOUNTER — OUTPATIENT (OUTPATIENT)
Dept: OUTPATIENT SERVICES | Facility: HOSPITAL | Age: 61
LOS: 1 days | End: 2021-10-08
Payer: COMMERCIAL

## 2021-10-08 ENCOUNTER — LABORATORY RESULT (OUTPATIENT)
Age: 61
End: 2021-10-08

## 2021-10-08 ENCOUNTER — APPOINTMENT (OUTPATIENT)
Dept: NEUROLOGY | Facility: CLINIC | Age: 61
End: 2021-10-08
Payer: COMMERCIAL

## 2021-10-08 VITALS
HEIGHT: 67 IN | WEIGHT: 168 LBS | OXYGEN SATURATION: 98 % | TEMPERATURE: 98.3 F | BODY MASS INDEX: 26.37 KG/M2 | HEART RATE: 56 BPM | DIASTOLIC BLOOD PRESSURE: 62 MMHG | SYSTOLIC BLOOD PRESSURE: 129 MMHG

## 2021-10-08 VITALS
RESPIRATION RATE: 16 BRPM | DIASTOLIC BLOOD PRESSURE: 71 MMHG | OXYGEN SATURATION: 99 % | HEART RATE: 53 BPM | TEMPERATURE: 98 F | SYSTOLIC BLOOD PRESSURE: 107 MMHG

## 2021-10-08 DIAGNOSIS — Z01.812 ENCOUNTER FOR PREPROCEDURAL LABORATORY EXAMINATION: ICD-10-CM

## 2021-10-08 DIAGNOSIS — D50.9 IRON DEFICIENCY ANEMIA, UNSPECIFIED: ICD-10-CM

## 2021-10-08 DIAGNOSIS — I65.29 OCCLUSION AND STENOSIS OF UNSPECIFIED CAROTID ARTERY: ICD-10-CM

## 2021-10-08 PROCEDURE — 99213 OFFICE O/P EST LOW 20 MIN: CPT

## 2021-10-08 PROCEDURE — 96365 THER/PROPH/DIAG IV INF INIT: CPT

## 2021-10-08 RX ORDER — FERUMOXYTOL 510 MG/17ML
510 INJECTION INTRAVENOUS ONCE
Refills: 0 | Status: COMPLETED | OUTPATIENT
Start: 2021-10-08 | End: 2021-10-08

## 2021-10-08 RX ADMIN — FERUMOXYTOL 510 MILLIGRAM(S): 510 INJECTION INTRAVENOUS at 12:35

## 2021-10-08 RX ADMIN — FERUMOXYTOL 117 MILLIGRAM(S): 510 INJECTION INTRAVENOUS at 11:35

## 2021-10-08 NOTE — DISCUSSION/SUMMARY
[FreeTextEntry1] : Pt is 60yoM with PMH CVA, seizures, Alzheimers dementia, anemia, here for post-ED f/u s/p breakthrough seizure while on Keppra 1G BID. Continues to have shaking of R limbs at night, improved with additional dose of 125mg keppra before bed, and some urine incontinence- unclear whether this is breakthrough seizures vs UTI.

## 2021-10-08 NOTE — ASSESSMENT
[FreeTextEntry1] : Pt is 60yoM with PMH CVA, seizures, Alzheimers dementia, anemia, here for post-ED f/u s/p breakthrough seizure while on Keppra 1G BID. Continues to have shaking of R limbs at night, improved with additional dose of 125mg keppra before bed, and some urine incontinence- unclear whether this is breakthrough seizures vs UTI. \par \par seizures\par -routine labs including keppra Level, urine \par -increase keppra to 1250mg BID \par -will start vimpat 50mg BID \par -EEG to be done in 1 week \par \par

## 2021-10-08 NOTE — HISTORY OF PRESENT ILLNESS
[FreeTextEntry1] : Pt is 61yo R-handed M PMH CVA (06/2020) (with residual L sided weakness, trouble walking and speech difficulties), COPD, HTN HLD, iron deficiency anemia, dementia, presents for follow-up visit.\par \par Pt was evaluated in [facility] ED on 9/16 for siezure causing him to fall face first, causing laceration requiring sutures. At the time of episode, wife gave him an additional dose of keppra as discussed. Pt was back to his baseline by the time he was in the ED. Only had head CT done which was neg for bleed. \par \par Wife says four days after he was in the ED, she began noticing that the bed would shake at night- while sleeping, Jeromy would have shaking of either his R arm or R leg. She has been giving him an additional 125mg Keppra before bed since noticing this and  these episodes seemed to have stopped. Of note, he has been having rare occurrences of urine incontinence, even within this week, so she is unclear if he continues to have small seizures. \par \par She denies any symptoms to suggest infection, aside from urine incontinence. No cough/ dysuria/ foul-smelling urine/ rashes/ GI or other complaints. \par \par Has not missed any doses of his Keppra\par \par On Keppra 1G BID, last Keppra level 24.7\par \par EEG 8/12/21- mild generalized background slowing, rare left temporal slowing, these findings indicate a mild degree of diffuse or multifocal dysfx as well as a mild degree of temporal focal dysfx. There were no findings of active epilepsy, however this alone does not r/o the diagnosis. \par \par ILR- no events to date o findings of any afib/flutter/ other arrythmias. \par Pt also follows as outpatient with hematology \par iron deficiency anemia- iron infusions with Dr. Hoffman , pending EGD with GI\par H Pylori- on pylera \par

## 2021-10-08 NOTE — PHYSICAL EXAM
[FreeTextEntry1] : The patient is alert and oriented to self, , city, situation (unable to state date/ day of the week/ president), naming intact x3, \par Speech- dysarthria \par Memory is intact: Immediate recall 3 out of 3, short-term 3 out of 3, remote memory intact\par Cranial nerves II through XII intact\par Motor exam: Upper and lower extremities 5 out of 5 power, normal tone. No abnormal movements noted.\par Sensory exam: Intact to light touch and pinprick. Romberg negative.\par Coordination and vestibular exam: Finger to nose intact, no evidence of truncal or appendicular ataxia. No evidence of nystagmus. no vestibular symptoms elicited with head turning during ambulation.\par Gait: No ataxia \par Reflexes: One to 2+ in upper and lower extremities. No pathological reflexes. Downgoing toes.\par

## 2021-10-11 LAB
ALBUMIN SERPL ELPH-MCNC: 4.8 G/DL
ALP BLD-CCNC: 59 U/L
ALT SERPL-CCNC: 24 U/L
ANION GAP SERPL CALC-SCNC: 14 MMOL/L
APPEARANCE: CLEAR
AST SERPL-CCNC: 14 U/L
BACTERIA: NEGATIVE
BASOPHILS # BLD AUTO: 0.04 K/UL
BASOPHILS NFR BLD AUTO: 0.9 %
BILIRUB SERPL-MCNC: 0.3 MG/DL
BILIRUBIN URINE: NEGATIVE
BLOOD URINE: NEGATIVE
BUN SERPL-MCNC: 20 MG/DL
CALCIUM SERPL-MCNC: 9.5 MG/DL
CHLORIDE SERPL-SCNC: 104 MMOL/L
CO2 SERPL-SCNC: 24 MMOL/L
COLOR: YELLOW
CREAT SERPL-MCNC: 0.7 MG/DL
EOSINOPHIL # BLD AUTO: 0.12 K/UL
EOSINOPHIL NFR BLD AUTO: 2.6 %
GLUCOSE QUALITATIVE U: NEGATIVE
GLUCOSE SERPL-MCNC: 89 MG/DL
HCT VFR BLD CALC: 34.5 %
HGB BLD-MCNC: 9.7 G/DL
HYALINE CASTS: 0 /LPF
IMM GRANULOCYTES NFR BLD AUTO: 0.2 %
KETONES URINE: NEGATIVE
LEUKOCYTE ESTERASE URINE: NEGATIVE
LYMPHOCYTES # BLD AUTO: 1.09 K/UL
LYMPHOCYTES NFR BLD AUTO: 24.1 %
MAN DIFF?: NORMAL
MCHC RBC-ENTMCNC: 23.3 PG
MCHC RBC-ENTMCNC: 28.1 GM/DL
MCV RBC AUTO: 82.7 FL
MICROSCOPIC-UA: NORMAL
MONOCYTES # BLD AUTO: 0.33 K/UL
MONOCYTES NFR BLD AUTO: 7.3 %
NEUTROPHILS # BLD AUTO: 2.94 K/UL
NEUTROPHILS NFR BLD AUTO: 64.9 %
NITRITE URINE: NEGATIVE
PH URINE: 6
PLATELET # BLD AUTO: 304 K/UL
POTASSIUM SERPL-SCNC: 4.8 MMOL/L
PROT SERPL-MCNC: 7.1 G/DL
PROTEIN URINE: NEGATIVE
RBC # BLD: 4.17 M/UL
RBC # FLD: NORMAL
RED BLOOD CELLS URINE: 1 /HPF
SODIUM SERPL-SCNC: 142 MMOL/L
SPECIFIC GRAVITY URINE: 1.03
SQUAMOUS EPITHELIAL CELLS: 1 /HPF
UROBILINOGEN URINE: NORMAL
WBC # FLD AUTO: 4.53 K/UL
WHITE BLOOD CELLS URINE: 0 /HPF

## 2021-10-12 LAB — LEVETIRACETAM SERPL-MCNC: 33.7 UG/ML

## 2021-10-13 ENCOUNTER — APPOINTMENT (OUTPATIENT)
Dept: NEUROLOGY | Facility: CLINIC | Age: 61
End: 2021-10-13
Payer: COMMERCIAL

## 2021-10-13 LAB — BACTERIA UR CULT: NORMAL

## 2021-10-14 ENCOUNTER — APPOINTMENT (OUTPATIENT)
Dept: NEUROLOGY | Facility: CLINIC | Age: 61
End: 2021-10-14

## 2021-10-14 PROCEDURE — 95719 EEG PHYS/QHP EA INCR W/O VID: CPT

## 2021-10-14 PROCEDURE — 95700 EEG CONT REC W/VID EEG TECH: CPT

## 2021-10-14 PROCEDURE — 95708 EEG WO VID EA 12-26HR UNMNTR: CPT

## 2021-10-17 LAB — UREA BREATH TEST QL: POSITIVE

## 2021-10-26 ENCOUNTER — INPATIENT (INPATIENT)
Facility: HOSPITAL | Age: 61
LOS: 13 days | Discharge: NURSING, REHAB OR LONG-TERM CARE | DRG: 101 | End: 2021-11-09
Attending: PSYCHIATRY & NEUROLOGY | Admitting: INTERNAL MEDICINE
Payer: COMMERCIAL

## 2021-10-26 ENCOUNTER — NON-APPOINTMENT (OUTPATIENT)
Age: 61
End: 2021-10-26

## 2021-10-26 VITALS
OXYGEN SATURATION: 97 % | SYSTOLIC BLOOD PRESSURE: 143 MMHG | DIASTOLIC BLOOD PRESSURE: 74 MMHG | TEMPERATURE: 99 F | HEART RATE: 55 BPM | HEIGHT: 66 IN | WEIGHT: 167.99 LBS | RESPIRATION RATE: 16 BRPM

## 2021-10-26 DIAGNOSIS — R56.9 UNSPECIFIED CONVULSIONS: ICD-10-CM

## 2021-10-26 DIAGNOSIS — I10 ESSENTIAL (PRIMARY) HYPERTENSION: ICD-10-CM

## 2021-10-26 DIAGNOSIS — D64.9 ANEMIA, UNSPECIFIED: ICD-10-CM

## 2021-10-26 DIAGNOSIS — I63.9 CEREBRAL INFARCTION, UNSPECIFIED: ICD-10-CM

## 2021-10-26 DIAGNOSIS — R63.8 OTHER SYMPTOMS AND SIGNS CONCERNING FOOD AND FLUID INTAKE: ICD-10-CM

## 2021-10-26 DIAGNOSIS — F03.90 UNSPECIFIED DEMENTIA WITHOUT BEHAVIORAL DISTURBANCE: ICD-10-CM

## 2021-10-26 DIAGNOSIS — Z98.890 OTHER SPECIFIED POSTPROCEDURAL STATES: Chronic | ICD-10-CM

## 2021-10-26 LAB
ALBUMIN SERPL ELPH-MCNC: 4.6 G/DL — SIGNIFICANT CHANGE UP (ref 3.3–5)
ALP SERPL-CCNC: 49 U/L — SIGNIFICANT CHANGE UP (ref 40–120)
ALT FLD-CCNC: 44 U/L — SIGNIFICANT CHANGE UP (ref 10–45)
ANION GAP SERPL CALC-SCNC: 8 MMOL/L — SIGNIFICANT CHANGE UP (ref 5–17)
ANISOCYTOSIS BLD QL: SLIGHT — SIGNIFICANT CHANGE UP
APTT BLD: 32.5 SEC — SIGNIFICANT CHANGE UP (ref 27.5–35.5)
AST SERPL-CCNC: 25 U/L — SIGNIFICANT CHANGE UP (ref 10–40)
BASOPHILS # BLD AUTO: 0 K/UL — SIGNIFICANT CHANGE UP (ref 0–0.2)
BASOPHILS NFR BLD AUTO: 0 % — SIGNIFICANT CHANGE UP (ref 0–2)
BILIRUB SERPL-MCNC: 0.3 MG/DL — SIGNIFICANT CHANGE UP (ref 0.2–1.2)
BUN SERPL-MCNC: 12 MG/DL — SIGNIFICANT CHANGE UP (ref 7–23)
CALCIUM SERPL-MCNC: 9 MG/DL — SIGNIFICANT CHANGE UP (ref 8.4–10.5)
CHLORIDE SERPL-SCNC: 103 MMOL/L — SIGNIFICANT CHANGE UP (ref 96–108)
CO2 SERPL-SCNC: 29 MMOL/L — SIGNIFICANT CHANGE UP (ref 22–31)
CREAT SERPL-MCNC: 0.85 MG/DL — SIGNIFICANT CHANGE UP (ref 0.5–1.3)
ELLIPTOCYTES BLD QL SMEAR: SLIGHT — SIGNIFICANT CHANGE UP
EOSINOPHIL # BLD AUTO: 0.04 K/UL — SIGNIFICANT CHANGE UP (ref 0–0.5)
EOSINOPHIL NFR BLD AUTO: 0.9 % — SIGNIFICANT CHANGE UP (ref 0–6)
GIANT PLATELETS BLD QL SMEAR: PRESENT — SIGNIFICANT CHANGE UP
GLUCOSE BLDC GLUCOMTR-MCNC: 137 MG/DL — HIGH (ref 70–99)
GLUCOSE SERPL-MCNC: 131 MG/DL — HIGH (ref 70–99)
HCT VFR BLD CALC: 38.1 % — LOW (ref 39–50)
HGB BLD-MCNC: 12.1 G/DL — LOW (ref 13–17)
INR BLD: 1.09 — SIGNIFICANT CHANGE UP (ref 0.88–1.16)
LYMPHOCYTES # BLD AUTO: 1.1 K/UL — SIGNIFICANT CHANGE UP (ref 1–3.3)
LYMPHOCYTES # BLD AUTO: 23.2 % — SIGNIFICANT CHANGE UP (ref 13–44)
MAGNESIUM SERPL-MCNC: 2.1 MG/DL — SIGNIFICANT CHANGE UP (ref 1.6–2.6)
MANUAL SMEAR VERIFICATION: SIGNIFICANT CHANGE UP
MCHC RBC-ENTMCNC: 26 PG — LOW (ref 27–34)
MCHC RBC-ENTMCNC: 31.8 GM/DL — LOW (ref 32–36)
MCV RBC AUTO: 81.9 FL — SIGNIFICANT CHANGE UP (ref 80–100)
MICROCYTES BLD QL: SLIGHT — SIGNIFICANT CHANGE UP
MONOCYTES # BLD AUTO: 0.13 K/UL — SIGNIFICANT CHANGE UP (ref 0–0.9)
MONOCYTES NFR BLD AUTO: 2.7 % — SIGNIFICANT CHANGE UP (ref 2–14)
NEUTROPHILS # BLD AUTO: 3.47 K/UL — SIGNIFICANT CHANGE UP (ref 1.8–7.4)
NEUTROPHILS NFR BLD AUTO: 73.2 % — SIGNIFICANT CHANGE UP (ref 43–77)
NT-PROBNP SERPL-SCNC: 98 PG/ML — SIGNIFICANT CHANGE UP (ref 0–300)
OVALOCYTES BLD QL SMEAR: SLIGHT — SIGNIFICANT CHANGE UP
PHOSPHATE SERPL-MCNC: 3.7 MG/DL — SIGNIFICANT CHANGE UP (ref 2.5–4.5)
PLAT MORPH BLD: ABNORMAL
PLATELET # BLD AUTO: 246 K/UL — SIGNIFICANT CHANGE UP (ref 150–400)
POIKILOCYTOSIS BLD QL AUTO: SLIGHT — SIGNIFICANT CHANGE UP
POLYCHROMASIA BLD QL SMEAR: SLIGHT — SIGNIFICANT CHANGE UP
POTASSIUM SERPL-MCNC: 4.2 MMOL/L — SIGNIFICANT CHANGE UP (ref 3.5–5.3)
POTASSIUM SERPL-SCNC: 4.2 MMOL/L — SIGNIFICANT CHANGE UP (ref 3.5–5.3)
PROT SERPL-MCNC: 7.4 G/DL — SIGNIFICANT CHANGE UP (ref 6–8.3)
PROTHROM AB SERPL-ACNC: 13 SEC — SIGNIFICANT CHANGE UP (ref 10.6–13.6)
RBC # BLD: 4.65 M/UL — SIGNIFICANT CHANGE UP (ref 4.2–5.8)
RBC # FLD: SIGNIFICANT CHANGE UP (ref 10.3–14.5)
RBC BLD AUTO: ABNORMAL
SARS-COV-2 RNA SPEC QL NAA+PROBE: NEGATIVE — SIGNIFICANT CHANGE UP
SODIUM SERPL-SCNC: 140 MMOL/L — SIGNIFICANT CHANGE UP (ref 135–145)
TROPONIN T SERPL-MCNC: 0.01 NG/ML — SIGNIFICANT CHANGE UP (ref 0–0.01)
WBC # BLD: 4.74 K/UL — SIGNIFICANT CHANGE UP (ref 3.8–10.5)
WBC # FLD AUTO: 4.74 K/UL — SIGNIFICANT CHANGE UP (ref 3.8–10.5)

## 2021-10-26 PROCEDURE — 70450 CT HEAD/BRAIN W/O DYE: CPT | Mod: 26,QQ

## 2021-10-26 PROCEDURE — 71046 X-RAY EXAM CHEST 2 VIEWS: CPT | Mod: 26

## 2021-10-26 PROCEDURE — 99285 EMERGENCY DEPT VISIT HI MDM: CPT

## 2021-10-26 PROCEDURE — 99291 CRITICAL CARE FIRST HOUR: CPT

## 2021-10-26 PROCEDURE — 72125 CT NECK SPINE W/O DYE: CPT | Mod: 26,QQ

## 2021-10-26 PROCEDURE — 99253 IP/OBS CNSLTJ NEW/EST LOW 45: CPT

## 2021-10-26 RX ORDER — ATORVASTATIN CALCIUM 80 MG/1
40 TABLET, FILM COATED ORAL AT BEDTIME
Refills: 0 | Status: DISCONTINUED | OUTPATIENT
Start: 2021-10-26 | End: 2021-11-09

## 2021-10-26 RX ORDER — LEVETIRACETAM 250 MG/1
1250 TABLET, FILM COATED ORAL
Refills: 0 | Status: DISCONTINUED | OUTPATIENT
Start: 2021-10-26 | End: 2021-10-26

## 2021-10-26 RX ORDER — CLOPIDOGREL BISULFATE 75 MG/1
75 TABLET, FILM COATED ORAL DAILY
Refills: 0 | Status: DISCONTINUED | OUTPATIENT
Start: 2021-10-26 | End: 2021-11-09

## 2021-10-26 RX ORDER — LACOSAMIDE 50 MG/1
150 TABLET ORAL ONCE
Refills: 0 | Status: DISCONTINUED | OUTPATIENT
Start: 2021-10-26 | End: 2021-10-26

## 2021-10-26 RX ORDER — ASPIRIN/CALCIUM CARB/MAGNESIUM 324 MG
81 TABLET ORAL DAILY
Refills: 0 | Status: DISCONTINUED | OUTPATIENT
Start: 2021-10-26 | End: 2021-11-09

## 2021-10-26 RX ORDER — LACOSAMIDE 50 MG/1
50 TABLET ORAL
Refills: 0 | Status: DISCONTINUED | OUTPATIENT
Start: 2021-10-26 | End: 2021-10-26

## 2021-10-26 RX ORDER — ENOXAPARIN SODIUM 100 MG/ML
40 INJECTION SUBCUTANEOUS AT BEDTIME
Refills: 0 | Status: DISCONTINUED | OUTPATIENT
Start: 2021-10-27 | End: 2021-11-09

## 2021-10-26 RX ORDER — LACOSAMIDE 50 MG/1
50 TABLET ORAL EVERY 12 HOURS
Refills: 0 | Status: DISCONTINUED | OUTPATIENT
Start: 2021-10-27 | End: 2021-10-30

## 2021-10-26 RX ORDER — LEVETIRACETAM 250 MG/1
1000 TABLET, FILM COATED ORAL ONCE
Refills: 0 | Status: DISCONTINUED | OUTPATIENT
Start: 2021-10-26 | End: 2021-10-26

## 2021-10-26 RX ORDER — LEVETIRACETAM 250 MG/1
1500 TABLET, FILM COATED ORAL EVERY 12 HOURS
Refills: 0 | Status: DISCONTINUED | OUTPATIENT
Start: 2021-10-27 | End: 2021-10-30

## 2021-10-26 RX ADMIN — ATORVASTATIN CALCIUM 40 MILLIGRAM(S): 80 TABLET, FILM COATED ORAL at 21:50

## 2021-10-26 RX ADMIN — LACOSAMIDE 50 MILLIGRAM(S): 50 TABLET ORAL at 21:42

## 2021-10-26 RX ADMIN — LEVETIRACETAM 1250 MILLIGRAM(S): 250 TABLET, FILM COATED ORAL at 21:42

## 2021-10-26 RX ADMIN — Medication 4 MILLIGRAM(S): at 22:50

## 2021-10-26 RX ADMIN — Medication 4 MILLIGRAM(S): at 22:45

## 2021-10-26 RX ADMIN — LACOSAMIDE 150 MILLIGRAM(S): 50 TABLET ORAL at 23:15

## 2021-10-26 NOTE — ED PROVIDER NOTE - CHPI ED SYMPTOMS NEG
no facial droop, no chest pain, no SOB, no diarrhea, no abdominal pain, no bloody stools, no leg swelling, no rashes/no loss of consciousness/no nausea/no vomiting

## 2021-10-26 NOTE — H&P ADULT - ATTENDING COMMENTS
Patient seen and evaluated in AM today 10/27 in ICU  Around 11 PM yesterday patient had witnessed seizures in ER.  Rapid response called and patient given total of Ativan 8 mg IV. Also given extra keppra and Vimpat load.  Evaluated by ICU and was transferred to ICU for monitoring overnight.  This AM patient awake, alert, oriented to self and in hospital. Disoriented to month (states December).   Poor historian, unable to elaborate on seizure history. Patient seen and evaluated in AM today 10/27 in ICU  Around 11 PM yesterday patient had witnessed seizures in ER.  Rapid response called and patient given total of Ativan 8 mg IV. Also given extra keppra and Vimpat load.  Evaluated by ICU and was transferred to unit for monitoring overnight.  This AM patient awake, alert, oriented to self and in hospital. Disoriented to month (states December).   Poor historian, unable to elaborate on seizure history.  Stable to transfer to [facility] (given bradycardia). EP consult Patient seen and evaluated in AM today 10/27 in ICU  Around 11 PM yesterday patient had witnessed seizures in ER.  Rapid response called and patient given total of Ativan 8 mg IV. Also given extra keppra and Vimpat load.  Evaluated by ICU and was transferred to unit for monitoring overnight.  This AM patient awake, alert, oriented to self and in hospital. Disoriented to month (states December).   Poor historian, unable to elaborate on seizure history.  Stable to transfer to [facility] (given bradycardia). EP consult  EEG to be connected

## 2021-10-26 NOTE — ED PROVIDER NOTE - OBJECTIVE STATEMENT
62 y/o M, former smoker, PMHx CVA (loop recorder, on Plavix and 81mg ASA), seizures (on Keppra, changed from 1000mg BID to 1250 BID), HTN, iron deficiency anemia, dementia, SHx hernia repair, Sent to ED by neurologist Dr. Guillen for concern of possible stroke. Per wife, Pt had a seizure two nights ago that was witnessed by son, and is his second episode in the past 2 weeks. He was given Keppra and his symptoms resolved after 5 minutes. Pt was sitting in his chair, no head injury or LOC. He was not brought to the hospital after his seizure episode. Afterwards at 8PM, Pt began to right sided facial, upper and lower extremity numbness and weakness in the right side. No facial droop. According to wife, Pt appeared to be confused afterwards.  Comes to ED today for further evaluation. Denies chest pain, SOB, n/v/d, abdominal pain, bloody stools, leg swelling, and rashes Last CVA 1 year ago with LOC. Pt is compliant with medications.  Scheduled for an endoscopy on 11/11/2021.   PCP Dr. Shultz 62 y/o M, former smoker, PMHx CVA (loop recorder, on Plavix and 81mg ASA), seizures (on Keppra, changed from 1000mg BID to 1250 BID + Vimpat BID), HTN, iron deficiency anemia, dementia, SHx hernia repair, Sent to ED by neurologist Dr. Guillen for concern of possible stroke. Per wife, Pt had a seizure two nights ago that was witnessed by son, and is his second episode in the past 2 weeks. He was given Keppra and his symptoms resolved after 5 minutes. Pt was sitting in his chair, no head injury or LOC. He was not brought to the hospital after his seizure episode. Afterwards at 8PM, Pt began to right sided facial, upper and lower extremity numbness and weakness in the right side. No facial droop. According to wife, Pt appeared to be confused afterwards.  Comes to ED today for further evaluation. Denies chest pain, SOB, n/v/d, abdominal pain, bloody stools, leg swelling, and rashes Last CVA 1 year ago with LOC. Pt is compliant with medications.  Scheduled for an endoscopy on 11/11/2021.   PCP Dr. Shultz 60 y/o M, former smoker, PMHx CVA (loop recorder, on Plavix and 81mg ASA, no residual deficits), seizures (on Keppra, changed from 1000mg BID to 1250 BID + Vimpat BID), HTN, iron deficiency anemia, dementia, SHx hernia repair, Sent to ED by neurologist Dr. Guillen for concern of possible stroke vs TIA on Sunday. Per wife, Pt had a seizure two nights ago (Sunday) that was witnessed by son, and is his second episode in the past 2 weeks, was GTC typical for pt. He was given Keppra and his symptoms resolved after 5 minutes. Pt was sitting in his chair, no head injury or LOC. He was not brought to the hospital after his seizure episode. Afterwards at 8PM, Pt began to right sided facial, upper and lower extremity numbness and weakness in the right side. No facial droop. According to wife, Pt appeared to be confused afterwards - these sx resolved spontaneously within 1 hour and have not recurred since then.  Comes to ED today for further evaluation. Denies chest pain, SOB, n/v/d, abdominal pain, bloody stools, leg swelling, and rashes Last CVA 1 year ago with LOC. Pt is compliant with medications.  Scheduled for an endoscopy on 11/11/2021.   PCP Dr. Shultz

## 2021-10-26 NOTE — ED PROVIDER NOTE - PROGRESS NOTE DETAILS
labs cth wnl pts covering pmd state they use hospitalist for admission. d/w stroke team who said they will come and evaluate pt to assess for admission to stroke vs epilepsy floor pt accepted to emu d/w neuro pa

## 2021-10-26 NOTE — ED CLERICAL - NS ED CLERK NOTE PRE-ARRIVAL INFORMATION; ADDITIONAL PRE-ARRIVAL INFORMATION
60 Y/O M DARIELA TOM BEING SENT IN BY DR HERNANDEZ FOR NEW SEIZURE ON SAT 10/23/21  RIGHTSIDED WEAKNESS ON DIANNA 10/24/21 H/O INTRACRANIAL STENOSIS

## 2021-10-26 NOTE — ED ADULT TRIAGE NOTE - CHIEF COMPLAINT QUOTE
Pt had seizure on Saturday accompanied with R sided numbness and confusion. Pt continues to have difficulty with ambulation. Pt was sent for further evaluation by Dr. Chery.

## 2021-10-26 NOTE — H&P ADULT - PROBLEM SELECTOR PLAN 2
hx of CVA (loop recorder, on Plavix and 81mg ASA, no residual deficits).  Sent to ED by neurologist Dr. Guillen for concern of possible stroke vs TIA on Sunday. CTH negative. Symptoms suggestive of seizure/ Todds paralysis, less likely stroke or TIA. Neurology followung  - MRA head and neck with DWI and FLAIR   - q8h neuro checks   - STAT CTH for any acute change in mental status   - c/w Plavix and 81mg ASA, lipitor 40mg qd hx of CVA (loop recorder, on Plavix and 81mg ASA, no residual deficits).  Sent to ED by neurologist Dr. Guillne for concern of possible stroke vs TIA on Sunday. CTH negative. Symptoms suggestive of seizure/ Todds paralysis, less likely stroke or TIA. Neurology followung  - f/u MRA head and neck with DWI and FLAIR   - q8h neuro checks   - STAT CTH for any acute change in mental status   - c/w Plavix and 81mg ASA, lipitor 40mg qd

## 2021-10-26 NOTE — CONSULT NOTE ADULT - SUBJECTIVE AND OBJECTIVE BOX
Neurology Stroke Consult Note    Chief Complaint: seizures, right sided numbness   Last known well time/Time of onset of symptoms: xweeks       HPI: 60 y/o M, former smoker, PMHx CVA (loop recorder, on Plavix and 81mg ASA, no residual deficits), seizures (on Keppra, changed from 1000mg BID to 1250 BID + Vimpat BID), HTN, iron deficiency anemia, dementia, SHx hernia repair, Sent to ED by neurologist Dr. Guillen for concern of possible stroke vs TIA on Sunday. Per wife, Pt had a seizure two nights ago (Sunday) that was witnessed by son, and is his second episode in the past 2 weeks, was GTC typical for pt. He was given Keppra and his symptoms resolved after 5 minutes. Pt was sitting in his chair, no head injury or LOC. He was not brought to the hospital after his seizure episode. Afterwards at 8PM, Pt began to right sided facial, upper and lower extremity numbness and weakness in the right side. No facial droop. According to wife, Pt appeared to be confused afterwards - these sx resolved spontaneously within 1 hour and have not recurred since then.  Comes to ED today for further evaluation. Denies chest pain, SOB, n/v/d, abdominal pain, bloody stools, leg swelling, and rashes Last CVA 1 year ago with LOC. Pt is compliant with medications.  Scheduled for an endoscopy on 11/11/2021.     Stroke neuro consulted for right sided weakness after seizure. As per wife, "the patient has been having shaking seizures more often so they increased his keppra and added vimpat. Then he had an episode where he stared off to the right then started shaking and then the right side went weak. 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     PAST MEDICAL & SURGICAL HISTORY:  Hypertension    CVA (cerebral vascular accident)    Dementia, old age    Anemia    Seizure    H/O hernia repair        FAMILY HISTORY:  Family history of early CAD        SOCIAL HISTORY:  Denies smoking, drinking, or drug use    ROS:  As above    MEDICATIONS  (STANDING):    MEDICATIONS  (PRN):      Allergies    No Known Allergies    Intolerances        Vital Signs Last 24 Hrs  T(C): 37.1 (26 Oct 2021 15:00), Max: 37.1 (26 Oct 2021 15:00)  T(F): 98.8 (26 Oct 2021 15:00), Max: 98.8 (26 Oct 2021 15:00)  HR: 55 (26 Oct 2021 15:00) (55 - 55)  BP: 143/74 (26 Oct 2021 15:00) (143/74 - 143/74)  BP(mean): --  RR: 16 (26 Oct 2021 15:00) (16 - 16)  SpO2: 97% (26 Oct 2021 15:00) (97% - 97%)    Physical exam:  General: No acute distress, awake and alert  Cardiovascular: Regular rate and rhythm, no murmurs, rubs, or gallops. No carotid bruits.   Pulmonary: Anterior breath sounds clear bilaterally, no crackles or wheezing. No use of accessory muscles  GI: Abdomen soft, non-distended, non-tender  Extremities: Radial and DP pulses +2, no edema    Neurologic:  -Mental status: Awake, alert, oriented to person, place, and time. Speech is fluent with intact naming, repetition, and comprehension, no dysarthria. Recent and remote memory intact. Follows commands. Attention/concentration intact. Fund of knowledge appropriate.  -Cranial nerves:   II: Visual fields are full to confrontation.  III, IV, VI: Extraocular movements are intact without nystagmus. Pupils equally round and reactive to light  V:  Facial sensation V1-V3 equal and intact   VII: Face is symmetric with normal eye closure and smile  VIII: Hearing is bilaterally intact to finger rub  Motor: Normal bulk and tone. No pronator drift. Strength bilateral upper extremity 5/5, bilateral lower extremities 5/5.  Sensation: Intact to light touch bilaterally. No neglect or extinction on double simultaneous testing.  Coordination: No dysmetria on finger-to-nose and heel-to-shin bilaterally  Reflexes: Downgoing toes bilaterally     NIHSS: 0      LABS:                        12.1   4.74  )-----------( 246      ( 26 Oct 2021 16:03 )             38.1     10-26    140  |  103  |  12  ----------------------------<  131<H>  4.2   |  29  |  0.85    Ca    9.0      26 Oct 2021 16:03  Phos  3.7     10-26  Mg     2.1     10-26    TPro  7.4  /  Alb  4.6  /  TBili  0.3  /  DBili  x   /  AST  25  /  ALT  44  /  AlkPhos  49  10-26    PT/INR - ( 26 Oct 2021 16:03 )   PT: 13.0 sec;   INR: 1.09          PTT - ( 26 Oct 2021 16:03 )  PTT:32.5 sec      RADIOLOGY & ADDITIONAL TESTS:    CTH: No acute fracture or malalignment of the cervical spine.

## 2021-10-26 NOTE — ED ADULT NURSE NOTE - OBJECTIVE STATEMENT
62yo male c/o numbness after having a seizure over the weekend (Saturday or Sunday ). Since then he is complaining of numbness to Right Side of body. PT MD sent him in to be admitted and have further CVA workup.

## 2021-10-26 NOTE — ED PROVIDER NOTE - NEUROLOGICAL, MLM
Alert and oriented, no focal deficits, no motor or sensory deficits. Alert and oriented to self and place, not to time, no focal deficits, no motor or sensory deficits.

## 2021-10-26 NOTE — ED PROVIDER NOTE - PHYSICAL EXAMINATION
NEURO: pupils 3 mm, PERRL, EOMI (CN III, IV, VI), facial sensation intact to light touch in all 3 divisions bilat (CN V), face is symmetric with normal eye closure, eye opening, and smile (CN VII), hearing is normal to rubbing fingers (CN VII), palate elevates symmetrically, phonation is normal (CN IX, X),  shoulder shrug intact bilat (CN XI), tongue is midline with nl movements and no atrophy (CN XII), finger to nose test nl bilat, negative pronator drift bilat, negative Romberg, speech is clear; 5/5 motor strength BUE and BLE: deltoids, biceps, triceps, wrist flexors/extensors, hand , hip flexors, knee flexors/extensors, plantar/dorsiflexors, hallux flexors/extensors; sensation intact to light touch BUE and BLE: C5-T1 and L3-S1 gait wnl no nystagmus

## 2021-10-26 NOTE — ED PROVIDER NOTE - CROS ED NEURO POS
confusion, right sided facial, upper and lower extremity numbness and weakness to the right side./SEIZURES

## 2021-10-26 NOTE — H&P ADULT - NSHPPHYSICALEXAM_GEN_ALL_CORE
PHYSICAL EXAM:  General: No acute distress, awake and alert  Cardiovascular: Regular rate and rhythm, no murmurs, rubs, or gallops. No carotid bruits.   Pulmonary: Anterior breath sounds clear bilaterally, no crackles or wheezing. No use of accessory muscles  GI: Abdomen soft, non-distended, non-tender  Extremities: Radial and DP pulses +2, no edema    Neurologic:  -Mental status: Awake, alert, oriented to person, place, and time. Speech is fluent with intact naming, repetition, and comprehension, no dysarthria. Recent and remote memory intact. Follows commands. Attention/concentration intact. Fund of knowledge appropriate.  -Cranial nerves:   II: Visual fields are full to confrontation.  III, IV, VI: Extraocular movements are intact without nystagmus. Pupils equally round and reactive to light  V:  Facial sensation V1-V3 equal and intact   VII: Face is symmetric with normal eye closure and smile  VIII: Hearing is bilaterally intact to finger rub  Motor: Normal bulk and tone. No pronator drift. Strength bilateral upper extremity 5/5, bilateral lower extremities 5/5.  Sensation: Intact to light touch bilaterally. No neglect or extinction on double simultaneous testing.  Coordination: No dysmetria on finger-to-nose and heel-to-shin bilaterally  Reflexes: Downgoing toes bilaterally     NIHSS: 0

## 2021-10-26 NOTE — H&P ADULT - NSHPLABSRESULTS_GEN_ALL_CORE
.  LABS:                         12.1   4.74  )-----------( 246      ( 26 Oct 2021 16:03 )             38.1     10-26    140  |  103  |  12  ----------------------------<  131<H>  4.2   |  29  |  0.85    Ca    9.0      26 Oct 2021 16:03  Phos  3.7     10-26  Mg     2.1     10-26    TPro  7.4  /  Alb  4.6  /  TBili  0.3  /  DBili  x   /  AST  25  /  ALT  44  /  AlkPhos  49  10-26    PT/INR - ( 26 Oct 2021 16:03 )   PT: 13.0 sec;   INR: 1.09          PTT - ( 26 Oct 2021 16:03 )  PTT:32.5 sec    Serum Pro-Brain Natriuretic Peptide: 98 pg/mL (10-26 @ 16:03)        RADIOLOGY, EKG & ADDITIONAL TESTS: Reviewed.

## 2021-10-26 NOTE — CONSULT NOTE ADULT - TIME BILLING
review of chart documentation and data; interview with patient; discussion of assessment plan with patient and multidisciplinary teams.

## 2021-10-26 NOTE — ED PROVIDER NOTE - CLINICAL SUMMARY MEDICAL DECISION MAKING FREE TEXT BOX
60 y/o M, former smoker, PMHx CVA (loop recorder, on Plavix and 81mg ASA), seizures (on Kepra, changed from 1000mg BID to 1250 BID), HTN, iron deficiency anemia, dementia, SHx hernia repair, sent to ED for evaluation for possible stroke 2 nights ago. Pt with a nonfocal neuro exam and unremarkable exam findings. Will send labs, EKG, UA, and chest xray. Pt to be admitted for TIA and breakthrough seizure workup.

## 2021-10-26 NOTE — H&P ADULT - HISTORY OF PRESENT ILLNESS
HPI: HPI: 62 y/o M, former smoker, PMHx CVA (loop recorder, on Plavix and 81mg ASA, no residual deficits), seizures (on Keppra, changed from 1000mg BID to 1250 BID + Vimpat BID), HTN, iron deficiency anemia, dementia, SHx hernia repair, Sent to ED by neurologist Dr. Guillen for concern of possible stroke vs TIA on Sunday. Per wife, Pt had a seizure two nights ago (Sunday) that was witnessed by son, and is his second episode in the past 2 weeks, was GTC typical for pt. He was given Keppra and his symptoms resolved after 5 minutes. Pt was sitting in his chair, no head injury or LOC. He was not brought to the hospital after his seizure episode. Afterwards at 8PM, Pt began to right sided facial, upper and lower extremity numbness and weakness in the right side. No facial droop. According to wife, Pt appeared to be confused afterwards - these sx resolved spontaneously within 1 hour and have not recurred since then.  Comes to ED today for further evaluation. Denies chest pain, SOB, n/v/d, abdominal pain, bloody stools, leg swelling, and rashes Last CVA 1 year ago with LOC. Pt is compliant with medications.  Scheduled for an endoscopy on 11/11/2021.     Stroke neuro consulted for right sided weakness after seizure. As per wife, "the patient has been having shaking seizures more often so they increased his keppra and added vimpat. Then he had an episode where he stared off to the right then started shaking and then the right side went weak. 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       In the ED:  Initial vital signs: T: XX F, HR: XX, BP: XX, R: XX, SpO2: XX% on RA  ED course:   Labs: significant for  Imaging:  CXR:   EKG:   Medications:   Consults: none      HPI: HPI: 62 y/o M, former smoker, PMHx CVA (loop recorder, on Plavix and 81mg ASA, no residual deficits), seizures (on Keppra, changed from 1000mg BID to 1250 BID + Vimpat BID), HTN, iron deficiency anemia, dementia, SHx hernia repair, Sent to ED by neurologist Dr. Guillen for concern of possible stroke vs TIA on Sunday. Per wife, Pt had a seizure two nights ago (Sunday) that was witnessed by son, and is his second episode in the past 2 weeks, was GTC typical for pt. He was given Keppra and his symptoms resolved after 5 minutes. Pt was sitting in his chair, no head injury or LOC. He was not brought to the hospital after his seizure episode. Afterwards at 8PM, Pt began to right sided facial, upper and lower extremity numbness and weakness in the right side. No facial droop. According to wife, Pt appeared to be confused afterwards - these sx resolved spontaneously within 1 hour and have not recurred since then.  Comes to ED today for further evaluation. Denies chest pain, SOB, n/v/d, abdominal pain, bloody stools, leg swelling, and rashes Last CVA 1 year ago with LOC. Pt is compliant with medications.  Scheduled for an endoscopy on 11/11/2021.     Stroke neuro consulted for right sided weakness after seizure. As per wife, "the patient has been having shaking seizures more often so they increased his keppra and added vimpat. Then he had an episode where he stared off to the right then started shaking and then the right side went weak. 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       In the ED:  Initial vital signs: T: 98.8F, HR: 55, BP: 143/74, R: 16, SpO2: 97% on RA  Labs: significant for hg 12.1, cmp lyts wnl, covid negative   Imaging: CT head No acute fracture or malalignment of the cervical spine.CT c spine no contrast: No acute fracture or malalignment of the cervical spine.  CXR: No acute cardiopulmonary disease process.   EKG:sinus bradycardia   Medications: keppra 1250mg po, vimpat 50mg po  Consults: Neurology

## 2021-10-26 NOTE — H&P ADULT - ASSESSMENT
60 y/o M, former smoker, PMHx CVA (loop recorder, on Plavix and 81mg ASA, no residual deficits), seizures (on Keppra, changed from 1000mg BID to 1250 BID + Vimpat BID), HTN, iron deficiency anemia, dementia, SHx hernia repair, Sent to ED by neurologist Dr. Guillen for concern of possible stroke vs TIA on Sunday. CTH negative. Symptoms suggestive of seizure/ Todds paralysis, less likely stroke or TIA. Patient admitted to epilepsy for AED medication management and EEG.

## 2021-10-26 NOTE — CONSULT NOTE ADULT - ATTENDING COMMENTS
The patient is a 61-year-old male with a past history of multifocal cryptogenic strokes in June 2020 with residual L-sided weakness, s/p ILR, intracranial atherosclerotic disease, dementia, seizure disorder (onset 3/2021), and multiple other comorbidities. He was sent to the ED for evaluation of recurrent breakthrough seizures (typically generalized shaking, LOC, post-ictal period), but one most recently 10 days ago associated with right-sided shaking, right gaze preference followed by postical period and right-sided weakness. He has had no seizures since. No infectious symptoms. He is taking meds as prescribed.  Prior EEGs have been abnormal showing rare L temporal sharp waves. Most recent EEG did not show epileptiform discharges/seizures. However, due to concern for clinical seizures, AED's have been adjusted. He is currently on Keppra 1250 mg BID, Vimpat 50 mg BID.     Plan:  Discussed case with epilepsy. Concern is for poorly controlled epilepsy. Admit for cEEG monitoring an adjustment of meds per epilepsy if need. For now, we will continue DAPT, statin. Obtain MRA/fast MRI to monitor intracranial athero and to evaluate for structural causes of focal seizures (prior strokes were largely subcortical)

## 2021-10-26 NOTE — H&P ADULT - PROBLEM SELECTOR PLAN 4
known hx of anemia. Hg 12 on admission. takes thiamine and folic acid as home med. no obvious signs of bleeding. on asa and plavix for prior CVA  -c/w home medications including asa, plavix  -f/u iron studies

## 2021-10-26 NOTE — H&P ADULT - PROBLEM SELECTOR PLAN 1
seizures (on Keppra, changed from 1000mg BID to 1250 BID + Vimpat 50mg BID)  -c/w home meds  -c/w vEEG

## 2021-10-26 NOTE — H&P ADULT - PROBLEM SELECTOR PLAN 5
Fluids: none  Electrolytes: Mg>2, K>4  Nutrition:  No IVF currently needed, replete lytes PRN  Prophylaxis: lovenox  Activity: AAT, OOBTC  GI: none  C: FC  Dispo: Admit to [facility], [place]

## 2021-10-26 NOTE — ED ADULT NURSE REASSESSMENT NOTE - NS ED NURSE REASSESS COMMENT FT1
Patient transferred to the [facility] from holding after prolonged seizure, lasting less than 2 minutes as per kaiden RN, 8mg ativan given with continues seizure activity in the [facility] rm, patient administered 150mg of lacosamide, IVP by Antia DANIELS, siezure activity stopped, patient asleep and transferred to floor.

## 2021-10-27 ENCOUNTER — RX RENEWAL (OUTPATIENT)
Age: 61
End: 2021-10-27

## 2021-10-27 LAB
A1C WITH ESTIMATED AVERAGE GLUCOSE RESULT: 4.5 % — SIGNIFICANT CHANGE UP (ref 4–5.6)
ALBUMIN SERPL ELPH-MCNC: 4.4 G/DL — SIGNIFICANT CHANGE UP (ref 3.3–5)
ALP SERPL-CCNC: 44 U/L — SIGNIFICANT CHANGE UP (ref 40–120)
ALT FLD-CCNC: 40 U/L — SIGNIFICANT CHANGE UP (ref 10–45)
ANION GAP SERPL CALC-SCNC: 10 MMOL/L — SIGNIFICANT CHANGE UP (ref 5–17)
AST SERPL-CCNC: 22 U/L — SIGNIFICANT CHANGE UP (ref 10–40)
BASE EXCESS BLDV CALC-SCNC: 3 MMOL/L — SIGNIFICANT CHANGE UP (ref -2–3)
BASOPHILS # BLD AUTO: 0.03 K/UL — SIGNIFICANT CHANGE UP (ref 0–0.2)
BASOPHILS NFR BLD AUTO: 0.7 % — SIGNIFICANT CHANGE UP (ref 0–2)
BILIRUB SERPL-MCNC: 0.4 MG/DL — SIGNIFICANT CHANGE UP (ref 0.2–1.2)
BLD GP AB SCN SERPL QL: NEGATIVE — SIGNIFICANT CHANGE UP
BUN SERPL-MCNC: 12 MG/DL — SIGNIFICANT CHANGE UP (ref 7–23)
CALCIUM SERPL-MCNC: 9.3 MG/DL — SIGNIFICANT CHANGE UP (ref 8.4–10.5)
CHLORIDE SERPL-SCNC: 105 MMOL/L — SIGNIFICANT CHANGE UP (ref 96–108)
CHOLEST SERPL-MCNC: 100 MG/DL — SIGNIFICANT CHANGE UP
CO2 BLDV-SCNC: 30.5 MMOL/L — HIGH (ref 22–26)
CO2 SERPL-SCNC: 27 MMOL/L — SIGNIFICANT CHANGE UP (ref 22–31)
COVID-19 SPIKE DOMAIN AB INTERP: POSITIVE
COVID-19 SPIKE DOMAIN ANTIBODY RESULT: >250 U/ML — HIGH
CREAT SERPL-MCNC: 0.68 MG/DL — SIGNIFICANT CHANGE UP (ref 0.5–1.3)
EOSINOPHIL # BLD AUTO: 0.08 K/UL — SIGNIFICANT CHANGE UP (ref 0–0.5)
EOSINOPHIL NFR BLD AUTO: 1.8 % — SIGNIFICANT CHANGE UP (ref 0–6)
ESTIMATED AVERAGE GLUCOSE: 82 MG/DL — SIGNIFICANT CHANGE UP (ref 68–114)
FERRITIN SERPL-MCNC: 144 NG/ML — SIGNIFICANT CHANGE UP (ref 30–400)
FOLATE SERPL-MCNC: >20 NG/ML — SIGNIFICANT CHANGE UP
GLUCOSE BLDC GLUCOMTR-MCNC: 99 MG/DL — SIGNIFICANT CHANGE UP (ref 70–99)
GLUCOSE BLDC GLUCOMTR-MCNC: 99 MG/DL — SIGNIFICANT CHANGE UP (ref 70–99)
GLUCOSE SERPL-MCNC: 108 MG/DL — HIGH (ref 70–99)
HCO3 BLDV-SCNC: 29 MMOL/L — SIGNIFICANT CHANGE UP (ref 22–29)
HCT VFR BLD CALC: 37.5 % — LOW (ref 39–50)
HDLC SERPL-MCNC: 43 MG/DL — SIGNIFICANT CHANGE UP
HGB BLD-MCNC: 11.8 G/DL — LOW (ref 13–17)
IMM GRANULOCYTES NFR BLD AUTO: 0.4 % — SIGNIFICANT CHANGE UP (ref 0–1.5)
IRON SATN MFR SERPL: 39 % — SIGNIFICANT CHANGE UP (ref 16–55)
IRON SATN MFR SERPL: 84 UG/DL — SIGNIFICANT CHANGE UP (ref 45–165)
LACTATE SERPL-SCNC: 2.6 MMOL/L — HIGH (ref 0.5–2)
LIPID PNL WITH DIRECT LDL SERPL: 47 MG/DL — SIGNIFICANT CHANGE UP
LYMPHOCYTES # BLD AUTO: 1.2 K/UL — SIGNIFICANT CHANGE UP (ref 1–3.3)
LYMPHOCYTES # BLD AUTO: 26.7 % — SIGNIFICANT CHANGE UP (ref 13–44)
MAGNESIUM SERPL-MCNC: 2 MG/DL — SIGNIFICANT CHANGE UP (ref 1.6–2.6)
MCHC RBC-ENTMCNC: 25.4 PG — LOW (ref 27–34)
MCHC RBC-ENTMCNC: 31.5 GM/DL — LOW (ref 32–36)
MCV RBC AUTO: 80.8 FL — SIGNIFICANT CHANGE UP (ref 80–100)
MONOCYTES # BLD AUTO: 0.41 K/UL — SIGNIFICANT CHANGE UP (ref 0–0.9)
MONOCYTES NFR BLD AUTO: 9.1 % — SIGNIFICANT CHANGE UP (ref 2–14)
NEUTROPHILS # BLD AUTO: 2.75 K/UL — SIGNIFICANT CHANGE UP (ref 1.8–7.4)
NEUTROPHILS NFR BLD AUTO: 61.3 % — SIGNIFICANT CHANGE UP (ref 43–77)
NON HDL CHOLESTEROL: 57 MG/DL — SIGNIFICANT CHANGE UP
NRBC # BLD: 0 /100 WBCS — SIGNIFICANT CHANGE UP (ref 0–0)
PCO2 BLDV: 49 MMHG — SIGNIFICANT CHANGE UP (ref 42–55)
PH BLDV: 7.38 — SIGNIFICANT CHANGE UP (ref 7.32–7.43)
PHOSPHATE SERPL-MCNC: 4.6 MG/DL — HIGH (ref 2.5–4.5)
PLATELET # BLD AUTO: 240 K/UL — SIGNIFICANT CHANGE UP (ref 150–400)
PO2 BLDV: 103 MMHG — HIGH (ref 25–45)
POTASSIUM SERPL-MCNC: 3.8 MMOL/L — SIGNIFICANT CHANGE UP (ref 3.5–5.3)
POTASSIUM SERPL-SCNC: 3.8 MMOL/L — SIGNIFICANT CHANGE UP (ref 3.5–5.3)
PROT SERPL-MCNC: 7.1 G/DL — SIGNIFICANT CHANGE UP (ref 6–8.3)
RBC # BLD: 4.64 M/UL — SIGNIFICANT CHANGE UP (ref 4.2–5.8)
RBC # FLD: SIGNIFICANT CHANGE UP (ref 10.3–14.5)
RH IG SCN BLD-IMP: POSITIVE — SIGNIFICANT CHANGE UP
SAO2 % BLDV: 99.8 % — HIGH (ref 67–88)
SARS-COV-2 IGG+IGM SERPL QL IA: >250 U/ML — HIGH
SARS-COV-2 IGG+IGM SERPL QL IA: POSITIVE
SODIUM SERPL-SCNC: 142 MMOL/L — SIGNIFICANT CHANGE UP (ref 135–145)
T4 FREE SERPL-MCNC: 1.28 NG/DL — SIGNIFICANT CHANGE UP (ref 0.93–1.7)
TIBC SERPL-MCNC: 213 UG/DL — LOW (ref 220–430)
TRANSFERRIN SERPL-MCNC: 180 MG/DL — LOW (ref 200–360)
TRIGL SERPL-MCNC: 52 MG/DL — SIGNIFICANT CHANGE UP
TSH SERPL-MCNC: 0.32 UIU/ML — SIGNIFICANT CHANGE UP (ref 0.27–4.2)
UIBC SERPL-MCNC: 129 UG/DL — SIGNIFICANT CHANGE UP (ref 110–370)
VIT B12 SERPL-MCNC: 1793 PG/ML — HIGH (ref 232–1245)
WBC # BLD: 4.49 K/UL — SIGNIFICANT CHANGE UP (ref 3.8–10.5)
WBC # FLD AUTO: 4.49 K/UL — SIGNIFICANT CHANGE UP (ref 3.8–10.5)

## 2021-10-27 PROCEDURE — 99291 CRITICAL CARE FIRST HOUR: CPT

## 2021-10-27 PROCEDURE — 99232 SBSQ HOSP IP/OBS MODERATE 35: CPT

## 2021-10-27 PROCEDURE — 95718 EEG PHYS/QHP 2-12 HR W/VEEG: CPT

## 2021-10-27 PROCEDURE — 99233 SBSQ HOSP IP/OBS HIGH 50: CPT | Mod: GC

## 2021-10-27 RX ORDER — IPRATROPIUM/ALBUTEROL SULFATE 18-103MCG
3 AEROSOL WITH ADAPTER (GRAM) INHALATION EVERY 6 HOURS
Refills: 0 | Status: DISCONTINUED | OUTPATIENT
Start: 2021-10-27 | End: 2021-11-09

## 2021-10-27 RX ORDER — LISINOPRIL 2.5 MG/1
20 TABLET ORAL DAILY
Refills: 0 | Status: DISCONTINUED | OUTPATIENT
Start: 2021-10-27 | End: 2021-11-09

## 2021-10-27 RX ORDER — POTASSIUM CHLORIDE 20 MEQ
10 PACKET (EA) ORAL ONCE
Refills: 0 | Status: COMPLETED | OUTPATIENT
Start: 2021-10-27 | End: 2021-10-27

## 2021-10-27 RX ORDER — DONEPEZIL HYDROCHLORIDE 10 MG/1
5 TABLET, FILM COATED ORAL AT BEDTIME
Refills: 0 | Status: DISCONTINUED | OUTPATIENT
Start: 2021-10-27 | End: 2021-11-04

## 2021-10-27 RX ADMIN — CLOPIDOGREL BISULFATE 75 MILLIGRAM(S): 75 TABLET, FILM COATED ORAL at 11:58

## 2021-10-27 RX ADMIN — Medication 100 MILLIEQUIVALENT(S): at 07:21

## 2021-10-27 RX ADMIN — ENOXAPARIN SODIUM 40 MILLIGRAM(S): 100 INJECTION SUBCUTANEOUS at 23:46

## 2021-10-27 RX ADMIN — Medication 81 MILLIGRAM(S): at 11:58

## 2021-10-27 RX ADMIN — LACOSAMIDE 110 MILLIGRAM(S): 50 TABLET ORAL at 17:54

## 2021-10-27 RX ADMIN — Medication 3 MILLILITER(S): at 22:19

## 2021-10-27 RX ADMIN — Medication 3 MILLILITER(S): at 17:15

## 2021-10-27 RX ADMIN — LEVETIRACETAM 400 MILLIGRAM(S): 250 TABLET, FILM COATED ORAL at 05:32

## 2021-10-27 RX ADMIN — LACOSAMIDE 110 MILLIGRAM(S): 50 TABLET ORAL at 06:23

## 2021-10-27 RX ADMIN — LEVETIRACETAM 400 MILLIGRAM(S): 250 TABLET, FILM COATED ORAL at 17:15

## 2021-10-27 NOTE — PROGRESS NOTE ADULT - ASSESSMENT
NEURO  #Status Epilepticus  S/p Ativan 8 mg, vimpat ***  - vEEG  - MRI  - F/u CPK, lactate, and VBG  - Keppra 1250    #CVA  On home ASA 81 mg daily and Plavix 75 mg daily  - C/w home ASA 81 mg PO daily  - C/w home Plavix 75 mg PO daily    CV  #HTN  - Lisinopril 30 mg daily    RESP  #Airway Protection  - Nasal trumpet in R nare    GI  No active issues    ENDO  No active issues    /RENAL  No active issues    HEME  #Iron Deficiency Anemia  - F/u iron studies, B12, and folate    ID  Afebrile   NEURO  #Seizure disorder  #Status Epilepticus  Onset in 03/2021. On home Keppra 1250 mg bid and Vimpat 50 mg bid. Sent to ED for evaluation of recurrent breakthrough seizures. On arrival, received home Keppra and Vimpat PO doses. Plan was initially for admission to [facility] for vEEG monitoring. While waiting for bed, began to have tonic clonic seizure with right-millard gaze. RRT called. Unresponsive to voice and pain with generalized shaking of R side with L sided flaccidity. S/p Ativan 4 mg IVP x 2 and vimpat 150 mg IVP x 1.  - vEEG  - MRA head and neck with DWI and FLAIR  - F/u CPK, lactate, and VBG  - Keppra 1500 mg IVPB q12h  - Vimpat 50 mg IVPB q12h  - q4h neuro checks    #Multifocal cryptogenic strokes  Hx multifocal cryptogenic strokes in 06/2020 w/ residual L-sided weakness, s/p ILR. CT w/ no acute intracranial or spinal pathology. On home ASA 81 mg daily, Plavix 75 mg daily, and atorvastatin 40 mg daily  - C/w home ASA 81 mg PO daily  - C/w home Plavix 75 mg PO daily  - C/w home atorvastatin 40 mg at bedtime    CARDS  #HTN  On home lisinopril 20 mg daily  - C/w home lisinopril 30 mg PO daily    RESP  #Airway Protection  - Nasal trumpet in R nare    GI  No active issues    ENDO  No active issues    /RENAL  No active issues    HEME  #Hx of iron deficiency anemia  - F/u iron studies, B12, and folate  - Transfuse if Hb < 7    ID  Afebrile. No leukocytosis. CXR without infiltrates or consolidations.    E: Replete K<4, Mg<2  N: NPO  VTE Ppx: Lovenox 40mg SQ q24h  GI: not needed  D: MICU 62 y/o M, former smoker, PMHx CVA (loop recorder, on Plavix and 81mg ASA, no residual deficits), seizures (on Keppra, changed from 1000mg BID to 1250 BID + Vimpat BID), HTN, iron deficiency anemia, dementia, SHx hernia repair, Sent to ED by neurologist Dr. Guillen for concern of possible stroke vs TIA on Sunday. CTH negative. Symptoms suggestive of seizure/ Todds paralysis, less likely stroke or TIA. While awaiting admission to [facility], had tonic clonic seizure that progressed to status epilepticus prompting rapid response. Now admitted to MICU for further evaluation and management.    NEURO  #Seizure disorder  #Status Epilepticus  Onset in 03/2021. On home Keppra 1250 mg bid and Vimpat 50 mg bid. Sent to ED for evaluation of recurrent breakthrough seizures. On arrival, received home Keppra and Vimpat PO doses. Plan was initially for admission to [facility] for vEEG monitoring. While waiting for bed, began to have tonic clonic seizure with right-millard gaze. RRT called. Unresponsive to voice and pain with generalized shaking of R side with L sided flaccidity. S/p Ativan 4 mg IVP x 2 and vimpat 150 mg IVP x 1.  - vEEG  - MRA head and neck with DWI and FLAIR  - F/u CPK, lactate, and VBG  - Keppra 1500 mg IVPB q12h  - Vimpat 50 mg IVPB q12h  - q4h neuro checks    #Multifocal cryptogenic strokes  Hx multifocal cryptogenic strokes in 06/2020 w/ residual L-sided weakness, s/p ILR. CT w/ no acute intracranial or spinal pathology. On home ASA 81 mg daily, Plavix 75 mg daily, and atorvastatin 40 mg daily  - C/w home ASA 81 mg PO daily  - C/w home Plavix 75 mg PO daily  - C/w home atorvastatin 40 mg at bedtime    CARDS  #HTN  On home lisinopril 20 mg daily  - C/w home lisinopril 30 mg PO daily    RESP  #Airway Protection  - Nasal trumpet in R nare    GI  No active issues    ENDO  No active issues    /RENAL  No active issues    HEME  #Hx of iron deficiency anemia  - F/u iron studies, B12, and folate  - Transfuse if Hb < 7    ID  Afebrile. No leukocytosis. CXR without infiltrates or consolidations.    E: Replete K<4, Mg<2  N: NPO  VTE Ppx: Lovenox 40mg SQ q24h  GI: not needed  D: MICU 62 y/o M, former smoker, PMHx CVA (loop recorder, on Plavix and 81mg ASA, no residual deficits), seizures (on Keppra, changed from 1000mg BID to 1250 BID + Vimpat BID), HTN, iron deficiency anemia, dementia, SHx hernia repair, Sent to ED by neurologist Dr. Guillen for concern of possible stroke vs TIA on Sunday. CTH negative. Symptoms suggestive of seizure / Todds paralysis, less likely stroke or TIA. While awaiting admission to epilepsy service, had generalized tonic clonic seizure that prompted rapid response. Now admitted to MICU for further evaluation and management.    NEURO  #Generalized Tonic Clonic Seizure  Onset in 03/2021. On home Keppra 1250 mg bid and Vimpat 50 mg bid. Sent to ED for evaluation of recurrent breakthrough seizures. On arrival, received home Keppra and Vimpat PO doses. Plan was initially for admission to epilepsy service for vEEG monitoring. While waiting for bed, began to have tonic clonic seizure with right-millard gaze. RRT called. Unresponsive to voice and pain with generalized shaking of R side with L sided flaccidity. S/p Ativan 4 mg IVP x 2 and vimpat 150 mg IVP x 1.  - vEEG  - MRA head and neck with DWI and FLAIR  - F/u CPK, lactate, and VBG  - Keppra 1500 mg IVPB q12h  - Vimpat 50 mg IVPB q12h  - q4h neuro checks    #Multifocal cryptogenic strokes  Hx multifocal cryptogenic strokes in 06/2020 w/ residual L-sided weakness, s/p ILR. CT w/ no acute intracranial or spinal pathology. On home ASA 81 mg daily, Plavix 75 mg daily, and atorvastatin 40 mg daily  - MRA as above  - C/w home ASA 81 mg PO daily  - C/w home Plavix 75 mg PO daily  - C/w home atorvastatin 40 mg at bedtime    CARDS  #HTN  On home lisinopril 20 mg daily  - C/w home lisinopril 30 mg PO daily    RESP  #Airway Protection  - Nasal trumpet in R nare    GI  No active issues    ENDO  No active issues    /RENAL  No active issues    HEME  #Hx of iron deficiency anemia  - F/u iron studies, B12, and folate  - Transfuse if Hb < 7    ID  Afebrile. No leukocytosis. CXR without infiltrates or consolidations.    E: Replete K<4, Mg<2  N: NPO  VTE Ppx: Lovenox 40mg SQ q24h  GI: not needed  D: MICU 60 y/o M, former smoker, PMHx CVA (loop recorder, on Plavix and 81mg ASA, no residual deficits), seizures (on Keppra, changed from 1000mg BID to 1250 BID + Vimpat BID), HTN, iron deficiency anemia, dementia, SHx hernia repair, Sent to ED by neurologist Dr. Guillen for concern of possible stroke vs TIA on Sunday. CTH negative. Symptoms suggestive of seizure / Todds paralysis, less likely stroke or TIA. While awaiting admission to epilepsy service, had generalized tonic clonic seizure that prompted rapid response. Now admitted to MICU for further evaluation and management.    NEURO  #Generalized Tonic Clonic Seizure  #Hx of seizures  First diagnosed in 03/2021. Follows with Dr. Guillen. Last visited [facility] ED for seizure leading to fall. Has had breakthrough seizures characterized by R arm/R leg shaking and rare episodes of urinary incontinence. On 10/08, Keppra dose increased to 1250 mg bid and started on Vimpat 50 mg bid. Continued to have breakthrough seizures on 10/23 and 10/24 with R-sided weakness and confusion. At baseline on 10/25 but discussed with Dr. Guillen who recommended inpatient epilepsy for monitoring. On arrival to ED, received home Keppra and Vimpat PO doses. Plan was initially admitted to epilepsy service for vEEG monitoring. While awaiting bed, had tonic clonic seizure with right-millard gaze. Rapid response called. Unresponsive to voice and pain with generalized shaking of R side with L sided flaccidity. S/p Ativan 4 mg IVP x 2 and Vimpat 150 mg IVP x 1.  - vEEG monitoring  - MRA head and neck with DWI and FLAIR  - F/u CPK, lactate, and VBG  - Keppra 1500 mg IVPB q12h  - Vimpat 50 mg IVPB q12h  - q4h neuro checks    #Multifocal cryptogenic strokes  #Hx TIA  Hx TIA in 06/2020. MRI at the time significant for bihemispheric small scattered stroke foci. CTA H/N with moderate R M1 and L P2 stenosis. ERICA w/o evidence of embolic source. S/p ILR placement. Investigated in 02/2021 after syncopal episode which revealed sinus rhythm with PACs, otherwise no evidence of AF/Atrial flutter. CT head and cervical spine this admission w/ no acute intracranial or spinal pathology. On home ASA 81 mg daily, Plavix 75 mg daily, and atorvastatin 40 mg daily  - MRA as above  - Investigate ILR  - C/w home ASA 81 mg PO daily  - C/w home Plavix 75 mg PO daily  - C/w home atorvastatin 40 mg at bedtime    CARDS  #HTN  On home lisinopril 20 mg daily  - C/w home lisinopril 30 mg PO daily    RESP  #Airway Protection  - Nasal trumpet in R nare    GI  No active issues    ENDO  No active issues    /RENAL  No active issues    HEME  #Hx of iron deficiency anemia  - F/u iron studies, B12, and folate  - Transfuse if Hb < 7    ID  Afebrile. No leukocytosis. CXR without infiltrates or consolidations.    E: Replete K<4, Mg<2  N: NPO  VTE Ppx: Lovenox 40mg SQ q24h  GI: not needed  D: MICU 62 y/o M, former smoker, PMHx CVA (loop recorder, on Plavix and 81mg ASA, no residual deficits), seizures (on Keppra, changed from 1000mg BID to 1250 BID + Vimpat BID), HTN, iron deficiency anemia, dementia, SHx hernia repair, Sent to ED by neurologist Dr. Guillen for concern of possible stroke vs TIA on Sunday. CTH negative. Symptoms suggestive of seizure / Todds paralysis, less likely stroke or TIA. While awaiting admission to epilepsy service, had generalized tonic clonic seizure that prompted rapid response. Now admitted to MICU for further evaluation and management.    NEURO  #Generalized Tonic Clonic Seizure  #Hx of seizures  First diagnosed in 03/2021. Follows with Dr. Guillen. Last visited [facility] ED last month for seizure leading to fall. Has had breakthrough seizures characterized by R arm/R leg shaking and rare episodes of urinary incontinence. On 10/08, Keppra dose increased to 1250 mg bid and started on Vimpat 50 mg bid. Continued to have breakthrough seizures on 10/23 and 10/24 with R-sided weakness and confusion. At baseline on 10/25 but discussed with Dr. Guillen who recommended inpatient epilepsy for monitoring. On arrival to ED, received home Keppra and Vimpat PO doses. Plan was initially admitted to epilepsy service for vEEG monitoring. While awaiting bed, had tonic clonic seizure with right-millard gaze. Rapid response called. Unresponsive to voice and pain with generalized shaking of R side with L sided flaccidity. S/p Ativan 4 mg IVP x 2 and Vimpat 150 mg IVP x 1.  - vEEG monitoring  - MRA head and neck with DWI and FLAIR  - F/u CPK, lactate, and VBG  - Keppra 1500 mg IVPB q12h  - Vimpat 50 mg IVPB q12h  - q4h neuro checks    #Multifocal cryptogenic strokes  #Hx TIA  Hx TIA in 06/2020. MRI at the time significant for bihemispheric small scattered stroke foci. CTA H/N with moderate R M1 and L P2 stenosis. ERICA w/o evidence of embolic source. S/p ILR placement. Investigated in 02/2021 after syncopal episode which revealed sinus rhythm with PACs, otherwise no evidence of AF/Atrial flutter. C/b residual L-sided weakness, trouble walking, speech difficulties. CT head and cervical spine this admission w/ no acute intracranial or spinal pathology. On home ASA 81 mg daily, Plavix 75 mg daily, and atorvastatin 40 mg daily  - MRA as above  - Investigate ILR  - C/w home ASA 81 mg PO daily  - C/w home Plavix 75 mg PO daily  - C/w home atorvastatin 40 mg at bedtime    CARDS  #HTN  On home lisinopril 20 mg daily  - C/w home lisinopril 30 mg PO daily    RESP  #Airway Protection  - Nasal trumpet in R nare    GI  #Hx H pylori  Chronic active gastritis positive for H pylori on EGD in 11/2020. Last EGD in 02/2021 with duodenal bulb with patchy mild inflammation / erosions / friability / granularity. Currently follows up with Dr. Fco Hazel. In 09/2021, positive H pylori breath test.  Prescribed 10 day course of Pylera with omeprazole in early October with plan for EGD in the future.  - F/u as outpatient with Dr. Hazel    ENDO  No active issues    /RENAL  No active issues    HEME  #Hx of anemia  Follows with Dr. Magaly Hoffman. Per chart, receives IV iron infusions. May be related to H pylori.  - F/u iron studies, B12, and folate  - Transfuse if Hb < 7  - Maintain active T&S  - F/u as outpatient with Dr. Hoffman and Dr. Hazel    ID  Afebrile. No leukocytosis. CXR without infiltrates or consolidations.    E: Replete K<4, Mg<2  N: NPO  VTE Ppx: Lovenox 40mg SQ q24h  GI: not needed  D: MICU 60 y/o M, former smoker, PMHx CVA (loop recorder, on Plavix and 81mg ASA, no residual deficits), seizures (on Keppra, changed from 1000mg BID to 1250 BID + Vimpat BID), HTN, iron deficiency anemia, dementia, SHx hernia repair, Sent to ED by neurologist Dr. Guillen for concern of possible stroke vs TIA on Sunday. CTH negative. Symptoms suggestive of seizure / Todds paralysis, less likely stroke or TIA. While awaiting admission to epilepsy service, had generalized tonic clonic seizure that prompted rapid response. Now admitted to MICU for further evaluation and management.    NEURO  #Generalized Tonic Clonic Seizure  #Hx of seizures  First diagnosed in 03/2021. Follows with Dr. Guillen. Last visited [facility] ED last month for seizure leading to fall. Has had breakthrough seizures characterized by R arm/R leg shaking and rare episodes of urinary incontinence. On 10/08, Keppra dose increased to 1250 mg bid and started on Vimpat 50 mg bid. Continued to have breakthrough seizures on 10/23 and 10/24 with R-sided weakness and confusion. At baseline on 10/25 but discussed with Dr. Guillen who recommended inpatient epilepsy for monitoring. On arrival to ED, received home Keppra and Vimpat PO doses. Plan was initially admitted to epilepsy service for vEEG monitoring. While awaiting bed, had tonic clonic seizure with right-millard gaze. Rapid response called. Unresponsive to voice and pain with generalized shaking of R side with L sided flaccidity. S/p Ativan 4 mg IVP x 2 and Vimpat 150 mg IVP x 1.  - vEEG monitoring  - MRA head and neck with DWI and FLAIR  - F/u CPK, lactate, and VBG  - Keppra 1500 mg IVPB q12h  - Vimpat 50 mg IVPB q12h  - q4h neuro checks    #Multifocal cryptogenic strokes  #Hx TIA  Hx TIA in 06/2020. MRI at the time significant for bihemispheric small scattered stroke foci. CTA H/N with moderate R M1 and L P2 stenosis. ERICA w/o evidence of embolic source. S/p ILR placement. Investigated in 02/2021 after syncopal episode which revealed sinus rhythm with PACs, otherwise no evidence of AF/Atrial flutter. C/b residual L-sided weakness, trouble walking, speech difficulties. CT head and cervical spine this admission w/ no acute intracranial or spinal pathology. On home ASA 81 mg daily, Plavix 75 mg daily, and atorvastatin 80 mg daily  - MRA as above  - Investigate ILR  - C/w home ASA 81 mg PO daily  - C/w home Plavix 75 mg PO daily  - C/w home atorvastatin 40 mg at bedtime    #Dementia  On home donepezil 5 mg. Unclear baseline.  - C/w home donepezil 5 mg at bedtime    CARDS  #HTN  On home lisinopril 20 mg daily  - C/w home lisinopril 30 mg PO daily    RESP  #Airway Protection  - Nasal trumpet in R nare    GI  #Hx H pylori  Chronic active gastritis positive for H pylori on EGD in 11/2020. Last EGD in 02/2021 with duodenal bulb with patchy mild inflammation / erosions / friability / granularity. Currently follows up with Dr. Fco Hazel. In 09/2021, positive H pylori breath test.  Prescribed 10 day course of Pylera with omeprazole in early October with plan for EGD in the future.  - F/u as outpatient with Dr. Hazel    ENDO  No active issues    /RENAL  No active issues    HEME  #Hx of anemia  Follows with Dr. Magaly Hoffman. Per chart, receives IV iron infusions. May be related to H pylori.  - F/u iron studies, B12, and folate  - Transfuse if Hb < 7  - Maintain active T&S  - F/u as outpatient with Dr. Hoffman and Dr. Hazel    ID  Afebrile. No leukocytosis. CXR without infiltrates or consolidations.    E: Replete K<4, Mg<2  N: NPO  VTE Ppx: Lovenox 40mg SQ q24h  GI: not needed  D: MICU 62 y/o M, former smoker, PMHx multifocal cryptogenic strokes (with ILR, on ASA/Plavix/Lipitor), seizure d/o (on Keppra 1250 mg bid + Vimpat 50 mg bid), HTN, iron deficiency anemia (requiring IV Fe), H pylori gastritis, dementia planned for inpatient monitoring on epilepsy service after several breakthrough seizures with associated lethargy/confusion. Admitted to ICU for close monitoring after patient had generalized tonic clonic seizure.    NEURO  #Generalized Tonic Clonic Seizure  #Hx of seizures  First diagnosed in 03/2021. Follows with Dr. Guillen. Last visited [facility] ED last month for seizure leading to fall. Has had breakthrough seizures characterized by R arm/R leg shaking and rare episodes of urinary incontinence. On 10/08, Keppra dose increased to 1250 mg bid and started on Vimpat 50 mg bid. Continued to have breakthrough seizures on 10/23 and 10/24 with R-sided weakness and confusion. At baseline on 10/25 but discussed with Dr. Guillen who recommended inpatient epilepsy for monitoring. On arrival to ED, received home Keppra and Vimpat PO doses. Plan was initially admitted to epilepsy service for vEEG monitoring. While awaiting bed, had tonic clonic seizure with right-millard gaze. Rapid response called. Unresponsive to voice and pain with generalized shaking of R side with L sided flaccidity. S/p Ativan 4 mg IVP x 2 and Vimpat 150 mg IVP x 1.  - vEEG monitoring  - MRA head and neck with DWI and FLAIR  - F/u CPK, lactate, and VBG  - Keppra 1500 mg IVPB q12h  - Vimpat 50 mg IVPB q12h  - q4h neuro checks    #Multifocal cryptogenic strokes  #Hx TIA  Hx TIA in 06/2020. MRI at the time significant for bihemispheric small scattered stroke foci. CTA H/N with moderate R M1 and L P2 stenosis. ERICA w/o evidence of embolic source. S/p ILR placement. Investigated in 02/2021 after syncopal episode which revealed sinus rhythm with PACs, otherwise no evidence of AF/Atrial flutter. C/b residual L-sided weakness, trouble walking, speech difficulties. CT head and cervical spine this admission w/ no acute intracranial or spinal pathology. On home ASA 81 mg daily, Plavix 75 mg daily, and atorvastatin 80 mg daily  - MRA as above  - Investigate ILR  - C/w home ASA 81 mg PO daily  - C/w home Plavix 75 mg PO daily  - C/w home atorvastatin 40 mg at bedtime    #Dementia  On home donepezil 5 mg. Unclear baseline.  - C/w home donepezil 5 mg at bedtime    CARDS  #HTN  On home lisinopril 20 mg daily  - C/w home lisinopril 30 mg PO daily    RESP  #Airway Protection  - Nasal trumpet in R nare    GI  #Hx H pylori  Chronic active gastritis positive for H pylori on EGD in 11/2020. Last EGD in 02/2021 with duodenal bulb with patchy mild inflammation / erosions / friability / granularity. Currently follows up with Dr. Fco Hazel. In 09/2021, positive H pylori breath test.  Prescribed 10 day course of Pylera with omeprazole in early October with plan for EGD in the future.  - F/u as outpatient with Dr. Hazel    ENDO  No active issues    /RENAL  No active issues    HEME  #Hx of anemia  Follows with Dr. Magaly Hoffman. Per chart, receives IV iron infusions. May be related to H pylori.  - F/u iron studies, B12, and folate  - Transfuse if Hb < 7  - Maintain active T&S  - F/u as outpatient with Dr. Hoffman and Dr. Hazel    ID  Afebrile. No leukocytosis. CXR without infiltrates or consolidations.    E: Replete K<4, Mg<2  N: NPO  VTE Ppx: Lovenox 40mg SQ q24h  GI: not needed  D: MICU 60 y/o M, former smoker, PMHx multifocal cryptogenic strokes (with ILR, on ASA/Plavix/Lipitor), seizure d/o (on Keppra 1250 mg bid + Vimpat 50 mg bid), HTN, iron deficiency anemia (requiring IV Fe), H pylori gastritis, dementia planned for inpatient monitoring on epilepsy service after several breakthrough seizures with associated lethargy/confusion. Admitted to ICU for close monitoring after patient had generalized tonic clonic seizure.    NEURO  #Generalized Tonic Clonic Seizure  #Hx of seizures  First diagnosed in 03/2021. Follows with Dr. Guillen. Last visited [facility] ED last month for seizure leading to fall. Has had breakthrough seizures characterized by R arm/R leg shaking and rare episodes of urinary incontinence. Prior EEGs have been abnormal showing rare L temporal sharp waves. Most recent EEG did not show epileptiform discharges/seizures. On 10/08, Keppra dose increased to 1250 mg bid and started on Vimpat 50 mg bid. Continued to have breakthrough seizures on 10/23 and 10/24 with R-sided weakness and confusion. At baseline on 10/25 but discussed with Dr. Guillen who recommended inpatient epilepsy for monitoring. On arrival to ED, received home Keppra and Vimpat PO doses. Plan was initially admitted to epilepsy service for vEEG monitoring. While awaiting bed, had tonic clonic seizure with right-millard gaze. Rapid response called. Unresponsive to voice and pain with generalized shaking of R side with L sided flaccidity. S/p Ativan 4 mg IVP x 2 and Vimpat 150 mg IVP x 1.  - vEEG monitoring  - MRA head and neck with DWI and FLAIR  - F/u CPK, lactate, and VBG  - Keppra 1500 mg IVPB q12h  - Vimpat 50 mg IVPB q12h  - q4h neuro checks    #Multifocal cryptogenic strokes  #Hx TIA  Hx TIA in 06/2020. MRI at the time significant for bihemispheric small scattered stroke foci. CTA H/N with moderate R M1 and L P2 stenosis. ERICA w/o evidence of embolic source. S/p ILR placement. Investigated in 02/2021 after syncopal episode which revealed sinus rhythm with PACs, otherwise no evidence of AF/Atrial flutter. C/b residual L-sided weakness, trouble walking, speech difficulties. CT head and cervical spine this admission w/ no acute intracranial or spinal pathology. On home ASA 81 mg daily, Plavix 75 mg daily, and atorvastatin 80 mg daily  - MRA as above  - Investigate ILR  - C/w home ASA 81 mg PO daily  - C/w home Plavix 75 mg PO daily  - C/w home atorvastatin 40 mg at bedtime    #Dementia  On home donepezil 5 mg. Unclear baseline.  - C/w home donepezil 5 mg at bedtime    CARDS  #HTN  On home lisinopril 20 mg daily  - C/w home lisinopril 30 mg PO daily    RESP  #Airway Protection  - Nasal trumpet in R nare    GI  #Hx H pylori  Chronic active gastritis positive for H pylori on EGD in 11/2020. Last EGD in 02/2021 with duodenal bulb with patchy mild inflammation / erosions / friability / granularity. Currently follows up with Dr. Fco Hazel. In 09/2021, positive H pylori breath test.  Prescribed 10 day course of Pylera with omeprazole in early October with plan for EGD in the future.  - F/u as outpatient with Dr. Hazel    ENDO  No active issues    /RENAL  No active issues    HEME  #Hx of anemia  Follows with Dr. Magaly Hoffman. Per chart, receives IV iron infusions. May be related to H pylori.  - F/u iron studies, B12, and folate  - Transfuse if Hb < 7  - Maintain active T&S  - F/u as outpatient with Dr. Hoffman and Dr. Hazel    ID  Afebrile. No leukocytosis. CXR without infiltrates or consolidations.    E: Replete K<4, Mg<2  N: NPO  VTE Ppx: Lovenox 40mg SQ q24h  GI: not needed  D: MICU

## 2021-10-27 NOTE — SWALLOW BEDSIDE ASSESSMENT ADULT - NS SPL SWALLOW CLINIC TRIAL FT
Oral stage was significant for prolonged bolus processing which increased with increase in texture. Pharyngeal stage was significant for suspected delay in swallow initiation and overt signs of airway protection deficits - cough and throat clear. PO diet is premature today. Please allow ice for therapeutic swallows. This service will re-assess in 24 hours.

## 2021-10-27 NOTE — PROGRESS NOTE ADULT - SUBJECTIVE AND OBJECTIVE BOX
Neurology Stroke Progress Note    INTERVAL HPI/OVERNIGHT EVENTS:  Patient seen and examined. States he feels great, does not remember the events of yesterday. Does remember seeing the neuro resident yesterday. Has no acute complaints.     MEDICATIONS  (STANDING):  albuterol/ipratropium for Nebulization 3 milliLiter(s) Nebulizer every 6 hours  aspirin enteric coated 81 milliGRAM(s) Oral daily  atorvastatin 40 milliGRAM(s) Oral at bedtime  clopidogrel Tablet 75 milliGRAM(s) Oral daily  donepezil 5 milliGRAM(s) Oral at bedtime  enoxaparin Injectable 40 milliGRAM(s) SubCutaneous at bedtime  lacosamide IVPB 50 milliGRAM(s) IV Intermittent every 12 hours  levETIRAcetam  IVPB 1500 milliGRAM(s) IV Intermittent every 12 hours  lisinopril 20 milliGRAM(s) Oral daily    MEDICATIONS  (PRN):      Allergies    No Known Allergies    Intolerances        Vital Signs Last 24 Hrs  T(C): 36.8 (27 Oct 2021 10:04), Max: 37.2 (26 Oct 2021 23:00)  T(F): 98.3 (27 Oct 2021 10:04), Max: 99 (26 Oct 2021 23:00)  HR: 49 (27 Oct 2021 11:00) (49 - 96)  BP: 105/57 (27 Oct 2021 11:00) (98/56 - 181/74)  BP(mean): 76 (27 Oct 2021 11:00) (72 - 107)  RR: 20 (27 Oct 2021 11:00) (16 - 23)  SpO2: 91% (27 Oct 2021 11:00) (91% - 100%)    Physical exam:  Neurologic:  -Mental status: Awake, alert, oriented to person, place, and time. Follows commands. Gives mostly one word answers  -Cranial nerves:   II: Visual fields are full to confrontation.  III, IV, VI: Extraocular movements are intact without nystagmus. Pupils equally round and reactive to light  VII: Slight right facial droop  Motor: Normal bulk and tone. No pronator drift. Strength bilateral upper extremity 5/5, bilateral lower extremities 5/5.  Sensation: Intact to light touch bilaterally. No neglect or extinction on double simultaneous testing.  Coordination: No dysmetria on finger-to-nose     LABS:                        11.8   4.49  )-----------( 240      ( 27 Oct 2021 05:34 )             37.5     10-27    142  |  105  |  12  ----------------------------<  108<H>  3.8   |  27  |  0.68    Ca    9.3      27 Oct 2021 05:34  Phos  4.6     10-27  Mg     2.0     10-27    TPro  7.1  /  Alb  4.4  /  TBili  0.4  /  DBili  x   /  AST  22  /  ALT  40  /  AlkPhos  44  10-27    PT/INR - ( 26 Oct 2021 16:03 )   PT: 13.0 sec;   INR: 1.09          PTT - ( 26 Oct 2021 16:03 )  PTT:32.5 sec      RADIOLOGY & ADDITIONAL TESTS:

## 2021-10-27 NOTE — PROGRESS NOTE ADULT - SUBJECTIVE AND OBJECTIVE BOX
~~transfer to neuro service under telemetry~~  Hospital Course: 62 y/o M, former smoker, PMHx multifocal cryptogenic strokes (with ILR, on ASA/Plavix/Lipitor), seizure d/o (on Keppra 1250 mg bid and Vimpat 50 mg bid), HTN, iron deficiency anemia (requiring IV Fe), H pylori gastritis, dementia originally admitted for inpatient vEEG monitoring on epilepsy service after several breakthrough seizures with associated lethargy/confusion. Received home Keppra and Vimpat in ED. While waiting for bed began to have tonic clonic seizure with rightward gaze so a RRT was called. . Patient unresponsive to voice and pain with generalized shaking of right side with left sided flaccidity. Per nursing, patient was seizing for 2 minutes. Patient given 2mg of Ativan IVP X 2. On attending arrival, approximately 8 minutes into episode given another 4mg of Ativan IVP X 1. Patient with partial seizure, making purposeful movements of right side. Transferred to ED resuscitation room where a load of 150mg of Vimpat given IVP X 1. No additional Keppra given. Epilepsy was consulted and recommended starting Keppra 1500 mg IV bid and continuing Vimpat 50 mg IV bid. Patient was hemodynamically stable and protecting airway throughout. Admitted to ICU for further monitoring but this morning patient stable, not requiring oxygen, no further seizure activity and stable for stepdown    INTERVAL HPI/OVERNIGHT EVENTS:  Overnight events as above. No complaints right now    VITAL SIGNS:  T(F): 98.3 (10-27-21 @ 10:04)  HR: 53 (10-27-21 @ 13:00)  BP: 118/59 (10-27-21 @ 13:00)  RR: 21 (10-27-21 @ 13:00)  SpO2: 90% (10-27-21 @ 13:00)  Wt(kg): --    PHYSICAL EXAM:    General: NAD, talks slowly  HEENT: NC/AT; PERRL, clear conjunctiva  Neck: supple  Respiratory: CTA b/l, nasal airway intact  Cardiovascular: +S1/S2; RRR  Abdomen: soft, NT/ND; +BS x4  Extremities: WWP, 2+ peripheral pulses b/l; no LE edema  Skin: normal color and turgor; no rash  Neurological: AAOx2-3, 4/5 LUE proximal muscle strenght, 5/5  strength in other extremities, intact sensation, CN II-XII intact    MEDICATIONS  (STANDING):  albuterol/ipratropium for Nebulization 3 milliLiter(s) Nebulizer every 6 hours  aspirin enteric coated 81 milliGRAM(s) Oral daily  atorvastatin 40 milliGRAM(s) Oral at bedtime  clopidogrel Tablet 75 milliGRAM(s) Oral daily  donepezil 5 milliGRAM(s) Oral at bedtime  enoxaparin Injectable 40 milliGRAM(s) SubCutaneous at bedtime  lacosamide IVPB 50 milliGRAM(s) IV Intermittent every 12 hours  levETIRAcetam  IVPB 1500 milliGRAM(s) IV Intermittent every 12 hours  lisinopril 20 milliGRAM(s) Oral daily    MEDICATIONS  (PRN):      Allergies    No Known Allergies    Intolerances        LABS:                        11.8   4.49  )-----------( 240      ( 27 Oct 2021 05:34 )             37.5     10-27    142  |  105  |  12  ----------------------------<  108<H>  3.8   |  27  |  0.68    Ca    9.3      27 Oct 2021 05:34  Phos  4.6     10-27  Mg     2.0     10-27    TPro  7.1  /  Alb  4.4  /  TBili  0.4  /  DBili  x   /  AST  22  /  ALT  40  /  AlkPhos  44  10-27    PT/INR - ( 26 Oct 2021 16:03 )   PT: 13.0 sec;   INR: 1.09          PTT - ( 26 Oct 2021 16:03 )  PTT:32.5 sec      RADIOLOGY & ADDITIONAL TESTS:  Reviewed

## 2021-10-27 NOTE — PROGRESS NOTE ADULT - ASSESSMENT
62 y/o M, former smoker, PMHx multifocal cryptogenic strokes (with ILR, on ASA/Plavix/Lipitor), seizure d/o (on Keppra 1250 mg bid + Vimpat 50 mg bid), HTN, iron deficiency anemia (requiring IV Fe), H pylori gastritis, dementia planned for inpatient monitoring on epilepsy service after several breakthrough seizures with associated lethargy/confusion. Admitted to ICU for close monitoring after patient had generalized tonic clonic seizure.    NEURO  #Generalized Tonic Clonic Seizure  #Hx of seizures  First diagnosed in 03/2021. Follows with Dr. Guillen. Last visited [facility] ED last month for seizure leading to fall. Has had breakthrough seizures characterized by R arm/R leg shaking and rare episodes of urinary incontinence. Prior EEGs have been abnormal showing rare L temporal sharp waves. Most recent EEG did not show epileptiform discharges/seizures. On 10/08, Keppra dose increased to 1250 mg bid and started on Vimpat 50 mg bid. Continued to have breakthrough seizures on 10/23 and 10/24 with R-sided weakness and confusion. At baseline on 10/25 but discussed with Dr. Guillen who recommended inpatient epilepsy for monitoring. On arrival to ED, received home Keppra and Vimpat PO doses. Plan was initially admitted to epilepsy service for vEEG monitoring. While awaiting bed, had tonic clonic seizure with right-millard gaze. Rapid response called. Unresponsive to voice and pain with generalized shaking of R side with L sided flaccidity. S/p Ativan 4 mg IVP x 2 and Vimpat 150 mg IVP x 1.  - vEEG monitoring  - MRA head and neck with DWI and FLAIR  - F/u CPK, lactate, and VBG  - Keppra 1500 mg IVPB q12h  - Vimpat 50 mg IVPB q12h  - q4h neuro checks  -speech and swallow evaluation    #Multifocal cryptogenic strokes  #Hx TIA  Hx TIA in 06/2020. MRI at the time significant for bihemispheric small scattered stroke foci. CTA H/N with moderate R M1 and L P2 stenosis. ERICA w/o evidence of embolic source. S/p ILR placement. Investigated in 02/2021 after syncopal episode which revealed sinus rhythm with PACs, otherwise no evidence of AF/Atrial flutter. C/b residual L-sided weakness, trouble walking, speech difficulties. CT head and cervical spine this admission w/ no acute intracranial or spinal pathology. On home ASA 81 mg daily, Plavix 75 mg daily, and atorvastatin 80 mg daily  - MRA as above  - Investigate ILR  - C/w home ASA 81 mg PO daily  - C/w home Plavix 75 mg PO daily  - C/w home atorvastatin 40 mg at bedtime      #Dementia  On home donepezil 5 mg. Unclear baseline.  - C/w home donepezil 5 mg at bedtime    CARDS  #HTN  On home lisinopril 20 mg daily  - C/w home lisinopril 30 mg PO daily    RESP  #Airway Protection  - Nasal trumpet in R nare    GI  #Hx H pylori  Chronic active gastritis positive for H pylori on EGD in 11/2020. Last EGD in 02/2021 with duodenal bulb with patchy mild inflammation / erosions / friability / granularity. Currently follows up with Dr. Fco Hazel. In 09/2021, positive H pylori breath test.  Prescribed 10 day course of Pylera with omeprazole in early October with plan for EGD in the future.  - F/u as outpatient with Dr. Hazel    ENDO  No active issues    /RENAL  No active issues    HEME  #Hx of anemia  Follows with Dr. Magaly Hoffman. Per chart, receives IV iron infusions. May be related to H pylori.  - F/u iron studies, B12, and folate  - Transfuse if Hb < 7  - Maintain active T&S  - F/u as outpatient with Dr. Hoffman and Dr. Hazel    ID  Afebrile. No leukocytosis. CXR without infiltrates or consolidations.    E: Replete K<4, Mg<2  N: NPO  VTE Ppx: Lovenox 40mg SQ q24h  GI: not needed  D: transfer to epilepsy service under telemetry

## 2021-10-27 NOTE — PROGRESS NOTE ADULT - SUBJECTIVE AND OBJECTIVE BOX
INTERVAL HPI/OVERNIGHT EVENTS:     SUBJECTIVE: Patient seen and examined at bedside.     OBJECTIVE:     VITAL SIGNS:  ICU Vital Signs Last 24 Hrs  T(C): 36.3 (27 Oct 2021 00:00), Max: 37.2 (26 Oct 2021 23:00)  T(F): 97.3 (27 Oct 2021 00:00), Max: 99 (26 Oct 2021 23:00)  HR: 69 (27 Oct 2021 00:00) (50 - 96)  BP: 138/76 (27 Oct 2021 00:00) (128/67 - 170/87)  BP(mean): 102 (27 Oct 2021 00:00) (102 - 102)  ABP: --  ABP(mean): --  RR: 21 (27 Oct 2021 00:00) (16 - 21)  SpO2: 94% (27 Oct 2021 00:00) (94% - 100%)        CAPILLARY BLOOD GLUCOSE      POCT Blood Glucose.: 137 mg/dL (26 Oct 2021 22:46)      PHYSICAL EXAM:    General: NAD  HEENT: NC/AT; PERRL, clear conjunctiva  Neck: supple  Respiratory: CTA b/l  Cardiovascular: +S1/S2; RRR  Abdomen: soft, NT/ND; +BS x4  Extremities: WWP, 2+ peripheral pulses b/l; no LE edema  Skin: normal color and turgor; no rash  Neurological:    MEDICATIONS:  MEDICATIONS  (STANDING):  aspirin enteric coated 81 milliGRAM(s) Oral daily  atorvastatin 40 milliGRAM(s) Oral at bedtime  clopidogrel Tablet 75 milliGRAM(s) Oral daily  enoxaparin Injectable 40 milliGRAM(s) SubCutaneous at bedtime  lacosamide IVPB 50 milliGRAM(s) IV Intermittent every 12 hours  levETIRAcetam  IVPB 1500 milliGRAM(s) IV Intermittent every 12 hours    MEDICATIONS  (PRN):      ALLERGIES:  Allergies    No Known Allergies    Intolerances        LABS:                        12.1   4.74  )-----------( 246      ( 26 Oct 2021 16:03 )             38.1     10-26    140  |  103  |  12  ----------------------------<  131<H>  4.2   |  29  |  0.85    Ca    9.0      26 Oct 2021 16:03  Phos  3.7     10-26  Mg     2.1     10-26    TPro  7.4  /  Alb  4.6  /  TBili  0.3  /  DBili  x   /  AST  25  /  ALT  44  /  AlkPhos  49  10-26    PT/INR - ( 26 Oct 2021 16:03 )   PT: 13.0 sec;   INR: 1.09          PTT - ( 26 Oct 2021 16:03 )  PTT:32.5 sec      RADIOLOGY & ADDITIONAL TESTS: Reviewed. INTERVAL HPI/OVERNIGHT EVENTS: Admitted for vEEG monitoring and while waiting for bed began to have tonic clonic seizure with rightward gaze so a RRT was called. . Patient unresponsive to voice and pain with generalized shaking of right side with left sided flaccidity. On arrival per bedside nursing patient took oral medications prior to seizure (Keppra and Vimpat). Per nursing patient seizing for 2 minutes on arrival. Patient given 2mg of Ativan IVP X 2. On attending arrival approximately 8 minutes into episode given another 4mg of Ativan IVP X 1. Patient with partial seizure, making purposeful movements of right side. Transferred to ED resuscitation room where a load of 150mg of Vimpat given IVP X 1. Patient received oral dose of 50mg of Vimpat earlier in evening which ceased seizure activity after about 10 minutes. Patient hemodynamically stable and protecting airway throughout.    SUBJECTIVE: Patient seen and examined at bedside.    OBJECTIVE:     VITAL SIGNS:  ICU Vital Signs Last 24 Hrs  T(C): 36.3 (27 Oct 2021 00:00), Max: 37.2 (26 Oct 2021 23:00)  T(F): 97.3 (27 Oct 2021 00:00), Max: 99 (26 Oct 2021 23:00)  HR: 69 (27 Oct 2021 00:00) (50 - 96)  BP: 138/76 (27 Oct 2021 00:00) (128/67 - 170/87)  BP(mean): 102 (27 Oct 2021 00:00) (102 - 102)  ABP: --  ABP(mean): --  RR: 21 (27 Oct 2021 00:00) (16 - 21)  SpO2: 94% (27 Oct 2021 00:00) (94% - 100%)        CAPILLARY BLOOD GLUCOSE      POCT Blood Glucose.: 137 mg/dL (26 Oct 2021 22:46)      PHYSICAL EXAM:    General: NAD  HEENT: NC/AT; PERRL, clear conjunctiva  Neck: supple  Respiratory: CTA b/l  Cardiovascular: +S1/S2; RRR  Abdomen: soft, NT/ND; +BS x4  Extremities: WWP, 2+ peripheral pulses b/l; no LE edema  Skin: normal color and turgor; no rash  Neurological:    MEDICATIONS:  MEDICATIONS  (STANDING):  aspirin enteric coated 81 milliGRAM(s) Oral daily  atorvastatin 40 milliGRAM(s) Oral at bedtime  clopidogrel Tablet 75 milliGRAM(s) Oral daily  enoxaparin Injectable 40 milliGRAM(s) SubCutaneous at bedtime  lacosamide IVPB 50 milliGRAM(s) IV Intermittent every 12 hours  levETIRAcetam  IVPB 1500 milliGRAM(s) IV Intermittent every 12 hours    MEDICATIONS  (PRN):      ALLERGIES:  Allergies    No Known Allergies    Intolerances        LABS:                        12.1   4.74  )-----------( 246      ( 26 Oct 2021 16:03 )             38.1     10-26    140  |  103  |  12  ----------------------------<  131<H>  4.2   |  29  |  0.85    Ca    9.0      26 Oct 2021 16:03  Phos  3.7     10-26  Mg     2.1     10-26    TPro  7.4  /  Alb  4.6  /  TBili  0.3  /  DBili  x   /  AST  25  /  ALT  44  /  AlkPhos  49  10-26    PT/INR - ( 26 Oct 2021 16:03 )   PT: 13.0 sec;   INR: 1.09          PTT - ( 26 Oct 2021 16:03 )  PTT:32.5 sec      RADIOLOGY & ADDITIONAL TESTS: Reviewed. INTERVAL HPI/OVERNIGHT EVENTS: Admitted for vEEG monitoring and while waiting for bed began to have tonic clonic seizure with rightward gaze so a RRT was called. . Patient unresponsive to voice and pain with generalized shaking of right side with left sided flaccidity. On arrival per bedside nursing patient took oral medications prior to seizure (Keppra and Vimpat). Per nursing patient seizing for 2 minutes on arrival. Patient given 2mg of Ativan IVP X 2. On attending arrival approximately 8 minutes into episode given another 4mg of Ativan IVP X 1. Patient with partial seizure, making purposeful movements of right side. Transferred to ED resuscitation room where a load of 150mg of Vimpat given IVP X 1. Patient received oral dose of 50mg of Vimpat earlier in evening which ceased seizure activity after about 10 minutes. Patient hemodynamically stable and protecting airway throughout.    SUBJECTIVE: Patient seen and examined at bedside.    OBJECTIVE: Unable to obtain ROS due to clinical status.    VITAL SIGNS:  ICU Vital Signs Last 24 Hrs  T(C): 36.3 (27 Oct 2021 00:00), Max: 37.2 (26 Oct 2021 23:00)  T(F): 97.3 (27 Oct 2021 00:00), Max: 99 (26 Oct 2021 23:00)  HR: 69 (27 Oct 2021 00:00) (50 - 96)  BP: 138/76 (27 Oct 2021 00:00) (128/67 - 170/87)  BP(mean): 102 (27 Oct 2021 00:00) (102 - 102)  ABP: --  ABP(mean): --  RR: 21 (27 Oct 2021 00:00) (16 - 21)  SpO2: 94% (27 Oct 2021 00:00) (94% - 100%)        CAPILLARY BLOOD GLUCOSE      POCT Blood Glucose.: 137 mg/dL (26 Oct 2021 22:46)      PHYSICAL EXAM:    General: NAD, somnolent  HEENT: NC/AT; PERRL, clear conjunctiva  Neck: supple  Respiratory: CTA b/l, nasal airway intact  Cardiovascular: +S1/S2; RRR  Abdomen: soft, NT/ND; +BS x4  Extremities: WWP, 2+ peripheral pulses b/l; no LE edema  Skin: normal color and turgor; no rash  Neurological: responsive to pain    MEDICATIONS:  MEDICATIONS  (STANDING):  aspirin enteric coated 81 milliGRAM(s) Oral daily  atorvastatin 40 milliGRAM(s) Oral at bedtime  clopidogrel Tablet 75 milliGRAM(s) Oral daily  enoxaparin Injectable 40 milliGRAM(s) SubCutaneous at bedtime  lacosamide IVPB 50 milliGRAM(s) IV Intermittent every 12 hours  levETIRAcetam  IVPB 1500 milliGRAM(s) IV Intermittent every 12 hours    MEDICATIONS  (PRN):      ALLERGIES:  Allergies    No Known Allergies    Intolerances        LABS:                        12.1   4.74  )-----------( 246      ( 26 Oct 2021 16:03 )             38.1     10-26    140  |  103  |  12  ----------------------------<  131<H>  4.2   |  29  |  0.85    Ca    9.0      26 Oct 2021 16:03  Phos  3.7     10-26  Mg     2.1     10-26    TPro  7.4  /  Alb  4.6  /  TBili  0.3  /  DBili  x   /  AST  25  /  ALT  44  /  AlkPhos  49  10-26    PT/INR - ( 26 Oct 2021 16:03 )   PT: 13.0 sec;   INR: 1.09          PTT - ( 26 Oct 2021 16:03 )  PTT:32.5 sec      RADIOLOGY & ADDITIONAL TESTS: Reviewed. INTERVAL HPI/OVERNIGHT EVENTS: 60 y/o M, former smoker, PMHx multifocal cryptogenic strokes (with ILR, on ASA/Plavix/Lipitor), seizure d/o (on Keppra 1250 mg bid and Vimpat 50 mg bid), HTN, iron deficiency anemia (requiring IV Fe), H pylori gastritis, dementia originally admitted for inpatient vEEG monitoring on epilepsy service after several breakthrough seizures with associated lethargy/confusion. Received home Keppra and Vimpat in ED. While waiting for bed began to have tonic clonic seizure with rightward gaze so a RRT was called. . Patient unresponsive to voice and pain with generalized shaking of right side with left sided flaccidity. Per nursing, patient was seizing for 2 minutes. Patient given 2mg of Ativan IVP X 2. On attending arrival, approximately 8 minutes into episode given another 4mg of Ativan IVP X 1. Patient with partial seizure, making purposeful movements of right side. Transferred to ED resuscitation room where a load of 150mg of Vimpat given IVP X 1. No additional Keppra given. Epilepsy was consulted and recommended starting Keppra 1500 mg IV bid and continuing Vimpat 50 mg IV bid. Patient was hemodynamically stable and protecting airway throughout. Admitted to ICU for further monitoring.    SUBJECTIVE: Patient seen and examined at bedside. Patient is resting comfortably with no seizure-like activity.    OBJECTIVE:     VITAL SIGNS:  ICU Vital Signs Last 24 Hrs  T(C): 36.3 (27 Oct 2021 00:00), Max: 37.2 (26 Oct 2021 23:00)  T(F): 97.3 (27 Oct 2021 00:00), Max: 99 (26 Oct 2021 23:00)  HR: 69 (27 Oct 2021 00:00) (50 - 96)  BP: 138/76 (27 Oct 2021 00:00) (128/67 - 170/87)  BP(mean): 102 (27 Oct 2021 00:00) (102 - 102)  ABP: --  ABP(mean): --  RR: 21 (27 Oct 2021 00:00) (16 - 21)  SpO2: 94% (27 Oct 2021 00:00) (94% - 100%)        CAPILLARY BLOOD GLUCOSE      POCT Blood Glucose.: 137 mg/dL (26 Oct 2021 22:46)      PHYSICAL EXAM:    General: NAD, somnolent  HEENT: NC/AT; PERRL, clear conjunctiva  Neck: supple  Respiratory: CTA b/l, nasal airway intact  Cardiovascular: +S1/S2; RRR  Abdomen: soft, NT/ND; +BS x4  Extremities: WWP, 2+ peripheral pulses b/l; no LE edema  Skin: normal color and turgor; no rash  Neurological: AAOx2, 5/5 upper and lower extremity strength, intact sensation, CN II-XII intact    MEDICATIONS:  MEDICATIONS  (STANDING):  aspirin enteric coated 81 milliGRAM(s) Oral daily  atorvastatin 40 milliGRAM(s) Oral at bedtime  clopidogrel Tablet 75 milliGRAM(s) Oral daily  enoxaparin Injectable 40 milliGRAM(s) SubCutaneous at bedtime  lacosamide IVPB 50 milliGRAM(s) IV Intermittent every 12 hours  levETIRAcetam  IVPB 1500 milliGRAM(s) IV Intermittent every 12 hours    MEDICATIONS  (PRN):      ALLERGIES:  Allergies    No Known Allergies    Intolerances        LABS:                        12.1   4.74  )-----------( 246      ( 26 Oct 2021 16:03 )             38.1     10-26    140  |  103  |  12  ----------------------------<  131<H>  4.2   |  29  |  0.85    Ca    9.0      26 Oct 2021 16:03  Phos  3.7     10-26  Mg     2.1     10-26    TPro  7.4  /  Alb  4.6  /  TBili  0.3  /  DBili  x   /  AST  25  /  ALT  44  /  AlkPhos  49  10-26    PT/INR - ( 26 Oct 2021 16:03 )   PT: 13.0 sec;   INR: 1.09          PTT - ( 26 Oct 2021 16:03 )  PTT:32.5 sec      RADIOLOGY & ADDITIONAL TESTS: Reviewed.

## 2021-10-27 NOTE — SWALLOW BEDSIDE ASSESSMENT ADULT - PHARYNGEAL PHASE
Delayed pharyngeal swallow/Decreased laryngeal elevation/Cough post oral intake/Throat clear post oral intake/Delayed cough post oral intake

## 2021-10-27 NOTE — CONSULT NOTE ADULT - SUBJECTIVE AND OBJECTIVE BOX
HPI  61y Male     Epilepsy risk factors:  Head injury with subsequent LOC?:  Febrile seizures in infancy?:  Hx of CNS infection?:  Family hx of epilepsy?:  Known CNS pathology?:     Prior AEDs      Prior imaging     Prior EEGs     Other diagnostic work-up     Review of Systems:  CONSTITUTIONAL:  No weight loss, fever, chills, weakness or fatigue.  HEENT:  Eyes:  No visual loss, blurred vision, double vision or yellow sclerae. Ears, Nose, Throat:  No hearing loss, sneezing, congestion, runny nose or sore throat.  SKIN:  No rash or itching.  CARDIOVASCULAR:  No chest pain, chest pressure or chest discomfort. No palpitations or edema.  RESPIRATORY:  No shortness of breath, cough or sputum.  GASTROINTESTINAL:  No anorexia, nausea, vomiting or diarrhea. No abdominal pain or blood.  GENITOURINARY: No Burning on urination.   NEUROLOGICAL:  see HPI  MUSCULOSKELETAL:  No muscle, back pain, joint pain or stiffness.  HEMATOLOGIC:  No anemia, bleeding or bruising.  LYMPHATICS:  No enlarged nodes. No history of splenectomy.  PSYCHIATRIC:  No history of depression or anxiety.  ENDOCRINOLOGIC:  No reports of sweating, cold or heat intolerance. No polyuria or polydipsia.  ALLERGIES:  No history of asthma, hives, eczema or rhinitis.       PAST MEDICAL & SURGICAL HISTORY:  Hypertension    CVA (cerebral vascular accident)    Dementia, old age    Anemia    Seizure    H/O hernia repair         FAMILY HISTORY:  Family history of early CAD         Social History:  Alcohol, illicits, smoking?:  Driving?:  Occupation:     Allergies  No Known Allergies       MEDICATIONS  (STANDING):  albuterol/ipratropium for Nebulization 3 milliLiter(s) Nebulizer every 6 hours  aspirin enteric coated 81 milliGRAM(s) Oral daily  atorvastatin 40 milliGRAM(s) Oral at bedtime  clopidogrel Tablet 75 milliGRAM(s) Oral daily  donepezil 5 milliGRAM(s) Oral at bedtime  enoxaparin Injectable 40 milliGRAM(s) SubCutaneous at bedtime  lacosamide IVPB 50 milliGRAM(s) IV Intermittent every 12 hours  levETIRAcetam  IVPB 1500 milliGRAM(s) IV Intermittent every 12 hours  lisinopril 20 milliGRAM(s) Oral daily    MEDICATIONS  (PRN):       T(C): 36.8 (10-27-21 @ 10:04), Max: 37.2 (10-26-21 @ 23:00)  HR: 53 (10-27-21 @ 13:00) (49 - 96)  BP: 118/59 (10-27-21 @ 13:00) (98/56 - 181/74)  RR: 21 (10-27-21 @ 13:00) (16 - 23)  SpO2: 90% (10-27-21 @ 13:00) (90% - 100%)  Wt(kg): --    General:  Constitutional:  Sitting comfortably in NAD.  Psychiatric: calm, normal affect, no overt anxiety or internal preoccupation  Ears, Nose, Throat: no abnormalities, mucus membranes moist  Neck: supple, no lymphadenopathy or nodules palpable  Cardiovascular: regular rate and rhythm, normal S1/S2, no murmurs   Chest: Clear to bases. 	  Abdomen: soft, non-tender, no hepatosplenomegaly   Extremities: no edema, clubbing or cyanosis  Skin: no rash or neurocutaneous signs     Cognitive:  Orientation, language, memory and knowledge screens intact.  Registration: 	4/4		Serial 7’s: normal		Recall 4/4    Cranial Nerves:  II: Full to confrontation. III/IV/VI: PERRL EOMF No nystagmus  V1V2V3: Symmetric, VII: Face appears symmetric VIII: Normal to screening, IX/X: Palate Elevates Symmetrical  XI: Trapezius Symmetric  XII: Tongue midline  Motor:  Power: 5/5 throughout, tone: normal x 4 limbs, no tremor   Sensation:  Intact to light touch. Intact to pinprick/temperature and vibration.  Coordination/Gait:  Finger-nose-finger intact, normal rapid-alternating movements.  Fine motor normal with normal rapid finger taps and heel-toe tapping   narrow based gait, tandem forward and back ok  hops well on both feet, heel and toe walking normal   Reflexes:  DTR: 2+ symmetric all 4 limbs, no clonus  Plantar responses: Down bilaterally         Labs  CBC Full  -  ( 27 Oct 2021 05:34 )  WBC Count : 4.49 K/uL  RBC Count : 4.64 M/uL  Hemoglobin : 11.8 g/dL  Hematocrit : 37.5 %  Platelet Count - Automated : 240 K/uL  Mean Cell Volume : 80.8 fl  Mean Cell Hemoglobin : 25.4 pg  Mean Cell Hemoglobin Concentration : 31.5 gm/dL  Auto Neutrophil # : 2.75 K/uL  Auto Lymphocyte # : 1.20 K/uL  Auto Monocyte # : 0.41 K/uL  Auto Eosinophil # : 0.08 K/uL  Auto Basophil # : 0.03 K/uL  Auto Neutrophil % : 61.3 %  Auto Lymphocyte % : 26.7 %  Auto Monocyte % : 9.1 %  Auto Eosinophil % : 1.8 %  Auto Basophil % : 0.7 %    10-27    142  |  105  |  12  ----------------------------<  108<H>  3.8   |  27  |  0.68    Ca    9.3      27 Oct 2021 05:34  Phos  4.6     10-27  Mg     2.0     10-27    TPro  7.1  /  Alb  4.4  /  TBili  0.4  /  DBili  x   /  AST  22  /  ALT  40  /  AlkPhos  44  10-27    LIVER FUNCTIONS - ( 27 Oct 2021 05:34 )  Alb: 4.4 g/dL / Pro: 7.1 g/dL / ALK PHOS: 44 U/L / ALT: 40 U/L / AST: 22 U/L / GGT: x           PT/INR - ( 26 Oct 2021 16:03 )   PT: 13.0 sec;   INR: 1.09          PTT - ( 26 Oct 2021 16:03 )  PTT:32.5 sec      EEG: EPILEPSY CONSULT NOTE:  HPI:  60 y/o M, former smoker, PMHx CVA (loop recorder, on Plavix and 81mg ASA, no residual deficits), seizures (on Keppra, changed from 1000mg BID to 1250 BID + Vimpat BID), HTN, iron deficiency anemia, dementia, SHx hernia repair, Sent to ED by neurologist Dr. Guillen for concern of possible stroke vs TIA on Sunday. Per wife, Pt had a seizure two nights ago (Sunday) that was witnessed by son, and is his second episode in the past 2 weeks, was GTC typical for pt. He was given Keppra and his symptoms resolved after 5 minutes. Pt was sitting in his chair, no head injury or LOC. He was not brought to the hospital after his seizure episode. Afterwards at 8PM, Pt began to right sided facial, upper and lower extremity numbness and weakness in the right side. No facial droop. According to wife, Pt appeared to be confused afterwards - these sx resolved spontaneously within 1 hour and have not recurred since then.  Comes to ED today for further evaluation. Denies chest pain, SOB, n/v/d, abdominal pain, bloody stools, leg swelling, and rashes Last CVA 1 year ago with LOC. Pt is compliant with medications.  Scheduled for an endoscopy on 11/11/2021.     Stroke neuro consulted for right sided weakness after seizure. As per wife, "the patient has been having shaking seizures more often so they increased his keppra and added vimpat. Then he had an episode where he stared off to the right then started shaking and then the right side went weak. 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     In the ED:  Initial vital signs: T: 98.8F, HR: 55, BP: 143/74, R: 16, SpO2: 97% on RA  Labs: significant for hg 12.1, cmp lyts wnl, covid negative   Imaging: CT head No acute fracture or malalignment of the cervical spine.CT c spine no contrast: No acute fracture or malalignment of the cervical spine.  CXR: No acute cardiopulmonary disease process.   EKG:sinus bradycardia   Medications: keppra 1250mg po, vimpat 50mg po  Consults: Neurology    Epilepsy consulted for multiple seizures on admission, and uncontrolled episodes. Seizure onset approximately 1yr ago following stroke that presents w/ R side jerking followed by whole body shaking. Unable to obtain information from patient, and most is obtained from the chart.     Epilepsy risk factors:  Head injury with subsequent LOC?: Denies  Febrile seizures in infancy?: Denies  Hx of CNS infection?: Denies  Family hx of epilepsy?: Denies  Known CNS pathology?: Hx of stroke      REVIEW OF SYSTEMS:  CONSTITUTIONAL:  No weight loss, fever, chills, weakness or fatigue.  HEENT:  Eyes:  No visual loss, blurred vision, double vision or yellow sclerae. Ears, Nose, Throat:  No hearing loss, sneezing, congestion, runny nose or sore throat.  SKIN:  No rash or itching.  CARDIOVASCULAR:  No chest pain, chest pressure or chest discomfort. No palpitations or edema.  RESPIRATORY:  No shortness of breath, cough or sputum.  GASTROINTESTINAL:  No anorexia, nausea, vomiting or diarrhea. No abdominal pain or blood.  GENITOURINARY: No Burning on urination.   NEUROLOGICAL:  see HPI  MUSCULOSKELETAL:  No muscle, back pain, joint pain or stiffness.  HEMATOLOGIC:  No anemia, bleeding or bruising.  LYMPHATICS:  No enlarged nodes. No history of splenectomy.  PSYCHIATRIC:  No history of depression or anxiety.  ENDOCRINOLOGIC:  No reports of sweating, cold or heat intolerance. No polyuria or polydipsia.  ALLERGIES:  No history of asthma, hives, eczema or rhinitis.     PAST MEDICAL & SURGICAL HISTORY:  Hypertension  CVA (cerebral vascular accident)  Dementia, old age  Anemia  Seizure  H/O hernia repair     FAMILY HISTORY:  Family history of early CAD    Social History:  Alcohol, illicits, smoking?: Drinks 6 cans of beers/day for unknown time and smoke 1ppd for 44yrs.      Allergies  No Known Allergies    MEDICATIONS  (STANDING):  albuterol/ipratropium for Nebulization 3 milliLiter(s) Nebulizer every 6 hours  aspirin enteric coated 81 milliGRAM(s) Oral daily  atorvastatin 40 milliGRAM(s) Oral at bedtime  clopidogrel Tablet 75 milliGRAM(s) Oral daily  donepezil 5 milliGRAM(s) Oral at bedtime  enoxaparin Injectable 40 milliGRAM(s) SubCutaneous at bedtime  lacosamide IVPB 50 milliGRAM(s) IV Intermittent every 12 hours  levETIRAcetam  IVPB 1500 milliGRAM(s) IV Intermittent every 12 hours  lisinopril 20 milliGRAM(s) Oral daily    VITAL SIGNS:  T(C): 36.8 (10-27-21 @ 10:04), Max: 37.2 (10-26-21 @ 23:00)  HR: 53 (10-27-21 @ 13:00) (49 - 96)  BP: 118/59 (10-27-21 @ 13:00) (98/56 - 181/74)  RR: 21 (10-27-21 @ 13:00) (16 - 23)  SpO2: 90% (10-27-21 @ 13:00) (90% - 100%)  Wt(kg): --    PHYSICAL EXAM:  Constitutional: Lying in bed in NAD  Extremities: no edema, clubbing or cyanosis  Skin: no rash or neurocutaneous signs     Cognitive:  Alert and oriented to name, hospital and year. Follow some simple commands.    Cranial Nerves:  II: Full to confrontation. III/IV/VI: PERRLA 2mm brisk EOMF No nystagmus  V1V2V3: Symmetric, VII: Face appears symmetric VIII: Normal to screening, IX/X: Palate Elevates Symmetrical  XI: Trapezius Symmetric  XII: Tongue midline  Motor:  Power: 5/5 throughout, tone: normal x 4 limbs, no tremor   Sensation:  Intact to light touch.   Coordination/Gait:  Finger-nose-finger intact  Reflexes:  DTR: 1+ symmetric all 4 limbs  Plantar responses: Mute    LABS:  CBC Full  -  ( 27 Oct 2021 05:34 )  WBC Count : 4.49 K/uL  RBC Count : 4.64 M/uL  Hemoglobin : 11.8 g/dL  Hematocrit : 37.5 %  Platelet Count - Automated : 240 K/uL  Mean Cell Volume : 80.8 fl  Mean Cell Hemoglobin : 25.4 pg  Mean Cell Hemoglobin Concentration : 31.5 gm/dL  Auto Neutrophil # : 2.75 K/uL  Auto Lymphocyte # : 1.20 K/uL  Auto Monocyte # : 0.41 K/uL  Auto Eosinophil # : 0.08 K/uL  Auto Basophil # : 0.03 K/uL  Auto Neutrophil % : 61.3 %  Auto Lymphocyte % : 26.7 %  Auto Monocyte % : 9.1 %  Auto Eosinophil % : 1.8 %  Auto Basophil % : 0.7 %    10-27    142  |  105  |  12  ----------------------------<  108<H>  3.8   |  27  |  0.68    Ca    9.3      27 Oct 2021 05:34  Phos  4.6     10-27  Mg     2.0     10-27    TPro  7.1  /  Alb  4.4  /  TBili  0.4  /  DBili  x   /  AST  22  /  ALT  40  /  AlkPhos  44  10-27    LIVER FUNCTIONS - ( 27 Oct 2021 05:34 )  Alb: 4.4 g/dL / Pro: 7.1 g/dL / ALK PHOS: 44 U/L / ALT: 40 U/L / AST: 22 U/L / GGT: x           PT/INR - ( 26 Oct 2021 16:03 )   PT: 13.0 sec;   INR: 1.09     PTT - ( 26 Oct 2021 16:03 )  PTT:32.5 sec

## 2021-10-27 NOTE — PATIENT PROFILE ADULT - VISION (WITH CORRECTIVE LENSES IF THE PATIENT USUALLY WEARS THEM):
Patient lethargic, responsive to painful stimuli, unable to obtain/Normal vision: sees adequately in most situations; can see medication labels, newsprint

## 2021-10-28 LAB
ALBUMIN SERPL ELPH-MCNC: 4.5 G/DL — SIGNIFICANT CHANGE UP (ref 3.3–5)
ALP SERPL-CCNC: 46 U/L — SIGNIFICANT CHANGE UP (ref 40–120)
ALT FLD-CCNC: 35 U/L — SIGNIFICANT CHANGE UP (ref 10–45)
ANION GAP SERPL CALC-SCNC: 12 MMOL/L — SIGNIFICANT CHANGE UP (ref 5–17)
ANISOCYTOSIS BLD QL: SIGNIFICANT CHANGE UP
AST SERPL-CCNC: 20 U/L — SIGNIFICANT CHANGE UP (ref 10–40)
BASOPHILS # BLD AUTO: 0.14 K/UL — SIGNIFICANT CHANGE UP (ref 0–0.2)
BASOPHILS NFR BLD AUTO: 2.6 % — HIGH (ref 0–2)
BILIRUB SERPL-MCNC: 0.5 MG/DL — SIGNIFICANT CHANGE UP (ref 0.2–1.2)
BUN SERPL-MCNC: 11 MG/DL — SIGNIFICANT CHANGE UP (ref 7–23)
CALCIUM SERPL-MCNC: 9.5 MG/DL — SIGNIFICANT CHANGE UP (ref 8.4–10.5)
CHLORIDE SERPL-SCNC: 103 MMOL/L — SIGNIFICANT CHANGE UP (ref 96–108)
CK SERPL-CCNC: 62 U/L — SIGNIFICANT CHANGE UP (ref 30–200)
CO2 SERPL-SCNC: 28 MMOL/L — SIGNIFICANT CHANGE UP (ref 22–31)
CREAT SERPL-MCNC: 0.76 MG/DL — SIGNIFICANT CHANGE UP (ref 0.5–1.3)
DACRYOCYTES BLD QL SMEAR: SLIGHT — SIGNIFICANT CHANGE UP
EOSINOPHIL # BLD AUTO: 0.09 K/UL — SIGNIFICANT CHANGE UP (ref 0–0.5)
EOSINOPHIL NFR BLD AUTO: 1.8 % — SIGNIFICANT CHANGE UP (ref 0–6)
GIANT PLATELETS BLD QL SMEAR: PRESENT — SIGNIFICANT CHANGE UP
GLUCOSE BLDC GLUCOMTR-MCNC: 101 MG/DL — HIGH (ref 70–99)
GLUCOSE SERPL-MCNC: 89 MG/DL — SIGNIFICANT CHANGE UP (ref 70–99)
HCT VFR BLD CALC: 39.9 % — SIGNIFICANT CHANGE UP (ref 39–50)
HGB BLD-MCNC: 12.7 G/DL — LOW (ref 13–17)
HYPOCHROMIA BLD QL: SLIGHT — SIGNIFICANT CHANGE UP
LYMPHOCYTES # BLD AUTO: 1.03 K/UL — SIGNIFICANT CHANGE UP (ref 1–3.3)
LYMPHOCYTES # BLD AUTO: 19.5 % — SIGNIFICANT CHANGE UP (ref 13–44)
MACROCYTES BLD QL: SLIGHT — SIGNIFICANT CHANGE UP
MAGNESIUM SERPL-MCNC: 2.1 MG/DL — SIGNIFICANT CHANGE UP (ref 1.6–2.6)
MANUAL SMEAR VERIFICATION: SIGNIFICANT CHANGE UP
MCHC RBC-ENTMCNC: 26.1 PG — LOW (ref 27–34)
MCHC RBC-ENTMCNC: 31.8 GM/DL — LOW (ref 32–36)
MCV RBC AUTO: 82.1 FL — SIGNIFICANT CHANGE UP (ref 80–100)
MICROCYTES BLD QL: SLIGHT — SIGNIFICANT CHANGE UP
MONOCYTES # BLD AUTO: 0.23 K/UL — SIGNIFICANT CHANGE UP (ref 0–0.9)
MONOCYTES NFR BLD AUTO: 4.4 % — SIGNIFICANT CHANGE UP (ref 2–14)
NEUTROPHILS # BLD AUTO: 3.78 K/UL — SIGNIFICANT CHANGE UP (ref 1.8–7.4)
NEUTROPHILS NFR BLD AUTO: 71.7 % — SIGNIFICANT CHANGE UP (ref 43–77)
OVALOCYTES BLD QL SMEAR: SLIGHT — SIGNIFICANT CHANGE UP
PHOSPHATE SERPL-MCNC: 4.4 MG/DL — SIGNIFICANT CHANGE UP (ref 2.5–4.5)
PLAT MORPH BLD: ABNORMAL
PLATELET # BLD AUTO: 230 K/UL — SIGNIFICANT CHANGE UP (ref 150–400)
POIKILOCYTOSIS BLD QL AUTO: SIGNIFICANT CHANGE UP
POLYCHROMASIA BLD QL SMEAR: SLIGHT — SIGNIFICANT CHANGE UP
POTASSIUM SERPL-MCNC: 3.8 MMOL/L — SIGNIFICANT CHANGE UP (ref 3.5–5.3)
POTASSIUM SERPL-SCNC: 3.8 MMOL/L — SIGNIFICANT CHANGE UP (ref 3.5–5.3)
PROT SERPL-MCNC: 7.2 G/DL — SIGNIFICANT CHANGE UP (ref 6–8.3)
RBC # BLD: 4.86 M/UL — SIGNIFICANT CHANGE UP (ref 4.2–5.8)
RBC # FLD: SIGNIFICANT CHANGE UP (ref 10.3–14.5)
RBC BLD AUTO: ABNORMAL
SCHISTOCYTES BLD QL AUTO: SLIGHT — SIGNIFICANT CHANGE UP
SODIUM SERPL-SCNC: 143 MMOL/L — SIGNIFICANT CHANGE UP (ref 135–145)
WBC # BLD: 5.27 K/UL — SIGNIFICANT CHANGE UP (ref 3.8–10.5)
WBC # FLD AUTO: 5.27 K/UL — SIGNIFICANT CHANGE UP (ref 3.8–10.5)

## 2021-10-28 PROCEDURE — 99232 SBSQ HOSP IP/OBS MODERATE 35: CPT

## 2021-10-28 PROCEDURE — 99233 SBSQ HOSP IP/OBS HIGH 50: CPT | Mod: GC

## 2021-10-28 PROCEDURE — 95720 EEG PHY/QHP EA INCR W/VEEG: CPT

## 2021-10-28 RX ADMIN — ENOXAPARIN SODIUM 40 MILLIGRAM(S): 100 INJECTION SUBCUTANEOUS at 21:28

## 2021-10-28 RX ADMIN — LACOSAMIDE 110 MILLIGRAM(S): 50 TABLET ORAL at 19:33

## 2021-10-28 RX ADMIN — Medication 3 MILLILITER(S): at 06:01

## 2021-10-28 RX ADMIN — DONEPEZIL HYDROCHLORIDE 5 MILLIGRAM(S): 10 TABLET, FILM COATED ORAL at 21:28

## 2021-10-28 RX ADMIN — ATORVASTATIN CALCIUM 40 MILLIGRAM(S): 80 TABLET, FILM COATED ORAL at 21:28

## 2021-10-28 RX ADMIN — CLOPIDOGREL BISULFATE 75 MILLIGRAM(S): 75 TABLET, FILM COATED ORAL at 12:38

## 2021-10-28 RX ADMIN — Medication 3 MILLILITER(S): at 19:11

## 2021-10-28 RX ADMIN — Medication 3 MILLILITER(S): at 13:07

## 2021-10-28 RX ADMIN — Medication 81 MILLIGRAM(S): at 12:38

## 2021-10-28 RX ADMIN — LISINOPRIL 20 MILLIGRAM(S): 2.5 TABLET ORAL at 10:23

## 2021-10-28 RX ADMIN — LEVETIRACETAM 400 MILLIGRAM(S): 250 TABLET, FILM COATED ORAL at 07:42

## 2021-10-28 RX ADMIN — LEVETIRACETAM 400 MILLIGRAM(S): 250 TABLET, FILM COATED ORAL at 19:10

## 2021-10-28 RX ADMIN — LACOSAMIDE 110 MILLIGRAM(S): 50 TABLET ORAL at 07:36

## 2021-10-28 NOTE — PROGRESS NOTE ADULT - ASSESSMENT
62 y/o M, former smoker, PMHx multifocal cryptogenic strokes (with ILR, on ASA/Plavix/Lipitor), seizure d/o (on Keppra 1250 mg bid + Vimpat 50 mg bid), HTN, iron deficiency anemia (requiring IV Fe), H pylori gastritis, dementia planned for inpatient monitoring on epilepsy service after several breakthrough seizures with associated lethargy/confusion. Admitted to ICU for close monitoring after patient had generalized tonic clonic seizure.    NEURO  #Generalized Tonic Clonic Seizure  #Hx of seizures  First diagnosed in 03/2021. Follows with Dr. Guillen. Last visited [facility] ED last month for seizure leading to fall. Has had breakthrough seizures characterized by R arm/R leg shaking and rare episodes of urinary incontinence. Prior EEGs have been abnormal showing rare L temporal sharp waves. Most recent EEG did not show epileptiform discharges/seizures. On 10/08, Keppra dose increased to 1250 mg bid and started on Vimpat 50 mg bid. Continued to have breakthrough seizures on 10/23 and 10/24 with R-sided weakness and confusion. At baseline on 10/25 but discussed with Dr. Guillen who recommended inpatient epilepsy for monitoring. On arrival to ED, received home Keppra and Vimpat PO doses. Plan was initially admitted to epilepsy service for vEEG monitoring. While awaiting bed, had tonic clonic seizure with right-millard gaze. Rapid response called. Unresponsive to voice and pain with generalized shaking of R side with L sided flaccidity. S/p Ativan 4 mg IVP x 2 and Vimpat 150 mg IVP x 1.  - vEEG monitoring  - MRA head and neck with DWI and FLAIR  - F/u CPK, lactate, and VBG  - Keppra 1500 mg IVPB q12h  - Vimpat 50 mg IVPB q12h  - q4h neuro checks  -failed speech and swallow yesterday, passed bedside dysphagia today, start diet    #Multifocal cryptogenic strokes  #Hx TIA  Hx TIA in 06/2020. MRI at the time significant for bihemispheric small scattered stroke foci. CTA H/N with moderate R M1 and L P2 stenosis. ERICA w/o evidence of embolic source. S/p ILR placement. Investigated in 02/2021 after syncopal episode which revealed sinus rhythm with PACs, otherwise no evidence of AF/Atrial flutter. C/b residual L-sided weakness, trouble walking, speech difficulties. CT head and cervical spine this admission w/ no acute intracranial or spinal pathology. On home ASA 81 mg daily, Plavix 75 mg daily, and atorvastatin 80 mg daily  - MRA as above  - C/w home ASA 81 mg PO daily  - C/w home Plavix 75 mg PO daily  - C/w home atorvastatin 40 mg at bedtime      #Dementia  On home donepezil 5 mg. Unclear baseline.  - C/w home donepezil 5 mg at bedtime    CARDS  #HTN  On home lisinopril 20 mg daily  - C/w home lisinopril 30 mg PO daily    #bradycardia  Patient persistently bradycardic, this is known baseline for patient  -our opinion is that patient does not to be in telemetry unit since this is baseline  -consider EP consult to investigate loop recorder    RESP  #Airway Protection  Much better today    GI  #Hx H pylori  Chronic active gastritis positive for H pylori on EGD in 11/2020. Last EGD in 02/2021 with duodenal bulb with patchy mild inflammation / erosions / friability / granularity. Currently follows up with Dr. Fco Hazel. In 09/2021, positive H pylori breath test.  Prescribed 10 day course of Pylera with omeprazole in early October with plan for EGD in the future.  - F/u as outpatient with Dr. Hazel    ENDO  No active issues    /RENAL  No active issues    HEME  #Hx of anemia  Follows with Dr. Magaly Hoffman. Per chart, receives IV iron infusions. May be related to H pylori.  - F/u iron studies, B12, and folate  - Transfuse if Hb < 7  - Maintain active T&S  - F/u as outpatient with Dr. Hoffman and Dr. Hazel    ID  Afebrile. No leukocytosis. CXR without infiltrates or consolidations.    E: Replete K<4, Mg<2  N: NPO  VTE Ppx: Lovenox 40mg SQ q24h  GI: not needed  D: transfer to epilepsy service under telemetry Crohn disease

## 2021-10-28 NOTE — PROGRESS NOTE ADULT - SUBJECTIVE AND OBJECTIVE BOX
TRANSFER NOTE MICU TO [address] [place] [address] Course: 60 y/o M, former smoker, PMHx multifocal cryptogenic strokes (with ILR, on ASA/Plavix/Lipitor), seizure d/o (on Keppra 1250 mg bid + Vimpat 50 mg bid), HTN, iron deficiency anemia (requiring IV Fe), H pylori gastritis, dementia planned for inpatient monitoring on epilepsy service after several breakthrough seizures with associated lethargy/confusion. Admitted to ICU for close monitoring after patient had generalized tonic clonic seizure requiring multiple doses of IV Ativan. Now stable on VEEG with Keppra and Vimpat q12 hours. Stable for stepdown from MICU to [facility] under epilepsy     Subjective/ROS: Patient seen and examined at bedside. No complaints at this time. Resting comfortably.     Family History, Social History, Past Medical History, and Home Medications from admission H&P were reviewed and are unchanged unless noted below.       VITALS  Vital Signs Last 24 Hrs  T(C): 36.5 (28 Oct 2021 18:00), Max: 37.1 (27 Oct 2021 22:11)  T(F): 97.7 (28 Oct 2021 18:00), Max: 98.7 (27 Oct 2021 22:11)  HR: 68 (28 Oct 2021 15:26) (47 - 69)  BP: 118/74 (28 Oct 2021 15:26) (102/57 - 147/68)  BP(mean): 91 (28 Oct 2021 15:26) (75 - 98)  RR: 16 (28 Oct 2021 15:26) (16 - 32)  SpO2: 94% (28 Oct 2021 15:26) (91% - 99%)    CAPILLARY BLOOD GLUCOSE    PHYSICAL EXAM:  General: No acute distress  HEENT: NC/AT; PERRL, clear conjunctiva  Neck: Supple  Respiratory: CTA b/l, no work of breathing   Cardiovascular: +S1/S2; RRR  Abdomen: soft, NT/ND; +BS x4  Extremities: WWP, 2+ peripheral pulses b/l; no LE edema  Skin: normal color and turgor; no rash  Neurological: AAOx3, motor strength equal and intact, sensation intact, CN II-XII intact    MEDICATIONS  (STANDING):  albuterol/ipratropium for Nebulization 3 milliLiter(s) Nebulizer every 6 hours  aspirin enteric coated 81 milliGRAM(s) Oral daily  atorvastatin 40 milliGRAM(s) Oral at bedtime  clopidogrel Tablet 75 milliGRAM(s) Oral daily  donepezil 5 milliGRAM(s) Oral at bedtime  enoxaparin Injectable 40 milliGRAM(s) SubCutaneous at bedtime  lacosamide IVPB 50 milliGRAM(s) IV Intermittent every 12 hours  levETIRAcetam  IVPB 1500 milliGRAM(s) IV Intermittent every 12 hours  lisinopril 20 milliGRAM(s) Oral daily    MEDICATIONS  (PRN):      No Known Allergies      LABS                        12.7   5.27  )-----------( 230      ( 28 Oct 2021 06:43 )             39.9     10-28    143  |  103  |  11  ----------------------------<  89  3.8   |  28  |  0.76    Ca    9.5      28 Oct 2021 06:43  Phos  4.4     10-28  Mg     2.1     10-28    TPro  7.2  /  Alb  4.5  /  TBili  0.5  /  DBili  x   /  AST  20  /  ALT  35  /  AlkPhos  46  10-28        CARDIAC MARKERS ( 28 Oct 2021 06:43 )  x     / x     / 62 U/L / x     / x          CT Head No Cont (10.26.21 @ 16:59)     IMPRESSION:  No acute fracture or malalignment of the cervical spine.

## 2021-10-28 NOTE — DIETITIAN INITIAL EVALUATION ADULT. - OTHER INFO
62 y/o M, former smoker, PMHx multifocal cryptogenic strokes (with ILR, on ASA/Plavix/Lipitor), seizure d/o (on Keppra 1250 mg bid + Vimpat 50 mg bid), HTN, iron deficiency anemia (requiring IV Fe), H pylori gastritis, dementia planned for inpatient monitoring on epilepsy service after several breakthrough seizures with associated lethargy/confusion. Admitted to ICU for close monitoring after patient had generalized tonic clonic seizure. Pt seen in room and discussed during MICU rounds. Pt awake, alert, pleasant. vEEG monitoring ongoing. Pt endorsed having a good appetite at home- reported liking eggs, owens, toast, fish, salmon, pizza. Confirmed NKFA. He is currently NPO, failed SLP eval on 10/27 but likely for repeat eval today. No N/V/C/D reported at this time. No BM since admit. Skin intact pressure-wise, no edema. Afebrile. No complaints of pain. Discussed purpose of NPO but hopeful advancement today to regular diet. Will continue to follow per RD protocol. 60 y/o M, former smoker, PMHx multifocal cryptogenic strokes (with ILR, on ASA/Plavix/Lipitor), seizure d/o (on Keppra 1250 mg bid + Vimpat 50 mg bid), HTN, iron deficiency anemia (requiring IV Fe), H pylori gastritis, dementia planned for inpatient monitoring on epilepsy service after several breakthrough seizures with associated lethargy/confusion. Admitted to ICU for close monitoring after patient had generalized tonic clonic seizure. Pt seen in room and discussed during MICU rounds. Pt awake, alert, pleasant. vEEG monitoring ongoing. Pt endorsed having a good appetite at home- reported liking eggs, owens, toast, fish, salmon, pizza. Confirmed NKFA. He was NPO yesterday d/t failed SLP eval (pt was more lethargic, post-ictal?) but passed dysphagia screen today and observed 100% intake of lunch. No N/V/C/D reported at this time. No BM since admit. Skin intact pressure-wise, no edema. Afebrile. No complaints of pain. Encouraged PO intake. Will continue to follow per RD protocol.

## 2021-10-28 NOTE — PROGRESS NOTE ADULT - ASSESSMENT
61 year-old male w/ PMH of CVA (loop recorder, on Plavix and 81mg ASA, no residual deficits), seizures (on Keppra, and vimpat), HTN, iron deficiency anemia, dementia, and hernia repair who was initially sent to ER for R-sided weakness, and seizures (witnessed event in ED required 8mg ativan).     Recommendations:  - Continue vEEG monitoring  - Continue Keppra 1500mg Q12hrs  - Continue Vimpat 50mg Q12hrs  - MRI brain w/wout contrast  - EP sheree; for bradycardia  - Maintain seizure and fall precautions

## 2021-10-28 NOTE — PROGRESS NOTE ADULT - SUBJECTIVE AND OBJECTIVE BOX
EPS Loop Recorder interrogation note:     Pt with an ILR (medtronic LINQ) implanted in 7/2/2020 post cryptogenic stroke for occult AFIB monitor.    Pt presented with seizure episode on 10/24.  EP is asked to interrogate his device.   No arrhythmia events (ie AFIB / pause) that explain his symptoms.    He had 2 amira episodes during sleeping hours (6am and 11pm) a few months ago (april and sept 3). No clinical correlation.   Pt should f/u with Dr. Catherine Jay outpatient for ILR follow up.    [phone]

## 2021-10-28 NOTE — DIETITIAN INITIAL EVALUATION ADULT. - PROBLEM SELECTOR PLAN 5
Fluids: none  Electrolytes: Mg>2, K>4  Nutrition:  No IVF currently needed, replete lytes PRN  Prophylaxis: lovenox  Activity: AAT, OOBTC  GI: none  C: FC  Dispo: Admit to [facility]

## 2021-10-28 NOTE — DIETITIAN INITIAL EVALUATION ADULT. - NAME AND PHONE
Samantha Gitlin, RD, CDN, [facility], [phone] or available on [employer]

## 2021-10-28 NOTE — PROGRESS NOTE ADULT - ASSESSMENT
61 year-old male w/ PMH of CVA (loop recorder, on Plavix and 81mg ASA, no residual deficits), seizures (on Keppra, and vimpat), HTN, iron deficiency anemia, dementia, and hernia repair who was initially sent to ER for R-sided weakness, and seizures (witnessed event in ED required 8mg ativan). Now stable for stepdown from MICU to [place] for further VEEG monitoring.     NEURO  #Generalized Tonic Clonic Seizure  #Hx of seizures  First diagnosed in 03/2021. Follows with Dr. Guillen. Last visited [facility] ED last month for seizure leading to fall. Has had breakthrough seizures characterized by R arm/R leg shaking and rare episodes of urinary incontinence. Prior EEGs have been abnormal showing rare L temporal sharp waves. Most recent EEG did not show epileptiform discharges/seizures. On 10/08, Keppra dose increased to 1250 mg bid and started on Vimpat 50 mg bid. Continued to have breakthrough seizures on 10/23 and 10/24 with R-sided weakness and confusion. At baseline on 10/25 but discussed with Dr. Guillen who recommended inpatient epilepsy for monitoring. On arrival to ED, received home Keppra and Vimpat PO doses. Plan was initially admitted to epilepsy service for vEEG monitoring. While awaiting bed, had tonic clonic seizure with right-millard gaze. Rapid response called. Unresponsive to voice and pain with generalized shaking of R side with L sided flaccidity. S/p Ativan 4 mg IVP x 2 and Vimpat 150 mg IVP x 1.  - vEEG monitoring  - MRA head and neck with DWI and FLAIR (pending)  - Keppra 1500 mg IVPB q12h  - Vimpat 50 mg IVPB q12h  - q4h neuro checks    #Multifocal cryptogenic strokes  #Hx TIA  Hx TIA in 06/2020. MRI at the time significant for bihemispheric small scattered stroke foci. CTA H/N with moderate R M1 and L P2 stenosis. ERICA w/o evidence of embolic source. S/p ILR placement. Investigated in 02/2021 after syncopal episode which revealed sinus rhythm with PACs, otherwise no evidence of AF/Atrial flutter. C/b residual L-sided weakness, trouble walking, speech difficulties. CT head and cervical spine this admission w/ no acute intracranial or spinal pathology. On home ASA 81 mg daily, Plavix 75 mg daily, and atorvastatin 80 mg daily  - MRA as above  - C/w home ASA 81 mg PO daily  - C/w home Plavix 75 mg PO daily  - C/w home atorvastatin 40 mg at bedtime    #Dementia  On home donepezil 5 mg. Unclear baseline.  - C/w home donepezil 5 mg at bedtime    CARDS  #HTN  On home lisinopril 20 mg daily  - C/w home lisinopril 30 mg PO daily    #Bradycardia  Patient persistently bradycardic, this is known baseline for patient  - Our opinion is that patient does not to be in telemetry unit since this is baseline  - EP interrogated loop recorder; No arrhythmia events noted. Bradycardia only happened twice while sleeping. NTD.     RESP  #Airway Protection  No issues at this time.     GI  #Hx H pylori  Chronic active gastritis positive for H pylori on EGD in 11/2020. Last EGD in 02/2021 with duodenal bulb with patchy mild inflammation / erosions / friability / granularity. Currently follows up with Dr. Fco Hazel. In 09/2021, positive H pylori breath test.  Prescribed 10 day course of Pylera with omeprazole in early October with plan for EGD in the future.  - F/u as outpatient with Dr. Hazel    ENDO  No active issues    /RENAL  No active issues    HEME  #Hx of anemia  Follows with Dr. Magaly Hoffman. Per chart, receives IV iron infusions. May be related to H pylori.  - F/u iron studies, B12, and folate  - Transfuse if Hb < 7  - Maintain active T&S  - F/u as outpatient with Dr. Hoffman and Dr. Hazel    ID  Afebrile. No leukocytosis. CXR without infiltrates or consolidations.    E: Replete K<4, Mg<2  N: DASH  VTE Ppx: Lovenox 40mg SQ q24h  GI: not needed  D: Transfer to epilepsy service under telemetry 61 year-old male w/ PMH of CVA (loop recorder, on Plavix and 81mg ASA, no residual deficits), seizures (on Keppra, and vimpat), HTN, iron deficiency anemia, dementia, and hernia repair who was initially sent to ER for R-sided weakness, and seizures (witnessed event in ED required 8mg ativan). Now stable for stepdown from MICU to [place] for further VEEG monitoring.     NEURO  #Generalized Tonic Clonic Seizure  #Hx of seizures  First diagnosed in 03/2021. Follows with Dr. Guillen. Last visited [facility] ED last month for seizure leading to fall. Has had breakthrough seizures characterized by R arm/R leg shaking and rare episodes of urinary incontinence. Prior EEGs have been abnormal showing rare L temporal sharp waves. Most recent EEG did not show epileptiform discharges/seizures. On 10/08, Keppra dose increased to 1250 mg bid and started on Vimpat 50 mg bid. Continued to have breakthrough seizures on 10/23 and 10/24 with R-sided weakness and confusion. At baseline on 10/25 but discussed with Dr. Guillen who recommended inpatient epilepsy for monitoring. On arrival to ED, received home Keppra and Vimpat PO doses. Plan was initially admitted to epilepsy service for vEEG monitoring. While awaiting bed, had tonic clonic seizure with right-millard gaze. Rapid response called. Unresponsive to voice and pain with generalized shaking of R side with L sided flaccidity. S/p Ativan 4 mg IVP x 2 and Vimpat 150 mg IVP x 1.  - vEEG monitoring  - MRA head and neck with DWI and FLAIR (pending)  - Keppra 1500 mg IVPB q12h  - Vimpat 50 mg IVPB q12h  - q4h neuro checks    #Multifocal cryptogenic strokes  #Hx TIA  Hx TIA in 06/2020. MRI at the time significant for bihemispheric small scattered stroke foci. CTA H/N with moderate R M1 and L P2 stenosis. ERICA w/o evidence of embolic source. S/p ILR placement. Investigated in 02/2021 after syncopal episode which revealed sinus rhythm with PACs, otherwise no evidence of AF/Atrial flutter. C/b residual L-sided weakness, trouble walking, speech difficulties. CT head and cervical spine this admission w/ no acute intracranial or spinal pathology. On home ASA 81 mg daily, Plavix 75 mg daily, and atorvastatin 80 mg daily  - MRA as above  - C/w home ASA 81 mg PO daily  - C/w home Plavix 75 mg PO daily  - C/w home atorvastatin 40 mg at bedtime    #Dementia  On home donepezil 5 mg. Unclear baseline.  - C/w home donepezil 5 mg at bedtime    CARDS  #HTN  On home lisinopril 20 mg daily  - C/w home lisinopril 30 mg PO daily    #Bradycardia  Patient persistently bradycardic, this is known baseline for patient  - EP interrogated loop recorder; No arrhythmia events noted. Bradycardia only happened twice while sleeping. NTD.     RESP  #Airway Protection  No issues at this time.     GI  #Hx H pylori  Chronic active gastritis positive for H pylori on EGD in 11/2020. Last EGD in 02/2021 with duodenal bulb with patchy mild inflammation / erosions / friability / granularity. Currently follows up with Dr. Fco Hazel. In 09/2021, positive H pylori breath test.  Prescribed 10 day course of Pylera with omeprazole in early October with plan for EGD in the future.  - F/u as outpatient with Dr. Hazel    ENDO  No active issues    /RENAL  No active issues    HEME  #Hx of anemia  Follows with Dr. Magaly Hoffman. Per chart, receives IV iron infusions. May be related to H pylori.  - F/u iron studies, B12, and folate  - Transfuse if Hb < 7  - Maintain active T&S  - F/u as outpatient with Dr. Hoffman and Dr. Hazel    ID  Afebrile. No leukocytosis. CXR without infiltrates or consolidations.    E: Replete K<4, Mg<2  N: DASH  VTE Ppx: Lovenox 40mg SQ q24h  GI: not needed  D: Transfer to epilepsy service under telemetry

## 2021-10-28 NOTE — PROGRESS NOTE ADULT - TIME BILLING
review of patient information including recent vital signs, labs, imaging, and notes; assessing, examining patient; updating patient/family; discussion and coordination of care with multidisciplinary team.
counseling and coordination of care

## 2021-10-28 NOTE — PROGRESS NOTE ADULT - SUBJECTIVE AND OBJECTIVE BOX
Subjective  No events overnight. No complaints currently.    ROS  As above, otherwise negative for constitutional/HEENT/CV/pulm/GI//MSK/neuro/derm/endocrine/psych.     MEDICATIONS  (STANDING):  albuterol/ipratropium for Nebulization 3 milliLiter(s) Nebulizer every 6 hours  aspirin enteric coated 81 milliGRAM(s) Oral daily  atorvastatin 40 milliGRAM(s) Oral at bedtime  clopidogrel Tablet 75 milliGRAM(s) Oral daily  donepezil 5 milliGRAM(s) Oral at bedtime  enoxaparin Injectable 40 milliGRAM(s) SubCutaneous at bedtime  lacosamide IVPB 50 milliGRAM(s) IV Intermittent every 12 hours  levETIRAcetam  IVPB 1500 milliGRAM(s) IV Intermittent every 12 hours  lisinopril 20 milliGRAM(s) Oral daily    MEDICATIONS  (PRN):      T(C): 36.6 (10-28-21 @ 09:42), Max: 37.1 (10-27-21 @ 22:11)  HR: 57 (10-28-21 @ 14:00) (47 - 69)  BP: 104/59 (10-28-21 @ 14:00) (104/59 - 147/68)  RR: 19 (10-28-21 @ 14:00) (17 - 32)  SpO2: 98% (10-28-21 @ 14:00) (91% - 99%)  Wt(kg): --    Eyes open spontaneously. Conversational with appropriate sentences. Oriented x 3  EOMI. Visual fields full. PERRL. Face symmetrical.  Full strength throughout.  Intact to light touch throughout.    CBC Full  -  ( 28 Oct 2021 06:43 )  WBC Count : 5.27 K/uL  RBC Count : 4.86 M/uL  Hemoglobin : 12.7 g/dL  Hematocrit : 39.9 %  Platelet Count - Automated : 230 K/uL  Mean Cell Volume : 82.1 fl  Mean Cell Hemoglobin : 26.1 pg  Mean Cell Hemoglobin Concentration : 31.8 gm/dL  Auto Neutrophil # : 3.78 K/uL  Auto Lymphocyte # : 1.03 K/uL  Auto Monocyte # : 0.23 K/uL  Auto Eosinophil # : 0.09 K/uL  Auto Basophil # : 0.14 K/uL  Auto Neutrophil % : 71.7 %  Auto Lymphocyte % : 19.5 %  Auto Monocyte % : 4.4 %  Auto Eosinophil % : 1.8 %  Auto Basophil % : 2.6 %    10-28    143  |  103  |  11  ----------------------------<  89  3.8   |  28  |  0.76    Ca    9.5      28 Oct 2021 06:43  Phos  4.4     10-28  Mg     2.1     10-28    TPro  7.2  /  Alb  4.5  /  TBili  0.5  /  DBili  x   /  AST  20  /  ALT  35  /  AlkPhos  46  10-28    LIVER FUNCTIONS - ( 28 Oct 2021 06:43 )  Alb: 4.5 g/dL / Pro: 7.2 g/dL / ALK PHOS: 46 U/L / ALT: 35 U/L / AST: 20 U/L / GGT: x           PT/INR - ( 26 Oct 2021 16:03 )   PT: 13.0 sec;   INR: 1.09          PTT - ( 26 Oct 2021 16:03 )  PTT:32.5 sec      EEG: no seizures

## 2021-10-28 NOTE — DIETITIAN INITIAL EVALUATION ADULT. - PROBLEM SELECTOR PLAN 2
hx of CVA (loop recorder, on Plavix and 81mg ASA, no residual deficits).  Sent to ED by neurologist Dr. Guillen for concern of possible stroke vs TIA on Sunday. CTH negative. Symptoms suggestive of seizure/ Todds paralysis, less likely stroke or TIA. Neurology followung  - f/u MRA head and neck with DWI and FLAIR   - q8h neuro checks   - STAT CTH for any acute change in mental status   - c/w Plavix and 81mg ASA, lipitor 40mg qd

## 2021-10-28 NOTE — DIETITIAN INITIAL EVALUATION ADULT. - ADD RECOMMEND
1. F/u with SLP recs for diet advancement. Maintain aspiration precautions at all times 2. Recommend multivitamin 3. Monitor lytes and replete prn. 4. Pain and bowel regimens per team 1. Cont. regular consistency diet. Maintain aspiration precautions at all times 2. Recommend multivitamin 3. Monitor lytes and replete prn. 4. Pain and bowel regimens per team

## 2021-10-28 NOTE — PROGRESS NOTE ADULT - SUBJECTIVE AND OBJECTIVE BOX
INTERVAL HPI/OVERNIGHT EVENTS:  Patient was seen and examined at bedside. As per nurse and patient, no o/n events, patient resting comfortably. Patient says he is feeling better today, more awake and alert    VITAL SIGNS:  T(F): 97.8 (10-28-21 @ 09:42)  HR: 53 (10-28-21 @ 11:00)  BP: 122/58 (10-28-21 @ 11:00)  RR: 19 (10-28-21 @ 11:00)  SpO2: 95% (10-28-21 @ 11:00)  Wt(kg): --    PHYSICAL EXAM:  General: NAD, more awake today  HEENT: NC/AT; PERRL, clear conjunctiva  Neck: supple  Respiratory: CTA b/l, nasal airway intact  Cardiovascular: +S1/S2; RRR  Abdomen: soft, NT/ND; +BS x4  Extremities: WWP, 2+ peripheral pulses b/l; no LE edema  Skin: normal color and turgor; no rash  Neurological: AAOx2-3, 4/5 LUE proximal muscle strength, 5/5  strength in other extremities, intact sensation, CN II-XII intact    MEDICATIONS  (STANDING):  albuterol/ipratropium for Nebulization 3 milliLiter(s) Nebulizer every 6 hours  aspirin enteric coated 81 milliGRAM(s) Oral daily  atorvastatin 40 milliGRAM(s) Oral at bedtime  clopidogrel Tablet 75 milliGRAM(s) Oral daily  donepezil 5 milliGRAM(s) Oral at bedtime  enoxaparin Injectable 40 milliGRAM(s) SubCutaneous at bedtime  lacosamide IVPB 50 milliGRAM(s) IV Intermittent every 12 hours  levETIRAcetam  IVPB 1500 milliGRAM(s) IV Intermittent every 12 hours  lisinopril 20 milliGRAM(s) Oral daily    MEDICATIONS  (PRN):      Allergies    No Known Allergies    Intolerances        LABS:                        12.7   5.27  )-----------( 230      ( 28 Oct 2021 06:43 )             39.9     10-28    143  |  103  |  11  ----------------------------<  89  3.8   |  28  |  0.76    Ca    9.5      28 Oct 2021 06:43  Phos  4.4     10-28  Mg     2.1     10-28    TPro  7.2  /  Alb  4.5  /  TBili  0.5  /  DBili  x   /  AST  20  /  ALT  35  /  AlkPhos  46  10-28    PT/INR - ( 26 Oct 2021 16:03 )   PT: 13.0 sec;   INR: 1.09          PTT - ( 26 Oct 2021 16:03 )  PTT:32.5 sec      RADIOLOGY & ADDITIONAL TESTS:  Reviewed

## 2021-10-29 LAB
ANION GAP SERPL CALC-SCNC: 11 MMOL/L — SIGNIFICANT CHANGE UP (ref 5–17)
BUN SERPL-MCNC: 14 MG/DL — SIGNIFICANT CHANGE UP (ref 7–23)
CALCIUM SERPL-MCNC: 9.1 MG/DL — SIGNIFICANT CHANGE UP (ref 8.4–10.5)
CHLORIDE SERPL-SCNC: 106 MMOL/L — SIGNIFICANT CHANGE UP (ref 96–108)
CO2 SERPL-SCNC: 26 MMOL/L — SIGNIFICANT CHANGE UP (ref 22–31)
CREAT SERPL-MCNC: 0.73 MG/DL — SIGNIFICANT CHANGE UP (ref 0.5–1.3)
GLUCOSE BLDC GLUCOMTR-MCNC: 107 MG/DL — HIGH (ref 70–99)
GLUCOSE BLDC GLUCOMTR-MCNC: 120 MG/DL — HIGH (ref 70–99)
GLUCOSE BLDC GLUCOMTR-MCNC: 139 MG/DL — HIGH (ref 70–99)
GLUCOSE BLDC GLUCOMTR-MCNC: 93 MG/DL — SIGNIFICANT CHANGE UP (ref 70–99)
GLUCOSE SERPL-MCNC: 95 MG/DL — SIGNIFICANT CHANGE UP (ref 70–99)
HCT VFR BLD CALC: 37.1 % — LOW (ref 39–50)
HGB BLD-MCNC: 11.9 G/DL — LOW (ref 13–17)
MCHC RBC-ENTMCNC: 26.6 PG — LOW (ref 27–34)
MCHC RBC-ENTMCNC: 32.1 GM/DL — SIGNIFICANT CHANGE UP (ref 32–36)
MCV RBC AUTO: 82.8 FL — SIGNIFICANT CHANGE UP (ref 80–100)
NRBC # BLD: 0 /100 WBCS — SIGNIFICANT CHANGE UP (ref 0–0)
PLATELET # BLD AUTO: 227 K/UL — SIGNIFICANT CHANGE UP (ref 150–400)
POTASSIUM SERPL-MCNC: 3.9 MMOL/L — SIGNIFICANT CHANGE UP (ref 3.5–5.3)
POTASSIUM SERPL-SCNC: 3.9 MMOL/L — SIGNIFICANT CHANGE UP (ref 3.5–5.3)
RBC # BLD: 4.48 M/UL — SIGNIFICANT CHANGE UP (ref 4.2–5.8)
RBC # FLD: SIGNIFICANT CHANGE UP (ref 10.3–14.5)
SODIUM SERPL-SCNC: 143 MMOL/L — SIGNIFICANT CHANGE UP (ref 135–145)
WBC # BLD: 4.8 K/UL — SIGNIFICANT CHANGE UP (ref 3.8–10.5)
WBC # FLD AUTO: 4.8 K/UL — SIGNIFICANT CHANGE UP (ref 3.8–10.5)

## 2021-10-29 PROCEDURE — 99232 SBSQ HOSP IP/OBS MODERATE 35: CPT

## 2021-10-29 PROCEDURE — 70547 MR ANGIOGRAPHY NECK W/O DYE: CPT | Mod: 26

## 2021-10-29 PROCEDURE — 70544 MR ANGIOGRAPHY HEAD W/O DYE: CPT | Mod: 26

## 2021-10-29 PROCEDURE — 95720 EEG PHY/QHP EA INCR W/VEEG: CPT

## 2021-10-29 RX ADMIN — Medication 3 MILLILITER(S): at 06:23

## 2021-10-29 RX ADMIN — LEVETIRACETAM 400 MILLIGRAM(S): 250 TABLET, FILM COATED ORAL at 06:23

## 2021-10-29 RX ADMIN — Medication 3 MILLILITER(S): at 00:14

## 2021-10-29 RX ADMIN — LACOSAMIDE 110 MILLIGRAM(S): 50 TABLET ORAL at 06:23

## 2021-10-29 RX ADMIN — Medication 3 MILLILITER(S): at 12:30

## 2021-10-29 RX ADMIN — Medication 81 MILLIGRAM(S): at 12:30

## 2021-10-29 RX ADMIN — ENOXAPARIN SODIUM 40 MILLIGRAM(S): 100 INJECTION SUBCUTANEOUS at 23:52

## 2021-10-29 RX ADMIN — ATORVASTATIN CALCIUM 40 MILLIGRAM(S): 80 TABLET, FILM COATED ORAL at 23:52

## 2021-10-29 RX ADMIN — LEVETIRACETAM 400 MILLIGRAM(S): 250 TABLET, FILM COATED ORAL at 17:53

## 2021-10-29 RX ADMIN — LISINOPRIL 20 MILLIGRAM(S): 2.5 TABLET ORAL at 16:21

## 2021-10-29 RX ADMIN — CLOPIDOGREL BISULFATE 75 MILLIGRAM(S): 75 TABLET, FILM COATED ORAL at 12:30

## 2021-10-29 RX ADMIN — LACOSAMIDE 110 MILLIGRAM(S): 50 TABLET ORAL at 17:53

## 2021-10-29 RX ADMIN — Medication 3 MILLILITER(S): at 17:54

## 2021-10-29 NOTE — PROGRESS NOTE ADULT - ASSESSMENT
61 year-old male w/ PMH of CVA (loop recorder, on Plavix and 81mg ASA, no residual deficits), seizures (on Keppra, and vimpat), HTN, iron deficiency anemia, dementia, and hernia repair who was initially sent to ER for R-sided weakness, and seizures (witnessed event in ED required 8mg ativan). Now stable for stepdown from MICU to [place] for further VEEG monitoring.     NEURO  #Generalized Tonic Clonic Seizure  #Hx of seizures  First diagnosed in 03/2021. Follows with Dr. Guillen. Last visited [facility] ED last month for seizure leading to fall. Has had breakthrough seizures characterized by R arm/R leg shaking and rare episodes of urinary incontinence. Prior EEGs have been abnormal showing rare L temporal sharp waves. Most recent EEG did not show epileptiform discharges/seizures. On 10/08, Keppra dose increased to 1250 mg bid and started on Vimpat 50 mg bid. Continued to have breakthrough seizures on 10/23 and 10/24 with R-sided weakness and confusion. At baseline on 10/25 but discussed with Dr. Guillen who recommended inpatient epilepsy for monitoring. On arrival to ED, received home Keppra and Vimpat PO doses. Plan was initially admitted to epilepsy service for vEEG monitoring. While awaiting bed, had tonic clonic seizure with right-millard gaze. Rapid response called. Unresponsive to voice and pain with generalized shaking of R side with L sided flaccidity. S/p Ativan 4 mg IVP x 2 and Vimpat 150 mg IVP x 1.  - vEEG monitoring  - MRA head and neck with DWI and FLAIR (pending)  - Keppra 1500 mg IVPB q12h  - Vimpat 50 mg IVPB q12h  - q4h neuro checks    #Multifocal cryptogenic strokes  #Hx TIA  Hx TIA in 06/2020. MRI at the time significant for bihemispheric small scattered stroke foci. CTA H/N with moderate R M1 and L P2 stenosis. ERICA w/o evidence of embolic source. S/p ILR placement. Investigated in 02/2021 after syncopal episode which revealed sinus rhythm with PACs, otherwise no evidence of AF/Atrial flutter. C/b residual L-sided weakness, trouble walking, speech difficulties. CT head and cervical spine this admission w/ no acute intracranial or spinal pathology. On home ASA 81 mg daily, Plavix 75 mg daily, and atorvastatin 80 mg daily  - MRA as above  - C/w home ASA 81 mg PO daily  - C/w home Plavix 75 mg PO daily  - C/w home atorvastatin 40 mg at bedtime    #Dementia  On home donepezil 5 mg. Unclear baseline.  - C/w home donepezil 5 mg at bedtime    CARDS  #HTN  On home lisinopril 20 mg daily  - C/w home lisinopril 30 mg PO daily    #Bradycardia  Patient persistently bradycardic, this is known baseline for patient  - Our opinion is that patient does not to be in telemetry unit since this is baseline  - EP interrogated loop recorder; No arrhythmia events noted. Bradycardia only happened twice while sleeping. NTD.     RESP  #Airway Protection  No issues at this time.     GI  #Hx H pylori  Chronic active gastritis positive for H pylori on EGD in 11/2020. Last EGD in 02/2021 with duodenal bulb with patchy mild inflammation / erosions / friability / granularity. Currently follows up with Dr. Fco Hazel. In 09/2021, positive H pylori breath test.  Prescribed 10 day course of Pylera with omeprazole in early October with plan for EGD in the future.  - F/u as outpatient with Dr. Hazel    ENDO  No active issues    /RENAL  No active issues    HEME  #Hx of anemia  Follows with Dr. Magaly Hoffman. Per chart, receives IV iron infusions. May be related to H pylori.  - F/u iron studies, B12, and folate  - Transfuse if Hb < 7  - Maintain active T&S  - F/u as outpatient with Dr. Hoffman and Dr. Hazel    ID  Afebrile. No leukocytosis. CXR without infiltrates or consolidations.    E: Replete K<4, Mg<2  N: DASH  VTE Ppx: Lovenox 40mg SQ q24h  GI: not needed  D: Transfer to epilepsy service

## 2021-10-29 NOTE — PROGRESS NOTE ADULT - SUBJECTIVE AND OBJECTIVE BOX
SUBJECTIVE/OVERNIGHT EVENTS: No acute overnight events. Pt seen in AM at bedside, resting comfortably in bed, and does not appear to be in any acute distress. When asked, pt denies any recent or active fever, chills, nausea, vomiting, headache, acute sob, chest pain, abdominal pain, genitourinary sx, extremity pain or swelling.    VITAL SIGNS:  Vital Signs Last 24 Hrs  T(C): 36.2 (29 Oct 2021 10:00), Max: 37 (29 Oct 2021 01:00)  T(F): 97.1 (29 Oct 2021 10:00), Max: 98.6 (29 Oct 2021 01:00)  HR: 56 (29 Oct 2021 12:20) (46 - 62)  BP: 128/73 (29 Oct 2021 12:20) (122/69 - 153/68)  BP(mean): 94 (29 Oct 2021 12:20) (86 - 95)  RR: 15 (29 Oct 2021 12:20) (14 - 19)  SpO2: 96% (29 Oct 2021 12:20) (92% - 99%)    PHYSICAL EXAM:  General: NAD; speaking in full sentences  HEENT: NC/AT; PERRL; EOMI; MMM  Neck: supple; no JVD  Cardiac: RRR; +S1/S2  Pulm: CTA B/L; no W/R/R  GI: soft, NT/ND, +BS  Extremities: WWP; no edema, clubbing or cyanosis  Vasc: 2+ radial, DP pulses B/L  Neuro: AAOx3; no focal deficits    MEDICATIONS:  MEDICATIONS  (STANDING):  albuterol/ipratropium for Nebulization 3 milliLiter(s) Nebulizer every 6 hours  aspirin enteric coated 81 milliGRAM(s) Oral daily  atorvastatin 40 milliGRAM(s) Oral at bedtime  clopidogrel Tablet 75 milliGRAM(s) Oral daily  donepezil 5 milliGRAM(s) Oral at bedtime  enoxaparin Injectable 40 milliGRAM(s) SubCutaneous at bedtime  lacosamide IVPB 50 milliGRAM(s) IV Intermittent every 12 hours  levETIRAcetam  IVPB 1500 milliGRAM(s) IV Intermittent every 12 hours  lisinopril 20 milliGRAM(s) Oral daily    MEDICATIONS  (PRN):      ALLERGIES:  Allergies    No Known Allergies    Intolerances        LABS:                        11.9   4.80  )-----------( 227      ( 29 Oct 2021 06:27 )             37.1     10-29    143  |  106  |  14  ----------------------------<  95  3.9   |  26  |  0.73    Ca    9.1      29 Oct 2021 06:27  Phos  4.4     10-28  Mg     2.1     10-28    TPro  7.2  /  Alb  4.5  /  TBili  0.5  /  DBili  x   /  AST  20  /  ALT  35  /  AlkPhos  46  10-28        RADIOLOGY & ADDITIONAL TESTS: Reviewed. **Transfer from [facility] to **    Hospital Course: 62 y/o M, former smoker, PMHx multifocal cryptogenic strokes (with ILR, on ASA/Plavix/Lipitor), seizure d/o (on Keppra 1250 mg bid + Vimpat 50 mg bid), HTN, iron deficiency anemia (requiring IV Fe), H pylori gastritis, dementia planned for inpatient monitoring on epilepsy service after several breakthrough seizures with associated lethargy/confusion. Admitted to ICU for close monitoring after patient had generalized tonic clonic seizure requiring multiple doses of IV Ativan. Now stable on VEEG with Keppra and Vimpat q12 hours. Transferred from MICU to [facility] under epilepsy for 2 episodes of bradycardia during sleep. EP interrogated device, functioning well. Patient stable for transfer to [address] [place] [address].     SUBJECTIVE/OVERNIGHT EVENTS: No acute overnight events. Pt seen in AM at bedside, resting comfortably in bed, and does not appear to be in any acute distress. When asked, pt denies any recent or active fever, chills, nausea, vomiting, headache, acute sob, chest pain, abdominal pain, genitourinary sx, extremity pain or swelling.    VITAL SIGNS:  Vital Signs Last 24 Hrs  T(C): 36.2 (29 Oct 2021 10:00), Max: 37 (29 Oct 2021 01:00)  T(F): 97.1 (29 Oct 2021 10:00), Max: 98.6 (29 Oct 2021 01:00)  HR: 56 (29 Oct 2021 12:20) (46 - 62)  BP: 128/73 (29 Oct 2021 12:20) (122/69 - 153/68)  BP(mean): 94 (29 Oct 2021 12:20) (86 - 95)  RR: 15 (29 Oct 2021 12:20) (14 - 19)  SpO2: 96% (29 Oct 2021 12:20) (92% - 99%)    PHYSICAL EXAM:  General: NAD; speaking in full sentences  HEENT: NC/AT; PERRL; EOMI; MMM  Neck: supple; no JVD  Cardiac: RRR; +S1/S2  Pulm: CTA B/L; no W/R/R  GI: soft, NT/ND, +BS  Extremities: WWP; no edema, clubbing or cyanosis  Vasc: 2+ radial, DP pulses B/L  Neuro: AAOx3; no focal deficits    MEDICATIONS:  MEDICATIONS  (STANDING):  albuterol/ipratropium for Nebulization 3 milliLiter(s) Nebulizer every 6 hours  aspirin enteric coated 81 milliGRAM(s) Oral daily  atorvastatin 40 milliGRAM(s) Oral at bedtime  clopidogrel Tablet 75 milliGRAM(s) Oral daily  donepezil 5 milliGRAM(s) Oral at bedtime  enoxaparin Injectable 40 milliGRAM(s) SubCutaneous at bedtime  lacosamide IVPB 50 milliGRAM(s) IV Intermittent every 12 hours  levETIRAcetam  IVPB 1500 milliGRAM(s) IV Intermittent every 12 hours  lisinopril 20 milliGRAM(s) Oral daily    MEDICATIONS  (PRN):      ALLERGIES:  Allergies    No Known Allergies    Intolerances        LABS:                        11.9   4.80  )-----------( 227      ( 29 Oct 2021 06:27 )             37.1     10-29    143  |  106  |  14  ----------------------------<  95  3.9   |  26  |  0.73    Ca    9.1      29 Oct 2021 06:27  Phos  4.4     10-28  Mg     2.1     10-28    TPro  7.2  /  Alb  4.5  /  TBili  0.5  /  DBili  x   /  AST  20  /  ALT  35  /  AlkPhos  46  10-28        RADIOLOGY & ADDITIONAL TESTS: Reviewed.

## 2021-10-29 NOTE — PROGRESS NOTE ADULT - SUBJECTIVE AND OBJECTIVE BOX
Neurology Transfer Acceptance Note  **Transfer from [facility] to [facility]**      HPI: A 60 y/o M, former smoker, PMHx CVA (loop recorder, on Plavix and 81mg ASA, no residual deficits), seizures (on Keppra, changed from 1000mg BID to 1250 BID + Vimpat BID), HTN, iron deficiency anemia, dementia, SHx hernia repair, Sent to ED by neurologist Dr. Guillen for concern of possible stroke vs TIA on Sunday. Per wife, Pt had a seizure two nights ago (Sunday) that was witnessed by son, and is his second episode in the past 2 weeks, was GTC typical for pt. He was given Keppra and his symptoms resolved after 5 minutes. Pt was sitting in his chair, no head injury or LOC. He was not brought to the hospital after his seizure episode. Afterwards at 8PM, Pt began to right sided facial, upper and lower extremity numbness and weakness in the right side. No facial droop. According to wife, Pt appeared to be confused afterwards - these sx resolved spontaneously within 1 hour and have not recurred since then.  Comes to ED today for further evaluation. Denies chest pain, SOB, n/v/d, abdominal pain, bloody stools, leg swelling, and rashes Last CVA 1 year ago with LOC. Pt is compliant with medications.  Scheduled for an endoscopy on 11/11/2021.     Stroke neuro consulted for right sided weakness after seizure. As per wife, "the patient has been having shaking seizures more often so they increased his keppra and added vimpat. Then he had an episode where he stared off to the right then started shaking and then the right side went weak. 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     Interval History:  Patient seen and examined at bedside. Patient has no complaints at this time.      PAST MEDICAL & SURGICAL HISTORY:  Hypertension  CVA (cerebral vascular accident)  Dementia, old age  Anemia  Seizure  H/O hernia repair      Medications:  albuterol/ipratropium for Nebulization 3 milliLiter(s) Nebulizer every 6 hours  aspirin enteric coated 81 milliGRAM(s) Oral daily  atorvastatin 40 milliGRAM(s) Oral at bedtime  clopidogrel Tablet 75 milliGRAM(s) Oral daily  donepezil 5 milliGRAM(s) Oral at bedtime  enoxaparin Injectable 40 milliGRAM(s) SubCutaneous at bedtime  lacosamide IVPB 50 milliGRAM(s) IV Intermittent every 12 hours  levETIRAcetam  IVPB 1500 milliGRAM(s) IV Intermittent every 12 hours  lisinopril 20 milliGRAM(s) Oral daily      Vital Signs Last 24 Hrs  T(C): 37.2 (29 Oct 2021 16:15), Max: 37.2 (29 Oct 2021 16:15)  T(F): 98.9 (29 Oct 2021 16:15), Max: 98.9 (29 Oct 2021 16:15)  HR: 64 (29 Oct 2021 16:15) (46 - 64)  BP: 155/81 (29 Oct 2021 16:15) (122/69 - 155/81)  BP(mean): 94 (29 Oct 2021 12:20) (86 - 95)  RR: 16 (29 Oct 2021 16:15) (14 - 19)  SpO2: 93% (29 Oct 2021 16:15) (92% - 99%)      Neurological Exam:   Mental status: Awake, alert and oriented x2.  No dysarthria, no aphasia.    Cranial nerves: Pupils equally round and reactive to light, visual fields full, no nystagmus, extraocular muscles intact, V1 through V3 intact bilaterally and symmetric, face symmetric, hearing intact to finger rub, palate elevation symmetric, tongue was midline.  Motor:  MRC grading 5/5 b/l UE/LE.   strength 5/5.  Normal tone and bulk.  No abnormal movements.    Sensation: Intact to light touch, proprioception, and pinprick.   Coordination: No dysmetria on finger-to-nose and heel-to-shin.  No dysdiadokinesia.  Reflexes: 2+ in bilateral UE/LE, downgoing toes bilaterally. (-) Ho.        Labs:  CBC Full  -  ( 29 Oct 2021 06:27 )  WBC Count : 4.80 K/uL  RBC Count : 4.48 M/uL  Hemoglobin : 11.9 g/dL  Hematocrit : 37.1 %  Platelet Count - Automated : 227 K/uL  Mean Cell Volume : 82.8 fl  Mean Cell Hemoglobin : 26.6 pg  Mean Cell Hemoglobin Concentration : 32.1 gm/dL      10-29    143  |  106  |  14  ----------------------------<  95  3.9   |  26  |  0.73    Ca    9.1      29 Oct 2021 06:27  Phos  4.4     10-28  Mg     2.1     10-28    TPro  7.2  /  Alb  4.5  /  TBili  0.5  /  DBili  x   /  AST  20  /  ALT  35  /  AlkPhos  46  10-28    LIVER FUNCTIONS - ( 28 Oct 2021 06:43 )  Alb: 4.5 g/dL / Pro: 7.2 g/dL / ALK PHOS: 46 U/L / ALT: 35 U/L / AST: 20 U/L / GGT: x

## 2021-10-29 NOTE — PROGRESS NOTE ADULT - ASSESSMENT
This is a 61 year-old male w/ PMH of CVA (loop recorder, on Plavix and 81mg ASA, no residual deficits), seizures (on Keppra, and vimpat), HTN, iron deficiency anemia, dementia, and hernia repair who was initially sent to ER for R-sided weakness, and seizures (witnessed event in ED required 8mg ativan). Now stable for stepdown from MICU to [facility] for further VEEG monitoring.

## 2021-10-30 LAB
GLUCOSE BLDC GLUCOMTR-MCNC: 106 MG/DL — HIGH (ref 70–99)
GLUCOSE BLDC GLUCOMTR-MCNC: 119 MG/DL — HIGH (ref 70–99)

## 2021-10-30 PROCEDURE — 99232 SBSQ HOSP IP/OBS MODERATE 35: CPT

## 2021-10-30 PROCEDURE — 93010 ELECTROCARDIOGRAM REPORT: CPT

## 2021-10-30 PROCEDURE — 95720 EEG PHY/QHP EA INCR W/VEEG: CPT

## 2021-10-30 RX ORDER — LACOSAMIDE 50 MG/1
50 TABLET ORAL
Refills: 0 | Status: DISCONTINUED | OUTPATIENT
Start: 2021-10-30 | End: 2021-10-30

## 2021-10-30 RX ORDER — LANOLIN ALCOHOL/MO/W.PET/CERES
5 CREAM (GRAM) TOPICAL ONCE
Refills: 0 | Status: COMPLETED | OUTPATIENT
Start: 2021-10-30 | End: 2021-10-30

## 2021-10-30 RX ORDER — LACOSAMIDE 50 MG/1
100 TABLET ORAL
Refills: 0 | Status: DISCONTINUED | OUTPATIENT
Start: 2021-10-30 | End: 2021-11-07

## 2021-10-30 RX ORDER — LACOSAMIDE 50 MG/1
50 TABLET ORAL ONCE
Refills: 0 | Status: DISCONTINUED | OUTPATIENT
Start: 2021-10-30 | End: 2021-10-30

## 2021-10-30 RX ORDER — LANOLIN ALCOHOL/MO/W.PET/CERES
5 CREAM (GRAM) TOPICAL AT BEDTIME
Refills: 0 | Status: DISCONTINUED | OUTPATIENT
Start: 2021-10-30 | End: 2021-11-09

## 2021-10-30 RX ORDER — LEVETIRACETAM 250 MG/1
1250 TABLET, FILM COATED ORAL EVERY 12 HOURS
Refills: 0 | Status: DISCONTINUED | OUTPATIENT
Start: 2021-10-30 | End: 2021-11-02

## 2021-10-30 RX ADMIN — LACOSAMIDE 100 MILLIGRAM(S): 50 TABLET ORAL at 22:28

## 2021-10-30 RX ADMIN — LEVETIRACETAM 400 MILLIGRAM(S): 250 TABLET, FILM COATED ORAL at 06:45

## 2021-10-30 RX ADMIN — ENOXAPARIN SODIUM 40 MILLIGRAM(S): 100 INJECTION SUBCUTANEOUS at 22:28

## 2021-10-30 RX ADMIN — Medication 81 MILLIGRAM(S): at 13:21

## 2021-10-30 RX ADMIN — LISINOPRIL 20 MILLIGRAM(S): 2.5 TABLET ORAL at 06:46

## 2021-10-30 RX ADMIN — Medication 3 MILLILITER(S): at 13:22

## 2021-10-30 RX ADMIN — Medication 5 MILLIGRAM(S): at 22:28

## 2021-10-30 RX ADMIN — LACOSAMIDE 50 MILLIGRAM(S): 50 TABLET ORAL at 18:18

## 2021-10-30 RX ADMIN — Medication 5 MILLIGRAM(S): at 01:52

## 2021-10-30 RX ADMIN — LEVETIRACETAM 400 MILLIGRAM(S): 250 TABLET, FILM COATED ORAL at 18:17

## 2021-10-30 RX ADMIN — CLOPIDOGREL BISULFATE 75 MILLIGRAM(S): 75 TABLET, FILM COATED ORAL at 13:21

## 2021-10-30 RX ADMIN — Medication 3 MILLILITER(S): at 06:46

## 2021-10-30 RX ADMIN — DONEPEZIL HYDROCHLORIDE 5 MILLIGRAM(S): 10 TABLET, FILM COATED ORAL at 01:12

## 2021-10-30 RX ADMIN — Medication 3 MILLILITER(S): at 22:34

## 2021-10-30 RX ADMIN — ATORVASTATIN CALCIUM 40 MILLIGRAM(S): 80 TABLET, FILM COATED ORAL at 22:27

## 2021-10-30 RX ADMIN — Medication 3 MILLILITER(S): at 00:37

## 2021-10-30 RX ADMIN — LACOSAMIDE 110 MILLIGRAM(S): 50 TABLET ORAL at 06:45

## 2021-10-30 NOTE — PROGRESS NOTE ADULT - ASSESSMENT
This is a 61 year-old male w/ PMH of CVA (loop recorder, on Plavix and 81mg ASA, no residual deficits), seizures (on Keppra, and vimpat), HTN, iron deficiency anemia, dementia, and hernia repair who was initially sent to ER for R-sided weakness, and seizures (witnessed event in ED required 8mg ativan). Now stable for stepdown from MICU to [facility] for further VEEG monitoring.

## 2021-10-30 NOTE — CONSULT NOTE ADULT - SUBJECTIVE AND OBJECTIVE BOX
Patient is a 61y old  Male who presents with a chief complaint of vEEG monitoring (29 Oct 2021 17:23)       HPI:  HPI: HPI: 60 y/o M, former smoker, PMHx CVA (loop recorder, on Plavix and 81mg ASA, no residual deficits), seizures (on Keppra, changed from 1000mg BID to 1250 BID + Vimpat BID), HTN, iron deficiency anemia, dementia, SHx hernia repair, Sent to ED by neurologist Dr. Guillen for concern of possible stroke vs TIA on Sunday. Per wife, Pt had a seizure two nights ago (Sunday) that was witnessed by son, and is his second episode in the past 2 weeks, was GTC typical for pt. He was given Keppra and his symptoms resolved after 5 minutes. Pt was sitting in his chair, no head injury or LOC. He was not brought to the hospital after his seizure episode. Afterwards at 8PM, Pt began to right sided facial, upper and lower extremity numbness and weakness in the right side. No facial droop. According to wife, Pt appeared to be confused afterwards - these sx resolved spontaneously within 1 hour and have not recurred since then.  Comes to ED today for further evaluation. Denies chest pain, SOB, n/v/d, abdominal pain, bloody stools, leg swelling, and rashes Last CVA 1 year ago with LOC. Pt is compliant with medications.  Scheduled for an endoscopy on 11/11/2021.     Stroke neuro consulted for right sided weakness after seizure. As per wife, "the patient has been having shaking seizures more often so they increased his keppra and added vimpat. Then he had an episode where he stared off to the right then started shaking and then the right side went weak. 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       In the ED:  Initial vital signs: T: 98.8F, HR: 55, BP: 143/74, R: 16, SpO2: 97% on RA  Labs: significant for hg 12.1, cmp lyts wnl, covid negative   Imaging: CT head No acute fracture or malalignment of the cervical spine.CT c spine no contrast: No acute fracture or malalignment of the cervical spine.  CXR: No acute cardiopulmonary disease process.   EKG:sinus bradycardia   Medications: keppra 1250mg po, vimpat 50mg po  Consults: Neurology     (26 Oct 2021 22:33)      PAST MEDICAL & SURGICAL HISTORY:  Hypertension    CVA (cerebral vascular accident)    Dementia, old age    Anemia    Seizure    H/O hernia repair        MEDICATIONS  (STANDING):  albuterol/ipratropium for Nebulization 3 milliLiter(s) Nebulizer every 6 hours  aspirin enteric coated 81 milliGRAM(s) Oral daily  atorvastatin 40 milliGRAM(s) Oral at bedtime  clopidogrel Tablet 75 milliGRAM(s) Oral daily  donepezil 5 milliGRAM(s) Oral at bedtime  enoxaparin Injectable 40 milliGRAM(s) SubCutaneous at bedtime  lacosamide IVPB 50 milliGRAM(s) IV Intermittent every 12 hours  levETIRAcetam  IVPB 1500 milliGRAM(s) IV Intermittent every 12 hours  lisinopril 20 milliGRAM(s) Oral daily    MEDICATIONS  (PRN):        Social History:                -  Home Living Status:  lives with his wife in a walk up apartment         -  Prior Home Care Services:   none          Baseline Functional Level Prior to Admission:             - ADL's/ IADL's:  independent         - ambulatory status PTA:  walked with a cane    FAMILY HISTORY:  Family history of early CAD        CBC Full  -  ( 29 Oct 2021 06:27 )  WBC Count : 4.80 K/uL  RBC Count : 4.48 M/uL  Hemoglobin : 11.9 g/dL  Hematocrit : 37.1 %  Platelet Count - Automated : 227 K/uL  Mean Cell Volume : 82.8 fl  Mean Cell Hemoglobin : 26.6 pg  Mean Cell Hemoglobin Concentration : 32.1 gm/dL  Auto Neutrophil # : x  Auto Lymphocyte # : x  Auto Monocyte # : x  Auto Eosinophil # : x  Auto Basophil # : x  Auto Neutrophil % : x  Auto Lymphocyte % : x  Auto Monocyte % : x  Auto Eosinophil % : x  Auto Basophil % : x      10-29    143  |  106  |  14  ----------------------------<  95  3.9   |  26  |  0.73    Ca    9.1      29 Oct 2021 06:27              Radiology:    < from: CT Head No Cont (10.26.21 @ 16:59) >  EXAM:  CT CERVICAL SPINE                          EXAM:  CT BRAIN                          PROCEDURE DATE:  10/26/2021          INTERPRETATION:  Kathrin FORD MD, have reviewed the images and the report and agree with the finings, with the following modification:    Evaluation of the brain demonstrates generalized volume loss. There are chronic lacunar infarcts involving the basal ganglia, thalamus and corona radiata bilaterally.    Evaluation of the cervical spine demonstrates a small central disc herniations at C2/3 and C3/4. There is loss intervertebral disc space height at the C6/7 with osseous ridging with disc bulge indenting the thecal sac. There is no spinal canal stenosis or neural foramen narrowing.    ******PRELIMINARY REPORT******    INDICATIONS: seizure/ fall 2 days ago    TECHNIQUE:  Serial axial images were obtained from the skull base to the vertex without the use of intravenous contrast. Coronal and sagittal reformatted images were obtained.    COMPARISON EXAMINATION: Head CT 9/16/2021    FINDINGS:  VENTRICLES AND SULCI: The ventricles and sulci are within normal limits.  INTRA-AXIAL: No intracranial mass, acute hemorrhage, or midline shift is present. There is preservation of the gray-white matter differentiation without evidence of acute transcortical infarction. Unchanged subcortical and periventricular-white matter hypoattenuation, nonspecific but favored to reflect sequela of chronic microvascular ischemia. There are again multiple chronic lacunar infarcts throughout subcortical white matter and basal ganglia/ right thalamus similar to prior exam.  EXTRA-AXIAL: No extra-axial fluid collection is present.  VISUALIZED SINUSES: No air-fluid levels are identified.  VISUALIZED MASTOIDS:  Clear.  CALVARIUM:  Normal.      IMPRESSION:  No acute intracranial hemorrhage or mass effect.        ----------------------------------------------    CERVICAL SPINE CT WITHOUT CONTRAST dated 10/26/2021 4:59 PM    CLINICAL STATEMENT: Seizure/ fall 2 days ago    TECHNIQUE: Contiguous noncontrast transaxial CT images were obtained through the cervical spine. Reconstructions were then created in the axial, sagittal, and coronal planes.    COMPARISON: None.    FINDINGS:    The cervical alignment is maintained without spondylolisthesis. No acute fracture is identified. The craniocervical association is intact. The vertebral bodyand disk space heights are preserved aside from mild intervertebral disc space narrowing at C6-7. No prevertebral soft tissue swelling is demonstrated.    No large disc herniations are seen. Few small disc protrusions without central spinal canal stenosis. No high-grade neural foraminal narrowing.    Emphysematous change of the visualized lung apices.      IMPRESSION:  No acute fracture or malalignment of the cervical spine.                Vital Signs Last 24 Hrs  T(C): 36.8 (30 Oct 2021 06:08), Max: 37.2 (29 Oct 2021 16:15)  T(F): 98.2 (30 Oct 2021 06:08), Max: 98.9 (29 Oct 2021 16:15)  HR: 74 (30 Oct 2021 06:08) (56 - 74)  BP: 170/81 (30 Oct 2021 06:08) (128/73 - 170/81)  BP(mean): 94 (29 Oct 2021 12:20) (94 - 94)  RR: 18 (30 Oct 2021 06:08) (15 - 18)  SpO2: 95% (30 Oct 2021 06:08) (93% - 96%)        REVIEW OF SYSTEMS:     CONSTITUTIONAL: No fever, weight loss, or fatigue  EYES: No eye pain, visual disturbances, or discharge  ENMT:  No difficulty hearing, tinnitus, vertigo; No sinus or throat pain  NECK: No pain or stiffness  BREASTS: No pain, masses, or nipple discharge  RESPIRATORY: No cough, wheezing, chills or hemoptysis; No shortness of breath  CARDIOVASCULAR: No chest pain, palpitations, dizziness, or leg swelling  GASTROINTESTINAL: No abdominal or epigastric pain. No nausea, vomiting, or hematemesis; No diarrhea or constipation. No melena or hematochezia.  GENITOURINARY: No dysuria, frequency, hematuria, or incontinence  NEUROLOGICAL: as per HPI  SKIN: No itching, burning, rashes, or lesions   LYMPH NODES: No enlarged glands  ENDOCRINE: No heat or cold intolerance; No hair loss  MUSCULOSKELETAL: No joint pain or swelling; No muscle, back, or extremity pain  PSYCHIATRIC: No depression, anxiety, mood swings, or difficulty sleeping  HEME/LYMPH: No easy bruising, or bleeding gums  ALLERGY AND IMMUNOLOGIC: No hives or eczema  VASCULAR: no swelling, erythema,           Physical Exam:  WDWN 60 yo  gentleman lying in semi Cerna's position, awake, alert, vEEG ongoing, no acute complaints    Head: normocephalic, atraumatic    Eyes: PERRLA, EOMI, no nystagmus, sclera anicteric    ENT: nasal discharge, uvula midline, no oropharyngeal erythema/exudate    Neck: supple, negative JVD, negative carotid bruits, no thyromegaly    Chest: CTA bilaterally, neg wheeze/ rhonchi/ rales/ crackles/ egophany    Cardiovascular: regular rate and rhythm, neg murmurs/rubs/gallops    Abdomen: soft, non distended, non tender to palpation in all 4 quadrants, negative rebound/guarding, normal bowel sounds    Extremities: WWP, neg cyanosis/clubbing/edema, negative calf tenderness to palpation, negative Tresa's sign    Neurologic Exam:    Alert and oriented to person, place, date/year, speech fluent w/o dysarthria, follows commands, recent and remote memory intact, repetition intact, comprehension intact,  attention/concentration intact, fund of knowledge appropriate    Cranial Nerves:     II:                         pupils equal, round and reactive to light, visual fields intact   III/ IV/VI:               extraocular movements intact, neg nystagmus, neg ptosis  V:                        facial sensation intact, V1-3 normal  VII:                      face symmetric, no droop, normal eye closure and smile  VIII:                     hearing intact to finger rub bilaterally  IX and X:             no hoarseness, gag intact, palate/ uvula rise symmetrically  XI:                       SCM/ trapezius strength intact bilateral  XII:                      no tongue deviation    Motor Exam:    Right UE:              : 5/5                             wrist extensors/ flexors: 5/5                             biceps:   5/5                             triceps:  5/5                             deltoid:  5/5                             pronator drift: neg    Left UE:                :  5/5                             wrist extensors/ flexors:  5/5                             biceps:    5/5                             triceps:   5/5                             deltoid:  5/5                             pronator drift: neg      Right LE:              dorsiflexors:  5/5                             plantar flexors:  5/5                             quadriceps:  5/5                             hamstrings:  5/5                             hip flexors:  5/5    Left LE:               dorsiflexors:  5/5                            plantar flexors:  5/5                            quadriceps:  5/5                            hamstrings:  5/5                            hip flexors:  5/5               Sensation:           intact to light touch x 4 extremities                            No neglect or extinction on double simultaneous testing                       DTR:                  biceps/brachioradialis: equal                                                      patella/ankle: equal                                                       neg clonus                           neg Babinski                        Coordination:                            Finger to Nose:  neg dysmetria bilaterally                           Gait:  not tested        PM&R Impression:    1) seizure  2) no focal weakness    Recommendations/ Plan :    1) Physical therapy focusing on therapeutic exercises, bed mobility/transfer out of bed evaluation, progressive ambulation with assistive devices prn.    2) Anticipated Disposition Plan/Recs:    probable d/c home

## 2021-10-30 NOTE — CONSULT NOTE ADULT - ASSESSMENT
per Neurology    60 y/o M, former smoker, PMHx CVA (loop recorder, on Plavix and 81mg ASA, no residual deficits), seizures (on Keppra and vimpat), HTN, iron deficiency anemia, dementia, SHx hernia repair, Sent to ED by neurologist Dr. Guillen after seizures on Sunday with right sided weakness afterwards. CTH negative. Patient had witnessed seizure on 10/26, admitted to MICU for close monitoring and being followed by epilepsy.     Recommend:    - MRA head and neck with DWI and FLAIR - would consider MRI with contrast studies to further determine etiology of seizures if noncontrast studies are unrevealing  - a1 hour neuro checks in ICU, q8h neuro checks once downgraded  - STAT CTH for any acute change in mental status   - EEG   -Epilepsy to follow  
60 y/o M, former smoker, PMHx CVA (loop recorder, on Plavix and 81mg ASA, no residual deficits), seizures (on Keppra, changed from 1000mg BID to 1250 BID + Vimpat BID), HTN, iron deficiency anemia, dementia, SHx hernia repair, Sent to ED by neurologist Dr. Guillen for concern of possible stroke vs TIA on Sunday. CTH negative. Symptoms suggestive of seizure/ Todds paralysis, less likely stroke or TIA. Patient admitted to epilepsy for AED medication management and EEG.     Recommend:    - MRA head and neck with DWI and FLAIR   - q8h neuro checks   - STAT CTH for any acute change in mental status   - EEG 
61 year-old male w/ PMH of CVA (loop recorder, on Plavix and 81mg ASA, no residual deficits), seizures (on Keppra, and vimpat), HTN, iron deficiency anemia, dementia, and hernia repair who was initially sent to ER for suspected TIA given R-sided weakness, and was subsequently admitted for seizures (witnessed event in ED required 8mg ativan).     Recommendations:  - Continue vEEG monitoring  - Continue Keppra 1500mg Q12hrs  - Continue Vimpat 50mg Q12hrs  - Maintain seizure and fall precautions

## 2021-10-30 NOTE — PROGRESS NOTE ADULT - SUBJECTIVE AND OBJECTIVE BOX
Neurology Progress Note    HPI:  HPI: HPI: 60 y/o M, former smoker, PMHx CVA (loop recorder, on Plavix and 81mg ASA, no residual deficits), seizures (on Keppra, changed from 1000mg BID to 1250 BID + Vimpat BID), HTN, iron deficiency anemia, dementia, SHx hernia repair, Sent to ED by neurologist Dr. Guillen for concern of possible stroke vs TIA on Sunday. Per wife, Pt had a seizure two nights ago (Sunday) that was witnessed by son, and is his second episode in the past 2 weeks, was GTC typical for pt. He was given Keppra and his symptoms resolved after 5 minutes. Pt was sitting in his chair, no head injury or LOC. He was not brought to the hospital after his seizure episode. Afterwards at 8PM, Pt began to right sided facial, upper and lower extremity numbness and weakness in the right side. No facial droop. According to wife, Pt appeared to be confused afterwards - these sx resolved spontaneously within 1 hour and have not recurred since then.  Comes to ED today for further evaluation. Denies chest pain, SOB, n/v/d, abdominal pain, bloody stools, leg swelling, and rashes Last CVA 1 year ago with LOC. Pt is compliant with medications.  Scheduled for an endoscopy on 11/11/2021.     Stroke neuro consulted for right sided weakness after seizure. As per wife, "the patient has been having shaking seizures more often so they increased his keppra and added vimpat. Then he had an episode where he stared off to the right then started shaking and then the right side went weak. 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     Interval History:    Patient seen and examined at bedside. There were no acute events overnight. The patient states he is feeling well, offers no complaints.       PAST MEDICAL & SURGICAL HISTORY:  Hypertension  CVA (cerebral vascular accident)  Dementia, old age  Anemia  Seizure  H/O hernia repair      Medications:  albuterol/ipratropium for Nebulization 3 milliLiter(s) Nebulizer every 6 hours  aspirin enteric coated 81 milliGRAM(s) Oral daily  atorvastatin 40 milliGRAM(s) Oral at bedtime  clopidogrel Tablet 75 milliGRAM(s) Oral daily  donepezil 5 milliGRAM(s) Oral at bedtime  enoxaparin Injectable 40 milliGRAM(s) SubCutaneous at bedtime  lacosamide IVPB 50 milliGRAM(s) IV Intermittent every 12 hours  levETIRAcetam  IVPB 1500 milliGRAM(s) IV Intermittent every 12 hours  lisinopril 20 milliGRAM(s) Oral daily      Vital Signs Last 24 Hrs  T(C): 36.8 (30 Oct 2021 06:08), Max: 37.2 (29 Oct 2021 16:15)  T(F): 98.2 (30 Oct 2021 06:08), Max: 98.9 (29 Oct 2021 16:15)  HR: 74 (30 Oct 2021 06:08) (58 - 74)  BP: 170/81 (30 Oct 2021 06:08) (155/81 - 170/81)  RR: 18 (30 Oct 2021 06:08) (16 - 18)  SpO2: 95% (30 Oct 2021 06:08) (93% - 95%)      Neurological Exam:   Mental status: Awake, alert and oriented x2.  No dysarthria, no aphasia.    Cranial nerves: Pupils equally round and reactive to light, visual fields full, no nystagmus, extraocular muscles intact, V1 through V3 intact bilaterally and symmetric, face symmetric, hearing intact to finger rub, palate elevation symmetric, tongue was midline.  Motor:  MRC grading 5/5 b/l UE/LE.   strength 5/5.  Normal tone and bulk.  No abnormal movements.    Sensation: Intact to light touch, proprioception, and pinprick.   Coordination: No dysmetria on finger-to-nose and heel-to-shin.  No dysdiadokinesia.  Reflexes: 2+ in bilateral UE/LE, downgoing toes bilaterally. (-) Ho.      Labs:  CBC Full  -  ( 29 Oct 2021 06:27 )  WBC Count : 4.80 K/uL  RBC Count : 4.48 M/uL  Hemoglobin : 11.9 g/dL  Hematocrit : 37.1 %  Platelet Count - Automated : 227 K/uL  Mean Cell Volume : 82.8 fl  Mean Cell Hemoglobin : 26.6 pg  Mean Cell Hemoglobin Concentration : 32.1 gm/dL      10-29    143  |  106  |  14  ----------------------------<  95  3.9   |  26  |  0.73    Ca    9.1      29 Oct 2021 06:27

## 2021-10-31 LAB — GLUCOSE BLDC GLUCOMTR-MCNC: 176 MG/DL — HIGH (ref 70–99)

## 2021-10-31 PROCEDURE — 99232 SBSQ HOSP IP/OBS MODERATE 35: CPT

## 2021-10-31 PROCEDURE — 95720 EEG PHY/QHP EA INCR W/VEEG: CPT

## 2021-10-31 RX ADMIN — Medication 81 MILLIGRAM(S): at 13:16

## 2021-10-31 RX ADMIN — ATORVASTATIN CALCIUM 40 MILLIGRAM(S): 80 TABLET, FILM COATED ORAL at 22:58

## 2021-10-31 RX ADMIN — LACOSAMIDE 100 MILLIGRAM(S): 50 TABLET ORAL at 09:52

## 2021-10-31 RX ADMIN — Medication 3 MILLILITER(S): at 13:17

## 2021-10-31 RX ADMIN — LEVETIRACETAM 400 MILLIGRAM(S): 250 TABLET, FILM COATED ORAL at 18:57

## 2021-10-31 RX ADMIN — LEVETIRACETAM 400 MILLIGRAM(S): 250 TABLET, FILM COATED ORAL at 08:19

## 2021-10-31 RX ADMIN — LACOSAMIDE 100 MILLIGRAM(S): 50 TABLET ORAL at 22:58

## 2021-10-31 RX ADMIN — ENOXAPARIN SODIUM 40 MILLIGRAM(S): 100 INJECTION SUBCUTANEOUS at 22:58

## 2021-10-31 RX ADMIN — DONEPEZIL HYDROCHLORIDE 5 MILLIGRAM(S): 10 TABLET, FILM COATED ORAL at 02:22

## 2021-10-31 RX ADMIN — Medication 3 MILLILITER(S): at 06:52

## 2021-10-31 RX ADMIN — CLOPIDOGREL BISULFATE 75 MILLIGRAM(S): 75 TABLET, FILM COATED ORAL at 13:16

## 2021-10-31 RX ADMIN — Medication 5 MILLIGRAM(S): at 22:58

## 2021-10-31 RX ADMIN — DONEPEZIL HYDROCHLORIDE 5 MILLIGRAM(S): 10 TABLET, FILM COATED ORAL at 22:58

## 2021-10-31 RX ADMIN — LISINOPRIL 20 MILLIGRAM(S): 2.5 TABLET ORAL at 06:52

## 2021-10-31 NOTE — PROGRESS NOTE ADULT - SUBJECTIVE AND OBJECTIVE BOX
OVERNIGHT EVENTS: NADIA    SUBJECTIVE / INTERVAL HPI: Patient seen and examined at bedside. Pt denies any complaints. Feels well. Denies any headaches, dizziness, light headedness, weakness, or numbness.     VITAL SIGNS:  Vital Signs Last 24 Hrs  T(C): 36.7 (30 Oct 2021 21:32), Max: 36.7 (30 Oct 2021 21:32)  T(F): 98 (30 Oct 2021 21:32), Max: 98 (30 Oct 2021 21:32)  HR: 74 (30 Oct 2021 21:32) (74 - 74)  BP: 160/74 (30 Oct 2021 21:32) (160/74 - 160/74)  BP(mean): --  RR: 16 (30 Oct 2021 21:32) (16 - 16)  SpO2: 96% (30 Oct 2021 21:32) (96% - 96%)    PHYSICAL EXAM:    General: WDWN, NAD, resting comfortably in bed  Neurological:   Neurologic:     -Mental Status: AAOx3. Speech is fluent with intact naming, repetition, and comprehension, no dysarthria. Recent and remote memory intact. Follows commands. Attention/concentration intact. Fund of knowledge appropriate.     -Cranial Nerves:          II: Visual fields are full to confrontation.          III, IV, VI: EOMI without nystagmus. PERRL b/l          V:  Facial sensation V1-V3 equal and intact           VII: Face is symmetric with normal eye closure and smile          VIII: Hearing is bilaterally intact to finger rub          IX, X: Uvula is midline and soft palate rises symmetrically          XI: Head turning and shoulder shrug are intact.          XII: Tongue protrudes midline     -Motor: Normal bulk and tone. Strength bilateral upper extremity 5/5, bilateral lower extremities 5/5.                    Rapid alternating movements intact and symmetric     -Sensation: Intact to light touch bilaterally.     -Coordination: No dysmetria on finger-to-nose.     -Reflexes: Reflexes 2+ b/l UE/LE. Downgoing toes bilaterally     MEDICATIONS:  MEDICATIONS  (STANDING):  albuterol/ipratropium for Nebulization 3 milliLiter(s) Nebulizer every 6 hours  aspirin enteric coated 81 milliGRAM(s) Oral daily  atorvastatin 40 milliGRAM(s) Oral at bedtime  clopidogrel Tablet 75 milliGRAM(s) Oral daily  donepezil 5 milliGRAM(s) Oral at bedtime  enoxaparin Injectable 40 milliGRAM(s) SubCutaneous at bedtime  lacosamide 100 milliGRAM(s) Oral two times a day  levETIRAcetam  IVPB 1250 milliGRAM(s) IV Intermittent every 12 hours  lisinopril 20 milliGRAM(s) Oral daily  melatonin 5 milliGRAM(s) Oral at bedtime    MEDICATIONS  (PRN):      ALLERGIES:  Allergies    No Known Allergies    Intolerances        LABS:              CAPILLARY BLOOD GLUCOSE      POCT Blood Glucose.: 176 mg/dL (31 Oct 2021 12:36)      RADIOLOGY & ADDITIONAL TESTS: Reviewed.

## 2021-11-01 ENCOUNTER — APPOINTMENT (OUTPATIENT)
Dept: HEART AND VASCULAR | Facility: CLINIC | Age: 61
End: 2021-11-01

## 2021-11-01 ENCOUNTER — APPOINTMENT (OUTPATIENT)
Dept: MRI IMAGING | Facility: HOSPITAL | Age: 61
End: 2021-11-01

## 2021-11-01 LAB
ANISOCYTOSIS BLD QL: SLIGHT — SIGNIFICANT CHANGE UP
APPEARANCE UR: CLEAR — SIGNIFICANT CHANGE UP
BACTERIA # UR AUTO: PRESENT /HPF
BILIRUB UR-MCNC: NEGATIVE — SIGNIFICANT CHANGE UP
COLOR SPEC: YELLOW — SIGNIFICANT CHANGE UP
DIFF PNL FLD: NEGATIVE — SIGNIFICANT CHANGE UP
EPI CELLS # UR: SIGNIFICANT CHANGE UP /HPF (ref 0–5)
GIANT PLATELETS BLD QL SMEAR: PRESENT — SIGNIFICANT CHANGE UP
GLUCOSE UR QL: 500
HCT VFR BLD CALC: 41.5 % — SIGNIFICANT CHANGE UP (ref 39–50)
HGB BLD-MCNC: 13.2 G/DL — SIGNIFICANT CHANGE UP (ref 13–17)
KETONES UR-MCNC: NEGATIVE — SIGNIFICANT CHANGE UP
LEUKOCYTE ESTERASE UR-ACNC: NEGATIVE — SIGNIFICANT CHANGE UP
MANUAL SMEAR VERIFICATION: SIGNIFICANT CHANGE UP
MCHC RBC-ENTMCNC: 26.1 PG — LOW (ref 27–34)
MCHC RBC-ENTMCNC: 31.8 GM/DL — LOW (ref 32–36)
MCV RBC AUTO: 82 FL — SIGNIFICANT CHANGE UP (ref 80–100)
MICROCYTES BLD QL: SLIGHT — SIGNIFICANT CHANGE UP
NITRITE UR-MCNC: NEGATIVE — SIGNIFICANT CHANGE UP
OVALOCYTES BLD QL SMEAR: SLIGHT — SIGNIFICANT CHANGE UP
PH UR: 5.5 — SIGNIFICANT CHANGE UP (ref 5–8)
PLAT MORPH BLD: ABNORMAL
PLATELET # BLD AUTO: 233 K/UL — SIGNIFICANT CHANGE UP (ref 150–400)
POIKILOCYTOSIS BLD QL AUTO: SLIGHT — SIGNIFICANT CHANGE UP
PROT UR-MCNC: ABNORMAL MG/DL
RBC # BLD: 5.06 M/UL — SIGNIFICANT CHANGE UP (ref 4.2–5.8)
RBC # FLD: SIGNIFICANT CHANGE UP (ref 10.3–14.5)
RBC BLD AUTO: ABNORMAL
RBC CASTS # UR COMP ASSIST: < 5 /HPF — SIGNIFICANT CHANGE UP
SP GR SPEC: >=1.03 — SIGNIFICANT CHANGE UP (ref 1–1.03)
UROBILINOGEN FLD QL: 0.2 E.U./DL — SIGNIFICANT CHANGE UP
VARIANT LYMPHS # BLD: 2.6 % — SIGNIFICANT CHANGE UP (ref 0–6)
WBC # BLD: 5.67 K/UL — SIGNIFICANT CHANGE UP (ref 3.8–10.5)
WBC # FLD AUTO: 5.67 K/UL — SIGNIFICANT CHANGE UP (ref 3.8–10.5)
WBC UR QL: < 5 /HPF — SIGNIFICANT CHANGE UP

## 2021-11-01 PROCEDURE — 99233 SBSQ HOSP IP/OBS HIGH 50: CPT

## 2021-11-01 PROCEDURE — 95720 EEG PHY/QHP EA INCR W/VEEG: CPT

## 2021-11-01 RX ORDER — SENNA PLUS 8.6 MG/1
2 TABLET ORAL AT BEDTIME
Refills: 0 | Status: DISCONTINUED | OUTPATIENT
Start: 2021-11-01 | End: 2021-11-09

## 2021-11-01 RX ADMIN — ATORVASTATIN CALCIUM 40 MILLIGRAM(S): 80 TABLET, FILM COATED ORAL at 21:59

## 2021-11-01 RX ADMIN — LACOSAMIDE 100 MILLIGRAM(S): 50 TABLET ORAL at 21:59

## 2021-11-01 RX ADMIN — Medication 81 MILLIGRAM(S): at 12:01

## 2021-11-01 RX ADMIN — Medication 3 MILLILITER(S): at 23:26

## 2021-11-01 RX ADMIN — Medication 3 MILLILITER(S): at 00:22

## 2021-11-01 RX ADMIN — LEVETIRACETAM 400 MILLIGRAM(S): 250 TABLET, FILM COATED ORAL at 17:46

## 2021-11-01 RX ADMIN — Medication 3 MILLILITER(S): at 12:01

## 2021-11-01 RX ADMIN — ENOXAPARIN SODIUM 40 MILLIGRAM(S): 100 INJECTION SUBCUTANEOUS at 22:00

## 2021-11-01 RX ADMIN — Medication 5 MILLIGRAM(S): at 22:00

## 2021-11-01 RX ADMIN — Medication 3 MILLILITER(S): at 17:47

## 2021-11-01 RX ADMIN — LACOSAMIDE 100 MILLIGRAM(S): 50 TABLET ORAL at 09:30

## 2021-11-01 RX ADMIN — SENNA PLUS 2 TABLET(S): 8.6 TABLET ORAL at 21:59

## 2021-11-01 RX ADMIN — Medication 3 MILLILITER(S): at 06:19

## 2021-11-01 RX ADMIN — CLOPIDOGREL BISULFATE 75 MILLIGRAM(S): 75 TABLET, FILM COATED ORAL at 12:02

## 2021-11-01 RX ADMIN — LISINOPRIL 20 MILLIGRAM(S): 2.5 TABLET ORAL at 06:18

## 2021-11-01 RX ADMIN — DONEPEZIL HYDROCHLORIDE 5 MILLIGRAM(S): 10 TABLET, FILM COATED ORAL at 22:00

## 2021-11-01 RX ADMIN — LEVETIRACETAM 400 MILLIGRAM(S): 250 TABLET, FILM COATED ORAL at 07:15

## 2021-11-01 NOTE — PHYSICAL THERAPY INITIAL EVALUATION ADULT - ADDITIONAL COMMENTS
ground floor 12-14 steps to enter ground floor 12-14 steps to enter, independent prior to arrival, no falls in past year

## 2021-11-01 NOTE — PHYSICAL THERAPY INITIAL EVALUATION ADULT - IMPAIRMENTS FOUND, PT EVAL
aerobic capacity/endurance/gait, locomotion, and balance/muscle strength/neuromotor development and sensory integration/poor safety awareness

## 2021-11-01 NOTE — PROGRESS NOTE ADULT - ASSESSMENT
This is a 61 year-old male w/ PMH of CVA (loop recorder, on Plavix and 81mg ASA, no residual deficits), seizures (on Keppra, and vimpat), HTN, iron deficiency anemia, dementia, and hernia repair who was initially sent to ER for R-sided weakness, and seizures (witnessed event in ED required 8mg ativan). Now stable for stepdown from [facility] to [facility] for further VEEG monitoring.     Plan:    - Given confusion in this setting, could be related to his cognitive decline / dementia compounded by hospital acquired delirium. UA neg. No signs of infectious process  - EEG monitoring showed no seizures  - MRA head and neck with DWI and FLAIR, MR Brain imaging reviewed  - C/w Keppra to 1250 mg IVPB q12h  - C/w Vimpat to 100 mg IVPB q12h  - C/w home ASA 81 mg PO daily  - C/w home Plavix 75 mg PO daily  - C/w home Atorvastatin 40 mg at bedtime  - C/w home donepezil 5 mg at bedtime  - q4h neuro checks

## 2021-11-01 NOTE — PHYSICAL THERAPY INITIAL EVALUATION ADULT - PERTINENT HX OF CURRENT PROBLEM, REHAB EVAL
61M , Sent to ED by neurologist Dr. Guillen for concern of possible stroke vs TIA on Sunday. Per wife, Pt had a seizure two nights ago (Sunday) that was witnessed by son, and is his second episode in the past 2 weeks, was GTC typical for pt. He was given Keppra and his symptoms resolved after 5 minutes.

## 2021-11-01 NOTE — PHYSICAL THERAPY INITIAL EVALUATION ADULT - IMPAIRMENTS CONTRIBUTING TO GAIT DEVIATIONS, PT EVAL
ataxic/impaired balance/cognition/impaired coordination/impaired postural control/decreased strength

## 2021-11-01 NOTE — PROGRESS NOTE ADULT - SUBJECTIVE AND OBJECTIVE BOX
Neurology Progress Note    HPI: HPI: 60 y/o M, former smoker, PMHx CVA (loop recorder, on Plavix and 81mg ASA, no residual deficits), seizures (on Keppra, changed from 1000mg BID to 1250 BID + Vimpat BID), HTN, iron deficiency anemia, dementia, SHx hernia repair, Sent to ED by neurologist Dr. Guillen for concern of possible stroke vs TIA on Sunday. Per wife, Pt had a seizure two nights ago (Sunday) that was witnessed by son, and is his second episode in the past 2 weeks, was GTC typical for pt. He was given Keppra and his symptoms resolved after 5 minutes. Pt was sitting in his chair, no head injury or LOC. He was not brought to the hospital after his seizure episode. Afterwards at 8PM, Pt began to right sided facial, upper and lower extremity numbness and weakness in the right side. No facial droop. According to wife, Pt appeared to be confused afterwards - these sx resolved spontaneously within 1 hour and have not recurred since then.  Comes to ED today for further evaluation. Denies chest pain, SOB, n/v/d, abdominal pain, bloody stools, leg swelling, and rashes Last CVA 1 year ago with LOC. Pt is compliant with medications.  Scheduled for an endoscopy on 11/11/2021.     Stroke neuro consulted for right sided weakness after seizure. As per wife, "the patient has been having shaking seizures more often so they increased his keppra and added vimpat. Then he had an episode where he stared off to the right then started shaking and then the right side went weak. 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     Interval History:    Patient seen and examined at bedside. Overnight, patient had one large bowel movement that was dark. CBC was sent. No gross blood or melena. Patient overall states he feels fine, offers no complaints.        PAST MEDICAL & SURGICAL HISTORY:  Hypertension  CVA (cerebral vascular accident)  Dementia, old age  Anemia  Seizure  H/O hernia repair      Medications:  albuterol/ipratropium for Nebulization 3 milliLiter(s) Nebulizer every 6 hours  aspirin enteric coated 81 milliGRAM(s) Oral daily  atorvastatin 40 milliGRAM(s) Oral at bedtime  clopidogrel Tablet 75 milliGRAM(s) Oral daily  donepezil 5 milliGRAM(s) Oral at bedtime  enoxaparin Injectable 40 milliGRAM(s) SubCutaneous at bedtime  lacosamide 100 milliGRAM(s) Oral two times a day  levETIRAcetam  IVPB 1250 milliGRAM(s) IV Intermittent every 12 hours  lisinopril 20 milliGRAM(s) Oral daily  melatonin 5 milliGRAM(s) Oral at bedtime  senna 2 Tablet(s) Oral at bedtime      Vital Signs Last 24 Hrs  T(C): 36.8 (01 Nov 2021 12:05), Max: 37.2 (01 Nov 2021 05:52)  T(F): 98.3 (01 Nov 2021 12:05), Max: 98.9 (01 Nov 2021 05:52)  HR: 73 (01 Nov 2021 12:05) (68 - 73)  BP: 108/65 (01 Nov 2021 12:05) (108/65 - 150/82)  RR: 18 (01 Nov 2021 12:05) (18 - 19)  SpO2: 96% (01 Nov 2021 12:05) (95% - 96%)      Neurological Exam:   Mental status: Awake, alert and oriented x1 to self. No dysarthria, no aphasia. Appears pleasantly confused.   Cranial nerves: Pupils equally round and reactive to light, visual fields full, no nystagmus, extraocular muscles intact, V1 through V3 intact bilaterally and symmetric, face symmetric, hearing intact to finger rub, palate elevation symmetric, tongue was midline.  Motor:  MRC grading 5/5 b/l UE/LE.   strength 5/5.  Normal tone and bulk.  No abnormal movements.    Sensation: Intact to light touch, proprioception, and pinprick.   Coordination: No dysmetria on finger-to-nose and heel-to-shin.  No dysdiadokinesia.  Reflexes: 2+ in bilateral UE/LE, downgoing toes bilaterally. (-) Ho.      Labs:  CBC Full  -  ( 01 Nov 2021 10:37 )  WBC Count : 5.67 K/uL  RBC Count : 5.06 M/uL  Hemoglobin : 13.2 g/dL  Hematocrit : 41.5 %  Platelet Count - Automated : 233 K/uL  Mean Cell Volume : 82.0 fl  Mean Cell Hemoglobin : 26.1 pg  Mean Cell Hemoglobin Concentration : 31.8 gm/dL      Urinalysis Basic - ( 01 Nov 2021 12:22 )    Color: Yellow / Appearance: Clear / SG: >=1.030 / pH: x  Gluc: x / Ketone: NEGATIVE  / Bili: Negative / Urobili: 0.2 E.U./dL   Blood: x / Protein: Trace mg/dL / Nitrite: NEGATIVE   Leuk Esterase: NEGATIVE / RBC: < 5 /HPF / WBC < 5 /HPF   Sq Epi: x / Non Sq Epi: 0-5 /HPF / Bacteria: Present /HPF

## 2021-11-01 NOTE — PHYSICAL THERAPY INITIAL EVALUATION ADULT - GAIT DEVIATIONS NOTED, PT EVAL
dec R step length, easily distracted, requires mod A to turn/decreased garrett/decreased step length/decreased weight-shifting ability

## 2021-11-02 ENCOUNTER — TRANSCRIPTION ENCOUNTER (OUTPATIENT)
Age: 61
End: 2021-11-02

## 2021-11-02 LAB
CULTURE RESULTS: SIGNIFICANT CHANGE UP
GLUCOSE BLDC GLUCOMTR-MCNC: 111 MG/DL — HIGH (ref 70–99)
GLUCOSE BLDC GLUCOMTR-MCNC: 115 MG/DL — HIGH (ref 70–99)
GLUCOSE BLDC GLUCOMTR-MCNC: 138 MG/DL — HIGH (ref 70–99)
SARS-COV-2 RNA SPEC QL NAA+PROBE: SIGNIFICANT CHANGE UP
SPECIMEN SOURCE: SIGNIFICANT CHANGE UP

## 2021-11-02 PROCEDURE — 99233 SBSQ HOSP IP/OBS HIGH 50: CPT

## 2021-11-02 PROCEDURE — 95720 EEG PHY/QHP EA INCR W/VEEG: CPT

## 2021-11-02 RX ORDER — QUETIAPINE FUMARATE 200 MG/1
12.5 TABLET, FILM COATED ORAL AT BEDTIME
Refills: 0 | Status: DISCONTINUED | OUTPATIENT
Start: 2021-11-02 | End: 2021-11-04

## 2021-11-02 RX ORDER — DONEPEZIL HYDROCHLORIDE 10 MG/1
1 TABLET, FILM COATED ORAL
Qty: 0 | Refills: 0 | DISCHARGE
Start: 2021-11-02

## 2021-11-02 RX ORDER — LACOSAMIDE 50 MG/1
1 TABLET ORAL
Qty: 60 | Refills: 0
Start: 2021-11-02 | End: 2021-12-01

## 2021-11-02 RX ORDER — LEVETIRACETAM 250 MG/1
2.5 TABLET, FILM COATED ORAL
Qty: 0 | Refills: 0 | DISCHARGE
Start: 2021-11-02

## 2021-11-02 RX ADMIN — LEVETIRACETAM 400 MILLIGRAM(S): 250 TABLET, FILM COATED ORAL at 06:27

## 2021-11-02 RX ADMIN — Medication 3 MILLILITER(S): at 18:47

## 2021-11-02 RX ADMIN — QUETIAPINE FUMARATE 12.5 MILLIGRAM(S): 200 TABLET, FILM COATED ORAL at 23:17

## 2021-11-02 RX ADMIN — SENNA PLUS 2 TABLET(S): 8.6 TABLET ORAL at 23:16

## 2021-11-02 RX ADMIN — LISINOPRIL 20 MILLIGRAM(S): 2.5 TABLET ORAL at 06:26

## 2021-11-02 RX ADMIN — Medication 81 MILLIGRAM(S): at 11:13

## 2021-11-02 RX ADMIN — Medication 3 MILLILITER(S): at 11:13

## 2021-11-02 RX ADMIN — CLOPIDOGREL BISULFATE 75 MILLIGRAM(S): 75 TABLET, FILM COATED ORAL at 11:13

## 2021-11-02 RX ADMIN — LACOSAMIDE 100 MILLIGRAM(S): 50 TABLET ORAL at 23:17

## 2021-11-02 RX ADMIN — Medication 5 MILLIGRAM(S): at 23:15

## 2021-11-02 RX ADMIN — ATORVASTATIN CALCIUM 40 MILLIGRAM(S): 80 TABLET, FILM COATED ORAL at 23:16

## 2021-11-02 RX ADMIN — Medication 3 MILLILITER(S): at 06:26

## 2021-11-02 RX ADMIN — ENOXAPARIN SODIUM 40 MILLIGRAM(S): 100 INJECTION SUBCUTANEOUS at 23:18

## 2021-11-02 RX ADMIN — DONEPEZIL HYDROCHLORIDE 5 MILLIGRAM(S): 10 TABLET, FILM COATED ORAL at 23:17

## 2021-11-02 RX ADMIN — LACOSAMIDE 100 MILLIGRAM(S): 50 TABLET ORAL at 09:40

## 2021-11-02 NOTE — DISCHARGE NOTE PROVIDER - NSDCFUSCHEDAPPT_GEN_ALL_CORE_FT
DARIELA TOM ; 11/11/2021 ; [facility] [address] DARIELA TOM ; 01/13/2022 ; [facility] [address]

## 2021-11-02 NOTE — PROGRESS NOTE ADULT - ASSESSMENT
This is a 61 year-old male w/ PMH of CVA (loop recorder, on Plavix and 81mg ASA, no residual deficits), seizures (on Keppra, and vimpat), HTN, iron deficiency anemia, dementia, and hernia repair who was initially sent to ER for R-sided weakness, and seizures (witnessed event in ED required 8mg ativan). Now stable for stepdown from [facility]U to [facility] for further VEEG monitoring.     Plan:    - Given confusion in this setting, could be related to his cognitive decline / dementia compounded by hospital acquired delirium. UA neg. No signs of infectious process.  - EEG monitoring showed no seizures  - MRA head and neck with DWI and FLAIR, MR Brain imaging reviewed  - Holding Keppra PM dose tonight  - Adding Seroquel 12.5 mg at bedtime  - Sending labs for autoimmune / paraneoplastic work up   - C/w Vimpat to 100 mg IVPB q12h  - C/w home ASA 81 mg PO daily  - C/w home Plavix 75 mg PO daily  - C/w home Atorvastatin 40 mg at bedtime  - C/w home Donepezil 5 mg at bedtime  - q4h neuro checks This is a 61 year-old male w/ PMH of CVA (loop recorder, on Plavix and 81mg ASA, no residual deficits), seizures (on Keppra, and vimpat), HTN, iron deficiency anemia, dementia, and hernia repair who was initially sent to ER for R-sided weakness, and seizures (witnessed event in ED required 8mg ativan). Now stable for stepdown from [facility]U to [facility] for further VEEG monitoring.     Plan:    - Given confusion in this setting, could be related to his cognitive decline / dementia compounded by hospital acquired delirium, and DDx includes neurotoxicity from medications, as they have been increased/added.  UA neg. No signs of infectious process.  - EEG monitoring showed no seizures  - MRA head and neck with DWI and FLAIR, MR Brain imaging reviewed  - Holding Keppra PM dose tonight  - Adding Seroquel 12.5 mg at bedtime  - Sending labs for autoimmune / paraneoplastic work up   - C/w Vimpat to 100 mg IVPB q12h  - C/w home ASA 81 mg PO daily  - C/w home Plavix 75 mg PO daily  - C/w home Atorvastatin 40 mg at bedtime  - C/w home Donepezil 5 mg at bedtime  - q4h neuro checks

## 2021-11-02 NOTE — DISCHARGE NOTE PROVIDER - HOSPITAL COURSE
This is a 61 year old male with a PMHx of CVA (loop recorder, on Plavix and 81mg ASA, no residual deficits), seizures (on Keppra, changed from 1000mg BID to 1250 BID + Vimpat BID), HTN, iron deficiency anemia, dementia, SHx hernia repair, Sent to ED by neurologist Dr. Guillen for concern of possible stroke vs TIA on Sunday. Per wife, Pt had a seizure two nights ago (Sunday) that was witnessed by son, and is his second episode in the past 2 weeks, was GTC typical for pt. He was given Keppra and his symptoms resolved after 5 minutes. Pt was sitting in his chair, no head injury or LOC. He was not brought to the hospital after his seizure episode. Afterwards at 8PM, Pt began to right sided facial, upper and lower extremity numbness and weakness in the right side. No facial droop. According to wife, Pt appeared to be confused afterwards - these sx resolved spontaneously within 1 hour and have not recurred since then.  Comes to ED today for further evaluation. Denies chest pain, SOB, n/v/d, abdominal pain, bloody stools, leg swelling, and rashes Last CVA 1 year ago with LOC. Pt is compliant with medications.     Stroke neuro consulted for right sided weakness after seizure. As per wife, "the patient has been having shaking seizures more often so they increased his keppra and added vimpat. Then he had an episode where he stared off to the right then started shaking and then the right side went weak. 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     The patient was connected to VEEG monitoring and did not have any evidence of clinical seizures. There was mild, right hemispheric slowing during wakefulness occasional during drowsy/sleep states suggesting mild underlying dysfunction. No epileptiform activity or significant clinical events occurred.     Due to cognitive decline, MOCA score assessed and patient scored 10/30. Also, an autoimmune and paraneoplastic work up was sent to test for additional causes of cognitive impairment. This is a 61 year old male with a PMHx of CVA (loop recorder, on Plavix and 81mg ASA, no residual deficits), seizures (on Keppra, recently changed from 1000mg BID to 1250 BID + added Vimpat 50 mg BID), HTN, iron deficiency anemia, dementia, SHx hernia repair, was sent to ED by neurologist Dr. Guillen neurologist for concern of seizures vs possible stroke vs TIA. Per wife, Pt had a seizure two nights ago (Sunday) that was witnessed by son, and is his second episode in the past 2 weeks, was generalized tonic clonic typical for pt. He was given Keppra and his symptoms resolved after 5 minutes. The pt was sitting in his chair, had no head injury or LOC. He was not brought to the hospital after this seizure episode. Afterwards, pt reportedly began to right sided facial, upper and lower extremity numbness and weakness in the right side. No facial droop. According to wife, Pt appeared to be confused afterwards - these sx resolved spontaneously within 1 hour and have not recurred since then.  Came to the ED today for further evaluation. Denies chest pain, SOB, n/v/d, abdominal pain, bloody stools, leg swelling, and rashes Last CVA 1 year ago with LOC. Pt is compliant with medications.     Stroke neuro consulted for right sided weakness after the seizure. As per wife, "the patient has been having shaking seizures more often recently so they increased his keppra and added vimpat. Then he had an episode where he stared off to the right then started shaking and then the right side went weak. 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     In the ED, the patient had 2 witnessed generalized tonic clonic seizures and a total of 8 mg IV ativan was administered. The seizures stopped at that time. The patient was admitted to the ICU for closer monitoring afterwards. The patient was connected to VEEG monitoring and did not have any evidence of clinical seizures for 9-10 days after the initial seizure in the ED. There was mild, right hemispheric slowing during wakefulness occasional during drowsy/sleep states suggesting mild underlying dysfunction. No epileptiform activity or significant clinical events occurred while on VEEG.     The patient was admitted to the EMU and his medications were adjusted. His Keppra was held 5 days into his admission for concern of worsening confusion and brain fog, and was ultimately removed. His vimpat was increased to 200 mg BID and he was continued on this dose.    Due to cognitive decline, MOCA score assessed and patient scored 10/30. Also, an autoimmune and paraneoplastic work up was sent to test for additional causes of cognitive impairment which the wife has said has progressed over the past year, and the results were unremarkable. His donepezil was increased to 10 mg and namenda 5 mg daily was added.    He was discharged home to acute rehab with follow up with Dr. Guillen. This is a 61 year old male with a PMHx of CVA (loop recorder, on Plavix and 81mg ASA, no residual deficits), seizures (on Keppra, recently changed from 1000mg BID to 1250 BID + added Vimpat 50 mg BID), HTN, iron deficiency anemia, dementia, SHx hernia repair, was sent to ED by neurologist Dr. Guillen neurologist for concern of seizures vs possible stroke vs TIA. Per wife, Pt had a seizure two nights ago (Sunday) that was witnessed by son, and is his second episode in the past 2 weeks, was generalized tonic clonic typical for pt. He was given Keppra and his symptoms resolved after 5 minutes. The pt was sitting in his chair, had no head injury or LOC. He was not brought to the hospital after this seizure episode. Afterwards, pt reportedly began to right sided facial, upper and lower extremity numbness and weakness in the right side. No facial droop. According to wife, Pt appeared to be confused afterwards - these sx resolved spontaneously within 1 hour and have not recurred since then.  Came to the ED today for further evaluation. Denies chest pain, SOB, n/v/d, abdominal pain, bloody stools, leg swelling, and rashes Last CVA 1 year ago with LOC. Pt is compliant with medications.     Stroke neuro consulted for right sided weakness after the seizure. As per wife, "the patient has been having shaking seizures more often recently so they increased his keppra and added vimpat. Then he had an episode where he stared off to the right then started shaking and then the right side went weak. 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     In the ED, the patient had 2 witnessed generalized tonic clonic seizures and a total of 8 mg IV ativan was administered. The seizures stopped at that time. The patient was admitted to the ICU for closer monitoring afterwards. The patient was connected to VEEG monitoring and did not have any evidence of clinical seizures for 9-10 days after the initial seizure in the ED. There was mild, right hemispheric slowing during wakefulness occasional during drowsy/sleep states suggesting mild underlying dysfunction.  The vEEG was continued due to continued significant changes in anticonvulsant medications.  No epileptiform activity or significant clinical events occurred while on VEEG.     The patient was admitted to the EMU and his medications were adjusted. His Keppra was held 5 days into his admission for concern of worsening confusion and brain fog, and was ultimately removed. His vimpat was increased to 200 mg BID and he was continued on this dose.    Due to cognitive decline, MOCA score assessed and patient scored 10/30.   There were some improvements, but the declines again.  Also, an autoimmune and paraneoplastic work up was sent to test for additional causes of cognitive impairment which the wife has said has progressed over the past year, and the results were unremarkable. His donepezil was increased to 10 mg and namenda 5 mg daily was added.    He was discharged home to acute rehab with follow up with Dr. Guillen.

## 2021-11-02 NOTE — PROGRESS NOTE ADULT - SUBJECTIVE AND OBJECTIVE BOX
Physical Medicine and Rehabilitation Progress Note:    Patient is a 61y old  Male who presents with a chief complaint of vEEG monitoring (02 Nov 2021 13:02)      HPI:  HPI: HPI: 60 y/o M, former smoker, PMHx CVA (loop recorder, on Plavix and 81mg ASA, no residual deficits), seizures (on Keppra, changed from 1000mg BID to 1250 BID + Vimpat BID), HTN, iron deficiency anemia, dementia, SHx hernia repair, Sent to ED by neurologist Dr. Guillen for concern of possible stroke vs TIA on Sunday. Per wife, Pt had a seizure two nights ago (Sunday) that was witnessed by son, and is his second episode in the past 2 weeks, was GTC typical for pt. He was given Keppra and his symptoms resolved after 5 minutes. Pt was sitting in his chair, no head injury or LOC. He was not brought to the hospital after his seizure episode. Afterwards at 8PM, Pt began to right sided facial, upper and lower extremity numbness and weakness in the right side. No facial droop. According to wife, Pt appeared to be confused afterwards - these sx resolved spontaneously within 1 hour and have not recurred since then.  Comes to ED today for further evaluation. Denies chest pain, SOB, n/v/d, abdominal pain, bloody stools, leg swelling, and rashes Last CVA 1 year ago with LOC. Pt is compliant with medications.  Scheduled for an endoscopy on 11/11/2021.     Stroke neuro consulted for right sided weakness after seizure. As per wife, "the patient has been having shaking seizures more often so they increased his keppra and added vimpat. Then he had an episode where he stared off to the right then started shaking and then the right side went weak. 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       In the ED:  Initial vital signs: T: 98.8F, HR: 55, BP: 143/74, R: 16, SpO2: 97% on RA  Labs: significant for hg 12.1, cmp lyts wnl, covid negative   Imaging: CT head No acute fracture or malalignment of the cervical spine.CT c spine no contrast: No acute fracture or malalignment of the cervical spine.  CXR: No acute cardiopulmonary disease process.   EKG:sinus bradycardia   Medications: keppra 1250mg po, vimpat 50mg po  Consults: Neurology     (26 Oct 2021 22:33)                            13.2   5.67  )-----------( 233      ( 01 Nov 2021 10:37 )             41.5             Vital Signs Last 24 Hrs  T(C): 37 (02 Nov 2021 11:11), Max: 37.1 (02 Nov 2021 06:49)  T(F): 98.6 (02 Nov 2021 11:11), Max: 98.7 (02 Nov 2021 06:49)  HR: 69 (02 Nov 2021 11:11) (69 - 80)  BP: 115/71 (02 Nov 2021 11:11) (115/71 - 129/84)  BP(mean): --  RR: 18 (02 Nov 2021 11:11) (18 - 18)  SpO2: 95% (02 Nov 2021 11:11) (93% - 95%)    MEDICATIONS  (STANDING):  albuterol/ipratropium for Nebulization 3 milliLiter(s) Nebulizer every 6 hours  aspirin enteric coated 81 milliGRAM(s) Oral daily  atorvastatin 40 milliGRAM(s) Oral at bedtime  clopidogrel Tablet 75 milliGRAM(s) Oral daily  donepezil 5 milliGRAM(s) Oral at bedtime  enoxaparin Injectable 40 milliGRAM(s) SubCutaneous at bedtime  lacosamide 100 milliGRAM(s) Oral two times a day  lisinopril 20 milliGRAM(s) Oral daily  melatonin 5 milliGRAM(s) Oral at bedtime  QUEtiapine 12.5 milliGRAM(s) Oral at bedtime  senna 2 Tablet(s) Oral at bedtime    MEDICATIONS  (PRN):    Currently Undergoing Physical/ Occupational Therapy at bedside.    Functional Status Assessment:         Previous Level of Function:     · Ambulation Skills  independent    · Transfer Skills  independent    · ADL Skills  independent    · Work/Leisure Activity  independent    · Additional Comments  ground floor 12-14 steps to enter, independent prior to arrival, no falls in past year            Cognitive Status Examination:   · Orientation  person; unable to recall birthdate    · Level of Consciousness  confused    · Follows Commands and Answers Questions  100% of the time    · Personal Safety and Judgment  impaired    · Short Term Memory  impaired    · Long Term Memory  impaired      Range of Motion Exam:   · Range of Motion Examination  no ROM deficits were identified    · Active Range of Motion Examination  no Active ROM deficits were identified      Manual Muscle Testing:   · Manual Muscle Testing Results  LUE 4+/5 MMT, RUE and BLE 5/5 MMT      Bed Mobility: Rolling/Turning:     · Level of [place]  supervision      Bed Mobility: Scooting/Bridging:     · Level of [place]  supervision      Bed Mobility: Sit to Supine:     · Level of [place]  supervision      Bed Mobility: Supine to Sit:     · Level of [place]  minimum assist (75% patients effort)    · Physical Assist/Nonphysical Assist  1 person assist; nonverbal cues (demo/gestures)      Transfer: Sit to Stand:     · Level of [place]  moderate assist (50% patients effort)    · Physical Assist/Nonphysical Assist  1 person assist    · Assistive Device  rolling walker      Transfer: Stand to Sit:     · Level of [place]  contact guard    · Physical Assist/Nonphysical Assist  verbal cues; nonverbal cues (demo/gestures); 1 person assist    · Assistive Device  rolling walker      Gait Skills:     · Level of [place]  contact guard    · Assistive Device  rolling walker    · Gait Distance  75 feet      Gait Analysis:     · Gait Pattern Used  swing-to gait    · Gait Deviations Noted  decreased garrett; decreased step length; decreased weight-shifting ability; dec R step length, easily distracted, requires mod A to turn    · Impairments Contributing to Gait Deviations  ataxic; impaired balance; cognition; impaired coordination; decreased strength; impaired postural control      Balance Skills Assessment:     · Sitting Balance: Static  good balance    · Sitting Balance: Dynamic  good balance    · Sit-to-Stand Balance  poor plus    · Standing Balance: Static  fair minus    · Standing Balance: Dynamic  poor plus    · Systems Impairment Contributing to Balance Disturbance  cognitive; neuromuscular; musculoskeletal    · Identified Impairments Contributing to Balance Disturbance  decreased strength; impaired coordination; impaired motor control      Sensory Examination:   Sensory Examination:    Grossly Intact:   · Gross Sensory Examination  Grossly Intact; Left UE; Right UE; Left LE; Right LE; Head/Neck        Light Touch Sensation:   · Left UE  within normal limits    · Right UE  within normal limits    · Left LE  within normal limits    · Right LE  within normal limits    · Head/Neck  within normal limits      · Coordination Assessed  finger to nose; heel to shin; BLE heel to shin intact 3/3, impaired alternating toe tap        Proprioception:   · Coordination Assessed  finger to nose; heel to shin; BLE heel to shin intact 3/3, impaired alternating toe tap        Fine Motor Coordination:   Fine Motor Coordination Examination:    Fine Motor Coordination:   · Left Hand, Finger to Nose  normal performance    · Right Hand, Finger to Nose  normal performance    · Left Hand Thumb/Finger Opposition Skills  normal performance    · Right Hand Thumb/Finger Opposition Skills  normal performance    · Left Hand, Diadochokinesis Skills  mild impairment    · Right Hand, Diadochokinesis Skills  mild impairment      Treatment Location:   · Comments  Facial nerve: drooped R smile, symmetrical jose puff and eyebrow elevation; Tongue: midline      Clinical Impressions:   · Criteria for Skilled Therapeutic Interventions  impairments found; functional limitations in following categories; risk reduction/prevention; rehab potential; therapy frequency; anticipated discharge recommendation    · Impairments Found (describe specific impairments)  aerobic capacity/endurance; gait, locomotion, and balance; muscle strength; neuromotor development and sensory integration; poor safety awareness    · Functional Limitations in Following Categories (describe specific limitations)  self-care; home management; work    · Risk Reduction/Prevention (Describe Specific Areas of risk reduction/prevention)  risk factors    · Risk Areas  fall    · Rehab Potential  fair, will monitor progress closely    · Therapy Frequency  2-3x/week          PM&R Impression: as above    Current Disposition Plan Recommendations:    acute rehab placement

## 2021-11-02 NOTE — DISCHARGE NOTE PROVIDER - NSDCMRMEDTOKEN_GEN_ALL_CORE_FT
Aricept 5 mg oral tablet: 1 tab(s) orally once a day (at bedtime)  aspirin 81 mg oral delayed release tablet: 1 tab(s) orally once a day  atorvastatin 40 mg oral tablet: 1 tab(s) orally once a day  clopidogrel 75 mg oral tablet: 1 tab(s) orally once a day  CoQ10 300 mg oral capsule: 1 cap(s) orally once a day  folic acid 1 mg oral tablet: 1 tab(s) orally once a day  Keppra 500 mg oral tablet: 2.5  orally every 12 hours  lisinopril 30 mg oral tablet: 1 tab(s) orally once a day  thiamine 100 mg oral tablet: 1 tab(s) orally once a day  Vimpat 100 mg oral tablet: 1 tab(s) orally 2 times a day MDD:200mg  Vitamin B12: 2500 microgram(s) orally once a day  Vitamin C 1000 mg oral tablet: 1 tab(s) orally once a day  Vitamin D3 5000 intl units (125 mcg) oral tablet: 1 tab(s) orally once a day   Aricept 5 mg oral tablet: 1 tab(s) orally once a day (at bedtime)  aspirin 81 mg oral delayed release tablet: 1 tab(s) orally once a day  atorvastatin 40 mg oral tablet: 1 tab(s) orally once a day  clopidogrel 75 mg oral tablet: 1 tab(s) orally once a day  donepezil 10 mg oral tablet: 1 tab(s) orally once a day (at bedtime)  folic acid 1 mg oral tablet: 1 tab(s) orally once a day  lacosamide 200 mg oral tablet: 1 tab(s) orally 2 times a day  lisinopril 30 mg oral tablet: 1 tab(s) orally once a day  Namenda 5 mg oral tablet: 1 tab(s) orally every 24 hours

## 2021-11-02 NOTE — EEG REPORT - NS EEG TEXT BOX
[facility] Department of Neurology  Inpatient Epilepsy Monitoring Unit video-Electroencephalography Report    Acquisition Details:  Electroencephalography was acquired using a minimum of 21 channels on an [employer] Neu[employer] system v 8.5.1 with electrode placement according to the standard International 10-20 system following ACNS (American Clinical Neurophysiology Society) guidelines for Long-Term Video EEG monitoring.  Anterior temporal T1 and T2 electrodes were utilized whenever possible.   The [employer]K automated spike & seizure detections were all reviewed in detail, in addition to extensive portions of raw EEG.  Specially-trained nurses were available for seizure-related events.  Continuous live-time video monitoring of the patients for seizure-related and safety events was performed by specially-trained technicians.      Day 6 11/01-11/02/2021: off EEG from 10:17am to 08:35 PM  Pertinent medications: LEV 1250mg BID, LCM 100mg BID  Background:  continuous, with predominantly alpha and beta frequencies.  Symmetry:  Rare <1%) irregular theta/delta waves over right hemisphere  Posterior Dominant Rhythm:  9 Hz symmetric, well-organized, and well-modulated.  Organization: Appropriate anterior-posterior gradient.  Voltage:  Normal (20+ uV)  Variability: Yes. 		Reactivity: Yes.  N2 sleep: Symmetric, synchronous spindles and K complexes.  Spontaneous Activity:  No epileptiform discharges.  Periodic/rhythmic activity:  None.  Events:  No electrographic seizures or significant clinical events.  Provocations:  Hyperventilation and Photic stimulation: was not performed.    Daily Summary:    Mild, rare right  hemisphere slow wave suggestive of a mild underlying  dysfunction.  No epileptiform activity and no significant clinical events occurred.    Bernard Lundy MD  Attending Neurologist, [facility] Epilepsy [facility]   [facility] Department of Neurology  Inpatient Epilepsy Monitoring Unit video-Electroencephalography Report    Acquisition Details:  Electroencephalography was acquired using a minimum of 21 channels on an [employer] Neurology system v 8.5.1 with electrode placement according to the standard International 10-20 system following ACNS (American Clinical Neurophysiology Society) guidelines for Long-Term Video EEG monitoring.  Anterior temporal T1 and T2 electrodes were utilized whenever possible.   The XLTEK automated spike & seizure detections were all reviewed in detail, in addition to extensive portions of raw EEG.  Specially-trained nurses were available for seizure-related events.  Continuous live-time video monitoring of the patients for seizure-related and safety events was performed by specially-trained technicians.      Day 6 11/01-11/02/2021: off EEG from 10:17am to 08:35 PM  Pertinent medications: LEV 1250mg BID, LCM 100mg BID  Background:  continuous, with predominantly alpha and beta frequencies.  Symmetry:  Rare <1%) irregular theta/delta waves over right hemisphere  Posterior Dominant Rhythm:  9 Hz symmetric, well-organized, and well-modulated.  Organization: Appropriate anterior-posterior gradient.  Voltage:  Normal (20+ uV)  Variability: Yes. 		Reactivity: Yes.  N2 sleep: Symmetric, synchronous spindles and K complexes.  Spontaneous Activity:  No epileptiform discharges.  Periodic/rhythmic activity:  None.  Events:  No electrographic seizures or significant clinical events.  Provocations:  Hyperventilation and Photic stimulation: was not performed.    Daily Summary:    Mild, right hemisphere slow waves, rare during wakefulness occasional during drowsy and sleep states, suggestive of a mild underlying  dysfunction.  No epileptiform activity and no significant clinical events occurred.    Bernard Lundy MD  Attending Neurologist, [facility] Epilepsy Program

## 2021-11-02 NOTE — PROGRESS NOTE ADULT - SUBJECTIVE AND OBJECTIVE BOX
Neurology Progress Note    HPI:   A 62 y/o M, former smoker, PMHx CVA (loop recorder, on Plavix and 81mg ASA, no residual deficits), seizures (on Keppra, changed from 1000mg BID to 1250 BID + Vimpat BID), HTN, iron deficiency anemia, dementia, SHx hernia repair, Sent to ED by neurologist Dr. Guillen for concern of possible stroke vs TIA on Sunday. Per wife, Pt had a seizure two nights ago (Sunday) that was witnessed by son, and is his second episode in the past 2 weeks, was GTC typical for pt. He was given Keppra and his symptoms resolved after 5 minutes. Pt was sitting in his chair, no head injury or LOC. He was not brought to the hospital after his seizure episode. Afterwards at 8PM, Pt began to right sided facial, upper and lower extremity numbness and weakness in the right side. No facial droop. According to wife, Pt appeared to be confused afterwards - these sx resolved spontaneously within 1 hour and have not recurred since then.  Comes to ED today for further evaluation. Denies chest pain, SOB, n/v/d, abdominal pain, bloody stools, leg swelling, and rashes Last CVA 1 year ago with LOC. Pt is compliant with medications.     Stroke neuro consulted for right sided weakness after seizure. As per wife, "the patient has been having shaking seizures more often so they increased his keppra and added vimpat. Then he had an episode where he stared off to the right then started shaking and then the right side went weak. 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     Interval History:    Patient seen and examined at bedside. There were no acute events overnight. This morning the patient was agitated. During the examination, he states he feels well and offers no complaints.      PAST MEDICAL & SURGICAL HISTORY:  Hypertension  CVA (cerebral vascular accident)  Dementia, old age  Anemia  Seizure  H/O hernia repair      Medications:  albuterol/ipratropium for Nebulization 3 milliLiter(s) Nebulizer every 6 hours  aspirin enteric coated 81 milliGRAM(s) Oral daily  atorvastatin 40 milliGRAM(s) Oral at bedtime  clopidogrel Tablet 75 milliGRAM(s) Oral daily  donepezil 5 milliGRAM(s) Oral at bedtime  enoxaparin Injectable 40 milliGRAM(s) SubCutaneous at bedtime  lacosamide 100 milliGRAM(s) Oral two times a day  levETIRAcetam  IVPB 1250 milliGRAM(s) IV Intermittent every 12 hours  lisinopril 20 milliGRAM(s) Oral daily  melatonin 5 milliGRAM(s) Oral at bedtime  senna 2 Tablet(s) Oral at bedtime      Vital Signs Last 24 Hrs  T(C): 37 (02 Nov 2021 11:11), Max: 37.1 (02 Nov 2021 06:49)  T(F): 98.6 (02 Nov 2021 11:11), Max: 98.7 (02 Nov 2021 06:49)  HR: 69 (02 Nov 2021 11:11) (69 - 80)  BP: 115/71 (02 Nov 2021 11:11) (115/71 - 129/84)  RR: 18 (02 Nov 2021 11:11) (18 - 18)  SpO2: 95% (02 Nov 2021 11:11) (93% - 95%)      Neurological Exam:   Mental status: Awake, alert and oriented x1 to self. Oriented to year but not to month, date, or day of the week. No dysarthria, no aphasia. Appears pleasantly confused.    Cranial nerves: Pupils equally round and reactive to light, visual fields full, no nystagmus, extraocular muscles intact, V1 through V3 intact bilaterally and symmetric, face symmetric, hearing intact to finger rub, palate elevation symmetric, tongue was midline.  Motor:  MRC grading 5/5 B/L UE/LE.   strength 5/5.  Slight bradykinesia in LUE. Normal tone and bulk.  No abnormal movements.    Sensation: Intact to light touch, proprioception, and pinprick.   Coordination: No dysmetria on finger-to-nose and heel-to-shin.  No dysdiadokinesia.  Reflexes: 2+ in bilateral UE/LE, downgoing toes bilaterally. (-) Ho.  Gait: Narrow and steady. No ataxia.  Romberg negative      Labs:  CBC Full  -  ( 01 Nov 2021 10:37 )  WBC Count : 5.67 K/uL  RBC Count : 5.06 M/uL  Hemoglobin : 13.2 g/dL  Hematocrit : 41.5 %  Platelet Count - Automated : 233 K/uL  Mean Cell Volume : 82.0 fl  Mean Cell Hemoglobin : 26.1 pg  Mean Cell Hemoglobin Concentration : 31.8 gm/dL      Urinalysis Basic - ( 01 Nov 2021 12:22 )    Color: Yellow / Appearance: Clear / SG: >=1.030 / pH: x  Gluc: x / Ketone: NEGATIVE  / Bili: Negative / Urobili: 0.2 E.U./dL   Blood: x / Protein: Trace mg/dL / Nitrite: NEGATIVE   Leuk Esterase: NEGATIVE / RBC: < 5 /HPF / WBC < 5 /HPF   Sq Epi: x / Non Sq Epi: 0-5 /HPF / Bacteria: Present /HPF

## 2021-11-02 NOTE — PROGRESS NOTE ADULT - ASSESSMENT
per Neurology    61 year-old male w/ PMH of CVA (loop recorder, on Plavix and 81mg ASA, no residual deficits), seizures (on Keppra, and vimpat), HTN, iron deficiency anemia, dementia, and hernia repair who was initially sent to ER for R-sided weakness, and seizures (witnessed event in ED required 8mg ativan). Now stable for stepdown from MICU to [facility] for further VEEG monitoring.     Plan:    - Given confusion in this setting, could be related to his cognitive decline / dementia compounded by hospital acquired delirium. UA neg. No signs of infectious process.  - EEG monitoring showed no seizures  - MRA head and neck with DWI and FLAIR, MR Brain imaging reviewed  - Holding Keppra PM dose tonight  - Adding Seroquel 12.5 mg at bedtime  - Sending labs for autoimmune / paraneoplastic work up   - C/w Vimpat to 100 mg IVPB q12h  - C/w home ASA 81 mg PO daily  - C/w home Plavix 75 mg PO daily  - C/w home Atorvastatin 40 mg at bedtime  - C/w home Donepezil 5 mg at bedtime  - q4h neuro checks

## 2021-11-02 NOTE — DISCHARGE NOTE PROVIDER - CARE PROVIDER_[facility] CALL
Celina Guillen)  Neurology; Vascular Neurology  [address], [address] [place] [address]  Phone: [phone]  Fax: [phone]  Follow Up Time:

## 2021-11-03 ENCOUNTER — RX RENEWAL (OUTPATIENT)
Age: 61
End: 2021-11-03

## 2021-11-03 LAB
A1C WITH ESTIMATED AVERAGE GLUCOSE RESULT: 4.4 % — SIGNIFICANT CHANGE UP (ref 4–5.6)
ANION GAP SERPL CALC-SCNC: 10 MMOL/L — SIGNIFICANT CHANGE UP (ref 5–17)
BUN SERPL-MCNC: 21 MG/DL — SIGNIFICANT CHANGE UP (ref 7–23)
C3 SERPL-MCNC: 104 MG/DL — SIGNIFICANT CHANGE UP (ref 81–157)
C4 SERPL-MCNC: 24 MG/DL — SIGNIFICANT CHANGE UP (ref 13–39)
CALCIUM SERPL-MCNC: 9.1 MG/DL — SIGNIFICANT CHANGE UP (ref 8.4–10.5)
CHLORIDE SERPL-SCNC: 106 MMOL/L — SIGNIFICANT CHANGE UP (ref 96–108)
CO2 SERPL-SCNC: 25 MMOL/L — SIGNIFICANT CHANGE UP (ref 22–31)
CREAT SERPL-MCNC: 0.65 MG/DL — SIGNIFICANT CHANGE UP (ref 0.5–1.3)
CRP SERPL-MCNC: <3 MG/L — SIGNIFICANT CHANGE UP (ref 0–4)
DRVVT SCREEN TO CONFIRM RATIO: SIGNIFICANT CHANGE UP
DSDNA AB FLD-ACNC: <0.2 AI — SIGNIFICANT CHANGE UP
ENA SS-A AB FLD IA-ACNC: <0.2 AI — SIGNIFICANT CHANGE UP
ERYTHROCYTE [SEDIMENTATION RATE] IN BLOOD: 6 MM/HR — SIGNIFICANT CHANGE UP
ESTIMATED AVERAGE GLUCOSE: 80 MG/DL — SIGNIFICANT CHANGE UP (ref 68–114)
GLUCOSE BLDC GLUCOMTR-MCNC: 129 MG/DL — HIGH (ref 70–99)
GLUCOSE SERPL-MCNC: 134 MG/DL — HIGH (ref 70–99)
IGA FLD-MCNC: 203 MG/DL — SIGNIFICANT CHANGE UP (ref 84–499)
IGG FLD-MCNC: 1028 MG/DL — SIGNIFICANT CHANGE UP (ref 610–1660)
IGM SERPL-MCNC: 146 MG/DL — SIGNIFICANT CHANGE UP (ref 35–242)
KAPPA LC SER QL IFE: 1.7 MG/DL — SIGNIFICANT CHANGE UP (ref 0.33–1.94)
KAPPA/LAMBDA FREE LIGHT CHAIN RATIO, SERUM: 0.98 RATIO — SIGNIFICANT CHANGE UP (ref 0.26–1.65)
LA NT DPL PPP QL: 30.6 SEC — SIGNIFICANT CHANGE UP
LAMBDA LC SER QL IFE: 1.74 MG/DL — SIGNIFICANT CHANGE UP (ref 0.57–2.63)
POTASSIUM SERPL-MCNC: 4.2 MMOL/L — SIGNIFICANT CHANGE UP (ref 3.5–5.3)
POTASSIUM SERPL-SCNC: 4.2 MMOL/L — SIGNIFICANT CHANGE UP (ref 3.5–5.3)
PROT SERPL-MCNC: 6.6 G/DL — SIGNIFICANT CHANGE UP (ref 6–8.3)
PROT SERPL-MCNC: 6.6 G/DL — SIGNIFICANT CHANGE UP (ref 6–8.3)
RHEUMATOID FACT SERPL-ACNC: 16 IU/ML — HIGH (ref 0–13)
SODIUM SERPL-SCNC: 141 MMOL/L — SIGNIFICANT CHANGE UP (ref 135–145)
T3 SERPL-MCNC: 119 NG/DL — SIGNIFICANT CHANGE UP (ref 80–200)
T4 AB SER-ACNC: 7.92 UG/DL — SIGNIFICANT CHANGE UP (ref 4.5–11.7)
THYROPEROXIDASE AB SERPL-ACNC: 15 IU/ML — SIGNIFICANT CHANGE UP
TOTAL HEM COMP BLD-ACNC: 75 U/ML — SIGNIFICANT CHANGE UP (ref 42–95)
TSH SERPL-MCNC: 0.34 UIU/ML — SIGNIFICANT CHANGE UP (ref 0.27–4.2)
VIT D25+D1,25 OH+D1,25 PNL SERPL-MCNC: 34.8 PG/ML — SIGNIFICANT CHANGE UP (ref 19.9–79.3)

## 2021-11-03 PROCEDURE — 95720 EEG PHY/QHP EA INCR W/VEEG: CPT

## 2021-11-03 PROCEDURE — 99233 SBSQ HOSP IP/OBS HIGH 50: CPT

## 2021-11-03 RX ORDER — LEVETIRACETAM 250 MG/1
375 TABLET, FILM COATED ORAL EVERY 12 HOURS
Refills: 0 | Status: DISCONTINUED | OUTPATIENT
Start: 2021-11-03 | End: 2021-11-04

## 2021-11-03 RX ADMIN — Medication 3 MILLILITER(S): at 12:00

## 2021-11-03 RX ADMIN — ENOXAPARIN SODIUM 40 MILLIGRAM(S): 100 INJECTION SUBCUTANEOUS at 21:32

## 2021-11-03 RX ADMIN — DONEPEZIL HYDROCHLORIDE 5 MILLIGRAM(S): 10 TABLET, FILM COATED ORAL at 22:07

## 2021-11-03 RX ADMIN — CLOPIDOGREL BISULFATE 75 MILLIGRAM(S): 75 TABLET, FILM COATED ORAL at 12:00

## 2021-11-03 RX ADMIN — ATORVASTATIN CALCIUM 40 MILLIGRAM(S): 80 TABLET, FILM COATED ORAL at 21:33

## 2021-11-03 RX ADMIN — Medication 3 MILLILITER(S): at 05:56

## 2021-11-03 RX ADMIN — LEVETIRACETAM 400 MILLIGRAM(S): 250 TABLET, FILM COATED ORAL at 17:47

## 2021-11-03 RX ADMIN — QUETIAPINE FUMARATE 12.5 MILLIGRAM(S): 200 TABLET, FILM COATED ORAL at 21:33

## 2021-11-03 RX ADMIN — Medication 3 MILLILITER(S): at 17:47

## 2021-11-03 RX ADMIN — Medication 5 MILLIGRAM(S): at 21:33

## 2021-11-03 RX ADMIN — Medication 81 MILLIGRAM(S): at 12:00

## 2021-11-03 RX ADMIN — SENNA PLUS 2 TABLET(S): 8.6 TABLET ORAL at 21:33

## 2021-11-03 RX ADMIN — LACOSAMIDE 100 MILLIGRAM(S): 50 TABLET ORAL at 10:27

## 2021-11-03 RX ADMIN — LISINOPRIL 20 MILLIGRAM(S): 2.5 TABLET ORAL at 05:55

## 2021-11-03 RX ADMIN — LACOSAMIDE 100 MILLIGRAM(S): 50 TABLET ORAL at 21:32

## 2021-11-03 NOTE — PROGRESS NOTE ADULT - ASSESSMENT
This is a 61 year-old male w/ PMH of CVA (loop recorder, on Plavix and 81mg ASA, no residual deficits), seizures (on Keppra, and Vimpat), HTN, iron deficiency anemia, dementia, and hernia repair who was initially sent to ER for R-sided weakness, and seizures (witnessed event in ED required 8mg ativan). Now stable for stepdown from MICU to [facility] for further VEEG monitoring.     Plan:    - Given improvement of confusion in this setting, could be related to his cognitive decline / dementia compounded by effects of Keppra and hospital acquired delirium. UA neg. No signs of infectious process. When PM dose of Keppra was held, patient did show improvement in mental status.  - Sent labs for autoimmune / paraneoplastic work up, f/u. Has been unremarkable thus far.  - On VEEG monitoring showed no seizures.  - MRA head and neck with DWI and FLAIR, MR Brain imaging reviewed.  - Holding Keppra for now.  - Added Seroquel 12.5 mg at bedtime.  - C/w Vimpat 100 mg IVPB q12h.  - C/w home ASA 81 mg PO daily.  - C/w home Plavix 75 mg PO daily.  - C/w home Atorvastatin 40 mg at bedtime.  - C/w home Donepezil 5 mg at bedtime.  - q4h neuro checks.  - Seizure and fall precautions.

## 2021-11-04 LAB
ACE SERPL-CCNC: 12 U/L — LOW (ref 14–82)
AUTO DIFF PNL BLD: NEGATIVE — SIGNIFICANT CHANGE UP
C-ANCA SER-ACNC: NEGATIVE — SIGNIFICANT CHANGE UP
GLIADIN PEPTIDE IGA SER-ACNC: <5 UNITS — SIGNIFICANT CHANGE UP
GLIADIN PEPTIDE IGA SER-ACNC: NEGATIVE — SIGNIFICANT CHANGE UP
GLIADIN PEPTIDE IGG SER-ACNC: <5 UNITS — SIGNIFICANT CHANGE UP
GLIADIN PEPTIDE IGG SER-ACNC: NEGATIVE — SIGNIFICANT CHANGE UP
GLUCOSE BLDC GLUCOMTR-MCNC: 103 MG/DL — HIGH (ref 70–99)
GLUCOSE BLDC GLUCOMTR-MCNC: 120 MG/DL — HIGH (ref 70–99)
MPO AB + PR3 PNL SER: SIGNIFICANT CHANGE UP
P-ANCA SER-ACNC: NEGATIVE — SIGNIFICANT CHANGE UP

## 2021-11-04 PROCEDURE — 99232 SBSQ HOSP IP/OBS MODERATE 35: CPT

## 2021-11-04 PROCEDURE — 95720 EEG PHY/QHP EA INCR W/VEEG: CPT

## 2021-11-04 RX ORDER — DONEPEZIL HYDROCHLORIDE 10 MG/1
10 TABLET, FILM COATED ORAL AT BEDTIME
Refills: 0 | Status: DISCONTINUED | OUTPATIENT
Start: 2021-11-04 | End: 2021-11-09

## 2021-11-04 RX ADMIN — Medication 3 MILLILITER(S): at 11:15

## 2021-11-04 RX ADMIN — DONEPEZIL HYDROCHLORIDE 10 MILLIGRAM(S): 10 TABLET, FILM COATED ORAL at 22:41

## 2021-11-04 RX ADMIN — CLOPIDOGREL BISULFATE 75 MILLIGRAM(S): 75 TABLET, FILM COATED ORAL at 11:15

## 2021-11-04 RX ADMIN — LISINOPRIL 20 MILLIGRAM(S): 2.5 TABLET ORAL at 06:18

## 2021-11-04 RX ADMIN — ATORVASTATIN CALCIUM 40 MILLIGRAM(S): 80 TABLET, FILM COATED ORAL at 22:41

## 2021-11-04 RX ADMIN — LACOSAMIDE 100 MILLIGRAM(S): 50 TABLET ORAL at 22:43

## 2021-11-04 RX ADMIN — Medication 3 MILLILITER(S): at 18:03

## 2021-11-04 RX ADMIN — Medication 3 MILLILITER(S): at 06:18

## 2021-11-04 RX ADMIN — ENOXAPARIN SODIUM 40 MILLIGRAM(S): 100 INJECTION SUBCUTANEOUS at 22:42

## 2021-11-04 RX ADMIN — Medication 5 MILLIGRAM(S): at 22:41

## 2021-11-04 RX ADMIN — SENNA PLUS 2 TABLET(S): 8.6 TABLET ORAL at 22:41

## 2021-11-04 RX ADMIN — LACOSAMIDE 100 MILLIGRAM(S): 50 TABLET ORAL at 11:15

## 2021-11-04 RX ADMIN — LEVETIRACETAM 400 MILLIGRAM(S): 250 TABLET, FILM COATED ORAL at 06:19

## 2021-11-04 RX ADMIN — Medication 81 MILLIGRAM(S): at 11:15

## 2021-11-04 NOTE — OCCUPATIONAL THERAPY INITIAL EVALUATION ADULT - DIAGNOSIS, OT EVAL
OT deficits include decreased BUE strength, endurance, word finding ability, activity tolerance affecting ability to complete ADL, functional mobility and transfers.

## 2021-11-04 NOTE — PROGRESS NOTE ADULT - ASSESSMENT
This is a 61 year-old male w/ PMH of CVA (loop recorder, on Plavix and 81mg ASA, no residual deficits), seizures (on Keppra, and Vimpat), HTN, iron deficiency anemia, dementia, and hernia repair who was initially sent to ER for R-sided weakness, and seizures (witnessed event in ED required 8mg ativan). Now stable for stepdown from MICU to [facility] for further VEEG monitoring. Given improvement of confusion in this setting, could be related to his cognitive decline / dementia compounded by effects of Keppra and hospital acquired delirium. UA neg. No signs of infectious process. When PM dose of Keppra was held, patient did show improvement in mental status.    Plan:    - Sent labs for autoimmune / paraneoplastic work up, f/u. Has been unremarkable thus far.   - On VEEG monitoring showed no seizures.   - MRA head and neck with DWI and FLAIR, MR Brain imaging completed, reviewed.   - Restarted Keppra at lower dose 375 mg BID. C/w this dose of Keppra  - Added Seroquel 12.5 mg at bedtime, but possibly does not need anymore  - C/w Vimpat 100 mg IVPB q12h.   - C/w home ASA 81 mg PO daily.   - C/w home Plavix 75 mg PO daily.   - C/w home Atorvastatin 40 mg at bedtime.   - C/w home Donepezil increased to 10 mg at bedtime.   - q4h neuro checks.   - Seizure and fall precautions.  This is a 61 year-old male w/ PMH of CVA (loop recorder, on Plavix and 81mg ASA, no residual deficits), seizures (on Keppra, and Vimpat), HTN, iron deficiency anemia, dementia, and hernia repair who was initially sent to ER for R-sided weakness, and seizures (witnessed event in ED required 8mg ativan). Now stable for stepdown from MICU to [facility] for further VEEG monitoring. Given improvement of confusion in this setting, could be related to his cognitive decline / dementia compounded by effects of Keppra and hospital acquired delirium. UA neg. No signs of infectious process. When PM dose of Keppra was held, patient did show improvement in mental status.    Plan:    - Sent labs for autoimmune / paraneoplastic work up, f/u. Has been unremarkable thus far.   - On VEEG monitoring showed no seizures.   - MRA head and neck with DWI and FLAIR, MR Brain imaging completed, reviewed.   - Holding Keppra for now in setting of confusion.   - Holding Seroquel 12.5 mg at bedtime.  - C/w Vimpat 100 mg IVPB q12h.   - C/w home ASA 81 mg PO daily.   - C/w home Plavix 75 mg PO daily.   - C/w home Atorvastatin 40 mg at bedtime.   - C/w home Donepezil increased to 10 mg at bedtime.   - q4h neuro checks.   - Seizure and fall precautions.  This is a 61 year-old male w/ PMH of CVA (loop recorder, on Plavix and 81mg ASA, no residual deficits), seizures (on Keppra, and Vimpat), HTN, iron deficiency anemia, dementia, and hernia repair who was initially sent to ER for R-sided weakness, and seizures (witnessed event in ED required 8mg ativan). Now stable for stepdown from MICU to [facility] for further VEEG monitoring. Given improvement of confusion in this setting, could be related to his cognitive decline / dementia compounded by effects of Keppra and hospital acquired delirium. UA neg. No signs of infectious process. When PM dose of Keppra was held, patient did show improvement in mental status.    Plan:    - Sent labs for autoimmune / paraneoplastic work up, f/u. Has been unremarkable thus far.   - On VEEG monitoring showed no seizures.   - MRA head and neck with DWI and FLAIR, MR Brain imaging completed, reviewed.   - Holding Keppra for now in setting of confusion and EEG looking good on vimpat with keppra withdrawal.  - Holding Seroquel 12.5 mg at bedtime as unclear it is necessary.  - C/w Vimpat 100 mg IVPB q12h.   - C/w home ASA 81 mg PO daily.   - C/w home Plavix 75 mg PO daily.   - C/w home Atorvastatin 40 mg at bedtime.   - C/w home Donepezil increased to 10 mg at bedtime.   - q4h neuro checks.   - Seizure and fall precautions.

## 2021-11-04 NOTE — OCCUPATIONAL THERAPY INITIAL EVALUATION ADULT - MODALITIES TREATMENT COMMENTS
Pt completed functional ambulation from bed to the restroom with RW and 2 person assist. Pt fatiguing quickly, with anterior lean requiring cues to maintain upright posture. Pt demo with increased word finding ability during conversation with therapist as unable to engage in flunet conversation. pt reports this has been occurring lately however per physical therapist  pt was not presenting this way last session (11/3/21)

## 2021-11-04 NOTE — OCCUPATIONAL THERAPY INITIAL EVALUATION ADULT - ADDITIONAL COMMENTS
Pt states he lives with his wife and 2 children in an apt building with no VICKIE. Pt states he was independent with ADL and IADL prior to admission.

## 2021-11-04 NOTE — OCCUPATIONAL THERAPY INITIAL EVALUATION ADULT - GROOMING, PREVIOUS LEVEL OF FUNCTION, OT EVAL
Patient called saying she was returning a call from Dr. Damian Doyle from late last week.  Patient did not say what it was regarding.     Please call to discuss and okay to leave detailed message.    independent

## 2021-11-04 NOTE — OCCUPATIONAL THERAPY INITIAL EVALUATION ADULT - PLANNED THERAPY INTERVENTIONS, OT EVAL
Patient's PAC accessed for port flush.  Port flushed easily, good blood return noted.  Needle heparinized and removed.  Patient tolerated well.  Band-aid applied to site.  C/D/I.     
ADL retraining/balance training/cognitive, visual perceptual/strengthening/transfer training

## 2021-11-04 NOTE — OCCUPATIONAL THERAPY INITIAL EVALUATION ADULT - LEVEL OF INDEPENDENCE: GROOMING, OT EVAL
able to stand at sink ~3 minutes with reports of fatigue, requesting to go back to bed. Pt required verbal cues to complete visual scanning to locate ADL items

## 2021-11-04 NOTE — PROGRESS NOTE ADULT - SUBJECTIVE AND OBJECTIVE BOX
Neurology Progress Note      HPI: HPI: 60 y/o M, former smoker, PMHx CVA (loop recorder, on Plavix and 81mg ASA, no residual deficits), seizures (on Keppra, changed from 1000mg BID to 1250 BID + Vimpat BID), HTN, iron deficiency anemia, dementia, SHx hernia repair, Sent to ED by neurologist Dr. Guillen for concern of possible stroke vs TIA on Sunday. Per wife, Pt had a seizure two nights ago (Sunday) that was witnessed by son, and is his second episode in the past 2 weeks, was GTC typical for pt. He was given Keppra and his symptoms resolved after 5 minutes. Pt was sitting in his chair, no head injury or LOC. He was not brought to the hospital after his seizure episode. Afterwards at 8PM, Pt began to right sided facial, upper and lower extremity numbness and weakness in the right side. No facial droop. According to wife, Pt appeared to be confused afterwards - these sx resolved spontaneously within 1 hour and have not recurred since then.  Comes to ED today for further evaluation. Denies chest pain, SOB, n/v/d, abdominal pain, bloody stools, leg swelling, and rashes Last CVA 1 year ago with LOC. Pt is compliant with medications.  Scheduled for an endoscopy on 11/11/2021.     Stroke neuro consulted for right sided weakness after seizure. As per wife, "the patient has been having shaking seizures more often so they increased his keppra and added vimpat. Then he had an episode where he stared off to the right then started shaking and then the right side went weak. 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       Interval History:    Patient seen and examined at bedside. There were no acute events overnight. Patient states he feels well, offers now complaints. Says "I feel better with less brain fog".      PAST MEDICAL & SURGICAL HISTORY:  Hypertension  CVA (cerebral vascular accident)  Dementia  Anemia  Seizure  H/O hernia repair      Medications:  albuterol/ipratropium for Nebulization 3 milliLiter(s) Nebulizer every 6 hours  aspirin enteric coated 81 milliGRAM(s) Oral daily  atorvastatin 40 milliGRAM(s) Oral at bedtime  clopidogrel Tablet 75 milliGRAM(s) Oral daily  donepezil 5 milliGRAM(s) Oral at bedtime  enoxaparin Injectable 40 milliGRAM(s) SubCutaneous at bedtime  lacosamide 100 milliGRAM(s) Oral two times a day  lisinopril 20 milliGRAM(s) Oral daily  melatonin 5 milliGRAM(s) Oral at bedtime  QUEtiapine 12.5 milliGRAM(s) Oral at bedtime  senna 2 Tablet(s) Oral at bedtime      Vital Signs Last 24 Hrs  T(C): 37 (03 Nov 2021 12:48), Max: 37 (03 Nov 2021 12:48)  T(F): 98.6 (03 Nov 2021 12:48), Max: 98.6 (03 Nov 2021 12:48)  HR: 78 (03 Nov 2021 12:48) (59 - 78)  BP: 108/68 (03 Nov 2021 12:48) (108/68 - 156/86)  BP(mean): 81 (03 Nov 2021 12:48) (81 - 81)  RR: 18 (03 Nov 2021 12:48) (18 - 18)  SpO2: 92% (03 Nov 2021 12:48) (92% - 95%)      Neurological Exam:   Mental status: Awake, alert and oriented x2 to self and location. Oriented to year but not to month, date, or day of the week. No dysarthria, no aphasia. Appears more alert and awake, able to carry out conversation and follow commands, answering questions appropriately.  Cranial nerves: Pupils equally round and reactive to light, visual fields full, no nystagmus, extraocular muscles intact, V1 through V3 intact bilaterally and symmetric, face symmetric, hearing intact to finger rub, palate elevation symmetric, tongue was midline.  Motor:  Slight bradykinesia with fine motor movements in L hand and LUE. MRC grading 5/5 B/L UE/LE.   strength 5/5.  Normal tone and bulk.  No abnormal movements.    Sensation: Intact to light touch, proprioception, and pinprick.   Coordination: No dysmetria on finger-to-nose and heel-to-shin.  No dysdiadokinesia.  Reflexes: 2+ in bilateral UE/LE, downgoing toes bilaterally. (-) Ho.  Gait: Narrow and steady. No ataxia.  Romberg negative      Labs:  CBC Full  -  ( 01 Nov 2021 10:37 )  WBC Count : 5.67 K/uL  RBC Count : 5.06 M/uL  Hemoglobin : 13.2 g/dL  Hematocrit : 41.5 %  Platelet Count - Automated : 233 K/uL  Mean Cell Volume : 82.0 fl  Mean Cell Hemoglobin : 26.1 pg  Mean Cell Hemoglobin Concentration : 31.8 gm/dL      Urinalysis Basic - ( 01 Nov 2021 12:22 )    Color: Yellow / Appearance: Clear / SG: >=1.030 / pH: x  Gluc: x / Ketone: NEGATIVE  / Bili: Negative / Urobili: 0.2 E.U./dL   Blood: x / Protein: Trace mg/dL / Nitrite: NEGATIVE   Leuk Esterase: NEGATIVE / RBC: < 5 /HPF / WBC < 5 /HPF   Sq Epi: x / Non Sq Epi: 0-5 /HPF / Bacteria: Present /HPF Neurology Progress Note      HPI: HPI: 62 y/o M, former smoker, PMHx CVA (loop recorder, on Plavix and 81mg ASA, no residual deficits), seizures (on Keppra, changed from 1000mg BID to 1250 BID + Vimpat BID), HTN, iron deficiency anemia, dementia, SHx hernia repair, Sent to ED by neurologist Dr. Guillen for concern of possible stroke vs TIA on Sunday. Per wife, Pt had a seizure two nights ago (Sunday) that was witnessed by son, and is his second episode in the past 2 weeks, was GTC typical for pt. He was given Keppra and his symptoms resolved after 5 minutes. Pt was sitting in his chair, no head injury or LOC. He was not brought to the hospital after his seizure episode. Afterwards at 8PM, Pt began to right sided facial, upper and lower extremity numbness and weakness in the right side. No facial droop. According to wife, Pt appeared to be confused afterwards - these sx resolved spontaneously within 1 hour and have not recurred since then.  Comes to ED today for further evaluation. Denies chest pain, SOB, n/v/d, abdominal pain, bloody stools, leg swelling, and rashes Last CVA 1 year ago with LOC. Pt is compliant with medications.  Scheduled for an endoscopy on 11/11/2021.     Stroke neuro consulted for right sided weakness after seizure. As per wife, "the patient has been having shaking seizures more often so they increased his keppra and added vimpat. Then he had an episode where he stared off to the right then started shaking and then the right side went weak. 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       Interval History:    Patient seen and examined at bedside. There were no acute events overnight. Patient states he feels well, offers now complaints. Says he feels fine.  When asked if he will remember some words for us, he stated, "I probably won't but will try."      PAST MEDICAL & SURGICAL HISTORY:  Hypertension  CVA (cerebral vascular accident)  Dementia  Anemia  Seizure  H/O hernia repair      Medications:  albuterol/ipratropium for Nebulization 3 milliLiter(s) Nebulizer every 6 hours  aspirin enteric coated 81 milliGRAM(s) Oral daily  atorvastatin 40 milliGRAM(s) Oral at bedtime  clopidogrel Tablet 75 milliGRAM(s) Oral daily  donepezil 5 milliGRAM(s) Oral at bedtime  enoxaparin Injectable 40 milliGRAM(s) SubCutaneous at bedtime  lacosamide 100 milliGRAM(s) Oral two times a day  lisinopril 20 milliGRAM(s) Oral daily  melatonin 5 milliGRAM(s) Oral at bedtime  QUEtiapine 12.5 milliGRAM(s) Oral at bedtime  senna 2 Tablet(s) Oral at bedtime      Vital Signs Last 24 Hrs  T(C): 37 (03 Nov 2021 12:48), Max: 37 (03 Nov 2021 12:48)  T(F): 98.6 (03 Nov 2021 12:48), Max: 98.6 (03 Nov 2021 12:48)  HR: 78 (03 Nov 2021 12:48) (59 - 78)  BP: 108/68 (03 Nov 2021 12:48) (108/68 - 156/86)  BP(mean): 81 (03 Nov 2021 12:48) (81 - 81)  RR: 18 (03 Nov 2021 12:48) (18 - 18)  SpO2: 92% (03 Nov 2021 12:48) (92% - 95%)      Neurological Exam:   Mental status: Awake, alert and oriented x2 to self and location. Oriented to year but not to month, date, or day of the week. No dysarthria, no aphasia. Appears more alert and awake, able to carry out conversation and follow commands, answering questions appropriately.  Cranial nerves: Pupils equally round and reactive to light, visual fields full, no nystagmus, extraocular muscles intact, V1 through V3 intact bilaterally and symmetric, face symmetric, hearing intact to finger rub, palate elevation symmetric, tongue was midline.  Motor:  Slight bradykinesia with fine motor movements in L hand and LUE. MRC grading 5/5 B/L UE/LE.   strength 5/5.  Normal tone and bulk.  No abnormal movements.    Sensation: Intact to light touch, proprioception, and pinprick.   Coordination: No dysmetria on finger-to-nose and heel-to-shin.  No dysdiadokinesia.  Reflexes: 2+ in bilateral UE/LE, downgoing toes bilaterally. (-) Ho.  Gait: Narrow and steady. No ataxia.  Romberg negative      Labs:  CBC Full  -  ( 01 Nov 2021 10:37 )  WBC Count : 5.67 K/uL  RBC Count : 5.06 M/uL  Hemoglobin : 13.2 g/dL  Hematocrit : 41.5 %  Platelet Count - Automated : 233 K/uL  Mean Cell Volume : 82.0 fl  Mean Cell Hemoglobin : 26.1 pg  Mean Cell Hemoglobin Concentration : 31.8 gm/dL      Urinalysis Basic - ( 01 Nov 2021 12:22 )    Color: Yellow / Appearance: Clear / SG: >=1.030 / pH: x  Gluc: x / Ketone: NEGATIVE  / Bili: Negative / Urobili: 0.2 E.U./dL   Blood: x / Protein: Trace mg/dL / Nitrite: NEGATIVE   Leuk Esterase: NEGATIVE / RBC: < 5 /HPF / WBC < 5 /HPF   Sq Epi: x / Non Sq Epi: 0-5 /HPF / Bacteria: Present /HPF

## 2021-11-05 LAB
% ALBUMIN: 60.1 % — SIGNIFICANT CHANGE UP
% ALPHA 1: 3.8 % — SIGNIFICANT CHANGE UP
% ALPHA 2: 10.3 % — SIGNIFICANT CHANGE UP
% BETA: 10.2 % — SIGNIFICANT CHANGE UP
% GAMMA: 15.6 % — SIGNIFICANT CHANGE UP
ALBUMIN SERPL ELPH-MCNC: 4 G/DL — SIGNIFICANT CHANGE UP (ref 3.6–5.5)
ALBUMIN/GLOB SERPL ELPH: 1.5 RATIO — SIGNIFICANT CHANGE UP
ALPHA1 GLOB SERPL ELPH-MCNC: 0.3 G/DL — SIGNIFICANT CHANGE UP (ref 0.1–0.4)
ALPHA2 GLOB SERPL ELPH-MCNC: 0.7 G/DL — SIGNIFICANT CHANGE UP (ref 0.5–1)
ANA TITR SER: NEGATIVE — SIGNIFICANT CHANGE UP
B-GLOBULIN SERPL ELPH-MCNC: 0.7 G/DL — SIGNIFICANT CHANGE UP (ref 0.5–1)
GAMMA GLOBULIN: 1 G/DL — SIGNIFICANT CHANGE UP (ref 0.6–1.6)
GLUCOSE BLDC GLUCOMTR-MCNC: 104 MG/DL — HIGH (ref 70–99)
GLUCOSE BLDC GLUCOMTR-MCNC: 107 MG/DL — HIGH (ref 70–99)
GLUCOSE BLDC GLUCOMTR-MCNC: 110 MG/DL — HIGH (ref 70–99)
GLUCOSE BLDC GLUCOMTR-MCNC: 116 MG/DL — HIGH (ref 70–99)
GLUCOSE BLDC GLUCOMTR-MCNC: 137 MG/DL — HIGH (ref 70–99)
INTERPRETATION SERPL IFE-IMP: SIGNIFICANT CHANGE UP
PROT PATTERN SERPL ELPH-IMP: SIGNIFICANT CHANGE UP

## 2021-11-05 PROCEDURE — 99232 SBSQ HOSP IP/OBS MODERATE 35: CPT

## 2021-11-05 PROCEDURE — 95720 EEG PHY/QHP EA INCR W/VEEG: CPT

## 2021-11-05 RX ORDER — QUETIAPINE FUMARATE 200 MG/1
12.5 TABLET, FILM COATED ORAL AT BEDTIME
Refills: 0 | Status: DISCONTINUED | OUTPATIENT
Start: 2021-11-05 | End: 2021-11-09

## 2021-11-05 RX ADMIN — LISINOPRIL 20 MILLIGRAM(S): 2.5 TABLET ORAL at 06:54

## 2021-11-05 RX ADMIN — Medication 3 MILLILITER(S): at 06:54

## 2021-11-05 RX ADMIN — CLOPIDOGREL BISULFATE 75 MILLIGRAM(S): 75 TABLET, FILM COATED ORAL at 13:15

## 2021-11-05 RX ADMIN — ATORVASTATIN CALCIUM 40 MILLIGRAM(S): 80 TABLET, FILM COATED ORAL at 21:27

## 2021-11-05 RX ADMIN — Medication 3 MILLILITER(S): at 00:34

## 2021-11-05 RX ADMIN — ENOXAPARIN SODIUM 40 MILLIGRAM(S): 100 INJECTION SUBCUTANEOUS at 21:27

## 2021-11-05 RX ADMIN — Medication 81 MILLIGRAM(S): at 13:15

## 2021-11-05 RX ADMIN — QUETIAPINE FUMARATE 12.5 MILLIGRAM(S): 200 TABLET, FILM COATED ORAL at 21:25

## 2021-11-05 RX ADMIN — DONEPEZIL HYDROCHLORIDE 10 MILLIGRAM(S): 10 TABLET, FILM COATED ORAL at 21:26

## 2021-11-05 RX ADMIN — SENNA PLUS 2 TABLET(S): 8.6 TABLET ORAL at 21:25

## 2021-11-05 RX ADMIN — LACOSAMIDE 100 MILLIGRAM(S): 50 TABLET ORAL at 10:38

## 2021-11-05 RX ADMIN — LACOSAMIDE 100 MILLIGRAM(S): 50 TABLET ORAL at 21:26

## 2021-11-05 RX ADMIN — Medication 5 MILLIGRAM(S): at 21:26

## 2021-11-05 RX ADMIN — Medication 3 MILLILITER(S): at 13:15

## 2021-11-05 NOTE — PROGRESS NOTE ADULT - SUBJECTIVE AND OBJECTIVE BOX
Physical Medicine and Rehabilitation Progress Note:    Patient is a 61y old  Male who presents with a chief complaint of vEEG monitoring (04 Nov 2021 16:56)      HPI:  HPI: HPI: 60 y/o M, former smoker, PMHx CVA (loop recorder, on Plavix and 81mg ASA, no residual deficits), seizures (on Keppra, changed from 1000mg BID to 1250 BID + Vimpat BID), HTN, iron deficiency anemia, dementia, SHx hernia repair, Sent to ED by neurologist Dr. Guillen for concern of possible stroke vs TIA on Sunday. Per wife, Pt had a seizure two nights ago (Sunday) that was witnessed by son, and is his second episode in the past 2 weeks, was GTC typical for pt. He was given Keppra and his symptoms resolved after 5 minutes. Pt was sitting in his chair, no head injury or LOC. He was not brought to the hospital after his seizure episode. Afterwards at 8PM, Pt began to right sided facial, upper and lower extremity numbness and weakness in the right side. No facial droop. According to wife, Pt appeared to be confused afterwards - these sx resolved spontaneously within 1 hour and have not recurred since then.  Comes to ED today for further evaluation. Denies chest pain, SOB, n/v/d, abdominal pain, bloody stools, leg swelling, and rashes Last CVA 1 year ago with LOC. Pt is compliant with medications.  Scheduled for an endoscopy on 11/11/2021.     Stroke neuro consulted for right sided weakness after seizure. As per wife, "the patient has been having shaking seizures more often so they increased his keppra and added vimpat. Then he had an episode where he stared off to the right then started shaking and then the right side went weak. 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       In the ED:  Initial vital signs: T: 98.8F, HR: 55, BP: 143/74, R: 16, SpO2: 97% on RA  Labs: significant for hg 12.1, cmp lyts wnl, covid negative   Imaging: CT head No acute fracture or malalignment of the cervical spine.CT c spine no contrast: No acute fracture or malalignment of the cervical spine.  CXR: No acute cardiopulmonary disease process.   EKG:sinus bradycardia   Medications: keppra 1250mg po, vimpat 50mg po  Consults: Neurology     (26 Oct 2021 22:33)          11-03    141  |  106  |  21  ----------------------------<  134<H>  4.2   |  25  |  0.65    Ca    9.1      03 Nov 2021 17:19    TPro  6.6  /  Alb  4.0  /  TBili  x   /  DBili  x   /  AST  x   /  ALT  x   /  AlkPhos  x   11-03    Vital Signs Last 24 Hrs  T(C): 36.8 (05 Nov 2021 05:20), Max: 36.8 (05 Nov 2021 05:20)  T(F): 98.2 (05 Nov 2021 05:20), Max: 98.2 (05 Nov 2021 05:20)  HR: 74 (05 Nov 2021 05:20) (68 - 74)  BP: 153/83 (05 Nov 2021 05:20) (153/83 - 156/81)  BP(mean): --  RR: 19 (05 Nov 2021 05:20) (16 - 19)  SpO2: 94% (05 Nov 2021 05:20) (94% - 98%)    MEDICATIONS  (STANDING):  albuterol/ipratropium for Nebulization 3 milliLiter(s) Nebulizer every 6 hours  aspirin enteric coated 81 milliGRAM(s) Oral daily  atorvastatin 40 milliGRAM(s) Oral at bedtime  clopidogrel Tablet 75 milliGRAM(s) Oral daily  donepezil 10 milliGRAM(s) Oral at bedtime  enoxaparin Injectable 40 milliGRAM(s) SubCutaneous at bedtime  lacosamide 100 milliGRAM(s) Oral two times a day  lisinopril 20 milliGRAM(s) Oral daily  melatonin 5 milliGRAM(s) Oral at bedtime  senna 2 Tablet(s) Oral at bedtime    MEDICATIONS  (PRN):    Currently Undergoing Physical/ Occupational Therapy at bedside.    Functional Status Assessment:   11/4/2021      Cognitive Status Examination:   · Level of Consciousness	confused  · Orientation	person; place  · Follow Commands/Answers Questions	75% of the time; able to follow single-step instructions  · Personal Safety and Judgment	impaired  · Short Term Memory	impaired  · Long Term Memory	impaired    Range of Motion Exam:   · Range of Motion Examination, Upper Extremity	bilateral UE Active ROM was WNL (within normal limits)  · Range of Motion Examination, Lower Extremity	bilateral LE Active ROM was WNL (within normal limits)    Manual Muscle Testing:   · Manual Muscle Testing Results	RUE 4-/5; LUE 3+/5; BLE 5/5    Bed Mobility: Rolling/Turning:     · Level of Preston	supervision  · Assistive Device	bed rails    Bed Mobility: Scooting/Bridging:     · Level of Preston	contact guard  · Physical Assist/Nonphysical Assist	1 person assist  · Assistive Device	bed rails; HOB elevated    Bed Mobility: Sit to Supine:     · Level of Preston	minimum assist (75% patients effort)  · Physical Assist/Nonphysical Assist	1 person assist; verbal cues  · Assistive Device	bed rails; HOB Elevated    Bed Mobility: Supine to Sit:     · Level of Preston	contact guard  · Physical Assist/Nonphysical Assist	1 person assist; verbal cues; set-up required; HOB Elevated  · Assistive Device	bed rails    Bed Mobility Analysis:     · Bed Mobility Limitations	decreased ability to use arms for pushing/pulling; decreased ability to use legs for bridging/pushing  · Impairments Contributing to Impaired Bed Mobility	decreased strength; impaired balance; cognition; impaired coordination    Transfer: Sit to Stand:     · Level of Preston	minimum assist (75% patients effort)  · Physical Assist/Nonphysical Assist	nonverbal cues (demo/gestures); verbal cues; 2 person assist  · Assistive Device	rolling walker    Transfer: Stand to Sit:     · Level of Preston	minimum assist (75% patients effort)  · Physical Assist/Nonphysical Assist	1 person assist; verbal cues; set-up required  · Assistive Device	rolling walker    Sit/Stand Transfer Safety Analysis:     · Transfer Safety Concerns Noted	decreased balance during turns; decreased safety awareness  · Impairments Contributing to Impaired Transfers	decreased strength; impaired coordination; impaired balance    Transfer: Toilet Transfer:     · Level of Preston	minimum assist (75% patients effort)  · Physical Assist/Nonphysical Assist	2 person assist; verbal cues; set-up required  · Assistive Device	grab bars; rolling walker    Toilet Transfer Safety Analysis:     · Transfer Safety Concerns Noted	decreased weight-shifting ability; decreased balance during turns; decreased safety awareness  · Impairments Contributing to Impaired Transfers	impaired balance; cognition; decreased strength    Balance Skills Assessment:     · Sitting Balance: Static	good balance  · Sitting Balance: Dynamic	good balance  · Sit-to-Stand Balance	fair balance  · Standing Balance: Static	fair minus  · Standing Balance: Dynamic	poor plus  · Identified Impairments Contributing to Balance Disturbance	decreased strength; pain    Sensory Examination:     Grossly Intact:   · Gross Sensory Examination	Grossly Intact    · Coordination Assessed	finger to nose; intact with decreased speed      Proprioception:   · Coordination Assessed	finger to nose; intact with decreased speed      Visual Assessment:   Visual Assessment:    Visual Assessment:   · Visual Scanning	WFL    Fine Motor Coordination:     Fine Motor Coordination:   · Left Hand, Finger to Nose	normal performance  · Right Hand, Finger to Nose	normal performance  · Left Hand Thumb/Finger Opposition Skills	normal performance  · Right Hand Thumb/Finger Opposition Skills	normal performance  · Left Hand, Manipulation of Objects	normal performance  · Right Hand, Manipulation of Objects	normal performance  · Left Hand, Diadochokinesis Skills	mild impairment  decrease speed  · Right Hand, Diadochokinesis Skills	mild impairment  decrease speed    Lower Body Dressing Training:     · Level of Preston	minimum assist (75% patients effort); to león socks seated EOB  · Physical Assist/Nonphysical Assist	1 person assist; verbal cues; set-up required    Toilet Hygiene Training:     · Level of Preston	minimum assist (75% patients effort)  · Physical Assist/Nonphysical Assist	1 person assist; verbal cues; set-up required  · Assistive Device	rolling walker; grab bars    Grooming Training:     · Level of Preston	able to stand at sink ~3 minutes with reports of fatigue, requesting to go back to bed. Pt required verbal cues to complete visual scanning to locate ADL items  · Physical Assist/Nonphysical Assist	1 person assist; nonverbal cues (demo/gestures); verbal cues; set-up required  · Assistive Device	RW to stand at sink    Treatment Locations:   · Comments	Pt completed functional ambulation from bed to the restroom with RW and 2 person assist. Pt fatiguing quickly, with anterior lean requiring cues to maintain upright posture. Pt demo with increased word finding ability during conversation with therapist as unable to engage in flunet conversation. pt reports this has been occurring lately however per physical therapist  pt was not presenting this way last session (11/3/21)    Clinical Impression:   · Rehab Potential	good, to achieve stated therapy goals  · Therapy Frequency	3-5x/week        PM&R Impression: as above    Current Disposition Plan :    [facility] acute rehab placement

## 2021-11-05 NOTE — PROGRESS NOTE ADULT - SUBJECTIVE AND OBJECTIVE BOX
Neurology Progress Note      Interval History:    Patient seen and examined at bedside. The patient was agitated again this morning. Patient states he still feels confused, offered no other complaints.       PAST MEDICAL & SURGICAL HISTORY:  Hypertension  CVA (cerebral vascular accident)  Dementia  Anemia  Seizure  H/O hernia repair      Medications:  albuterol/ipratropium for Nebulization 3 milliLiter(s) Nebulizer every 6 hours  aspirin enteric coated 81 milliGRAM(s) Oral daily  atorvastatin 40 milliGRAM(s) Oral at bedtime  clopidogrel Tablet 75 milliGRAM(s) Oral daily  donepezil 5 milliGRAM(s) Oral at bedtime  enoxaparin Injectable 40 milliGRAM(s) SubCutaneous at bedtime  lacosamide 100 milliGRAM(s) Oral two times a day  lisinopril 20 milliGRAM(s) Oral daily  melatonin 5 milliGRAM(s) Oral at bedtime  QUEtiapine 12.5 milliGRAM(s) Oral at bedtime  senna 2 Tablet(s) Oral at bedtime      Vital Signs Last 24 Hrs  T(C): 37 (03 Nov 2021 12:48), Max: 37 (03 Nov 2021 12:48)  T(F): 98.6 (03 Nov 2021 12:48), Max: 98.6 (03 Nov 2021 12:48)  HR: 78 (03 Nov 2021 12:48) (59 - 78)  BP: 108/68 (03 Nov 2021 12:48) (108/68 - 156/86)  BP(mean): 81 (03 Nov 2021 12:48) (81 - 81)  RR: 18 (03 Nov 2021 12:48) (18 - 18)  SpO2: 92% (03 Nov 2021 12:48) (92% - 95%)      Neurological Exam:   Mental status: Awake, alert and oriented x2 to self and location. Oriented to year but not to month, date, or day of the week. No dysarthria, no aphasia. Appears more alert and awake, able to carry out conversation and follow commands, answering questions appropriately. Appears to be unable to say certain words and has difficulty with recall, short term memory.  Cranial nerves: Pupils equally round and reactive to light, visual fields full, no nystagmus, extraocular muscles intact, V1 through V3 intact bilaterally and symmetric, face symmetric, hearing intact to finger rub, palate elevation symmetric, tongue was midline.  Motor:  Slight bradykinesia with fine motor movements in L hand and LUE. MRC grading 5/5 B/L UE/LE.   strength 5/5.  Normal tone and bulk.  No abnormal movements.    Sensation: Intact to light touch, proprioception, and pinprick.   Coordination: No dysmetria on finger-to-nose and heel-to-shin.  No dysdiadokinesia.  Reflexes: 2+ in bilateral UE/LE, downgoing toes bilaterally. (-) Ho.  Gait: Narrow and steady. No ataxia.  Romberg negative      Labs:  CBC Full  -  ( 01 Nov 2021 10:37 )  WBC Count : 5.67 K/uL  RBC Count : 5.06 M/uL  Hemoglobin : 13.2 g/dL  Hematocrit : 41.5 %  Platelet Count - Automated : 233 K/uL  Mean Cell Volume : 82.0 fl  Mean Cell Hemoglobin : 26.1 pg  Mean Cell Hemoglobin Concentration : 31.8 gm/dL      Urinalysis Basic - ( 01 Nov 2021 12:22 )    Color: Yellow / Appearance: Clear / SG: >=1.030 / pH: x  Gluc: x / Ketone: NEGATIVE  / Bili: Negative / Urobili: 0.2 E.U./dL   Blood: x / Protein: Trace mg/dL / Nitrite: NEGATIVE   Leuk Esterase: NEGATIVE / RBC: < 5 /HPF / WBC < 5 /HPF   Sq Epi: x / Non Sq Epi: 0-5 /HPF / Bacteria: Present /HPF

## 2021-11-05 NOTE — PROGRESS NOTE ADULT - ASSESSMENT
This is a 61 year-old male w/ PMH of CVA (loop recorder, on Plavix and 81mg ASA, no residual deficits), seizures (on Keppra, and Vimpat), HTN, iron deficiency anemia, dementia, and hernia repair who was initially sent to ER for R-sided weakness, and seizures (witnessed event in ED required 8mg ativan). Now stable for stepdown from MICU to [facility] for further VEEG monitoring. Given improvement of confusion in this setting, could be related to his cognitive decline / dementia compounded by effects of Keppra and hospital acquired delirium. UA neg. No signs of infectious process. When PM dose of Keppra was held, patient did show improvement in mental status.    Plan:    - Continue on VEEG monitoring showed no seizures.   - MRA head and neck with DWI and FLAIR, MR Brain imaging completed, reviewed.   - Holding Keppra for now in setting of confusion and EEG looking good on vimpat with keppra withdrawal.  - Restarted Seroquel 12.5 mg at bedtime.  - C/w Vimpat 100 mg IVPB q12h.   - C/w home ASA 81 mg PO daily.   - C/w home Plavix 75 mg PO daily.   - C/w home Atorvastatin 40 mg at bedtime.   - C/w home Donepezil increased to 10 mg at bedtime.   - F/u labs for autoimmune / paraneoplastic work up, f/u. Has been unremarkable thus far.   - PT/OT eval completed.  - q4h neuro checks.   - Seizure and fall precautions.

## 2021-11-06 LAB
A-TUMOR NECROSIS FACT SERPL-MCNC: 5 PG/ML — SIGNIFICANT CHANGE UP
DSDNA AB SER-ACNC: <12 IU/ML — SIGNIFICANT CHANGE UP
GLUCOSE BLDC GLUCOMTR-MCNC: 101 MG/DL — HIGH (ref 70–99)
GLUCOSE BLDC GLUCOMTR-MCNC: 115 MG/DL — HIGH (ref 70–99)
GLUCOSE BLDC GLUCOMTR-MCNC: 155 MG/DL — HIGH (ref 70–99)
GLUCOSE BLDC GLUCOMTR-MCNC: 210 MG/DL — HIGH (ref 70–99)
IL10 SERPL-MCNC: 6.3 PG/ML — HIGH
IL12 SERPL-MCNC: <1.9 PG/ML — SIGNIFICANT CHANGE UP
IL13 SERPL-MCNC: <1.7 PG/ML — SIGNIFICANT CHANGE UP
IL17A SERPL-MCNC: 1.7 PG/ML — HIGH
IL2 SERPL-MCNC: 413.3 PG/ML — SIGNIFICANT CHANGE UP (ref 175.3–858.2)
IL2 SERPL-MCNC: <2.1 PG/ML — SIGNIFICANT CHANGE UP
IL4 SERPL-MCNC: 2.6 PG/ML — HIGH
IL6 SERPL-MCNC: <2 PG/ML — SIGNIFICANT CHANGE UP
IL8 SERPL-MCNC: <3 PG/ML — SIGNIFICANT CHANGE UP
INTERFERON GAMMA, BLOOD: <4.2 PG/ML — SIGNIFICANT CHANGE UP
INTERLEUKIN 1 BETA: <6.5 PG/ML — SIGNIFICANT CHANGE UP
INTERLEUKIN 5: <2.1 PG/ML — SIGNIFICANT CHANGE UP

## 2021-11-06 PROCEDURE — 95720 EEG PHY/QHP EA INCR W/VEEG: CPT

## 2021-11-06 PROCEDURE — 99232 SBSQ HOSP IP/OBS MODERATE 35: CPT

## 2021-11-06 RX ADMIN — ATORVASTATIN CALCIUM 40 MILLIGRAM(S): 80 TABLET, FILM COATED ORAL at 22:42

## 2021-11-06 RX ADMIN — Medication 5 MILLIGRAM(S): at 22:42

## 2021-11-06 RX ADMIN — CLOPIDOGREL BISULFATE 75 MILLIGRAM(S): 75 TABLET, FILM COATED ORAL at 11:23

## 2021-11-06 RX ADMIN — Medication 3 MILLILITER(S): at 11:23

## 2021-11-06 RX ADMIN — Medication 81 MILLIGRAM(S): at 11:23

## 2021-11-06 RX ADMIN — ENOXAPARIN SODIUM 40 MILLIGRAM(S): 100 INJECTION SUBCUTANEOUS at 22:42

## 2021-11-06 RX ADMIN — LACOSAMIDE 100 MILLIGRAM(S): 50 TABLET ORAL at 22:42

## 2021-11-06 RX ADMIN — DONEPEZIL HYDROCHLORIDE 10 MILLIGRAM(S): 10 TABLET, FILM COATED ORAL at 22:42

## 2021-11-06 RX ADMIN — LACOSAMIDE 100 MILLIGRAM(S): 50 TABLET ORAL at 11:23

## 2021-11-06 RX ADMIN — Medication 3 MILLILITER(S): at 06:20

## 2021-11-06 RX ADMIN — Medication 3 MILLILITER(S): at 17:59

## 2021-11-06 RX ADMIN — QUETIAPINE FUMARATE 12.5 MILLIGRAM(S): 200 TABLET, FILM COATED ORAL at 22:42

## 2021-11-06 NOTE — PROGRESS NOTE ADULT - SUBJECTIVE AND OBJECTIVE BOX
Subjective  No events overnight. No complaints currently.    ROS  As above, otherwise negative for constitutional/HEENT/CV/pulm/GI//MSK/neuro/derm/endocrine/psych.     MEDICATIONS  (STANDING):  albuterol/ipratropium for Nebulization 3 milliLiter(s) Nebulizer every 6 hours  aspirin enteric coated 81 milliGRAM(s) Oral daily  atorvastatin 40 milliGRAM(s) Oral at bedtime  clopidogrel Tablet 75 milliGRAM(s) Oral daily  donepezil 10 milliGRAM(s) Oral at bedtime  enoxaparin Injectable 40 milliGRAM(s) SubCutaneous at bedtime  lacosamide 100 milliGRAM(s) Oral two times a day  lisinopril 20 milliGRAM(s) Oral daily  melatonin 5 milliGRAM(s) Oral at bedtime  QUEtiapine 12.5 milliGRAM(s) Oral at bedtime  senna 2 Tablet(s) Oral at bedtime    MEDICATIONS  (PRN):      T(C): 36.7 (11-06-21 @ 05:27), Max: 37.2 (11-05-21 @ 21:07)  HR: 51 (11-06-21 @ 05:27) (51 - 85)  BP: 119/79 (11-06-21 @ 05:27) (119/79 - 151/85)  RR: 18 (11-06-21 @ 05:27) (18 - 18)  SpO2: 95% (11-06-21 @ 05:27) (95% - 99%)  Wt(kg): --    Neurological Exam:   Mental status: Awake, alert and oriented x2 to self and location. Oriented to year but not to month, date, or day of the week. No dysarthria, no aphasia. Appears more alert and awake, able to carry out conversation and follow commands, answering questions appropriately. Appears to be unable to say certain words and has difficulty with recall, short term memory.  Cranial nerves: Pupils equally round and reactive to light, visual fields full, no nystagmus, extraocular muscles intact, V1 through V3 intact bilaterally and symmetric, face symmetric,  Motor:  Slight bradykinesia with fine motor movements in L hand and LUE. MRC grading 5/5 B/L UE/LE.   strength 5/5.  Normal tone and bulk.  No abnormal movements.    Sensation: Intact to light touch.  Coordination: No dysmetria on finger-to-nose.  Reflexes: 2+ in bilateral UE/LE, downgoing toes bilaterally.       EEG:  Mild, right hemisphere slow waves, rare during wakefulness occasional during drowsy and sleep states, suggestive of a mild underlying  dysfunction.  No epileptiform activity and no significant clinical events occurred. Subjective  No events overnight. No complaints currently.  A bit drowsy today.    ROS  As above, otherwise negative for constitutional/HEENT/CV/pulm/GI//MSK/neuro/derm/endocrine/psych.     MEDICATIONS  (STANDING):  albuterol/ipratropium for Nebulization 3 milliLiter(s) Nebulizer every 6 hours  aspirin enteric coated 81 milliGRAM(s) Oral daily  atorvastatin 40 milliGRAM(s) Oral at bedtime  clopidogrel Tablet 75 milliGRAM(s) Oral daily  donepezil 10 milliGRAM(s) Oral at bedtime  enoxaparin Injectable 40 milliGRAM(s) SubCutaneous at bedtime  lacosamide 100 milliGRAM(s) Oral two times a day  lisinopril 20 milliGRAM(s) Oral daily  melatonin 5 milliGRAM(s) Oral at bedtime  QUEtiapine 12.5 milliGRAM(s) Oral at bedtime  senna 2 Tablet(s) Oral at bedtime    MEDICATIONS  (PRN):      T(C): 36.7 (11-06-21 @ 05:27), Max: 37.2 (11-05-21 @ 21:07)  HR: 51 (11-06-21 @ 05:27) (51 - 85)  BP: 119/79 (11-06-21 @ 05:27) (119/79 - 151/85)  RR: 18 (11-06-21 @ 05:27) (18 - 18)  SpO2: 95% (11-06-21 @ 05:27) (95% - 99%)  Wt(kg): --    Neurological Exam:   Mental status: Awake, alert and oriented x2 to self and location. Oriented to year but not to month, date, or day of the week. No dysarthria, no aphasia. Appears more alert and awake, able to carry out conversation and follow commands, answering questions appropriately. Appears to be unable to say certain words and has difficulty with recall, short term memory.  Cranial nerves: Pupils equally round and reactive to light, visual fields full, no nystagmus, extraocular muscles intact, V1 through V3 intact bilaterally and symmetric, face symmetric,  Motor:  Slight bradykinesia with fine motor movements in L hand and LUE. MRC grading 5/5 B/L UE/LE.   strength 5/5.  Normal tone and bulk.  No abnormal movements.    Sensation: Intact to light touch.  Coordination: No dysmetria on finger-to-nose.  Reflexes: 2+ in bilateral UE/LE, downgoing toes bilaterally.       EEG:  Mild, right hemisphere slow waves, rare during wakefulness occasional during drowsy and sleep states, suggestive of a mild underlying  dysfunction.  No epileptiform activity and no significant clinical events occurred.

## 2021-11-06 NOTE — EEG REPORT - NS EEG TEXT BOX
Day 10 11/05-11/06/2021:   Pertinent medications: LCM 100mg BID  Background:  continuous, with predominantly alpha and beta frequencies.  Symmetry:  Occasional (1-9%) irregular theta/delta waves over right hemisphere  Posterior Dominant Rhythm:  9 Hz symmetric, well-organized, and well-modulated.  Organization: Appropriate anterior-posterior gradient.  Voltage:  Normal (20+ uV)  Variability: Yes. 		Reactivity: Yes.  N2 sleep: Symmetric, synchronous spindles and K complexes.  Spontaneous Activity:  No epileptiform discharges.  Periodic/rhythmic activity:  None.  Events:  No electrographic seizures or significant clinical events.  Provocations:  Hyperventilation and Photic stimulation: was not performed.    Daily Summary:    Mild, right  hemisphere slow waves, rare during wakefulness occasional during drowsy and sleep states, suggestive of a mild underlying  dysfunction.  No epileptiform activity and no significant clinical events occurred.    Karan Espinoza DO  Clinical Neurophysiology Fellow    Bernard Lundy MD  Attending Neurologist, [facility]

## 2021-11-06 NOTE — PROGRESS NOTE ADULT - ASSESSMENT
61 year-old male w/ PMH of CVA (loop recorder, on Plavix and 81mg ASA, no residual deficits), seizures (on Keppra, and Vimpat), HTN, iron deficiency anemia, dementia, and hernia repair who was initially sent to ER for R-sided weakness, and seizures (witnessed event in ED required 8mg ativan). Now stable for stepdown from MICU to [facility] for further VEEG monitoring. Given improvement of confusion in this setting, could be related to his cognitive decline / dementia compounded by effects of Keppra and hospital acquired delirium. UA neg. No signs of infectious process. When PM dose of Keppra was held, patient did show improvement in mental status.    Plan:    -  VEEG monitoring showed no seizures.   - MRA head and neck with DWI and FLAIR, MR Brain imaging completed, reviewed.   - Holding Keppra for now in setting of confusion and EEG looking good on vimpat with keppra withdrawal.  - Restarted Seroquel 12.5 mg at bedtime.  - C/w Vimpat 100 mg IVPB q12h.   - C/w home ASA 81 mg PO daily.   - C/w home Plavix 75 mg PO daily.   - C/w home Atorvastatin 40 mg at bedtime.   - C/w home Donepezil increased to 10 mg at bedtime.   - F/u labs for autoimmune / paraneoplastic work up, f/u. Has been unremarkable thus far.   - PT/OT eval completed.  - q4h neuro checks.   - Seizure and fall precautions.

## 2021-11-07 LAB
ALBUMIN SERPL ELPH-MCNC: 4.8 G/DL — SIGNIFICANT CHANGE UP (ref 3.3–5)
ALP SERPL-CCNC: 66 U/L — SIGNIFICANT CHANGE UP (ref 40–120)
ALT FLD-CCNC: 31 U/L — SIGNIFICANT CHANGE UP (ref 10–45)
ANION GAP SERPL CALC-SCNC: 17 MMOL/L — SIGNIFICANT CHANGE UP (ref 5–17)
AST SERPL-CCNC: 16 U/L — SIGNIFICANT CHANGE UP (ref 10–40)
BILIRUB SERPL-MCNC: 0.3 MG/DL — SIGNIFICANT CHANGE UP (ref 0.2–1.2)
BUN SERPL-MCNC: 16 MG/DL — SIGNIFICANT CHANGE UP (ref 7–23)
CALCIUM SERPL-MCNC: 9.5 MG/DL — SIGNIFICANT CHANGE UP (ref 8.4–10.5)
CHLORIDE SERPL-SCNC: 103 MMOL/L — SIGNIFICANT CHANGE UP (ref 96–108)
CO2 SERPL-SCNC: 21 MMOL/L — LOW (ref 22–31)
CREAT SERPL-MCNC: 0.79 MG/DL — SIGNIFICANT CHANGE UP (ref 0.5–1.3)
GLUCOSE SERPL-MCNC: 147 MG/DL — HIGH (ref 70–99)
HCT VFR BLD CALC: 43.1 % — SIGNIFICANT CHANGE UP (ref 39–50)
HGB BLD-MCNC: 13.9 G/DL — SIGNIFICANT CHANGE UP (ref 13–17)
LACTATE SERPL-SCNC: 8.8 MMOL/L — CRITICAL HIGH (ref 0.5–2)
MAGNESIUM SERPL-MCNC: 2.2 MG/DL — SIGNIFICANT CHANGE UP (ref 1.6–2.6)
MCHC RBC-ENTMCNC: 26.8 PG — LOW (ref 27–34)
MCHC RBC-ENTMCNC: 32.3 GM/DL — SIGNIFICANT CHANGE UP (ref 32–36)
MCV RBC AUTO: 83.2 FL — SIGNIFICANT CHANGE UP (ref 80–100)
NRBC # BLD: 0 /100 WBCS — SIGNIFICANT CHANGE UP (ref 0–0)
PHOSPHATE SERPL-MCNC: 3.3 MG/DL — SIGNIFICANT CHANGE UP (ref 2.5–4.5)
PLATELET # BLD AUTO: 295 K/UL — SIGNIFICANT CHANGE UP (ref 150–400)
POTASSIUM SERPL-MCNC: 4.4 MMOL/L — SIGNIFICANT CHANGE UP (ref 3.5–5.3)
POTASSIUM SERPL-SCNC: 4.4 MMOL/L — SIGNIFICANT CHANGE UP (ref 3.5–5.3)
PROT SERPL-MCNC: 8.2 G/DL — SIGNIFICANT CHANGE UP (ref 6–8.3)
RBC # BLD: 5.18 M/UL — SIGNIFICANT CHANGE UP (ref 4.2–5.8)
RBC # FLD: SIGNIFICANT CHANGE UP (ref 10.3–14.5)
SODIUM SERPL-SCNC: 141 MMOL/L — SIGNIFICANT CHANGE UP (ref 135–145)
VGCC-P/Q BIND AB SER-SCNC: 0.7 PMOL/L — SIGNIFICANT CHANGE UP (ref 0–24.5)
VOLTAGE-GATED K CHANNEL AB RESULT: 14 PMOL/L — SIGNIFICANT CHANGE UP (ref 0–31)
WBC # BLD: 6.2 K/UL — SIGNIFICANT CHANGE UP (ref 3.8–10.5)
WBC # FLD AUTO: 6.2 K/UL — SIGNIFICANT CHANGE UP (ref 3.8–10.5)

## 2021-11-07 PROCEDURE — 95720 EEG PHY/QHP EA INCR W/VEEG: CPT

## 2021-11-07 PROCEDURE — 99231 SBSQ HOSP IP/OBS SF/LOW 25: CPT

## 2021-11-07 RX ORDER — LISINOPRIL 2.5 MG/1
20 TABLET ORAL ONCE
Refills: 0 | Status: COMPLETED | OUTPATIENT
Start: 2021-11-07 | End: 2021-11-07

## 2021-11-07 RX ORDER — LACOSAMIDE 50 MG/1
150 TABLET ORAL
Refills: 0 | Status: DISCONTINUED | OUTPATIENT
Start: 2021-11-07 | End: 2021-11-08

## 2021-11-07 RX ORDER — MEMANTINE HYDROCHLORIDE 10 MG/1
5 TABLET ORAL EVERY 24 HOURS
Refills: 0 | Status: DISCONTINUED | OUTPATIENT
Start: 2021-11-07 | End: 2021-11-09

## 2021-11-07 RX ADMIN — LISINOPRIL 20 MILLIGRAM(S): 2.5 TABLET ORAL at 20:35

## 2021-11-07 RX ADMIN — Medication 3 MILLILITER(S): at 17:56

## 2021-11-07 RX ADMIN — LACOSAMIDE 150 MILLIGRAM(S): 50 TABLET ORAL at 22:12

## 2021-11-07 RX ADMIN — Medication 3 MILLILITER(S): at 06:53

## 2021-11-07 RX ADMIN — Medication 81 MILLIGRAM(S): at 11:22

## 2021-11-07 RX ADMIN — MEMANTINE HYDROCHLORIDE 5 MILLIGRAM(S): 10 TABLET ORAL at 15:22

## 2021-11-07 RX ADMIN — LACOSAMIDE 100 MILLIGRAM(S): 50 TABLET ORAL at 10:22

## 2021-11-07 RX ADMIN — Medication 5 MILLIGRAM(S): at 22:12

## 2021-11-07 RX ADMIN — ATORVASTATIN CALCIUM 40 MILLIGRAM(S): 80 TABLET, FILM COATED ORAL at 22:12

## 2021-11-07 RX ADMIN — CLOPIDOGREL BISULFATE 75 MILLIGRAM(S): 75 TABLET, FILM COATED ORAL at 11:22

## 2021-11-07 RX ADMIN — ENOXAPARIN SODIUM 40 MILLIGRAM(S): 100 INJECTION SUBCUTANEOUS at 22:11

## 2021-11-07 RX ADMIN — QUETIAPINE FUMARATE 12.5 MILLIGRAM(S): 200 TABLET, FILM COATED ORAL at 22:12

## 2021-11-07 RX ADMIN — SENNA PLUS 2 TABLET(S): 8.6 TABLET ORAL at 22:11

## 2021-11-07 RX ADMIN — Medication 3 MILLILITER(S): at 11:22

## 2021-11-07 RX ADMIN — DONEPEZIL HYDROCHLORIDE 10 MILLIGRAM(S): 10 TABLET, FILM COATED ORAL at 22:12

## 2021-11-07 RX ADMIN — Medication 3 MILLILITER(S): at 00:02

## 2021-11-07 NOTE — CHART NOTE - NSCHARTNOTEFT_GEN_A_CORE
***Rapid Response Clinical Impact Advanced Care Provider Note***    Patient is a 61 year old male, former smoker, PMHx CVA (loop recorder, on Plavix and 81mg ASA, no residual deficits), seizures (on Keppra, changed from 1000mg BID to 1250 BID + Vimpat BID), HTN, iron deficiency anemia, dementia, SHx hernia repair, Sent to ED by neurologist Dr. Guillen for concern of possible stroke vs TIA on Sunday. Per wife, Pt had a seizure two nights ago (Sunday) that was witnessed by son, and is his second episode in the past 2 weeks, was GTC typical for pt. He was given Keppra and his symptoms resolved after 5 minutes. Pt was sitting in his chair, no head injury or LOC. He was not brought to the hospital after his seizure episode. Afterwards at 8PM, Pt began to right sided facial, upper and lower extremity numbness and weakness in the right side. No facial droop. According to wife, Pt appeared to be confused afterwards - these sx resolved spontaneously within 1 hour and have not recurred since then.  Comes to ED today for further evaluation. Denies chest pain, SOB, n/v/d, abdominal pain, bloody stools, leg swelling, and rashes Last CVA 1 year ago with LOC. Pt is compliant with medications.  Scheduled for an endoscopy on 11/11/2021.     Stroke neuro consulted for right sided weakness after seizure. As per wife, "the patient has been having shaking seizures more often so they increased his keppra and added vimpat. Then he had an episode where he stared off to the right then started shaking and then the right side went weak. He has never had an episode like that before." Symptoms suggestive of Henry's paralysis postictal.     Admitted for vEEG monitoring and while waiting for bed began to have tonic clonic seizure with rightward gaze so a RRT was called. . Patient unresponsive to voice and pain with generalized shaking of right side with left sided flaccidity.     On arrival per bedside nursing patient took oral medications prior to seizure (Keppra and Vimpat). Per nursing patient seizing for 2 minutes on arrival. Patient given 2mg of Ativan IVP X 2. On attending arrival approximately 8 minutes into episode given another 4mg of Ativan IVP X 1. Patient with partial seizure, making purposeful movements of right side. Transferred to ED resuscitation room where a load of 150mg of Vimpat given IVP X 1. Patient received oral dose of 50mg of Vimpat earlier in evening which ceased seizure activity after about 10 minutes. Patient hemodynamically stable and protecting airway throughout.    Review of Systems: Unable to obtain due to patient's seizures.     Allergies    No Known Allergies    Intolerances      PAST MEDICAL & SURGICAL HISTORY:  Hypertension    CVA (cerebral vascular accident)    Dementia, old age    Anemia    Seizure    H/O hernia repair    Vital Signs Last 24 Hrs  T(C): 37.2 (26 Oct 2021 23:00), Max: 37.2 (26 Oct 2021 23:00)  T(F): 99 (26 Oct 2021 23:00), Max: 99 (26 Oct 2021 23:00)  HR: 78 (26 Oct 2021 23:20) (50 - 96)  BP: 128/67 (26 Oct 2021 23:20) (128/67 - 170/87)  BP(mean): --  RR: 18 (26 Oct 2021 20:41) (16 - 18)  SpO2: 96% (26 Oct 2021 20:41) (96% - 100%)    Physical Exam:  General: Elderly male lying in hospital stretcher in semifowlers position. Patient with rightward gaze actively seizing.   HEENT: +PERRL, Conjunctiva pink and moist. +Nystagmus, MMM, Oropharynx patent without any gargling or adventitious sounds. Trachea midline. No JVD.   Cardiac: S1, S2 NSR on telemetry.   Pulm: Good bilateral chest rise. LS CTA bilaterally.   GI: Abdomen SNT to palpation.  : +Incontinence.  MSK: Left sided flaccidity. + and shaking of right side. +Pulses X 4.  Neuro: Responsive to deep noxious stimulus.                         12.1   4.74  )-----------( 246      ( 26 Oct 2021 16:03 )             38.1     10-26    140  |  103  |  12  ----------------------------<  131<H>  4.2   |  29  |  0.85    Ca    9.0      26 Oct 2021 16:03  Phos  3.7     10-26  Mg     2.1     10-26    TPro  7.4  /  Alb  4.6  /  TBili  0.3  /  DBili  x   /  AST  25  /  ALT  44  /  AlkPhos  49  10-26         LIVER FUNCTIONS - ( 26 Oct 2021 16:03 )  Alb: 4.6 g/dL / Pro: 7.4 g/dL / ALK PHOS: 49 U/L / ALT: 44 U/L / AST: 25 U/L / GGT: x              PT/INR - ( 26 Oct 2021 16:03 )   PT: 13.0 sec;   INR: 1.09          PTT - ( 26 Oct 2021 16:03 )  PTT:32.5 sec    MEDICATIONS  (STANDING):  aspirin enteric coated 81 milliGRAM(s) Oral daily  atorvastatin 40 milliGRAM(s) Oral at bedtime  clopidogrel Tablet 75 milliGRAM(s) Oral daily    MEDICATIONS  (PRN):    Assessment- Patient is a 61 year old male, former smoker, PMHx CVA (loop recorder, on Plavix and 81mg ASA, no residual deficits), seizures (on Keppra, changed from 1000mg BID to 1250 BID + Vimpat BID), HTN, iron deficiency anemia, dementia, SHx hernia repair admitted for vEEG monitoring with concern for Todds paralysis after witnessed seizures at home now with witnessed seizure in ED prompting a RRT found to be in status epilepticus.     Plan:   -BG WNL  -Seizure safety precautions.  -Given 8mg of ativan in 2mg and 4mg aliquots. See MAR.  -Given Vimpat load. Patient received load of Keppra PTA.  -Patient seizures ceased.   -Patient protecting airway with +Gag reflex. Maintaining saturations with good ventilations.  -Nasal Trumpet placed in right nare.   -vEEG STAT.  -VS per ICU protocol.  -Strict I and O's.   -Admit to MICU for close monitoring.  -Dr. Chao at bedside throughout RRT.  -Rest of plan per MICU.     Above discussed with primary nursing, MICU, and Intensivist.
Admitting Diagnosis:   Patient is a 61y old  Male who presents with a chief complaint of vEEG monitoring (2021 14:26)    PAST MEDICAL & SURGICAL HISTORY:  Hypertension    CVA (cerebral vascular accident)    Dementia, old age    Anemia    Seizure    H/O hernia repair    Current Nutrition Order:  DASH     PO Intake: Good (%) [  x ]  Fair (50-75%) [   ] Poor (<25%) [   ]    GI Issues:   Pt denies N/V/D/C at present  Last BM     Pain:  No pain noted    Skin Integrity:  Wdl    Labs:     CAPILLARY BLOOD GLUCOSE    POCT Blood Glucose.: 115 mg/dL (2021 12:17)    Medications:  MEDICATIONS  (STANDING):  albuterol/ipratropium for Nebulization 3 milliLiter(s) Nebulizer every 6 hours  aspirin enteric coated 81 milliGRAM(s) Oral daily  atorvastatin 40 milliGRAM(s) Oral at bedtime  clopidogrel Tablet 75 milliGRAM(s) Oral daily  donepezil 5 milliGRAM(s) Oral at bedtime  enoxaparin Injectable 40 milliGRAM(s) SubCutaneous at bedtime  lacosamide 100 milliGRAM(s) Oral two times a day  lisinopril 20 milliGRAM(s) Oral daily  melatonin 5 milliGRAM(s) Oral at bedtime  QUEtiapine 12.5 milliGRAM(s) Oral at bedtime  senna 2 Tablet(s) Oral at bedtime    MEDICATIONS  (PRN):    Anthropometric Measurements:    Weight Assessment:  · Height for BMI (FEET)	5 Feet  · Height for BMI (INCHES)	6 Inch(s)  · Height for BMI (CENTIMETERS)	167.64 Centimeter(s)  · Weight for BMI (lbs)	168 lb  · Weight for BMI (kg)	76.2 kg  · Body Mass Index	27.1  · Ideal Body Weight (lbs)	142  · Ideal Body Weight (kg)	64.4    Weight Change: No updated wts     Estimated energy needs:   ActualBW used for calculations as pt between % of IBW (118% IBW). Needs estimated for age  25-30kcal/k-2286kcal  0.8-1g/k-76gprotein  25-30ml/k-2286ml fluid    Subjective:   61 year-old male w/ PMH of CVA (loop recorder, on Plavix and 81mg ASA, no residual deficits), seizures (on Keppra, and vimpat), HTN, iron deficiency anemia, dementia, and hernia repair who was initially sent to ER for R-sided weakness, and seizures (witnessed event in ED required 8mg ativan). Now stepdown from MICU to [address] for further VEEG monitoring.     Pt seen in room, sitting up in bed eating breakfast. Pt passed SLP eval and now on DASH diet. Pt reports tolerating PO well, denies chewing/swallowing difficulty.  Observed pt eating 100% of owens, eggs and fruit this AM. Appetite good. Pt pending acute rehab. Will continue to follow per RD protocol.     Previous Nutrition Diagnosis: No active nutrition diagnosis at this time    Recommendations:  1. Continue DASH diet  2. Monitor tolerance  3. Trend wts  4. Bowel regimen per team    Education: Discussed current diet     Risk Level: High [   ] Moderate [ x ] Low [   ]
Called by primary team to "clear" patient to be taken off telemetry monitoring.  - the patient has not had any arrhythmias while on telemetry monitoring this admission  - the loop recorder was interrogated by EP on 10/28 without any documented episodes of arrhythmia. He was recommended to f/u with Dr. Jay for ILR f/u post discharge    Based on this, there does not seem to be a need for the patient to remain on telemetry monitoring. However, the final decision is, as always, up to the primary team.    D/w cardiology attending.    --  Steven Fry MD  Cardiology PGY6
Called to room for patient having a seizure around 8:20 pm. Seizure unwitnessed by nurse due to happening during nursing change of shift and went on for unknown duration. Patient was noted to have tonic clonic shaking that self resolved. On arrival patient not actively seizing was responsive to simple commands. Blood pressure was 189/80,  O2 sat 100% on nonrebreather. Reported that patient was desaturating to 90% on room air after seizure.   Neuro exam showed aox3, cranial nerves intact. motor strength 4/5 in b/l upper and lower extremities.   CBC CMP and lactate drawn. CBC CMP normal, lactate 8.8 suggestive of seizure.

## 2021-11-07 NOTE — PROGRESS NOTE ADULT - SUBJECTIVE AND OBJECTIVE BOX
***INCOMPLETE***  NEUROLOGY CONSULT: PROGRESS NOTE    INTERVAL HISTORY:      SUBJECTIVE:      MEDICATIONS:  albuterol/ipratropium for Nebulization 3 milliLiter(s) Nebulizer every 6 hours  aspirin enteric coated 81 milliGRAM(s) Oral daily  atorvastatin 40 milliGRAM(s) Oral at bedtime  clopidogrel Tablet 75 milliGRAM(s) Oral daily  donepezil 10 milliGRAM(s) Oral at bedtime  enoxaparin Injectable 40 milliGRAM(s) SubCutaneous at bedtime  lacosamide 100 milliGRAM(s) Oral two times a day  lisinopril 20 milliGRAM(s) Oral daily  melatonin 5 milliGRAM(s) Oral at bedtime  QUEtiapine 12.5 milliGRAM(s) Oral at bedtime  senna 2 Tablet(s) Oral at bedtime    VITAL SIGNS:  Vital Signs Last 24 Hrs  T(C): 36.7 (07 Nov 2021 05:38), Max: 36.9 (06 Nov 2021 12:28)  T(F): 98 (07 Nov 2021 05:38), Max: 98.4 (06 Nov 2021 12:28)  HR: 57 (07 Nov 2021 05:38) (57 - 73)  BP: 146/90 (07 Nov 2021 05:38) (113/74 - 146/90)  BP(mean): --  RR: 16 (07 Nov 2021 05:38) (16 - 18)  SpO2: 95% (07 Nov 2021 05:38) (95% - 96%)    PHYSICAL EXAMINATION:  General: Comfortable, pleasant/anxious/agitated, Ill-appearing, well-nourished/frail/cachectic, comfortable / in distress  Neurologic:     -Mental Status: AAOx3. Speech is fluent with intact naming, repetition, and comprehension, no dysarthria. Recent and remote memory intact. Follows commands. Attention/concentration intact. Fund of knowledge appropriate.     -Cranial Nerves:          II: Visual fields are full to confrontation.          III, IV, VI: EOMI without nystagmus. PERRL b/l          V:  Facial sensation V1-V3 equal and intact           VII: Face is symmetric with normal eye closure and smile          VIII: Hearing is bilaterally intact to finger rub          IX, X: Uvula is midline and soft palate rises symmetrically          XI: Head turning and shoulder shrug are intact.          XII: Tongue protrudes midline     -Motor: Normal bulk and tone. No involuntary movements (tremor, myoclonus, chorea, athetosis, dystonia)          Upper extremities: shoulder abduction, elbow flexion/extension, wrist flexion/extension, handgrip          Lower extremities: hip flexion, knee extension/flexion, plantar flexion, ankle dorsiflexion          No pronator drift. Rapid alternating movements intact and symmetric     -Sensation: Intact to light touch. Pain and temperature intact. Vibration sense intact.           No neglect or extinction on double simultaneous testing.     -Coordination: No dysmetria on finger-to-nose and heel-to-shin intact bilaterally, rapid alternating hand movements intact     -Reflexes: 2+ and symmetric at biceps, triceps, brachioradialis, patellar, ankles          Plantar reflexes downgoing bilaterally. Downgoing toes bilaterally      -Gait: narrow and steady      LABS:                  RADIOLOGY & ADDITIONAL STUDIES:  reviewed EPILEPSY CONSULT: PROGRESS NOTE    INTERVAL HISTORY: NADIA    SUBJECTIVE:  Patient just finished eating breakfast, denies any complaints. Denies any weakness in either extremity.     MEDICATIONS:  albuterol/ipratropium for Nebulization 3 milliLiter(s) Nebulizer every 6 hours  aspirin enteric coated 81 milliGRAM(s) Oral daily  atorvastatin 40 milliGRAM(s) Oral at bedtime  clopidogrel Tablet 75 milliGRAM(s) Oral daily  donepezil 10 milliGRAM(s) Oral at bedtime  enoxaparin Injectable 40 milliGRAM(s) SubCutaneous at bedtime  lacosamide 100 milliGRAM(s) Oral two times a day  lisinopril 20 milliGRAM(s) Oral daily  melatonin 5 milliGRAM(s) Oral at bedtime  QUEtiapine 12.5 milliGRAM(s) Oral at bedtime  senna 2 Tablet(s) Oral at bedtime    VITAL SIGNS:  Vital Signs Last 24 Hrs  T(C): 36.7 (07 Nov 2021 05:38), Max: 36.9 (06 Nov 2021 12:28)  T(F): 98 (07 Nov 2021 05:38), Max: 98.4 (06 Nov 2021 12:28)  HR: 57 (07 Nov 2021 05:38) (57 - 73)  BP: 146/90 (07 Nov 2021 05:38) (113/74 - 146/90)  BP(mean): --  RR: 16 (07 Nov 2021 05:38) (16 - 18)  SpO2: 95% (07 Nov 2021 05:38) (95% - 96%)    PHYSICAL EXAMINATION:  Neurological Exam:   Mental status: Awake, alert and oriented x2 to self and location. Oriented to year but not to month, date, or day of the week. No dysarthria, no aphasia. Appears more alert and awake, able to carry out conversation and follow commands, answering questions appropriately.   Cranial nerves: Pupils equally round and reactive to light, visual fields full, no nystagmus, extraocular muscles intact, V1 through V3 intact bilaterally and symmetric, face symmetric,  Motor:  Slight bradykinesia with fine motor movements in L hand and LUE. MRC grading 5/5 B/L UE/LE.   strength 5/5.  Normal tone and bulk.  No abnormal movements.    Sensation: Intact to light touch.  Coordination: No dysmetria on finger-to-nose.  Reflexes: 2+ in bilateral UE/LE, downgoing toes bilaterally.       EEG:  Mild, right hemisphere slow waves, rare during wakefulness occasional during drowsy and sleep states, suggestive of a mild underlying  dysfunction.  No epileptiform activity and no significant clinical events occurred.      LABS:                  RADIOLOGY & ADDITIONAL STUDIES:  reviewed EPILEPSY CONSULT: PROGRESS NOTE    INTERVAL HISTORY: NADIA    SUBJECTIVE:  Patient just finished eating breakfast, denies any complaints. Denies any weakness in either extremity.   Language errors and orientation fluctuating through the day, notable by RN and on re-rounding, despite no change in EEG.    MEDICATIONS:  albuterol/ipratropium for Nebulization 3 milliLiter(s) Nebulizer every 6 hours  aspirin enteric coated 81 milliGRAM(s) Oral daily  atorvastatin 40 milliGRAM(s) Oral at bedtime  clopidogrel Tablet 75 milliGRAM(s) Oral daily  donepezil 10 milliGRAM(s) Oral at bedtime  enoxaparin Injectable 40 milliGRAM(s) SubCutaneous at bedtime  lacosamide 100 milliGRAM(s) Oral two times a day  lisinopril 20 milliGRAM(s) Oral daily  melatonin 5 milliGRAM(s) Oral at bedtime  QUEtiapine 12.5 milliGRAM(s) Oral at bedtime  senna 2 Tablet(s) Oral at bedtime    VITAL SIGNS:  Vital Signs Last 24 Hrs  T(C): 36.7 (07 Nov 2021 05:38), Max: 36.9 (06 Nov 2021 12:28)  T(F): 98 (07 Nov 2021 05:38), Max: 98.4 (06 Nov 2021 12:28)  HR: 57 (07 Nov 2021 05:38) (57 - 73)  BP: 146/90 (07 Nov 2021 05:38) (113/74 - 146/90)  BP(mean): --  RR: 16 (07 Nov 2021 05:38) (16 - 18)  SpO2: 95% (07 Nov 2021 05:38) (95% - 96%)    PHYSICAL EXAMINATION:  Neurological Exam:   Mental status: Awake, alert and oriented x2 to self and location. Oriented to year but not to month, date, or day of the week. No dysarthria, no aphasia. Appears more alert and awake, able to carry out conversation and follow commands, answering questions appropriately.   Cranial nerves: Pupils equally round and reactive to light, visual fields full, no nystagmus, extraocular muscles intact, V1 through V3 intact bilaterally and symmetric, face symmetric,  Motor:  Slight bradykinesia with fine motor movements in L hand and LUE. MRC grading 5/5 B/L UE/LE.   strength 5/5.  Normal tone and bulk.  No abnormal movements.    Sensation: Intact to light touch.  Coordination: No dysmetria on finger-to-nose.  Reflexes: 2+ in bilateral UE/LE, downgoing toes bilaterally.       EEG:  Mild, right hemisphere slow waves, rare during wakefulness occasional during drowsy and sleep states, suggestive of a mild underlying  dysfunction.  No epileptiform activity and no significant clinical events occurred.      LABS:                  RADIOLOGY & ADDITIONAL STUDIES:  reviewed

## 2021-11-07 NOTE — PROGRESS NOTE ADULT - ASSESSMENT
61 year-old male w/ PMH of CVA (loop recorder, on Plavix and 81mg ASA, no residual deficits), seizures (on Keppra, and Vimpat), HTN, iron deficiency anemia, dementia, and hernia repair who was initially sent to ER for R-sided weakness, and seizures (witnessed event in ED required 8mg ativan). Now stable for stepdown from MICU to [facility] for further VEEG monitoring. Given improvement of confusion in this setting, could be related to his cognitive decline / dementia compounded by effects of Keppra and hospital acquired delirium. UA neg. No signs of infectious process. When PM dose of Keppra was held, patient did show improvement in mental status.    Plan:    - VEEG monitoring showed no seizures.   - MRA head and neck with DWI and FLAIR, MR Brain imaging completed, reviewed.   - C/W Holding Keppra for now in setting of confusion and EEG looking good on vimpat with keppra withdrawal.  - C/W Seroquel 12.5 mg at bedtime.  - C/w Vimpat 100 mg IVPB q12h.   - C/w home ASA 81 mg PO daily.   - C/w home Plavix 75 mg PO daily.   - C/w home Atorvastatin 40 mg at bedtime.   - C/w home Donepezil increased to 10 mg at bedtime.   - F/u labs for autoimmune / paraneoplastic work up, f/u. Has been unremarkable thus far.   - Neuro checks q4H  - Seizure and fall precautions.     Dispo: Acute Rehab, plan for Monday 11/8/21    Recommendations preliminary until attested by attending 61 year-old male w/ PMH of CVA (loop recorder, on Plavix and 81mg ASA, no residual deficits), seizures (on Keppra, and Vimpat), HTN, iron deficiency anemia, dementia, and hernia repair who was initially sent to ER for R-sided weakness, and seizures (witnessed event in ED required 8mg ativan). Now stable for stepdown from MICU to [facility] for further VEEG monitoring. Given improvement of confusion in this setting, could be related to his cognitive decline / dementia compounded by effects of Keppra and hospital acquired delirium. UA neg. No signs of infectious process. When PM dose of Keppra was held, patient did show improvement in mental status.    Plan:    - VEEG monitoring showed no seizures.   - MRA head and neck with DWI and FLAIR, MR Brain imaging completed, reviewed.   - C/W Holding Keppra for now in setting of confusion and EEG looking good on vimpat with keppra withdrawal.  - C/W Seroquel 12.5 mg at bedtime.  - C/w Vimpat 100 mg IVPB q12h.   - C/w home ASA 81 mg PO daily.   - C/w home Plavix 75 mg PO daily.   - C/w home Atorvastatin 40 mg at bedtime.   - C/w home Donepezil increased to 10 mg at bedtime.   - Given unimproved mental status with still some cognitive slowing, will start namenda 5mg once daily   - F/u labs for autoimmune / paraneoplastic work up, f/u. Has been unremarkable thus far.   - Neuro checks q4H  - Seizure and fall precautions.     Dispo: Acute Rehab, possibly 11/8/21    Recommendations preliminary until attested by attending

## 2021-11-07 NOTE — EEG REPORT - NS EEG TEXT BOX
[facility] Department of Neurology  Inpatient Continuous video-Electroencephalogram    Patient Name:	DARIELA TOM    :	1960  MRN:	[id number]    Acquisition Details:  Electroencephalography was acquired using a minimum of 21 channels on an [employer] Neurology system v 8.5.1 with electrode placement according to the standard International 10-20 system following ACNS (American Clinical Neurophysiology Society) guidelines for Long-Term Video EEG monitoring.  Anterior temporal T1 and T2 electrodes were utilized whenever possible.   The [employer] automated spike & seizure detections were all reviewed in detail, in addition to extensive portions of raw EEG.    Day -2021:   Pertinent medications: LCM 100mg BID  Background:  continuous, with predominantly alpha and beta frequencies.  Symmetry:  Occasional (1-9%) irregular theta/delta waves over right hemisphere  Posterior Dominant Rhythm:  9 Hz symmetric, well-organized, and well-modulated.  Organization: Appropriate anterior-posterior gradient.  Voltage:  Normal (20+ uV)  Variability: Yes. 		Reactivity: Yes.  N2 sleep: Symmetric, synchronous spindles and K complexes.  Spontaneous Activity:  No epileptiform discharges.  Periodic/rhythmic activity:  None.  Events:  No electrographic seizures or significant clinical events.  Provocations:  Hyperventilation and Photic stimulation: was not performed.    Daily Summary:    No change from prior day.   No epileptiform activity and no significant clinical events occurred.    Karan Espinoza DO  Clinical Neurophysiology Fellow    Bernard Lundy MD  Attending Neurologist, [facility]

## 2021-11-08 LAB
ALBUMIN SERPL ELPH-MCNC: 4.6 G/DL — SIGNIFICANT CHANGE UP (ref 3.3–5)
ALP SERPL-CCNC: 63 U/L — SIGNIFICANT CHANGE UP (ref 40–120)
ALT FLD-CCNC: 27 U/L — SIGNIFICANT CHANGE UP (ref 10–45)
ANION GAP SERPL CALC-SCNC: 9 MMOL/L — SIGNIFICANT CHANGE UP (ref 5–17)
AST SERPL-CCNC: 13 U/L — SIGNIFICANT CHANGE UP (ref 10–40)
BASOPHILS # BLD AUTO: 0.06 K/UL — SIGNIFICANT CHANGE UP (ref 0–0.2)
BASOPHILS NFR BLD AUTO: 0.9 % — SIGNIFICANT CHANGE UP (ref 0–2)
BILIRUB SERPL-MCNC: 0.2 MG/DL — SIGNIFICANT CHANGE UP (ref 0.2–1.2)
BUN SERPL-MCNC: 18 MG/DL — SIGNIFICANT CHANGE UP (ref 7–23)
CALCIUM SERPL-MCNC: 8.9 MG/DL — SIGNIFICANT CHANGE UP (ref 8.4–10.5)
CHLORIDE SERPL-SCNC: 106 MMOL/L — SIGNIFICANT CHANGE UP (ref 96–108)
CO2 SERPL-SCNC: 27 MMOL/L — SIGNIFICANT CHANGE UP (ref 22–31)
CREAT SERPL-MCNC: 0.7 MG/DL — SIGNIFICANT CHANGE UP (ref 0.5–1.3)
CRYOGLOB SERPL-MCNC: NEGATIVE — SIGNIFICANT CHANGE UP
ENDOMYSIUM IGA TITR SER IF: NEGATIVE — SIGNIFICANT CHANGE UP
ENDOMYSIUM IGA TITR SER: SIGNIFICANT CHANGE UP
EOSINOPHIL # BLD AUTO: 0.18 K/UL — SIGNIFICANT CHANGE UP (ref 0–0.5)
EOSINOPHIL NFR BLD AUTO: 2.7 % — SIGNIFICANT CHANGE UP (ref 0–6)
GLUCOSE BLDC GLUCOMTR-MCNC: 125 MG/DL — HIGH (ref 70–99)
GLUCOSE BLDC GLUCOMTR-MCNC: 179 MG/DL — HIGH (ref 70–99)
GLUCOSE BLDC GLUCOMTR-MCNC: 212 MG/DL — HIGH (ref 70–99)
GLUCOSE BLDC GLUCOMTR-MCNC: 220 MG/DL — HIGH (ref 70–99)
GLUCOSE SERPL-MCNC: 133 MG/DL — HIGH (ref 70–99)
HCT VFR BLD CALC: 39.5 % — SIGNIFICANT CHANGE UP (ref 39–50)
HGB BLD-MCNC: 12.4 G/DL — LOW (ref 13–17)
LACTATE SERPL-SCNC: 1.7 MMOL/L — SIGNIFICANT CHANGE UP (ref 0.5–2)
LYMPHOCYTES # BLD AUTO: 0.91 K/UL — LOW (ref 1–3.3)
LYMPHOCYTES # BLD AUTO: 13.5 % — SIGNIFICANT CHANGE UP (ref 13–44)
MAGNESIUM SERPL-MCNC: 2.3 MG/DL — SIGNIFICANT CHANGE UP (ref 1.6–2.6)
MCHC RBC-ENTMCNC: 26 PG — LOW (ref 27–34)
MCHC RBC-ENTMCNC: 31.4 GM/DL — LOW (ref 32–36)
MCV RBC AUTO: 82.8 FL — SIGNIFICANT CHANGE UP (ref 80–100)
MONOCYTES # BLD AUTO: 0.61 K/UL — SIGNIFICANT CHANGE UP (ref 0–0.9)
MONOCYTES NFR BLD AUTO: 9 % — SIGNIFICANT CHANGE UP (ref 2–14)
NEUTROPHILS # BLD AUTO: 4.86 K/UL — SIGNIFICANT CHANGE UP (ref 1.8–7.4)
NEUTROPHILS NFR BLD AUTO: 72.1 % — SIGNIFICANT CHANGE UP (ref 43–77)
PHOSPHATE SERPL-MCNC: 3.1 MG/DL — SIGNIFICANT CHANGE UP (ref 2.5–4.5)
PLATELET # BLD AUTO: 289 K/UL — SIGNIFICANT CHANGE UP (ref 150–400)
POTASSIUM SERPL-MCNC: 4 MMOL/L — SIGNIFICANT CHANGE UP (ref 3.5–5.3)
POTASSIUM SERPL-SCNC: 4 MMOL/L — SIGNIFICANT CHANGE UP (ref 3.5–5.3)
PROT SERPL-MCNC: 7.5 G/DL — SIGNIFICANT CHANGE UP (ref 6–8.3)
RBC # BLD: 4.77 M/UL — SIGNIFICANT CHANGE UP (ref 4.2–5.8)
RBC # FLD: SIGNIFICANT CHANGE UP (ref 10.3–14.5)
SARS-COV-2 RNA SPEC QL NAA+PROBE: SIGNIFICANT CHANGE UP
SODIUM SERPL-SCNC: 142 MMOL/L — SIGNIFICANT CHANGE UP (ref 135–145)
WBC # BLD: 6.74 K/UL — SIGNIFICANT CHANGE UP (ref 3.8–10.5)
WBC # FLD AUTO: 6.74 K/UL — SIGNIFICANT CHANGE UP (ref 3.8–10.5)

## 2021-11-08 PROCEDURE — 99233 SBSQ HOSP IP/OBS HIGH 50: CPT

## 2021-11-08 RX ORDER — GLUCAGON INJECTION, SOLUTION 0.5 MG/.1ML
1 INJECTION, SOLUTION SUBCUTANEOUS ONCE
Refills: 0 | Status: DISCONTINUED | OUTPATIENT
Start: 2021-11-08 | End: 2021-11-09

## 2021-11-08 RX ORDER — DEXTROSE 50 % IN WATER 50 %
25 SYRINGE (ML) INTRAVENOUS ONCE
Refills: 0 | Status: DISCONTINUED | OUTPATIENT
Start: 2021-11-08 | End: 2021-11-09

## 2021-11-08 RX ORDER — LACOSAMIDE 50 MG/1
200 TABLET ORAL
Refills: 0 | Status: DISCONTINUED | OUTPATIENT
Start: 2021-11-08 | End: 2021-11-09

## 2021-11-08 RX ORDER — SODIUM CHLORIDE 9 MG/ML
1000 INJECTION, SOLUTION INTRAVENOUS
Refills: 0 | Status: DISCONTINUED | OUTPATIENT
Start: 2021-11-08 | End: 2021-11-09

## 2021-11-08 RX ORDER — DEXTROSE 50 % IN WATER 50 %
15 SYRINGE (ML) INTRAVENOUS ONCE
Refills: 0 | Status: DISCONTINUED | OUTPATIENT
Start: 2021-11-08 | End: 2021-11-09

## 2021-11-08 RX ORDER — INSULIN LISPRO 100/ML
VIAL (ML) SUBCUTANEOUS
Refills: 0 | Status: DISCONTINUED | OUTPATIENT
Start: 2021-11-08 | End: 2021-11-09

## 2021-11-08 RX ORDER — DEXTROSE 50 % IN WATER 50 %
12.5 SYRINGE (ML) INTRAVENOUS ONCE
Refills: 0 | Status: DISCONTINUED | OUTPATIENT
Start: 2021-11-08 | End: 2021-11-09

## 2021-11-08 RX ADMIN — LACOSAMIDE 150 MILLIGRAM(S): 50 TABLET ORAL at 06:58

## 2021-11-08 RX ADMIN — QUETIAPINE FUMARATE 12.5 MILLIGRAM(S): 200 TABLET, FILM COATED ORAL at 22:35

## 2021-11-08 RX ADMIN — Medication 3 MILLILITER(S): at 06:58

## 2021-11-08 RX ADMIN — ENOXAPARIN SODIUM 40 MILLIGRAM(S): 100 INJECTION SUBCUTANEOUS at 22:34

## 2021-11-08 RX ADMIN — SENNA PLUS 2 TABLET(S): 8.6 TABLET ORAL at 22:36

## 2021-11-08 RX ADMIN — ATORVASTATIN CALCIUM 40 MILLIGRAM(S): 80 TABLET, FILM COATED ORAL at 22:35

## 2021-11-08 RX ADMIN — LISINOPRIL 20 MILLIGRAM(S): 2.5 TABLET ORAL at 06:58

## 2021-11-08 RX ADMIN — CLOPIDOGREL BISULFATE 75 MILLIGRAM(S): 75 TABLET, FILM COATED ORAL at 12:39

## 2021-11-08 RX ADMIN — DONEPEZIL HYDROCHLORIDE 10 MILLIGRAM(S): 10 TABLET, FILM COATED ORAL at 22:35

## 2021-11-08 RX ADMIN — Medication 81 MILLIGRAM(S): at 12:39

## 2021-11-08 RX ADMIN — Medication 5 MILLIGRAM(S): at 22:35

## 2021-11-08 RX ADMIN — LACOSAMIDE 200 MILLIGRAM(S): 50 TABLET ORAL at 18:31

## 2021-11-08 RX ADMIN — MEMANTINE HYDROCHLORIDE 5 MILLIGRAM(S): 10 TABLET ORAL at 14:59

## 2021-11-08 RX ADMIN — Medication 3 MILLILITER(S): at 12:40

## 2021-11-08 RX ADMIN — Medication 3 MILLILITER(S): at 23:21

## 2021-11-08 RX ADMIN — Medication 2: at 23:20

## 2021-11-08 RX ADMIN — Medication 3 MILLILITER(S): at 18:31

## 2021-11-08 NOTE — PROGRESS NOTE ADULT - ATTENDING COMMENTS
62 yo M with history of dementia, strokes and seizures  Initially on LEV and LCM, LCM increased to 100mg BID in first few days of admission.  LEV 1250mg BID tapered and  was stopped last week as thought to be contributing to ms changes.  Overnight patient had seizure reported (off EEG).  LCM increased to 150mg BID.  Given high dose of LEV stopped last week, will recheck EKG and increase LCM further to 200mg BID  D/c planning to rehab
Post-seizure admission, with slow return to baseline.  PT/OT suggesting acute rehab for improvements in turning, balance and cognition.  Spoke to [id number] today to provide accurate representation of the patient, and they agreed to go week by week.  Cause of cognitive decline unclear - perhaps stroke related or early onset dementia.  No significant findings in autoimmune panel aside from minimal elevation in RF.    Plan:  1) continue higher dose of donepezil at 10mg, can also consider namenda
Stroke with tonic clonic seizure with acute encephalopathy. Admit to ICU for airway monitoring. Rest as above.
Stroke with tonic clonic seizure with acute encephalopathy. Stable, able to protect airway. Transfer to neuro service.
The patient is a 61-year-old male with a past history of multifocal cryptogenic strokes in June 2020 with residual L-sided weakness, s/p ILR, intracranial atherosclerotic disease, dementia, seizure disorder with frequent breakthrough seizures (onset 3/2021), and multiple other comorbidities. He was sent to the ED for evaluation of recurrent breakthrough seizures (focal with left hemispheric onset and generalized). While in the ED last night, he had a generalized seizure for which he was given 8 mg IV Ativan and admitted to the medical ICU.    Plan:  Overall, clinical picture seems atypical for classic post-stroke epilepsy, particularly given the refractory nature. Discussed case with Dr. Boyce. Plan will be for cEEG, medication titration per her recommendations, and repeat MRI with contrast and MRA. Stroke to peripherally follow.
Was considered for d/c home today, but wife noted cognitive status has not returned to baseline.  Pt evaluated, and seen to have significant errors in knowledge.    vEEG leads were poor and may not have identified subtle changes, so will redo vEEG tonight and may need to adjust epilepsy medications up or down tomorrow.
62 yo with acute on chronic cognitive decline  Improvement with holding Levetiracetam overnight, with small dose of quetiapine.  Continues on moderate lacosamide.    Plan:  1) acute rehab is a great idea  2) lacosamide is now the main anti-seizure medication, but will restart low dose LEV to avoid withdrawal - will restart at 375mg bid and spoke with Dr. Guillen who may continue taper off as outpatient.
No seizures on EEG. Rare right hemisphere slowing.  Patient appears at baseline.  Spoke to wife on the phone who reports increase in seizure recently.  Will continue EEG monitoring for further med adjustments   On tele due to bradycardia
Plan as above.  No seizures overnight. No current complaints.
Less agitated at night.  No further improvement in cognition, fluctuating and worsening at times.  Word-finding issues, paraphasias.  vEEG showed no cause for the fluctuations.  No epileptiform activity in days following LEV reduction.    vimpat at moderate dose - less likely but still possible it is contributing.    Will first trial namenda 5mg today.  Re-evaluate tomorrow - hopeful for acute rehab tmrw.
acute on chronic cognitive decline.  Last year's MOCA was 26/30, but today was 10/30 during the day.  Seen by rehab, which showed gait/balance issues as well as safety questions.    Plan:  1) cleared of significant structural changes, vEEG changes but unclear significant decline from last year - so will hold Levetiracetam, which has over doubled in hospital, and has not been effective against seizures.  2) small dose of quetiapine for nocturnal agitation.
No seizures  Mental status fluctuating, but without continued improvements, despite increase in donepezil to 10mg, and weaning off LEV.  EEG holding well.    Continue current management.  Acute rehab hopefully on MOnday.
60 yo M admitted with increasing seizures. s/p seizures in ER  Patient now at baseline.  Spoke to patient's outpatient Neurologist Dr Guillen- we discussed the increase in LEV may effect his mood/behavior. We agreed to increase Vimpat to 100mg BID and taper down LEV to 1250mg BID.  Will continue to monitor on EEG given changes  MRA head/neck w/ flair and DWI completed last night- follow up results  Awaiting PT eval
Patient seen and evaluated.   Currently no complaints. Denies side effects from AEDs.  EEG no seizures.  Will continue to monitor one more day  PT eval  D/c planning for tomorrow possibly
Date of service 10/26/2021    61Y M with h/o seizure d/o presented to the Ed with c/o worsening seizure activity at home with significant post seizure focal defecits. Patient recently  had his home keppra dose increased to 1250 and was started on vimpat 50. in the ED the patient underwent a CT head which was negative for any acute process. The patient was given his home meds and admitted to the epilepsy service for planned vEEG.  In the late evening while waiting for a bed the patient was noted to be having a GTC seizure. An RRT was called and the patient was given 2mg of ativan. blood glucose was normal. GTC movements broke however the patient continued to have focal right upper extremity expectative movements with right sided gaze deviation. An additional 4mg of ativan was given. patient was then loaded with an additional 150 of vimpat and seizure like activity stopped but patient was drowsy likely post ictal. patient appears to be protecting his airway but should be monitored for airway protection and kept on seizure precautions. patient will need MRI and vEEG. epilepsy followup. monitor and replete electrolytes. patient is at high risk for critical decompensation and requires an ICU level of care for status epilepticus

## 2021-11-08 NOTE — PROGRESS NOTE ADULT - SUBJECTIVE AND OBJECTIVE BOX
Physical Medicine and Rehabilitation Progress Note:    Patient is a 61y old  Male who presents with a chief complaint of vEEG monitoring (08 Nov 2021 13:52)      HPI:  HPI: HPI: 60 y/o M, former smoker, PMHx CVA (loop recorder, on Plavix and 81mg ASA, no residual deficits), seizures (on Keppra, changed from 1000mg BID to 1250 BID + Vimpat BID), HTN, iron deficiency anemia, dementia, SHx hernia repair, Sent to ED by neurologist Dr. Guillen for concern of possible stroke vs TIA on Sunday. Per wife, Pt had a seizure two nights ago (Sunday) that was witnessed by son, and is his second episode in the past 2 weeks, was GTC typical for pt. He was given Keppra and his symptoms resolved after 5 minutes. Pt was sitting in his chair, no head injury or LOC. He was not brought to the hospital after his seizure episode. Afterwards at 8PM, Pt began to right sided facial, upper and lower extremity numbness and weakness in the right side. No facial droop. According to wife, Pt appeared to be confused afterwards - these sx resolved spontaneously within 1 hour and have not recurred since then.  Comes to ED today for further evaluation. Denies chest pain, SOB, n/v/d, abdominal pain, bloody stools, leg swelling, and rashes Last CVA 1 year ago with LOC. Pt is compliant with medications.  Scheduled for an endoscopy on 11/11/2021.     Stroke neuro consulted for right sided weakness after seizure. As per wife, "the patient has been having shaking seizures more often so they increased his keppra and added vimpat. Then he had an episode where he stared off to the right then started shaking and then the right side went weak. 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       In the ED:  Initial vital signs: T: 98.8F, HR: 55, BP: 143/74, R: 16, SpO2: 97% on RA  Labs: significant for hg 12.1, cmp lyts wnl, covid negative   Imaging: CT head No acute fracture or malalignment of the cervical spine.CT c spine no contrast: No acute fracture or malalignment of the cervical spine.  CXR: No acute cardiopulmonary disease process.   EKG:sinus bradycardia   Medications: keppra 1250mg po, vimpat 50mg po  Consults: Neurology     (26 Oct 2021 22:33)                            13.9   6.20  )-----------( 295      ( 07 Nov 2021 20:51 )             43.1       11-07    141  |  103  |  16  ----------------------------<  147<H>  4.4   |  21<L>  |  0.79    Ca    9.5      07 Nov 2021 20:51  Phos  3.3     11-07  Mg     2.2     11-07    TPro  8.2  /  Alb  4.8  /  TBili  0.3  /  DBili  x   /  AST  16  /  ALT  31  /  AlkPhos  66  11-07    Vital Signs Last 24 Hrs  T(C): 36.8 (08 Nov 2021 11:53), Max: 36.9 (07 Nov 2021 22:18)  T(F): 98.2 (08 Nov 2021 11:53), Max: 98.5 (07 Nov 2021 22:18)  HR: 71 (08 Nov 2021 11:53) (71 - 102)  BP: 108/55 (08 Nov 2021 11:53) (108/55 - 189/80)  BP(mean): --  RR: 16 (08 Nov 2021 11:53) (16 - 18)  SpO2: 95% (08 Nov 2021 11:53) (94% - 100%)    MEDICATIONS  (STANDING):  albuterol/ipratropium for Nebulization 3 milliLiter(s) Nebulizer every 6 hours  aspirin enteric coated 81 milliGRAM(s) Oral daily  atorvastatin 40 milliGRAM(s) Oral at bedtime  clopidogrel Tablet 75 milliGRAM(s) Oral daily  donepezil 10 milliGRAM(s) Oral at bedtime  enoxaparin Injectable 40 milliGRAM(s) SubCutaneous at bedtime  lacosamide 150 milliGRAM(s) Oral two times a day  lisinopril 20 milliGRAM(s) Oral daily  melatonin 5 milliGRAM(s) Oral at bedtime  memantine 5 milliGRAM(s) Oral every 24 hours  QUEtiapine 12.5 milliGRAM(s) Oral at bedtime  senna 2 Tablet(s) Oral at bedtime    MEDICATIONS  (PRN):  LORazepam   Injectable 2 milliGRAM(s) IV Push once PRN seizures    Currently Undergoing Physical/ Occupational Therapy at bedside.    Functional Status Assessment:           Cognitive/Perceptual/Neuro  Cognitive/Neuro/Behavioral [WDL Definition: Alert; opens eyes spontaneously; arouses to voice or touch; oriented x 4; follows commands; speech spontaneous, logical; purposeful motor response; behavior appropriate to situation]: WDL    Language Assistance  Preferred Language to Address Healthcare Preferred Language to Address Healthcare: English    Therapeutic Interventions      Bed Mobility  Bed Mobility Training Rolling/Turning: contact guard;  1 person assist;  nonverbal cues (demo/gestures);  verbal cues;  bed rails  Bed Mobility Training Sit-to-Supine: minimum assist (75% patient effort);  1 person assist;  nonverbal cues (demo/gestures);  verbal cues;  bed rails  Bed Mobility Training Supine-to-Sit: minimum assist (75% patient effort);  1 person assist;  nonverbal cues (demo/gestures);  verbal cues;  bed rails  Bed Mobility Training Limitations: decreased ability to use arms for pushing/pulling;  decreased ability to use legs for bridging/pushing;  impaired ability to control trunk for mobility;  decreased strength;  impaired balance;  impaired postural control;  cognitive, decreased safety awareness    Sit-Stand Transfer Training  Transfer Training Sit-to-Stand Transfer: minimum assist (75% patient effort);  1 person assist;  verbal cues;  nonverbal cues (demo/gestures);  rolling walker  Transfer Training Stand-to-Sit Transfer: minimum assist (75% patient effort);  nonverbal cues (demo/gestures);  1 person assist;  verbal cues;  rolling walker  Sit-to-Stand Transfer Training Transfer Safety Analysis: decreased balance;  decreased sequencing ability;  decreased step length;  decreased weight-shifting ability;  decreased strength;  impaired balance;  impaired coordination;  impaired postural control;  cognitive, decreased safety awareness;  rolling walker    Gait Training  Gait Training: minimum assist (75% patient effort);  1 person assist;  verbal cues;  nonverbal cues (demo/gestures);  rolling walker;  75 feet  Gait Analysis: 3-point gait   decreased garrett;  crouch;  increased time in double stance;  decreased hip/knee flexion;  shuffling;  decreased step length;  decreased trunk rotation;  decreased weight-shifting ability;  decreased strength;  impaired balance;  impaired postural control;  cognitive, decreased safety awareness;  75 feet;  rolling walker          PM&R Impression: as above    Current Disposition Plan Recommendations:    acute rehab placement

## 2021-11-08 NOTE — PROGRESS NOTE ADULT - ATTENDING SUPERVISION STATEMENT
Resident
ACP
Resident

## 2021-11-08 NOTE — PROGRESS NOTE ADULT - ASSESSMENT
per Neurology    61 year-old male w/ PMH of CVA (loop recorder, on Plavix and 81mg ASA, no residual deficits), seizures (on Keppra, and Vimpat), HTN, iron deficiency anemia, dementia, and hernia repair who was initially sent to ER for R-sided weakness, and seizures (witnessed event in ED required 8mg ativan). Now stable for stepdown from MICU to [facility] for further VEEG monitoring. Given improvement of confusion in this setting, could be related to his cognitive decline / dementia compounded by effects of Keppra and hospital acquired delirium. UA neg. No signs of infectious process. When PM dose of Keppra was held, patient did show improvement in mental status.    Plan:    - D/c'd VEEG monitoring.  - MRA head and neck with DWI and FLAIR, MR Brain imaging completed, reviewed.   - Holding Keppra for now in setting of confusion and EEG was looking good on vimpat with keppra withdrawal.  - Restarted Seroquel 12.5 mg at bedtime.  - Follow up CBC, CMP, Mg, Phos, Lactate.  - C/w Vimpat 150 mg IVPB q12h. Check EKG and UT interval. If wnl, will increase vimpat to 200 mg q12h.  - C/w home ASA 81 mg PO daily.   - C/w home Plavix 75 mg PO daily.   - C/w home Atorvastatin 40 mg at bedtime.   - C/w home Donepezil increased to 10 mg at bedtime.  - C/w Namenda 5 mg PO daily.  - F/u labs for autoimmune / paraneoplastic work up, f/u. Has been unremarkable thus far.   - PT/OT eval completed.  - q4h neuro checks.   - Seizure and fall precautions.

## 2021-11-08 NOTE — PROGRESS NOTE ADULT - ASSESSMENT
This is a 61 year-old male w/ PMH of CVA (loop recorder, on Plavix and 81mg ASA, no residual deficits), seizures (on Keppra, and Vimpat), HTN, iron deficiency anemia, dementia, and hernia repair who was initially sent to ER for R-sided weakness, and seizures (witnessed event in ED required 8mg ativan). Now stable for stepdown from MICU to [facility] for further VEEG monitoring. Given improvement of confusion in this setting, could be related to his cognitive decline / dementia compounded by effects of Keppra and hospital acquired delirium. UA neg. No signs of infectious process. When PM dose of Keppra was held, patient did show improvement in mental status.    Plan:    - D/c'd VEEG monitoring.  - MRA head and neck with DWI and FLAIR, MR Brain imaging completed, reviewed.   - Holding Keppra for now in setting of confusion and EEG was looking good on vimpat with keppra withdrawal.  - Restarted Seroquel 12.5 mg at bedtime.  - Follow up CBC, CMP, Mg, Phos, Lactate.  - C/w Vimpat 150 mg IVPB q12h. Check EKG and AR interval. If wnl, will increase vimpat to 200 mg q12h.  - C/w home ASA 81 mg PO daily.   - C/w home Plavix 75 mg PO daily.   - C/w home Atorvastatin 40 mg at bedtime.   - C/w home Donepezil increased to 10 mg at bedtime.  - C/w Namenda 5 mg PO daily.  - F/u labs for autoimmune / paraneoplastic work up, f/u. Has been unremarkable thus far.   - PT/OT eval completed.  - q4h neuro checks.   - Seizure and fall precautions.  This is a 61 year-old male w/ PMH of CVA (loop recorder, on Plavix and 81mg ASA, no residual deficits), seizures (on Keppra, and Vimpat), HTN, iron deficiency anemia, dementia, and hernia repair who was initially sent to ER for R-sided weakness, and seizures (witnessed event in ED required 8mg ativan). Now stable for stepdown from MICU to [facility] for further VEEG monitoring. Given improvement of confusion in this setting, could be related to his cognitive decline / dementia compounded by effects of Keppra and hospital acquired delirium. UA neg. No signs of infectious process. When PM dose of Keppra was held, patient did show improvement in mental status.

## 2021-11-08 NOTE — PROGRESS NOTE ADULT - SUBJECTIVE AND OBJECTIVE BOX
Neurology Progress Note    HPI: 60 y/o M, former smoker, PMHx CVA (loop recorder, on Plavix and 81mg ASA, no residual deficits), seizures (on Keppra, changed from 1000mg BID to 1250 BID + Vimpat BID), HTN, iron deficiency anemia, dementia, SHx hernia repair, Sent to ED by neurologist Dr. Guillen for concern of possible stroke vs TIA on Sunday. Per wife, Pt had a seizure two nights ago (Sunday) that was witnessed by son, and is his second episode in the past 2 weeks, was GTC typical for pt. He was given Keppra and his symptoms resolved after 5 minutes. Pt was sitting in his chair, no head injury or LOC. He was not brought to the hospital after his seizure episode. Afterwards at 8PM, Pt began to right sided facial, upper and lower extremity numbness and weakness in the right side. No facial droop. According to wife, Pt appeared to be confused afterwards - these sx resolved spontaneously within 1 hour and have not recurred since then.  Comes to ED today for further evaluation. Denies chest pain, SOB, n/v/d, abdominal pain, bloody stools, leg swelling, and rashes Last CVA 1 year ago with LOC. Pt is compliant with medications.       Interval History:  Patient was seen and examined at bedside. Last night, the patient had an unwitnessed tonic-clonic seizure which self resolved. The patient states he feels fine this morning, offers no complaints.      PAST MEDICAL & SURGICAL HISTORY:  Hypertension  CVA (cerebral vascular accident)  Dementia, old age  Anemia  Seizure  H/O hernia repair      Medications:  albuterol/ipratropium for Nebulization 3 milliLiter(s) Nebulizer every 6 hours  aspirin enteric coated 81 milliGRAM(s) Oral daily  atorvastatin 40 milliGRAM(s) Oral at bedtime  clopidogrel Tablet 75 milliGRAM(s) Oral daily  donepezil 10 milliGRAM(s) Oral at bedtime  enoxaparin Injectable 40 milliGRAM(s) SubCutaneous at bedtime  lacosamide 150 milliGRAM(s) Oral two times a day  lisinopril 20 milliGRAM(s) Oral daily  LORazepam   Injectable 2 milliGRAM(s) IV Push once PRN  melatonin 5 milliGRAM(s) Oral at bedtime  memantine 5 milliGRAM(s) Oral every 24 hours  QUEtiapine 12.5 milliGRAM(s) Oral at bedtime  senna 2 Tablet(s) Oral at bedtime      Vital Signs Last 24 Hrs  T(C): 36.8 (08 Nov 2021 11:53), Max: 36.9 (07 Nov 2021 22:18)  T(F): 98.2 (08 Nov 2021 11:53), Max: 98.5 (07 Nov 2021 22:18)  HR: 71 (08 Nov 2021 11:53) (71 - 102)  BP: 108/55 (08 Nov 2021 11:53) (108/55 - 189/80)  RR: 16 (08 Nov 2021 11:53) (16 - 18)  SpO2: 95% (08 Nov 2021 11:53) (94% - 100%)      Neurological Exam:   Mental status: Awake, alert and oriented x2 to self and location. Oriented to year but not to month, date, or day of the week. No dysarthria, no aphasia. Appears more alert and awake, able to carry out conversation and follow commands, answering questions appropriately. Appears to be unable to say certain words and has difficulty with recall and short term memory.  Cranial nerves: Pupils equally round and reactive to light, visual fields full, no nystagmus, extraocular muscles intact, V1 through V3 intact bilaterally and symmetric, face symmetric, hearing intact to finger rub, palate elevation symmetric, tongue was midline.  Motor:  Slight bradykinesia with fine motor movements in L hand and LUE. MRC grading 5/5 B/L UE/LE.   strength 5/5.  Normal tone and bulk.  No abnormal movements.    Sensation: Intact to light touch, proprioception, and pinprick.   Coordination: No dysmetria on finger-to-nose and heel-to-shin.  No dysdiadokinesia.  Reflexes: 2+ in bilateral UE/LE, downgoing toes bilaterally. (-) Ho.        Labs:  CBC Full  -  ( 07 Nov 2021 20:51 )  WBC Count : 6.20 K/uL  RBC Count : 5.18 M/uL  Hemoglobin : 13.9 g/dL  Hematocrit : 43.1 %  Platelet Count - Automated : 295 K/uL  Mean Cell Volume : 83.2 fl  Mean Cell Hemoglobin : 26.8 pg  Mean Cell Hemoglobin Concentration : 32.3 gm/dL      11-07    141  |  103  |  16  ----------------------------<  147<H>  4.4   |  21<L>  |  0.79    Ca    9.5      07 Nov 2021 20:51  Phos  3.3     11-07  Mg     2.2     11-07    TPro  8.2  /  Alb  4.8  /  TBili  0.3  /  DBili  x   /  AST  16  /  ALT  31  /  AlkPhos  66  11-07    LIVER FUNCTIONS - ( 07 Nov 2021 20:51 )  Alb: 4.8 g/dL / Pro: 8.2 g/dL / ALK PHOS: 66 U/L / ALT: 31 U/L / AST: 16 U/L / GGT: x

## 2021-11-09 ENCOUNTER — TRANSCRIPTION ENCOUNTER (OUTPATIENT)
Age: 61
End: 2021-11-09

## 2021-11-09 VITALS
HEART RATE: 76 BPM | SYSTOLIC BLOOD PRESSURE: 128 MMHG | TEMPERATURE: 98 F | RESPIRATION RATE: 18 BRPM | DIASTOLIC BLOOD PRESSURE: 74 MMHG | OXYGEN SATURATION: 95 %

## 2021-11-09 LAB
DESMETHYL LACOSAMIDE: <0.5 UG/ML — SIGNIFICANT CHANGE UP
GLUCOSE BLDC GLUCOMTR-MCNC: 115 MG/DL — HIGH (ref 70–99)
GLUCOSE BLDC GLUCOMTR-MCNC: 130 MG/DL — HIGH (ref 70–99)
LACOSAMIDE (VIMPAT) RESULT: 5.1 UG/ML — SIGNIFICANT CHANGE UP (ref 1–10)

## 2021-11-09 PROCEDURE — 82803 BLOOD GASES ANY COMBINATION: CPT

## 2021-11-09 PROCEDURE — 85730 THROMBOPLASTIN TIME PARTIAL: CPT

## 2021-11-09 PROCEDURE — 70544 MR ANGIOGRAPHY HEAD W/O DYE: CPT

## 2021-11-09 PROCEDURE — 80048 BASIC METABOLIC PNL TOTAL CA: CPT

## 2021-11-09 PROCEDURE — 82164 ANGIOTENSIN I ENZYME TEST: CPT

## 2021-11-09 PROCEDURE — 84480 ASSAY TRIIODOTHYRONINE (T3): CPT

## 2021-11-09 PROCEDURE — 81001 URINALYSIS AUTO W/SCOPE: CPT

## 2021-11-09 PROCEDURE — U0003: CPT

## 2021-11-09 PROCEDURE — 87086 URINE CULTURE/COLONY COUNT: CPT

## 2021-11-09 PROCEDURE — 86431 RHEUMATOID FACTOR QUANT: CPT

## 2021-11-09 PROCEDURE — 70547 MR ANGIOGRAPHY NECK W/O DYE: CPT

## 2021-11-09 PROCEDURE — 82962 GLUCOSE BLOOD TEST: CPT

## 2021-11-09 PROCEDURE — 86769 SARS-COV-2 COVID-19 ANTIBODY: CPT

## 2021-11-09 PROCEDURE — 86225 DNA ANTIBODY NATIVE: CPT

## 2021-11-09 PROCEDURE — 84100 ASSAY OF PHOSPHORUS: CPT

## 2021-11-09 PROCEDURE — 82728 ASSAY OF FERRITIN: CPT

## 2021-11-09 PROCEDURE — 86160 COMPLEMENT ANTIGEN: CPT

## 2021-11-09 PROCEDURE — 82607 VITAMIN B-12: CPT

## 2021-11-09 PROCEDURE — 83520 IMMUNOASSAY QUANT NOS NONAB: CPT

## 2021-11-09 PROCEDURE — 80061 LIPID PANEL: CPT

## 2021-11-09 PROCEDURE — 84466 ASSAY OF TRANSFERRIN: CPT

## 2021-11-09 PROCEDURE — 86038 ANTINUCLEAR ANTIBODIES: CPT

## 2021-11-09 PROCEDURE — 86334 IMMUNOFIX E-PHORESIS SERUM: CPT

## 2021-11-09 PROCEDURE — 84439 ASSAY OF FREE THYROXINE: CPT

## 2021-11-09 PROCEDURE — 99285 EMERGENCY DEPT VISIT HI MDM: CPT | Mod: 25

## 2021-11-09 PROCEDURE — 82784 ASSAY IGA/IGD/IGG/IGM EACH: CPT

## 2021-11-09 PROCEDURE — 95714 VEEG EA 12-26 HR UNMNTR: CPT

## 2021-11-09 PROCEDURE — 83036 HEMOGLOBIN GLYCOSYLATED A1C: CPT

## 2021-11-09 PROCEDURE — 86235 NUCLEAR ANTIGEN ANTIBODY: CPT

## 2021-11-09 PROCEDURE — 85025 COMPLETE CBC W/AUTO DIFF WBC: CPT

## 2021-11-09 PROCEDURE — 86231 EMA EACH IG CLASS: CPT

## 2021-11-09 PROCEDURE — 82746 ASSAY OF FOLIC ACID SERUM: CPT

## 2021-11-09 PROCEDURE — 84165 PROTEIN E-PHORESIS SERUM: CPT

## 2021-11-09 PROCEDURE — C9254: CPT

## 2021-11-09 PROCEDURE — 86900 BLOOD TYPING SEROLOGIC ABO: CPT

## 2021-11-09 PROCEDURE — 86376 MICROSOMAL ANTIBODY EACH: CPT

## 2021-11-09 PROCEDURE — 36415 COLL VENOUS BLD VENIPUNCTURE: CPT

## 2021-11-09 PROCEDURE — 82550 ASSAY OF CK (CPK): CPT

## 2021-11-09 PROCEDURE — 97116 GAIT TRAINING THERAPY: CPT

## 2021-11-09 PROCEDURE — 86901 BLOOD TYPING SEROLOGIC RH(D): CPT

## 2021-11-09 PROCEDURE — U0005: CPT

## 2021-11-09 PROCEDURE — 87635 SARS-COV-2 COVID-19 AMP PRB: CPT

## 2021-11-09 PROCEDURE — 71046 X-RAY EXAM CHEST 2 VIEWS: CPT

## 2021-11-09 PROCEDURE — 97161 PT EVAL LOW COMPLEX 20 MIN: CPT

## 2021-11-09 PROCEDURE — 84155 ASSAY OF PROTEIN SERUM: CPT

## 2021-11-09 PROCEDURE — 92526 ORAL FUNCTION THERAPY: CPT

## 2021-11-09 PROCEDURE — 83605 ASSAY OF LACTIC ACID: CPT

## 2021-11-09 PROCEDURE — 97530 THERAPEUTIC ACTIVITIES: CPT

## 2021-11-09 PROCEDURE — 70450 CT HEAD/BRAIN W/O DYE: CPT | Mod: QQ

## 2021-11-09 PROCEDURE — 86596 VOLTAGE-GTD CA CHNL ANTB EA: CPT

## 2021-11-09 PROCEDURE — 82652 VIT D 1 25-DIHYDROXY: CPT

## 2021-11-09 PROCEDURE — 80053 COMPREHEN METABOLIC PANEL: CPT

## 2021-11-09 PROCEDURE — 83540 ASSAY OF IRON: CPT

## 2021-11-09 PROCEDURE — 92610 EVALUATE SWALLOWING FUNCTION: CPT

## 2021-11-09 PROCEDURE — 86140 C-REACTIVE PROTEIN: CPT

## 2021-11-09 PROCEDURE — 84484 ASSAY OF TROPONIN QUANT: CPT

## 2021-11-09 PROCEDURE — 86850 RBC ANTIBODY SCREEN: CPT

## 2021-11-09 PROCEDURE — 86162 COMPLEMENT TOTAL (CH50): CPT

## 2021-11-09 PROCEDURE — 84436 ASSAY OF TOTAL THYROXINE: CPT

## 2021-11-09 PROCEDURE — 83735 ASSAY OF MAGNESIUM: CPT

## 2021-11-09 PROCEDURE — 85652 RBC SED RATE AUTOMATED: CPT

## 2021-11-09 PROCEDURE — 83789 MASS SPECTROMETRY QUAL/QUAN: CPT

## 2021-11-09 PROCEDURE — 93005 ELECTROCARDIOGRAM TRACING: CPT

## 2021-11-09 PROCEDURE — 86341 ISLET CELL ANTIBODY: CPT

## 2021-11-09 PROCEDURE — 99239 HOSP IP/OBS DSCHRG MGMT >30: CPT

## 2021-11-09 PROCEDURE — 80235 DRUG ASSAY LACOSAMIDE: CPT

## 2021-11-09 PROCEDURE — 94640 AIRWAY INHALATION TREATMENT: CPT

## 2021-11-09 PROCEDURE — 95700 EEG CONT REC W/VID EEG TECH: CPT

## 2021-11-09 PROCEDURE — 86036 ANCA SCREEN EACH ANTIBODY: CPT

## 2021-11-09 PROCEDURE — 85610 PROTHROMBIN TIME: CPT

## 2021-11-09 PROCEDURE — 86256 FLUORESCENT ANTIBODY TITER: CPT

## 2021-11-09 PROCEDURE — 84443 ASSAY THYROID STIM HORMONE: CPT

## 2021-11-09 PROCEDURE — 82595 ASSAY OF CRYOGLOBULIN: CPT

## 2021-11-09 PROCEDURE — 85027 COMPLETE CBC AUTOMATED: CPT

## 2021-11-09 PROCEDURE — 86258 DGP ANTIBODY EACH IG CLASS: CPT

## 2021-11-09 PROCEDURE — 83880 ASSAY OF NATRIURETIC PEPTIDE: CPT

## 2021-11-09 PROCEDURE — 83550 IRON BINDING TEST: CPT

## 2021-11-09 PROCEDURE — 83519 RIA NONANTIBODY: CPT

## 2021-11-09 PROCEDURE — 85598 HEXAGNAL PHOSPH PLTLT NEUTRL: CPT

## 2021-11-09 PROCEDURE — 72125 CT NECK SPINE W/O DYE: CPT | Mod: QQ

## 2021-11-09 RX ORDER — LACOSAMIDE 50 MG/1
1 TABLET ORAL
Qty: 0 | Refills: 0 | DISCHARGE
Start: 2021-11-09

## 2021-11-09 RX ORDER — FOLIC ACID 0.8 MG
1 TABLET ORAL
Qty: 0 | Refills: 0 | DISCHARGE

## 2021-11-09 RX ORDER — THIAMINE MONONITRATE (VIT B1) 100 MG
1 TABLET ORAL
Qty: 0 | Refills: 0 | DISCHARGE

## 2021-11-09 RX ORDER — PREGABALIN 225 MG/1
2500 CAPSULE ORAL
Qty: 0 | Refills: 0 | DISCHARGE

## 2021-11-09 RX ORDER — FOLIC ACID 0.8 MG
1 TABLET ORAL DAILY
Refills: 0 | Status: DISCONTINUED | OUTPATIENT
Start: 2021-11-09 | End: 2021-11-09

## 2021-11-09 RX ORDER — DONEPEZIL HYDROCHLORIDE 10 MG/1
1 TABLET, FILM COATED ORAL
Qty: 0 | Refills: 0 | DISCHARGE
Start: 2021-11-09

## 2021-11-09 RX ORDER — ASCORBIC ACID 60 MG
1 TABLET,CHEWABLE ORAL
Qty: 0 | Refills: 0 | DISCHARGE

## 2021-11-09 RX ORDER — FOLIC ACID 0.8 MG
1 TABLET ORAL
Qty: 0 | Refills: 0 | DISCHARGE
Start: 2021-11-09

## 2021-11-09 RX ORDER — CHOLECALCIFEROL (VITAMIN D3) 125 MCG
1 CAPSULE ORAL
Qty: 0 | Refills: 0 | DISCHARGE

## 2021-11-09 RX ORDER — MEMANTINE HYDROCHLORIDE 10 MG/1
1 TABLET ORAL
Qty: 30 | Refills: 0
Start: 2021-11-09

## 2021-11-09 RX ORDER — UBIDECARENONE 100 MG
1 CAPSULE ORAL
Qty: 0 | Refills: 0 | DISCHARGE

## 2021-11-09 RX ADMIN — Medication 81 MILLIGRAM(S): at 12:38

## 2021-11-09 RX ADMIN — Medication 3 MILLILITER(S): at 12:39

## 2021-11-09 RX ADMIN — LISINOPRIL 20 MILLIGRAM(S): 2.5 TABLET ORAL at 06:23

## 2021-11-09 RX ADMIN — CLOPIDOGREL BISULFATE 75 MILLIGRAM(S): 75 TABLET, FILM COATED ORAL at 12:39

## 2021-11-09 RX ADMIN — MEMANTINE HYDROCHLORIDE 5 MILLIGRAM(S): 10 TABLET ORAL at 15:37

## 2021-11-09 RX ADMIN — Medication 1 MILLIGRAM(S): at 15:37

## 2021-11-09 RX ADMIN — Medication 3 MILLILITER(S): at 06:18

## 2021-11-09 RX ADMIN — LACOSAMIDE 200 MILLIGRAM(S): 50 TABLET ORAL at 06:18

## 2021-11-09 NOTE — PROGRESS NOTE ADULT - PROBLEM SELECTOR PLAN 1
In ED had tonic clonic seizure with right-millard gaze. Rapid response called. Unresponsive to voice and pain with generalized shaking of R side with L sided flaccidity. S/p Ativan 4 mg IVP x 2 and Vimpat 150 mg IVP x 1.    - Continue vEEG monitoring  - MRA head and neck with DWI and FLAIR (pending)  - Continue Keppra 1500 mg IVPB q12h  - Continue Vimpat 50 mg IVPB q12h  - q4h neuro checks
In ED had tonic clonic seizure with right-millard gaze. Rapid response called. Unresponsive to voice and pain with generalized shaking of R side with L sided flaccidity. S/p Ativan 4 mg IVP x 2 and Vimpat 150 mg IVP x 1.    - Continue vEEG monitoring  - MRA head and neck with DWI and FLAIR, MR Brain imaging reviewed  - Lowering Keppra to 1250 mg IVPB q12h starting PM dose today 10/30  - Increasing Vimpat to 100 mg IVPB q12h starting additional 50 mg dose in afternoon, and 100 mg PM today 10/30  - q4h neuro checks.  - Get EKG now to look at SD interval  - If seizure free on EEG, possible d/c tomorrow
- Keep off VEEG monitoring.  - MRA head and neck with DWI and FLAIR, MR Brain imaging completed.  - Holding Keppra for now in setting of confusion and EEG was looking good on vimpat with keppra withdrawal.  - C/w Seroquel 12.5 mg at bedtime.  - C/w Vimpat 200 mg IVPB q12h.   - q4h neuro checks.   - Seizure and fall precautions.  - Discharge planning to acute rehab.
In ED had tonic clonic seizure with right-millard gaze. Rapid response called. Unresponsive to voice and pain with generalized shaking of R side with L sided flaccidity. S/p Ativan 4 mg IVP x 2 and Vimpat 150 mg IVP x 1.    - Continue vEEG monitoring  - MRA head and neck with DWI and FLAIR, MR Brain imaging reviewed  - c/w Keppra to 1250 mg IVPB q12h  - c/w Vimpat to 100 mg IVPB q12h  - q4h neuro checks.  - Get EKG now to look at [facility] interval  - If seizure free on EEG, will plan for d/c tomorrow
- D/c'd VEEG monitoring.  - MRA head and neck with DWI and FLAIR, MR Brain imaging completed, reviewed.   - Holding Keppra for now in setting of confusion and EEG was looking good on vimpat with keppra withdrawal.  - Restarted Seroquel 12.5 mg at bedtime.  - Follow up CBC, CMP, Mg, Phos, Lactate.  - C/w Vimpat 150 mg IVPB q12h. Check EKG and MI interval. If wnl, will increase vimpat to 200 mg q12h.  - q4h neuro checks.   - Seizure and fall precautions.

## 2021-11-09 NOTE — PROGRESS NOTE ADULT - PROBLEM SELECTOR PLAN 3
- C/w home donepezil 5 mg at bedtime.
- C/w home donepezil 5 mg at bedtime
- C/w home ASA 81 mg PO daily.   - C/w home Plavix 75 mg PO daily.   - C/w home Atorvastatin 40 mg at bedtime.
- C/w home donepezil 5 mg at bedtime.
- C/w home ASA 81 mg PO daily.   - C/w home Plavix 75 mg PO daily.   - C/w home Atorvastatin 40 mg at bedtime.

## 2021-11-09 NOTE — DISCHARGE NOTE NURSING/CASE MANAGEMENT/SOCIAL WORK - NSDCVIVACCINE_GEN_ALL_CORE_FT
Tdap; 16-Sep-2021 03:57; Alicia Terrazas (GERALDINE); [employer]; [id number] (Exp. Date: 15-Jul-2023); IntraMuscular; Deltoid Right.; 0.5 milliLiter(s); VIS (VIS Published: 09-May-2013, VIS Presented: 16-Sep-2021);

## 2021-11-09 NOTE — PROGRESS NOTE ADULT - PROVIDER SPECIALTY LIST ADULT
Epilepsy
Electrophysiology
Epilepsy
Internal Medicine
Neurology
Epilepsy
MICU
MICU
Neurology
Rehab Medicine
Rehab Medicine
Epilepsy
MICU
Rehab Medicine
Epilepsy

## 2021-11-09 NOTE — DISCHARGE NOTE NURSING/CASE MANAGEMENT/SOCIAL WORK - PATIENT PORTAL LINK FT
You can access the FollowMyHealth Patient Portal offered by [facility] by registering at the following website: [email or web address]. By joining [facility]’s FollowMyHealth portal, you will also be able to view your health information using other applications (apps) compatible with our system.

## 2021-11-09 NOTE — PROGRESS NOTE ADULT - PROBLEM SELECTOR PLAN 4
- C/w home Donepezil increased to 10 mg at bedtime.  - C/w Namenda 5 mg PO daily.  - MOCA on this admission showed cognitive decline, scored 10/30.  - F/u remaining autoimmune/paraneoplastic work up. Has been unremarkable thus far.
- C/w home Donepezil increased to 10 mg at bedtime.  - C/w Namenda 5 mg PO daily.

## 2021-11-09 NOTE — PROGRESS NOTE ADULT - PROBLEM SELECTOR PROBLEM 2
Hypertension
CVA (cerebral vascular accident)
Hypertension

## 2021-11-09 NOTE — PROGRESS NOTE ADULT - PROBLEM SELECTOR PROBLEM 3
CVA (cerebral vascular accident)
Dementia, old age
CVA (cerebral vascular accident)
Dementia, old age
Dementia, old age

## 2021-11-09 NOTE — PROGRESS NOTE ADULT - ASSESSMENT
This is a 61 year-old male w/ PMH of CVA (loop recorder, on Plavix and 81mg ASA, no residual deficits), seizures (on Keppra, and Vimpat), HTN, iron deficiency anemia, dementia, and hernia repair who was initially sent to ER for R-sided weakness, and seizures (witnessed event in ED required 8mg ativan). Now stable for stepdown from MICU to [facility] for further VEEG monitoring. Given improvement of confusion in this setting, could be related to his cognitive decline / dementia compounded by effects of Keppra and hospital acquired delirium. UA neg. No signs of infectious process. When PM dose of Keppra was held, patient did show improvement in mental status.

## 2021-11-09 NOTE — PROGRESS NOTE ADULT - PROBLEM SELECTOR PLAN 2
On home ASA 81 mg daily, Plavix 75 mg daily, and atorvastatin 80 mg daily    - MRA completed as above  - C/w home ASA 81 mg PO daily  - C/w home Plavix 75 mg PO daily  - C/w home atorvastatin 40 mg at bedtime.
- BP has been well controlled
- BP has been well controlled.
On home ASA 81 mg daily, Plavix 75 mg daily, and atorvastatin 80 mg daily    - MRA completed as above  - C/w home ASA 81 mg PO daily  - C/w home Plavix 75 mg PO daily  - C/w home atorvastatin 40 mg at bedtime.
On home ASA 81 mg daily, Plavix 75 mg daily, and atorvastatin 80 mg daily    - MRA pending as above  - C/w home ASA 81 mg PO daily  - C/w home Plavix 75 mg PO daily  - C/w home atorvastatin 40 mg at bedtime

## 2021-11-09 NOTE — PROGRESS NOTE ADULT - SUBJECTIVE AND OBJECTIVE BOX
Neurology Progress Note    Interval History:     Patient was seen and examined at bedside. There were no acute events overnight. The patient states he feels fine this morning, offers no complaints. Was emotional and tearful during encounter, saying "I want to go home".       PAST MEDICAL & SURGICAL HISTORY:  Hypertension  CVA (cerebral vascular accident)  Dementia, old age  Anemia  Seizure  H/O hernia repair      Medications:  albuterol/ipratropium for Nebulization 3 milliLiter(s) Nebulizer every 6 hours  aspirin enteric coated 81 milliGRAM(s) Oral daily  atorvastatin 40 milliGRAM(s) Oral at bedtime  clopidogrel Tablet 75 milliGRAM(s) Oral daily  donepezil 10 milliGRAM(s) Oral at bedtime  enoxaparin Injectable 40 milliGRAM(s) SubCutaneous at bedtime  lacosamide 200 milliGRAM(s) Oral two times a day  lisinopril 20 milliGRAM(s) Oral daily  LORazepam   Injectable 2 milliGRAM(s) IV Push once PRN  melatonin 5 milliGRAM(s) Oral at bedtime  memantine 5 milliGRAM(s) Oral every 24 hours  QUEtiapine 12.5 milliGRAM(s) Oral at bedtime  senna 2 Tablet(s) Oral at bedtime      Vital Signs Last 24 Hrs  T(C): 36.9 (09 Nov 2021 12:36), Max: 37 (08 Nov 2021 21:38)  T(F): 98.5 (09 Nov 2021 12:36), Max: 98.6 (08 Nov 2021 21:38)  HR: 76 (09 Nov 2021 12:36) (76 - 79)  BP: 128/74 (09 Nov 2021 12:36) (114/65 - 137/67)  RR: 18 (09 Nov 2021 12:36) (18 - 19)  SpO2: 95% (09 Nov 2021 12:36) (94% - 97%)      Neurological Exam:   Mental status: Awake, alert and oriented x2 to self and location. Oriented to year but not to month, date, or day of the week. No dysarthria, no aphasia. Appears more alert and awake, able to carry out conversation and follow commands, answering questions appropriately.   Cranial nerves: Pupils equally round and reactive to light, no nystagmus, extraocular muscles intact, V1 through V3 intact bilaterally and symmetric, face symmetric, hearing intact to finger rub, palate elevation symmetric, tongue was midline.  Motor:  MRC grading 5/5 B/L UE/LE.   strength 5/5.  Normal tone and bulk.  No abnormal movements.    Sensation: Intact to light touch, proprioception, and pinprick.   Coordination: No dysmetria on finger-to-nose and heel-to-shin.  No dysdiadokinesia.  Reflexes: 2+ in bilateral UE/LE, downgoing toes bilaterally. (-) Ho.  Gait: Narrow and steady. No ataxia.  Romberg negative      Labs:  CBC Full  -  ( 08 Nov 2021 15:06 )  WBC Count : 6.74 K/uL  RBC Count : 4.77 M/uL  Hemoglobin : 12.4 g/dL  Hematocrit : 39.5 %  Platelet Count - Automated : 289 K/uL  Mean Cell Volume : 82.8 fl  Mean Cell Hemoglobin : 26.0 pg  Mean Cell Hemoglobin Concentration : 31.4 gm/dL  Auto Neutrophil # : 4.86 K/uL  Auto Lymphocyte # : 0.91 K/uL  Auto Monocyte # : 0.61 K/uL  Auto Eosinophil # : 0.18 K/uL  Auto Basophil # : 0.06 K/uL  Auto Neutrophil % : 72.1 %  Auto Lymphocyte % : 13.5 %  Auto Monocyte % : 9.0 %  Auto Eosinophil % : 2.7 %  Auto Basophil % : 0.9 %    11-08    142  |  106  |  18  ----------------------------<  133<H>  4.0   |  27  |  0.70    Ca    8.9      08 Nov 2021 15:06  Phos  3.1     11-08  Mg     2.3     11-08    TPro  7.5  /  Alb  4.6  /  TBili  0.2  /  DBili  x   /  AST  13  /  ALT  27  /  AlkPhos  63  11-08    LIVER FUNCTIONS - ( 08 Nov 2021 15:06 )  Alb: 4.6 g/dL / Pro: 7.5 g/dL / ALK PHOS: 63 U/L / ALT: 27 U/L / AST: 13 U/L / GGT: x

## 2021-11-12 LAB
GAD65 AB SER-MCNC: 0 NMOL/L — SIGNIFICANT CHANGE UP
PARANEOPLASTIC AB PNL SER: SIGNIFICANT CHANGE UP

## 2021-11-17 DIAGNOSIS — G93.49 OTHER ENCEPHALOPATHY: ICD-10-CM

## 2021-11-17 DIAGNOSIS — I10 ESSENTIAL (PRIMARY) HYPERTENSION: ICD-10-CM

## 2021-11-17 DIAGNOSIS — Z79.82 LONG TERM (CURRENT) USE OF ASPIRIN: ICD-10-CM

## 2021-11-17 DIAGNOSIS — D50.9 IRON DEFICIENCY ANEMIA, UNSPECIFIED: ICD-10-CM

## 2021-11-17 DIAGNOSIS — G83.84 TODD'S PARALYSIS (POSTEPILEPTIC): ICD-10-CM

## 2021-11-17 DIAGNOSIS — R00.1 BRADYCARDIA, UNSPECIFIED: ICD-10-CM

## 2021-11-17 DIAGNOSIS — Z87.891 PERSONAL HISTORY OF NICOTINE DEPENDENCE: ICD-10-CM

## 2021-11-17 DIAGNOSIS — R56.9 UNSPECIFIED CONVULSIONS: ICD-10-CM

## 2021-11-17 DIAGNOSIS — I69.352 HEMIPLEGIA AND HEMIPARESIS FOLLOWING CEREBRAL INFARCTION AFFECTING LEFT DOMINANT SIDE: ICD-10-CM

## 2021-11-17 DIAGNOSIS — G40.419 OTHER GENERALIZED EPILEPSY AND EPILEPTIC SYNDROMES, INTRACTABLE, WITHOUT STATUS EPILEPTICUS: ICD-10-CM

## 2021-11-17 DIAGNOSIS — F03.90 UNSPECIFIED DEMENTIA WITHOUT BEHAVIORAL DISTURBANCE: ICD-10-CM

## 2021-11-17 DIAGNOSIS — Z79.02 LONG TERM (CURRENT) USE OF ANTITHROMBOTICS/ANTIPLATELETS: ICD-10-CM

## 2021-11-17 DIAGNOSIS — K29.50 UNSPECIFIED CHRONIC GASTRITIS WITHOUT BLEEDING: ICD-10-CM

## 2021-11-17 DIAGNOSIS — B96.81 HELICOBACTER PYLORI [H. PYLORI] AS THE CAUSE OF DISEASES CLASSIFIED ELSEWHERE: ICD-10-CM

## 2021-11-17 DIAGNOSIS — Z45.09 ENCOUNTER FOR ADJUSTMENT AND MANAGEMENT OF OTHER CARDIAC DEVICE: ICD-10-CM

## 2021-12-02 LAB — MATRIX METALLOPROTEINASE-9: 451 NG/ML — SIGNIFICANT CHANGE UP

## 2021-12-07 ENCOUNTER — APPOINTMENT (OUTPATIENT)
Dept: NEUROLOGY | Facility: CLINIC | Age: 61
End: 2021-12-07

## 2021-12-21 ENCOUNTER — APPOINTMENT (OUTPATIENT)
Dept: NEUROLOGY | Facility: CLINIC | Age: 61
End: 2021-12-21
Payer: COMMERCIAL

## 2021-12-21 VITALS
TEMPERATURE: 97.8 F | OXYGEN SATURATION: 99 % | BODY MASS INDEX: 26.68 KG/M2 | RESPIRATION RATE: 14 BRPM | HEIGHT: 67 IN | DIASTOLIC BLOOD PRESSURE: 78 MMHG | HEART RATE: 53 BPM | WEIGHT: 170 LBS | SYSTOLIC BLOOD PRESSURE: 150 MMHG

## 2021-12-21 PROCEDURE — 99214 OFFICE O/P EST MOD 30 MIN: CPT

## 2021-12-21 NOTE — ASSESSMENT
[FreeTextEntry1] : seizures - continue vimpat\par TAD - patient often would have a sz after his eeg is off. \par stroke cont asa and plavix\par cont activities.

## 2021-12-28 ENCOUNTER — APPOINTMENT (OUTPATIENT)
Dept: HEART AND VASCULAR | Facility: CLINIC | Age: 61
End: 2021-12-28
Payer: COMMERCIAL

## 2021-12-28 VITALS
TEMPERATURE: 97.5 F | DIASTOLIC BLOOD PRESSURE: 62 MMHG | HEIGHT: 67 IN | HEART RATE: 56 BPM | OXYGEN SATURATION: 92 % | RESPIRATION RATE: 14 BRPM | WEIGHT: 170 LBS | BODY MASS INDEX: 26.68 KG/M2 | SYSTOLIC BLOOD PRESSURE: 124 MMHG

## 2021-12-28 DIAGNOSIS — I10 ESSENTIAL (PRIMARY) HYPERTENSION: ICD-10-CM

## 2021-12-28 PROCEDURE — 99213 OFFICE O/P EST LOW 20 MIN: CPT

## 2021-12-28 PROCEDURE — 93000 ELECTROCARDIOGRAM COMPLETE: CPT

## 2021-12-28 PROCEDURE — 36415 COLL VENOUS BLD VENIPUNCTURE: CPT

## 2021-12-28 RX ORDER — LACOSAMIDE 50 MG/1
50 TABLET, FILM COATED ORAL TWICE DAILY
Qty: 60 | Refills: 0 | Status: DISCONTINUED | COMMUNITY
Start: 2021-10-08 | End: 2021-12-28

## 2021-12-28 RX ORDER — PANTOPRAZOLE 40 MG/1
40 TABLET, DELAYED RELEASE ORAL
Qty: 90 | Refills: 0 | Status: DISCONTINUED | COMMUNITY
Start: 2021-10-04 | End: 2021-12-28

## 2021-12-28 RX ORDER — OMEPRAZOLE 40 MG/1
40 CAPSULE, DELAYED RELEASE ORAL
Qty: 30 | Refills: 0 | Status: DISCONTINUED | COMMUNITY
Start: 2021-02-16 | End: 2021-12-28

## 2021-12-28 RX ORDER — LEVETIRACETAM 500 MG/1
500 TABLET, FILM COATED ORAL
Qty: 120 | Refills: 0 | Status: DISCONTINUED | COMMUNITY
End: 2021-12-28

## 2021-12-28 RX ORDER — LEVETIRACETAM 250 MG/1
250 TABLET, FILM COATED ORAL
Qty: 180 | Refills: 1 | Status: DISCONTINUED | COMMUNITY
Start: 2021-10-08 | End: 2021-12-28

## 2021-12-28 NOTE — HISTORY OF PRESENT ILLNESS
[FreeTextEntry1] : No hx of covid, has been fully vaccinated with booster too\par \par Had flu vaccine this season\par \par Janet rowell had a minor seizure \par \par EKG shows  sinus bradycardia 54 bpm without ectopy , ischemia or LVH\par \par Vimpat 200mg bid\par \par Treated H. pylori   , half way through treatment, he had bad seizures \par \par Still taking vitamins

## 2021-12-28 NOTE — PHYSICAL EXAM
[Well Developed] : well developed [Well Nourished] : well nourished [No Acute Distress] : no acute distress [Normal Conjunctiva] : normal conjunctiva [No Xanthelasma] : no xanthelasma [Normal Venous Pressure] : normal venous pressure [No Carotid Bruit] : no carotid bruit [Normal S1, S2] : normal S1, S2 [No Murmur] : no murmur [No Rub] : no rub [No Gallop] : no gallop [Clear Lung Fields] : clear lung fields [Good Air Entry] : good air entry [No Respiratory Distress] : no respiratory distress  [Soft] : abdomen soft [Non Tender] : non-tender [No Masses/organomegaly] : no masses/organomegaly [Normal Bowel Sounds] : normal bowel sounds [Abnormal Gait] : abnormal gait [No Edema] : no edema [No Cyanosis] : no cyanosis [No Clubbing] : no clubbing [No Varicosities] : no varicosities [No Rash] : no rash [No Skin Lesions] : no skin lesions [Moves all extremities] : moves all extremities [No Focal Deficits] : no focal deficits [Normal Speech] : normal speech [Cognitive Impairment] : cognitive impairment [de-identified] : anicteric  [de-identified] : limping  [de-identified] : oriented to person  and place

## 2021-12-28 NOTE — REASON FOR VISIT
[Symptom and Test Evaluation] : symptom and test evaluation [Hyperlipidemia] : hyperlipidemia [Spouse] : spouse [FreeTextEntry1] : 61 year old make with emphysema, chronic smoking addiction  with diffuse atherosclerosis  and moderately severe  stenosis of the proximal left vertebral artery with moderate stenosis of the right M1  and left P3 segment  and severe atherosclerosis of the  descending aorta  has multi infarct dementia .\par \par Loop recorder was  implanted to assess for PAF \par \par ERICA  suspicious for possible pulmonary AVM -  pulmonary CTA  was advised \par \par He was discharged on aspirin and plavix  and atorvastatin 80mg daily

## 2021-12-28 NOTE — REVIEW OF SYSTEMS
[Convulsions] : convulsions [Speech Disturbance] : speech disturbance [Confusion] : confusion was observed [Memory Lapses Or Loss] : memory lapses or loss [Negative] : Heme/Lymph [Weight Gain (___ Lbs)] : no recent weight gain [Depression] : no depression [Anxiety] : no anxiety [Under Stress] : not under stress [Suicidal] : not suicidal [FreeTextEntry8] : enuresis

## 2021-12-29 LAB
25(OH)D3 SERPL-MCNC: 37 NG/ML
ALBUMIN SERPL ELPH-MCNC: 4.7 G/DL
ALP BLD-CCNC: 60 U/L
ALT SERPL-CCNC: 17 U/L
ANION GAP SERPL CALC-SCNC: 13 MMOL/L
APPEARANCE: CLEAR
AST SERPL-CCNC: 12 U/L
BASOPHILS # BLD AUTO: 0.03 K/UL
BASOPHILS NFR BLD AUTO: 0.6 %
BILIRUB SERPL-MCNC: 0.3 MG/DL
BILIRUBIN URINE: NEGATIVE
BLOOD URINE: NEGATIVE
BUN SERPL-MCNC: 11 MG/DL
CALCIUM SERPL-MCNC: 9.5 MG/DL
CHLORIDE SERPL-SCNC: 103 MMOL/L
CHOLEST SERPL-MCNC: 107 MG/DL
CO2 SERPL-SCNC: 27 MMOL/L
COLOR: YELLOW
CREAT SERPL-MCNC: 0.8 MG/DL
EOSINOPHIL # BLD AUTO: 0.14 K/UL
EOSINOPHIL NFR BLD AUTO: 2.8 %
ESTIMATED AVERAGE GLUCOSE: 108 MG/DL
FOLATE SERPL-MCNC: >20 NG/ML
GLUCOSE QUALITATIVE U: NEGATIVE
GLUCOSE SERPL-MCNC: 146 MG/DL
HBA1C MFR BLD HPLC: 5.4 %
HCT VFR BLD CALC: 39.4 %
HDLC SERPL-MCNC: 41 MG/DL
HGB BLD-MCNC: 12.4 G/DL
IMM GRANULOCYTES NFR BLD AUTO: 0.2 %
KETONES URINE: NEGATIVE
LDLC SERPL CALC-MCNC: 48 MG/DL
LEUKOCYTE ESTERASE URINE: NEGATIVE
LYMPHOCYTES # BLD AUTO: 1.09 K/UL
LYMPHOCYTES NFR BLD AUTO: 21.5 %
MAN DIFF?: NORMAL
MCHC RBC-ENTMCNC: 29.5 PG
MCHC RBC-ENTMCNC: 31.5 GM/DL
MCV RBC AUTO: 93.8 FL
MONOCYTES # BLD AUTO: 0.35 K/UL
MONOCYTES NFR BLD AUTO: 6.9 %
NEUTROPHILS # BLD AUTO: 3.45 K/UL
NEUTROPHILS NFR BLD AUTO: 68 %
NITRITE URINE: NEGATIVE
NONHDLC SERPL-MCNC: 66 MG/DL
PH URINE: 5.5
PLATELET # BLD AUTO: 361 K/UL
POTASSIUM SERPL-SCNC: 4.5 MMOL/L
PROT SERPL-MCNC: 7.3 G/DL
PROTEIN URINE: NEGATIVE
RBC # BLD: 4.2 M/UL
RBC # FLD: 15.4 %
SODIUM SERPL-SCNC: 143 MMOL/L
SPECIFIC GRAVITY URINE: 1.02
TRIGL SERPL-MCNC: 90 MG/DL
TSH SERPL-ACNC: 0.44 UIU/ML
UROBILINOGEN URINE: NORMAL
VIT B12 SERPL-MCNC: 1707 PG/ML
WBC # FLD AUTO: 5.07 K/UL

## 2022-01-13 ENCOUNTER — APPOINTMENT (OUTPATIENT)
Dept: GASTROENTEROLOGY | Facility: HOSPITAL | Age: 62
End: 2022-01-13

## 2022-01-19 ENCOUNTER — APPOINTMENT (OUTPATIENT)
Dept: HEART AND VASCULAR | Facility: CLINIC | Age: 62
End: 2022-01-19

## 2022-02-02 ENCOUNTER — RX RENEWAL (OUTPATIENT)
Age: 62
End: 2022-02-02

## 2022-02-08 ENCOUNTER — APPOINTMENT (OUTPATIENT)
Dept: HEART AND VASCULAR | Facility: CLINIC | Age: 62
End: 2022-02-08
Payer: COMMERCIAL

## 2022-02-08 VITALS
SYSTOLIC BLOOD PRESSURE: 150 MMHG | BODY MASS INDEX: 26.68 KG/M2 | RESPIRATION RATE: 14 BRPM | HEIGHT: 67 IN | HEART RATE: 80 BPM | OXYGEN SATURATION: 93 % | TEMPERATURE: 97.3 F | DIASTOLIC BLOOD PRESSURE: 70 MMHG | WEIGHT: 170 LBS

## 2022-02-08 DIAGNOSIS — E78.5 HYPERLIPIDEMIA, UNSPECIFIED: ICD-10-CM

## 2022-02-08 PROCEDURE — 93306 TTE W/DOPPLER COMPLETE: CPT

## 2022-02-08 PROCEDURE — 99213 OFFICE O/P EST LOW 20 MIN: CPT

## 2022-02-08 NOTE — DISCUSSION/SUMMARY
[FreeTextEntry1] : continue present therapy\par \par advised to see neurologist for  possible PT referral  to improve gait\par \par Fully vaccinated with Pfizer  and booster

## 2022-02-08 NOTE — REVIEW OF SYSTEMS
[Cough] : cough [Convulsions] : convulsions [Speech Disturbance] : speech disturbance [Confusion] : confusion was observed [Memory Lapses Or Loss] : memory lapses or loss [Negative] : Heme/Lymph [Weight Gain (___ Lbs)] : no recent weight gain [Wheezing] : no wheezing [Coughing Up Blood] : no hemoptysis [Snoring] : no snoring [Depression] : no depression [Anxiety] : no anxiety [Under Stress] : not under stress [Suicidal] : not suicidal [FreeTextEntry8] : enuresis

## 2022-02-08 NOTE — REASON FOR VISIT
[Symptom and Test Evaluation] : symptom and test evaluation [Hyperlipidemia] : hyperlipidemia [Hypertension] : hypertension [Spouse] : spouse [FreeTextEntry1] : 61 year old make with emphysema, chronic smoking addiction  with diffuse atherosclerosis  and moderately severe  stenosis of the proximal left vertebral artery with moderate stenosis of the right M1  and left P3 segment  and severe atherosclerosis of the  descending aorta  has multi infarct dementia .\par \par Loop recorder was  implanted to assess for PAF \par \par ERICA  suspicious for possible pulmonary AVM -  pulmonary CTA  was advised \par \par He was discharged on aspirin and plavix  and atorvastatin 80mg daily

## 2022-02-08 NOTE — HISTORY OF PRESENT ILLNESS
[FreeTextEntry1] : here for echocardiogram  \par \par Had iron infusions for anemia in   September  and now  Hgb up from high 8 gm/dL to  12.4 gm/dL \par \par \par Had   brief seizure  a January 29th lasting "less than a minute"  "was in a trace but came out of it without confusion"\par \par \par No fevers, cough\par \par \par Wife asking for physical therapy \par \par Recent labs are stable \par \par

## 2022-02-08 NOTE — PHYSICAL EXAM
[Well Developed] : well developed [Well Nourished] : well nourished [No Acute Distress] : no acute distress [Normal Conjunctiva] : normal conjunctiva [No Xanthelasma] : no xanthelasma [Normal Venous Pressure] : normal venous pressure [No Carotid Bruit] : no carotid bruit [Normal S1, S2] : normal S1, S2 [No Murmur] : no murmur [No Rub] : no rub [No Gallop] : no gallop [Clear Lung Fields] : clear lung fields [Good Air Entry] : good air entry [No Respiratory Distress] : no respiratory distress  [Soft] : abdomen soft [Non Tender] : non-tender [No Masses/organomegaly] : no masses/organomegaly [Normal Bowel Sounds] : normal bowel sounds [Abnormal Gait] : abnormal gait [No Edema] : no edema [No Cyanosis] : no cyanosis [No Clubbing] : no clubbing [No Varicosities] : no varicosities [No Rash] : no rash [No Skin Lesions] : no skin lesions [Moves all extremities] : moves all extremities [No Focal Deficits] : no focal deficits [Normal Speech] : normal speech [Cognitive Impairment] : cognitive impairment [de-identified] : anicteric  [de-identified] : limping  [de-identified] : oriented to person  and place

## 2022-02-08 NOTE — ASSESSMENT
[FreeTextEntry1] : Seizures improved \par \par \par old CVA\par \par Alzheimers dementia \par \par \par gait disturbance\par

## 2022-02-16 ENCOUNTER — RX RENEWAL (OUTPATIENT)
Age: 62
End: 2022-02-16

## 2022-02-21 ENCOUNTER — TRANSCRIPTION ENCOUNTER (OUTPATIENT)
Age: 62
End: 2022-02-21

## 2022-03-10 ENCOUNTER — TRANSCRIPTION ENCOUNTER (OUTPATIENT)
Age: 62
End: 2022-03-10

## 2022-03-24 ENCOUNTER — APPOINTMENT (OUTPATIENT)
Dept: GASTROENTEROLOGY | Facility: HOSPITAL | Age: 62
End: 2022-03-24

## 2022-03-28 ENCOUNTER — APPOINTMENT (OUTPATIENT)
Dept: NEUROLOGY | Facility: CLINIC | Age: 62
End: 2022-03-28
Payer: COMMERCIAL

## 2022-03-28 VITALS
SYSTOLIC BLOOD PRESSURE: 148 MMHG | HEIGHT: 67 IN | WEIGHT: 171 LBS | DIASTOLIC BLOOD PRESSURE: 72 MMHG | TEMPERATURE: 98.2 F | BODY MASS INDEX: 26.84 KG/M2 | OXYGEN SATURATION: 96 % | HEART RATE: 64 BPM

## 2022-03-28 PROCEDURE — 99214 OFFICE O/P EST MOD 30 MIN: CPT

## 2022-03-28 NOTE — HISTORY OF PRESENT ILLNESS
[FreeTextEntry1] : HPI: Patient here for stroke and Alzheimer's\par Wife who is here with him he is stable and has not gotten worse. \par He is playing puzzles but not doing much physical activity due to weather. Getting virtual speech therapy. \par \par Seizures - x 2 since December. and January 29th\par very brief. \par compliant with Vimpat.  \par \par was dancing at his son's wedding\par indep with adls. \par \par \par \par \par \par \par VIMPAT 200 BID\par DONEPEZIL 10 MG QHS\par LISINOPRIL \par ATORVASTATIN\par NAMENDA 5 MG\par \par \par \par KEPPRA WAS STOPPED. mOST OF THE SEIZURES OCCURRED AFTER BEING DISCONNECTED \par \par went to rehab \par \par now independent with adls but is observed when he oes out

## 2022-03-28 NOTE — PHYSICAL EXAM
[FreeTextEntry1] : The patient is alert and oriented x3, naming intact x3, repetition normal, follows three-step commands, and is able to participate fully in the history taking.\par Speech is normal with no evidence of dysarthria.\par Memory is intact: Immediate recall 3 out of 3, short-term 3 out of 3, remote memory intact\par Cranial nerves II through XII intact\par Motor exam: Upper and lower extremities 5 out of 5 power, normal tone. No abnormal movements noted.\par Sensory exam: Intact to light touch and pinprick. Romberg negative.\par Coordination and vestibular exam: Finger to nose intact, no evidence of truncal or appendicular ataxia. No evidence of nystagmus. no vestibular symptoms elicited with head turning during ambulation.\par Gait: slow gait but able to get out of chair easily\par Reflexes: One to 2+ in upper and lower extremities. No pathological reflexes. Downgoing toes.\par \par

## 2022-03-28 NOTE — ASSESSMENT
[FreeTextEntry1] : seizures - continue vimpat\par TAD - patient often would have a sz after his eeg is off. \par stroke cont asa and plavix\par cont activities. \par PT script\par EEG before next visit

## 2022-05-03 ENCOUNTER — RX RENEWAL (OUTPATIENT)
Age: 62
End: 2022-05-03

## 2022-05-17 ENCOUNTER — APPOINTMENT (OUTPATIENT)
Dept: HEART AND VASCULAR | Facility: CLINIC | Age: 62
End: 2022-05-17
Payer: COMMERCIAL

## 2022-05-17 ENCOUNTER — LABORATORY RESULT (OUTPATIENT)
Age: 62
End: 2022-05-17

## 2022-05-17 VITALS
OXYGEN SATURATION: 95 % | SYSTOLIC BLOOD PRESSURE: 124 MMHG | BODY MASS INDEX: 27 KG/M2 | HEART RATE: 60 BPM | HEIGHT: 67 IN | DIASTOLIC BLOOD PRESSURE: 70 MMHG | WEIGHT: 172 LBS | TEMPERATURE: 98.1 F

## 2022-05-17 DIAGNOSIS — R06.00 DYSPNEA, UNSPECIFIED: ICD-10-CM

## 2022-05-17 DIAGNOSIS — I11.9 HYPERTENSIVE HEART DISEASE W/OUT HEART FAILURE: ICD-10-CM

## 2022-05-17 DIAGNOSIS — R76.0 RAISED ANTIBODY TITER: ICD-10-CM

## 2022-05-17 DIAGNOSIS — E53.8 DEFICIENCY OF OTHER SPECIFIED B GROUP VITAMINS: ICD-10-CM

## 2022-05-17 DIAGNOSIS — R73.09 OTHER ABNORMAL GLUCOSE: ICD-10-CM

## 2022-05-17 PROCEDURE — 36415 COLL VENOUS BLD VENIPUNCTURE: CPT

## 2022-05-17 PROCEDURE — 99213 OFFICE O/P EST LOW 20 MIN: CPT

## 2022-05-17 NOTE — HISTORY OF PRESENT ILLNESS
[FreeTextEntry1] : No hx of covid,  fully vaccinated and boosted Pfizer \par \par No recent seizures\par \par No c/o chest pain \par \par Constant cough from reflux and emphysema \par \par Exertional  fatigue ,  wife says his walking  is unsteady \par \par \par Employed by [employer] since October 2012  until June 2020\par \par

## 2022-05-17 NOTE — REASON FOR VISIT
[Symptom and Test Evaluation] : symptom and test evaluation [Hyperlipidemia] : hyperlipidemia [Hypertension] : hypertension [FreeTextEntry1] : 61 year old make with emphysema, chronic smoking addiction  with diffuse atherosclerosis  and moderately severe  stenosis of the proximal left vertebral artery with moderate stenosis of the right M1  and left P3 segment  and severe atherosclerosis of the  descending aorta  has multi infarct dementia .\par \par Loop recorder was  implanted to assess for PAF \par \par ERICA  suspicious for possible pulmonary AVM -  pulmonary CTA  was advised \par \par He was discharged on aspirin and plavix  and atorvastatin 80mg daily

## 2022-05-17 NOTE — DISCUSSION/SUMMARY
[Essential Hypertension] : essential hypertension [Stable] : stable [Responding to Treatment] : responding to treatment [COPD] : chronic obstructive pulmonary disease [None] : none [With Me] : with me [___ Month(s)] : in [unfilled] month(s) [FreeTextEntry1] : Venipuncture performed in office  with B12/folate,  a1c,  Covid Ab, etc \par \par medications reconciled and renewed \par \par advised physical therapy for balance and gait \par \par

## 2022-05-17 NOTE — REVIEW OF SYSTEMS
[Dyspnea on exertion] : dyspnea during exertion [Cough] : cough [Convulsions] : convulsions [Speech Disturbance] : speech disturbance [Confusion] : confusion was observed [Memory Lapses Or Loss] : memory lapses or loss [Negative] : Heme/Lymph [Weight Gain (___ Lbs)] : no recent weight gain [Wheezing] : no wheezing [Coughing Up Blood] : no hemoptysis [Snoring] : no snoring [Depression] : no depression [Anxiety] : no anxiety [Under Stress] : not under stress [Suicidal] : not suicidal [FreeTextEntry8] : enuresis

## 2022-05-17 NOTE — ASSESSMENT
[FreeTextEntry1] : stable hemodynamics \par \par Alzheimers \par \par COPD/emphysema \par \par seizures

## 2022-05-17 NOTE — PHYSICAL EXAM
[Well Developed] : well developed [Well Nourished] : well nourished [No Acute Distress] : no acute distress [Normal Conjunctiva] : normal conjunctiva [No Xanthelasma] : no xanthelasma [Normal Venous Pressure] : normal venous pressure [No Carotid Bruit] : no carotid bruit [Normal S1, S2] : normal S1, S2 [No Murmur] : no murmur [No Rub] : no rub [No Gallop] : no gallop [Clear Lung Fields] : clear lung fields [Good Air Entry] : good air entry [No Respiratory Distress] : no respiratory distress  [Soft] : abdomen soft [Non Tender] : non-tender [No Masses/organomegaly] : no masses/organomegaly [Normal Bowel Sounds] : normal bowel sounds [Abnormal Gait] : abnormal gait [No Edema] : no edema [No Cyanosis] : no cyanosis [No Clubbing] : no clubbing [No Varicosities] : no varicosities [No Rash] : no rash [No Skin Lesions] : no skin lesions [Moves all extremities] : moves all extremities [No Focal Deficits] : no focal deficits [Normal Speech] : normal speech [Cognitive Impairment] : cognitive impairment [de-identified] : anicteric  [de-identified] : limping  [de-identified] : oriented to person  and place

## 2022-05-25 LAB
ALBUMIN SERPL ELPH-MCNC: 5 G/DL
ALP BLD-CCNC: 66 U/L
ALT SERPL-CCNC: 18 U/L
ANION GAP SERPL CALC-SCNC: 14 MMOL/L
APPEARANCE: CLEAR
AST SERPL-CCNC: 13 U/L
BASOPHILS # BLD AUTO: 0.03 K/UL
BASOPHILS NFR BLD AUTO: 0.5 %
BILIRUB SERPL-MCNC: 0.3 MG/DL
BILIRUBIN URINE: NEGATIVE
BLOOD URINE: NEGATIVE
BUN SERPL-MCNC: 14 MG/DL
CALCIUM SERPL-MCNC: 9.4 MG/DL
CHLORIDE SERPL-SCNC: 103 MMOL/L
CHOLEST SERPL-MCNC: 99 MG/DL
CO2 SERPL-SCNC: 26 MMOL/L
COLOR: ABNORMAL
COVID-19 NUCLEOCAPSID  GAM ANTIBODY INTERPRETATION: NEGATIVE
COVID-19 SPIKE DOMAIN ANTIBODY INTERPRETATION: POSITIVE
CREAT SERPL-MCNC: 0.68 MG/DL
EGFR: 106 ML/MIN/1.73M2
EOSINOPHIL # BLD AUTO: 0.11 K/UL
EOSINOPHIL NFR BLD AUTO: 1.9 %
ESTIMATED AVERAGE GLUCOSE: 105 MG/DL
FOLATE SERPL-MCNC: >20 NG/ML
GLUCOSE QUALITATIVE U: NEGATIVE
GLUCOSE SERPL-MCNC: 88 MG/DL
HBA1C MFR BLD HPLC: 5.3 %
HCT VFR BLD CALC: 39 %
HDLC SERPL-MCNC: 44 MG/DL
HGB BLD-MCNC: 11.8 G/DL
IMM GRANULOCYTES NFR BLD AUTO: 0.2 %
KETONES URINE: NEGATIVE
LDLC SERPL CALC-MCNC: 38 MG/DL
LEUKOCYTE ESTERASE URINE: NEGATIVE
LYMPHOCYTES # BLD AUTO: 1.11 K/UL
LYMPHOCYTES NFR BLD AUTO: 18.8 %
MAN DIFF?: NORMAL
MCHC RBC-ENTMCNC: 30.3 GM/DL
MCHC RBC-ENTMCNC: 30.4 PG
MCV RBC AUTO: 100.5 FL
MONOCYTES # BLD AUTO: 0.38 K/UL
MONOCYTES NFR BLD AUTO: 6.5 %
NEUTROPHILS # BLD AUTO: 4.25 K/UL
NEUTROPHILS NFR BLD AUTO: 72.1 %
NITRITE URINE: NEGATIVE
NONHDLC SERPL-MCNC: 54 MG/DL
NT-PROBNP SERPL-MCNC: 29 PG/ML
PH URINE: 6
PLATELET # BLD AUTO: 319 K/UL
POTASSIUM SERPL-SCNC: 4.3 MMOL/L
PROT SERPL-MCNC: 7.3 G/DL
PROTEIN URINE: NORMAL
RBC # BLD: 3.88 M/UL
RBC # FLD: 14.2 %
SARS-COV-2 AB SERPL IA-ACNC: >250 U/ML
SARS-COV-2 AB SERPL QL IA: 0.07 INDEX
SODIUM SERPL-SCNC: 142 MMOL/L
SPECIFIC GRAVITY URINE: 1.03
TRIGL SERPL-MCNC: 80 MG/DL
TSH SERPL-ACNC: 0.28 UIU/ML
UROBILINOGEN URINE: NORMAL
VIT B12 SERPL-MCNC: 1750 PG/ML
WBC # FLD AUTO: 5.89 K/UL

## 2022-06-11 ENCOUNTER — TRANSCRIPTION ENCOUNTER (OUTPATIENT)
Age: 62
End: 2022-06-11

## 2022-06-14 ENCOUNTER — TRANSCRIPTION ENCOUNTER (OUTPATIENT)
Age: 62
End: 2022-06-14

## 2022-06-29 ENCOUNTER — APPOINTMENT (OUTPATIENT)
Dept: NEUROLOGY | Facility: CLINIC | Age: 62
End: 2022-06-29

## 2022-06-29 ENCOUNTER — APPOINTMENT (OUTPATIENT)
Dept: NEUROLOGY | Facility: CLINIC | Age: 62
End: 2022-06-29
Payer: COMMERCIAL

## 2022-06-29 ENCOUNTER — NON-APPOINTMENT (OUTPATIENT)
Age: 62
End: 2022-06-29

## 2022-06-29 VITALS
OXYGEN SATURATION: 95 % | BODY MASS INDEX: 26.84 KG/M2 | TEMPERATURE: 97.7 F | WEIGHT: 171 LBS | HEIGHT: 67 IN | DIASTOLIC BLOOD PRESSURE: 72 MMHG | HEART RATE: 50 BPM | SYSTOLIC BLOOD PRESSURE: 128 MMHG

## 2022-06-29 PROCEDURE — 99214 OFFICE O/P EST MOD 30 MIN: CPT

## 2022-06-29 PROCEDURE — 95819 EEG AWAKE AND ASLEEP: CPT

## 2022-06-29 RX ORDER — LEVETIRACETAM 500 MG/1
500 TABLET, FILM COATED ORAL
Qty: 120 | Refills: 1 | Status: DISCONTINUED | COMMUNITY
Start: 2022-02-02 | End: 2022-06-29

## 2022-06-29 NOTE — ASSESSMENT
[FreeTextEntry1] : seizures - continue vimpat 200 BID\par TAD - patient often would have a sz after his eeg is off. \par stroke cont asa and plavix\par cont activities. \par PT script\par EEG before next visit

## 2022-06-29 NOTE — HISTORY OF PRESENT ILLNESS
[FreeTextEntry1] : Interval course: \par Seizure april 24th had a GTC sz lasting 1 min. Patient stayed at home because his wife felt he got back to normal right way. 1 sz in 6 months. \par Sleep was ok. No alcohol. \par Still having accidents with bladder in depends. \par \par \par \par HPI: Patient here for stroke and Alzheimer's\par Wife who is here with him he is stable and has not gotten worse. \par He is playing puzzles but not doing much physical activity due to weather. Getting virtual speech therapy. \par \par Seizures - x 2 since December. and January 29th\par very brief. \par compliant with Vimpat.  \par \par was dancing at his son's wedding\par indep with adls. \par \par \par \par \par \par \par VIMPAT 200 BID\par DONEPEZIL 10 MG QHS\par LISINOPRIL \par ATORVASTATIN\par NAMENDA 5 MG\par \par \par \par KEPPRA WAS STOPPED. mOST OF THE SEIZURES OCCURRED AFTER BEING DISCONNECTED \par \par went to rehab \par \par now independent with adls but is observed when he oes out

## 2022-07-28 ENCOUNTER — NON-APPOINTMENT (OUTPATIENT)
Age: 62
End: 2022-07-28

## 2022-07-28 LAB — LACOSAMIDE (VIMPAT): 12.63 UG/ML

## 2022-11-15 NOTE — PROGRESS NOTE ADULT - ASSESSMENT
"Infusion Nursing Note:  Janelle TORRES Christopher presents today for ketamine infusion.    Patient seen by provider today: No   present during visit today: Not Applicable.    Note: States she feels more \"down\" the few days just prior to next infusion.    Intravenous Access:  Peripheral IV placed.    Treatment Conditions:  Not Applicable.    Post Infusion Assessment:  Patient tolerated infusion without incident.  Patient observed for 60 minutes post ketamine infusion, per protocol.  Blood return noted pre and post infusion.  Site patent and intact, free from redness, edema or discomfort.  No evidence of extravasations.  Access discontinued per protocol.     Discharge Plan:   Patient discharged in stable condition accompanied by: self.  Departure Mode: Ambulatory.  RTC 12/6/22.      Viktoria Mcdaniel                      " per Neurology/ Epilepsy     61 year-old male w/ PMH of CVA (loop recorder, on Plavix and 81mg ASA, no residual deficits), seizures (on Keppra, and Vimpat), HTN, iron deficiency anemia, dementia, and hernia repair who was initially sent to ER for R-sided weakness, and seizures (witnessed event in ED required 8mg ativan). Now stable for stepdown from MICU to [facility] for further VEEG monitoring. Given improvement of confusion in this setting, could be related to his cognitive decline / dementia compounded by effects of Keppra and hospital acquired delirium. UA neg. No signs of infectious process. When PM dose of Keppra was held, patient did show improvement in mental status.    Plan:    - Sent labs for autoimmune / paraneoplastic work up, f/u. Has been unremarkable thus far.   - On VEEG monitoring showed no seizures.   - MRA head and neck with DWI and FLAIR, MR Brain imaging completed, reviewed.   - Holding Keppra for now in setting of confusion and EEG looking good on vimpat with keppra withdrawal.  - Holding Seroquel 12.5 mg at bedtime as unclear it is necessary.  - C/w Vimpat 100 mg IVPB q12h.   - C/w home ASA 81 mg PO daily.   - C/w home Plavix 75 mg PO daily.   - C/w home Atorvastatin 40 mg at bedtime.   - C/w home Donepezil increased to 10 mg at bedtime.   - q4h neuro checks.   - Seizure and fall precautions.

## 2022-11-20 ENCOUNTER — EMERGENCY (EMERGENCY)
Facility: HOSPITAL | Age: 62
LOS: 1 days | Discharge: ANOTHER HOSPITAL | End: 2022-11-20
Attending: EMERGENCY MEDICINE | Admitting: EMERGENCY MEDICINE
Payer: COMMERCIAL

## 2022-11-20 VITALS
DIASTOLIC BLOOD PRESSURE: 98 MMHG | TEMPERATURE: 98 F | RESPIRATION RATE: 18 BRPM | HEART RATE: 77 BPM | SYSTOLIC BLOOD PRESSURE: 162 MMHG | OXYGEN SATURATION: 98 %

## 2022-11-20 DIAGNOSIS — Z79.02 LONG TERM (CURRENT) USE OF ANTITHROMBOTICS/ANTIPLATELETS: ICD-10-CM

## 2022-11-20 DIAGNOSIS — Z86.2 PERSONAL HISTORY OF DISEASES OF THE BLOOD AND BLOOD-FORMING ORGANS AND CERTAIN DISORDERS INVOLVING THE IMMUNE MECHANISM: ICD-10-CM

## 2022-11-20 DIAGNOSIS — F03.90 UNSPECIFIED DEMENTIA WITHOUT BEHAVIORAL DISTURBANCE: ICD-10-CM

## 2022-11-20 DIAGNOSIS — Z98.890 OTHER SPECIFIED POSTPROCEDURAL STATES: Chronic | ICD-10-CM

## 2022-11-20 DIAGNOSIS — Z86.73 PERSONAL HISTORY OF TRANSIENT ISCHEMIC ATTACK (TIA), AND CEREBRAL INFARCTION WITHOUT RESIDUAL DEFICITS: ICD-10-CM
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
## 2022-11-20 DIAGNOSIS — R56.9 UNSPECIFIED CONVULSIONS: ICD-10-CM
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
## 2022-11-20 DIAGNOSIS — H11.31 CONJUNCTIVAL HEMORRHAGE, RIGHT EYE: ICD-10-CM

## 2022-11-20 DIAGNOSIS — Z79.82 LONG TERM (CURRENT) USE OF ASPIRIN: ICD-10-CM

## 2022-11-20 DIAGNOSIS — Z20.822 CONTACT WITH AND (SUSPECTED) EXPOSURE TO COVID-19: ICD-10-CM

## 2022-11-20 DIAGNOSIS — I10 ESSENTIAL (PRIMARY) HYPERTENSION: ICD-10-CM

## 2022-11-20 DIAGNOSIS — S05.31XA OCULAR LACERATION WITHOUT PROLAPSE OR LOSS OF INTRAOCULAR TISSUE, RIGHT EYE, INITIAL ENCOUNTER: ICD-10-CM

## 2022-11-20 LAB
ALBUMIN SERPL ELPH-MCNC: 4.6 G/DL — SIGNIFICANT CHANGE UP (ref 3.3–5)
ALP SERPL-CCNC: 62 U/L — SIGNIFICANT CHANGE UP (ref 40–120)
ALT FLD-CCNC: 28 U/L — SIGNIFICANT CHANGE UP (ref 10–45)
ANION GAP SERPL CALC-SCNC: 9 MMOL/L — SIGNIFICANT CHANGE UP (ref 5–17)
APTT BLD: 28.8 SEC — SIGNIFICANT CHANGE UP (ref 27.5–35.5)
AST SERPL-CCNC: 18 U/L — SIGNIFICANT CHANGE UP (ref 10–40)
BASOPHILS # BLD AUTO: 0.03 K/UL — SIGNIFICANT CHANGE UP (ref 0–0.2)
BASOPHILS NFR BLD AUTO: 0.4 % — SIGNIFICANT CHANGE UP (ref 0–2)
BILIRUB SERPL-MCNC: 0.3 MG/DL — SIGNIFICANT CHANGE UP (ref 0.2–1.2)
BUN SERPL-MCNC: 15 MG/DL — SIGNIFICANT CHANGE UP (ref 7–23)
CALCIUM SERPL-MCNC: 9.2 MG/DL — SIGNIFICANT CHANGE UP (ref 8.4–10.5)
CHLORIDE SERPL-SCNC: 102 MMOL/L — SIGNIFICANT CHANGE UP (ref 96–108)
CO2 SERPL-SCNC: 28 MMOL/L — SIGNIFICANT CHANGE UP (ref 22–31)
CREAT SERPL-MCNC: 0.89 MG/DL — SIGNIFICANT CHANGE UP (ref 0.5–1.3)
EGFR: 98 ML/MIN/1.73M2 — SIGNIFICANT CHANGE UP
EOSINOPHIL # BLD AUTO: 0.05 K/UL — SIGNIFICANT CHANGE UP (ref 0–0.5)
EOSINOPHIL NFR BLD AUTO: 0.6 % — SIGNIFICANT CHANGE UP (ref 0–6)
GLUCOSE SERPL-MCNC: 135 MG/DL — HIGH (ref 70–99)
HCT VFR BLD CALC: 38.4 % — LOW (ref 39–50)
HGB BLD-MCNC: 12.9 G/DL — LOW (ref 13–17)
IMM GRANULOCYTES NFR BLD AUTO: 0.3 % — SIGNIFICANT CHANGE UP (ref 0–0.9)
INR BLD: 1.08 — SIGNIFICANT CHANGE UP (ref 0.88–1.16)
LYMPHOCYTES # BLD AUTO: 0.68 K/UL — LOW (ref 1–3.3)
LYMPHOCYTES # BLD AUTO: 8.7 % — LOW (ref 13–44)
MAGNESIUM SERPL-MCNC: 2.2 MG/DL — SIGNIFICANT CHANGE UP (ref 1.6–2.6)
MCHC RBC-ENTMCNC: 30.4 PG — SIGNIFICANT CHANGE UP (ref 27–34)
MCHC RBC-ENTMCNC: 33.6 GM/DL — SIGNIFICANT CHANGE UP (ref 32–36)
MCV RBC AUTO: 90.4 FL — SIGNIFICANT CHANGE UP (ref 80–100)
MONOCYTES # BLD AUTO: 0.39 K/UL — SIGNIFICANT CHANGE UP (ref 0–0.9)
MONOCYTES NFR BLD AUTO: 5 % — SIGNIFICANT CHANGE UP (ref 2–14)
NEUTROPHILS # BLD AUTO: 6.63 K/UL — SIGNIFICANT CHANGE UP (ref 1.8–7.4)
NEUTROPHILS NFR BLD AUTO: 85 % — HIGH (ref 43–77)
NRBC # BLD: 0 /100 WBCS — SIGNIFICANT CHANGE UP (ref 0–0)
PLATELET # BLD AUTO: 288 K/UL — SIGNIFICANT CHANGE UP (ref 150–400)
POTASSIUM SERPL-MCNC: 4 MMOL/L — SIGNIFICANT CHANGE UP (ref 3.5–5.3)
POTASSIUM SERPL-SCNC: 4 MMOL/L — SIGNIFICANT CHANGE UP (ref 3.5–5.3)
PROT SERPL-MCNC: 7.6 G/DL — SIGNIFICANT CHANGE UP (ref 6–8.3)
PROTHROM AB SERPL-ACNC: 12.9 SEC — SIGNIFICANT CHANGE UP (ref 10.5–13.4)
RBC # BLD: 4.25 M/UL — SIGNIFICANT CHANGE UP (ref 4.2–5.8)
RBC # FLD: 13 % — SIGNIFICANT CHANGE UP (ref 10.3–14.5)
SARS-COV-2 RNA SPEC QL NAA+PROBE: NEGATIVE — SIGNIFICANT CHANGE UP
SODIUM SERPL-SCNC: 139 MMOL/L — SIGNIFICANT CHANGE UP (ref 135–145)
VALPROATE SERPL-MCNC: <2.8 UG/ML — LOW (ref 50–100)
WBC # BLD: 7.8 K/UL — SIGNIFICANT CHANGE UP (ref 3.8–10.5)
WBC # FLD AUTO: 7.8 K/UL — SIGNIFICANT CHANGE UP (ref 3.8–10.5)

## 2022-11-20 PROCEDURE — 85610 PROTHROMBIN TIME: CPT

## 2022-11-20 PROCEDURE — 85730 THROMBOPLASTIN TIME PARTIAL: CPT

## 2022-11-20 PROCEDURE — 70486 CT MAXILLOFACIAL W/O DYE: CPT | Mod: 26,MA

## 2022-11-20 PROCEDURE — 36415 COLL VENOUS BLD VENIPUNCTURE: CPT

## 2022-11-20 PROCEDURE — 70450 CT HEAD/BRAIN W/O DYE: CPT | Mod: MA

## 2022-11-20 PROCEDURE — 83735 ASSAY OF MAGNESIUM: CPT

## 2022-11-20 PROCEDURE — 72125 CT NECK SPINE W/O DYE: CPT | Mod: 26,MA

## 2022-11-20 PROCEDURE — 99291 CRITICAL CARE FIRST HOUR: CPT

## 2022-11-20 PROCEDURE — 85025 COMPLETE CBC W/AUTO DIFF WBC: CPT

## 2022-11-20 PROCEDURE — 80053 COMPREHEN METABOLIC PANEL: CPT

## 2022-11-20 PROCEDURE — 70486 CT MAXILLOFACIAL W/O DYE: CPT | Mod: MA

## 2022-11-20 PROCEDURE — 87635 SARS-COV-2 COVID-19 AMP PRB: CPT

## 2022-11-20 PROCEDURE — 72125 CT NECK SPINE W/O DYE: CPT | Mod: MA

## 2022-11-20 PROCEDURE — 70450 CT HEAD/BRAIN W/O DYE: CPT | Mod: 26,MA

## 2022-11-20 PROCEDURE — 99284 EMERGENCY DEPT VISIT MOD MDM: CPT | Mod: 25

## 2022-11-20 PROCEDURE — 80164 ASSAY DIPROPYLACETIC ACD TOT: CPT

## 2022-11-20 NOTE — ED PROVIDER NOTE - PHYSICAL EXAMINATION
VITAL SIGNS: I have reviewed nursing notes and confirm.  CONSTITUTIONAL: Well appearing, in no acute distress.   SKIN:  warm and dry, no acute rash.   HEAD:  normocephalic, atraumatic.  EYES: + swelling and ecchymosis to r upper eyelid, almost fully closed, with bleeding ? from lateral aspect of eye with clot vs. tissue. Pupil appears to be intact and reactive. + limited abduction of r eye. No proptosis.   ENT: No nasal discharge; airway clear.   NECK: Supple.  CARD: S1, S2, Regular rate and rhythm.   RESP:  Clear to auscultation b/l, no wheezes, rales or rhonchi.  ABD: Normal bowel sounds; soft; non-distended; non-tender; no guarding/ rebound.  EXT: Normal ROM. No peripheral edema. Pulses intact and equal b/l.  NEURO: Alert, oriented, CN grossly intact. Motor/ sensation intact and equal b/l.   PSYCH: Cooperative, mood and affect appropriate. VITAL SIGNS: I have reviewed nursing notes and confirm.  CONSTITUTIONAL: Well appearing, in no acute distress.   SKIN:  warm and dry, no acute rash.   HEAD:  normocephalic, atraumatic.  EYES: + swelling and ecchymosis to r upper eyelid, almost fully closed, with bleeding ? from lateral aspect of eye with clot vs. tissue. Pupil appears to be intact and reactive. + limited abduction of r eye. No proptosis.   ENT: No nasal discharge; airway clear. + swelling, ecchymosis and mild ttp to r cheek.  NECK: Supple. No midline tenderness.   CARD: S1, S2, Regular rate and rhythm.   RESP:  Clear to auscultation b/l, no wheezes, rales or rhonchi.  ABD: Normal bowel sounds; soft; non-distended; non-tender; no guarding/ rebound.  EXT: Normal ROM. No peripheral edema. Pulses intact and equal b/l.  NEURO: Alert, oriented, CN grossly intact. Motor/ sensation intact and equal b/l.   PSYCH: Cooperative, mood and affect appropriate.

## 2022-11-20 NOTE — ED PROVIDER NOTE - TOBACCO USE
"Encounter Date: 2017    SCRIBE #1 NOTE: I, Crow Mcclelland , am scribing for, and in the presence of,  Dani Gagnon NP . I have scribed the following portions of the note - Other sections scribed: HPI/ROS  .       History     Chief Complaint   Patient presents with    Abdominal Cramping     Patient states, "it has to be my ovaries. I have had ovarian cysts in the past. I am having cramping in my lower abdomen for a few weeks now."     CC: Abdominal Cramping     HPI: This 23 y.o. female smoker with a PMHx of appendectomy, cholecystectomy, and  section presents to the ED c/o a 1.5 weeks hx of acute-onset bilateral pelvic pain which is worse on the L side.  Pt also reports associated copious amounts of clear vaginal discharge which began today. Pt reports a hx of ovarian cysts, and states her current pain reminds her of pain associated with the ovarian cysts. Pt attempted tx with ibuprofen with no reduction in pain. Pt is followed by her OB/GYN, Dr. Aden. She otherwise denies fever, dysuria, urinary frequency, hematuria, vaginal bleeding, N/V, and any other associated symptoms.       The history is provided by the patient. No  was used.     Review of patient's allergies indicates:  No Known Allergies  History reviewed. No pertinent past medical history.  Past Surgical History:   Procedure Laterality Date    APPENDECTOMY       SECTION      CHOLECYSTECTOMY       Family History   Problem Relation Age of Onset    Breast cancer Neg Hx     Colon cancer Neg Hx     Ovarian cancer Neg Hx      Social History   Substance Use Topics    Smoking status: Current Every Day Smoker     Packs/day: 0.50     Types: Cigarettes    Smokeless tobacco: Not on file    Alcohol use No     Review of Systems   Constitutional: Negative for chills and fever.   HENT: Negative for congestion.    Eyes: Negative for visual disturbance.   Respiratory: Negative for cough and shortness of breath.  "   Cardiovascular: Negative for chest pain.   Gastrointestinal: Negative for abdominal pain, diarrhea, nausea and vomiting.   Genitourinary: Positive for pelvic pain (bilateral (L>R)) and vaginal discharge (clear). Negative for dysuria, flank pain, frequency, hematuria and vaginal bleeding.   Musculoskeletal: Negative for back pain and neck pain.   Skin: Negative for rash.   Neurological: Negative for headaches.       Physical Exam     Initial Vitals [07/09/17 0002]   BP Pulse Resp Temp SpO2   135/67 91 16 98.2 °F (36.8 °C) 100 %      MAP       89.67         Physical Exam    Nursing note and vitals reviewed.  Constitutional: She appears well-developed and well-nourished. She is not diaphoretic. No distress.   HENT:   Head: Normocephalic and atraumatic.   Right Ear: External ear normal.   Left Ear: External ear normal.   Nose: Nose normal.   Eyes: Conjunctivae and EOM are normal. Pupils are equal, round, and reactive to light. Right eye exhibits no discharge. Left eye exhibits no discharge. No scleral icterus.   Neck: Normal range of motion. Neck supple. No thyromegaly present. No tracheal deviation present. No JVD present.   Cardiovascular: Normal rate, regular rhythm, normal heart sounds and intact distal pulses. Exam reveals no gallop and no friction rub.    No murmur heard.  Pulmonary/Chest: Breath sounds normal. No stridor. No respiratory distress. She has no wheezes. She has no rhonchi. She has no rales.   Abdominal: Soft. Bowel sounds are normal. She exhibits no distension and no mass. There is no tenderness. There is no rebound and no guarding.   Genitourinary: Vagina normal. Pelvic exam was performed with patient supine. Uterus is tender. Uterus is not enlarged. Cervix exhibits no motion tenderness, no discharge and no friability. Right adnexum displays no mass, no tenderness and no fullness. Left adnexum displays tenderness. Left adnexum displays no mass and no fullness. No erythema, tenderness or bleeding  in the vagina. No foreign body in the vagina. No signs of injury around the vagina. No vaginal discharge found.   Musculoskeletal: Normal range of motion. She exhibits no edema or tenderness.   Lymphadenopathy:     She has no cervical adenopathy.   Neurological: She is alert and oriented to person, place, and time. She has normal strength and normal reflexes. She displays normal reflexes. No cranial nerve deficit or sensory deficit.   Skin: Skin is warm and dry. No rash and no abscess noted. No erythema. No pallor.   Psychiatric: She has a normal mood and affect. Her behavior is normal. Judgment and thought content normal.         ED Course   Procedures  Labs Reviewed   VAGINAL SCREEN - Abnormal; Notable for the following:        Result Value    Clue Cells, Wet Prep Few (*)     WBC - Vaginal Screen Few (*)     Bacteria - Vaginal Screen Moderate (*)     All other components within normal limits   C. TRACHOMATIS/N. GONORRHOEAE BY AMP DNA   URINALYSIS   POCT URINE PREGNANCY             Medical Decision Making:   Clinical Tests:   Lab Tests: Ordered and Reviewed  ED Management:  This is a 23-year-old female presenting emergency department for evaluation of suprapubic pain that began 1.5 weeks ago and has been continuous. Patient has history of similar symptoms in the past due to ovarian cysts. Reports associated vaginal discharge. She denies vaginal bleeding, fevers, abdominal pain, nausea, vomiting, diarrhea, dysuria, urinary frequency. Patient is well-appearing and in no apparent distress. On exam there is no external genital rash or lesion. Vaginal canal is without bleeding, erythema, edema, foreign body, discharge. Cervix appears normal and without discharge. No CMT. Right adnexal and uterine tenderness to palpation without palpable mass on bimanual exam. Urinalysis is unremarkable. Vaginal screen remarkable for clue cells, WBCs, bacteria. Gonorrhea and Chlamydia testing in process. I suspect bacterial vaginosis. I  will treat as PID because of the presence of pelvic pain and adnexal and uterine tenderness to palpation. I consider but doubt ectopic pregnancy, tubo-ovarian abscess, ovarian torsion.    Ordered Rocephin 500 milligrams IM. Prescribed doxycycline ×14 days at discharge. Instructed patient to follow-up with OB/GYN in the next 2-3 days. I believe patient is safe and stable for discharge and outpatient follow-up at this time. Pt discharged home with instructions for follow up and supportive care. ED return precautions given. Pt verbalized understanding of all information and instructions given.  Other:   I have discussed this case with another health care provider.       <> Summary of the Discussion: Case is discussed with my attending physician Carole Becerra M.D. who agreed with the assessment and plan.            Scribe Attestation:   Scribe #1: I performed the above scribed service and the documentation accurately describes the services I performed. I attest to the accuracy of the note.    Attending Attestation:           Physician Attestation for Scribe:  Physician Attestation Statement for Scribe #1: I, Dani Gagnon NP , reviewed documentation, as scribed by Crow Mcclelland  in my presence, and it is both accurate and complete.                 ED Course     Clinical Impression:   The primary encounter diagnosis was Bacterial vaginosis. A diagnosis of Pelvic pain in female was also pertinent to this visit.    Disposition:   Disposition: Discharged  Condition: Stable                        Dani Gagnon NP  07/09/17 0401     Unknown if ever smoked

## 2022-11-20 NOTE — ED ADULT NURSE NOTE - OBJECTIVE STATEMENT
61 y/o M with pmhx seizures and dementia presents to the ED c/o seizure and right eye injury just PTA. Per wife, "I heard a loud bang from upstairs. I ran up immediately and he had just finished up a seizure lasting less than 1 minute. 63 y/o M with pmhx seizures and dementia presents to the ED c/o seizure and right eye injury just PTA. Per wife, "I heard a loud bang from upstairs. I ran up immediately and he had just finished up a seizure lasting less than 1 minute. He was sitting on the couch watching TV and must have fallen and struck his head right eye first on the table. He was a little confused right after but I got him up and in a cab and straight here. His last seizure was exactly a month ago on October 19th and were supposed to see his neurologist next week for that." Denies med changes, med non-compliance, and any other complaints at this time. On exam, pt right gross right eye edema, ecchymosis, and active bright red bleeding streaming down from lateral corner like tears. Large clot-like structure visualized laterally once eye forced open. Tetracaine given by MD Pacheco. Opthalmology consulted. PIV placed. Labs sent.

## 2022-11-20 NOTE — ED ADULT NURSE NOTE - NSIMPLEMENTINTERV_GEN_ALL_ED
Implemented All Fall Risk Interventions:  [place] to call system. Call bell, personal items and telephone within reach. Instruct patient to call for assistance. Room bathroom lighting operational. Non-slip footwear when patient is off stretcher. Physically safe environment: no spills, clutter or unnecessary equipment. Stretcher in lowest position, wheels locked, appropriate side rails in place. Provide visual cue, wrist band, yellow gown, etc. Monitor gait and stability. Monitor for mental status changes and reorient to person, place, and time. Review medications for side effects contributing to fall risk. Reinforce activity limits and safety measures with patient and family.

## 2022-11-20 NOTE — ED ADULT NURSE NOTE - BREATHING
Patient called concerned over her husbands arm. Had angio yesterday. Will defer to cardiology.    Moni Cartagena PA-C  [facility]  
spontaneous/unlabored

## 2022-11-20 NOTE — ED PROVIDER NOTE - OBJECTIVE STATEMENT
Pt is a 60yo m, h/o CVA (loop recorder, on Plavix and 81mg ASA, no residual deficits), seizures (on Vimpat), HTN, iron deficiency anemia, dementia, biba s/p sz tonight. Pt remembers watching tv from bed and next remembers bleeding from r eye. Wife was in living room when she heard a thump and found pt lying on ground with generalized convulsive activity for < 1 min, + post-ictal confusion. No tongue biting/ incontinence. Pt has been compliant w/ his Vimpat. Was in [facility] [place] prior to today. Pt is a 60yo m, h/o CVA (loop recorder, on Plavix and 81mg ASA, no residual deficits), seizures (on Vimpat), HTN, iron deficiency anemia, dementia, biba s/p sz tonight. Pt remembers watching tv from bed and next remembers bleeding from r eye. Wife was in living room when she heard a thump and found pt lying on ground with generalized convulsive activity for < 1 min, + post-ictal confusion. No tongue biting/ incontinence. Pt has been compliant w/ his Vimpat. Was in [facility] prior to today. Pt is utd with vaccinations. Pt is a 60yo m, h/o CVA (loop recorder, on Plavix and 81mg ASA, no residual deficits), seizures (on Vimpat), HTN, iron deficiency anemia, dementia, who presents s/p sz tonight. Pt remembers watching tv from bed and next remembers bleeding from r eye. Wife was in living room when she heard a thump and found pt lying on ground with generalized convulsive activity for < 1 min, + post-ictal confusion. No tongue biting/ incontinence. Pt has been compliant w/ his Vimpat. Was in [facility] prior to today. Pt is utd with vaccinations.

## 2022-11-20 NOTE — ED PROVIDER NOTE - CLINICAL SUMMARY MEDICAL DECISION MAKING FREE TEXT BOX
Impression: s/p sz tonight w/ r eye/ facial injury. Neuro intact. AVSS. Pt seen by ophtho and found to have 360 subconjunctival hemorrhage w/ conjunctival lac to lateral aspect of eye, ? underlying scleral lac. Will require transfer to [place] for exploration of globe in OR to r/o ruptured globe. Will obtain ct head/ mfb to r/o acute bleed/ fractures. Labs wnl. Will continue to monitor. Impression: s/p sz tonight w/ r eye and facial injury. Neuro intact. AVSS. Pt seen by ophtho and found to have 360 subconjunctival hemorrhage w/ conjunctival lac to lateral aspect of eye, ? underlying scleral lac. Will require transfer to [place] for exploration of globe in OR to r/o ruptured globe. Will obtain ct head/ mfb to r/o acute bleed/ fractures. Labs wnl.     Spoke w/ ophto and ED attendings at [facility] and pt accepted for transfer to ED for optho eval (accepting physician is Dr. Bhagat). Consent signed.

## 2022-11-21 VITALS
DIASTOLIC BLOOD PRESSURE: 87 MMHG | OXYGEN SATURATION: 97 % | HEART RATE: 68 BPM | RESPIRATION RATE: 18 BRPM | SYSTOLIC BLOOD PRESSURE: 134 MMHG | TEMPERATURE: 99 F

## 2022-11-21 NOTE — PROGRESS NOTE ADULT - SUBJECTIVE AND OBJECTIVE BOX
[facility] DEPARTMENT OF OPHTHALMOLOGY - INITIAL ADULT CONSULT  -----------------------------------------------------------------------------------------------------------------  Masha Huertayeon Yu  -----------------------------------------------------------------------------------------------------------------    HPI:   Pt is a 60yo m, h/o CVA (loop recorder, on Plavix and 81mg ASA, no residual deficits), seizures (on Vimpat), HTN, iron deficiency anemia, dementia, biba s/p sz tonight. Pt remembers watching tv from bed and next remembers bleeding from r eye. Wife was in living room when she heard a thump and found pt lying on ground with generalized convulsive activity for < 1 min, + post-ictal confusion. No tongue biting/ incontinence. Pt has been compliant w/ his Vimpat. Was in [facility] [place] prior to today. Pt is utd with vaccinations.    Interval History: Pt with right eye pain, bleeding, no flashes/floaters. states vision is OK.     PAST MEDICAL & SURGICAL HISTORY:  Hypertension      CVA (cerebral vascular accident)      Dementia, old age      Anemia      Seizure      H/O hernia repair        Past Ocular History:none  FAMILY HISTORY:  Family history of early CAD        MEDICATIONS  (STANDING):    MEDICATIONS  (PRN):    Allergies & Intolerances:       VITALS: T(C): 37.2 (11-21-22 @ 00:38)  T(F): 98.9 (11-21-22 @ 00:38), Max: 98.9 (11-21-22 @ 00:38)  HR: 68 (11-21-22 @ 00:38) (68 - 77)  BP: 134/87 (11-21-22 @ 00:38) (134/87 - 162/98)  RR:  (18 - 18)  SpO2:  (97% - 98%)  Wt(kg): --  General: AAO x 3, appropriate mood and affect    Ophthalmology Exam:  Visual acuity (sc): 20/40 OD, 20/25 OS  Pupils: PERRL OU, no APD, no peaked pupil  Ttono: 22, 18 OU  Extraocular movements (EOMs): Limited abduction, but likely due to swelling, no pain, no diplopia      Pen Light Exam (PLE)  External: Ecchymosis and swelling OD, tenderness over inferior orbital rim  Lids/Lashes/Lacrimal Ducts: Flat OU    Sclera/Conjunctiva: Conjunctival laceration temporally with 360 bullous subconjunctival hemorrhage.   Cornea: Cl OU  Anterior Chamber: D+F OU    Iris: Flat OU  Lens: NS OU    Fundus Exam: dilated with 1% tropicamide and 2.5% phenylephrine  Approval obtained from primary team for dilation  Patient aware that pupils can remained dilated for at least 4-6 hours  Exam performed with 20D lens    Vitreous: wnl OU  Disc, cup/disc: sharp and pink, 0.3 OU  Macula: wnl OU  Vessels: wnl OU  Periphery: difficult view due to pt cooperation    Labs/Imaging:  < from: CT Maxillofacial No Cont (11.20.22 @ 23:29) >    ORBITAL WALLS: Intact without fracture.    ORBITAL CONTENTS: The globes are symmetric. No radiopaque orbital foreign   bodies are visualized. The extraocular muscles, optic nerves, and   retrobulbar fat appear normal.    MANDIBLE/TMJ: Intact without fracture or TMJ dislocation.    ZYGOMATIC ARCHES: Intact without fracture.    SKULL BASE:  Intact without fracture.    NASAL BONES: Intactwithout fracture.    TEETH: Periapical lucency right mandibular incisor (7:21), and left   mandibular canine (7:24), correlate for endodontic disease.    SINONASAL CAVITIES: Mucosal polyp versus retention cyst at the left   maxillary sinus.    MASTOIDS: Normal    SOFT TISSUES: Soft tissue swelling/hematoma over the right orbit and   right zygoma.    INTRACRANIAL CONTENTS: Limited evaluation of the brain parenchyma   demonstrates no hydrocephalus or large intraaxial hematoma. Calcification   of the petrous and cavernous portions of the bilateral ICAs.    IMPRESSION: No evidence of maxillofacial or mandibular fracture.      < end of copied text >    Assessment and Recommendations:  62y male w/ pmhx/ochx of seizures consulted for fall with right eye bleeding    R/o Ruptured globe  - Vision, Intraocular Pressure, pupil all points toward a intact globe. pt has 360 degree bullous subconjunctival hemorrhage and unable to rule out scleral lac.   - retina visualized and flat and intact  - would recommend globe exploration in the OR to rule out a ruptured globe, although less likely      Outpatient follow-up: Patient should follow-up with his/her ophthalmologist or with [facility] Department of Ophthalmology at the address below       [facility] [address] [address]

## 2022-11-29 ENCOUNTER — APPOINTMENT (OUTPATIENT)
Dept: NEUROLOGY | Facility: CLINIC | Age: 62
End: 2022-11-29

## 2022-12-27 ENCOUNTER — APPOINTMENT (OUTPATIENT)
Dept: NEUROLOGY | Facility: CLINIC | Age: 62
End: 2022-12-27

## 2023-01-01 NOTE — ED ADULT NURSE NOTE - OBJECTIVE STATEMENT
Statement Selected
presents to ED [facility] s/p found wandering the streets. pt w/ hx CVA with known short term memory loss, per wife at bedside, pt left home around 1100 this am, and is not abnormal for him to wander. pt reports +fall, w/ mild L elbow pain and abrasion, denies head strike or any additional pain. pt also seen recently by his MD for labwork and reported a low Hgb of 8.5. pt denies bloody stool or additional complaints.

## 2023-01-12 ENCOUNTER — NON-APPOINTMENT (OUTPATIENT)
Age: 63
End: 2023-01-12

## 2023-01-12 ENCOUNTER — APPOINTMENT (OUTPATIENT)
Dept: NEUROLOGY | Facility: CLINIC | Age: 63
End: 2023-01-12
Payer: COMMERCIAL

## 2023-01-12 VITALS
WEIGHT: 174 LBS | HEIGHT: 67 IN | SYSTOLIC BLOOD PRESSURE: 125 MMHG | OXYGEN SATURATION: 95 % | DIASTOLIC BLOOD PRESSURE: 76 MMHG | TEMPERATURE: 97.3 F | BODY MASS INDEX: 27.31 KG/M2 | HEART RATE: 75 BPM

## 2023-01-12 DIAGNOSIS — I63.9 CEREBRAL INFARCTION, UNSPECIFIED: ICD-10-CM

## 2023-01-12 PROCEDURE — 99214 OFFICE O/P EST MOD 30 MIN: CPT

## 2023-01-12 NOTE — HISTORY OF PRESENT ILLNESS
[FreeTextEntry1] : Interval 1/12/23\par patient had a seizure in November and fell of the bed and had to go to [place]. 2 additional events happened in December but occurred after he had a fright about his daughter. last event was on [place] day. \par going to the gym and walking better. \par also appears at times to have more confusion occasionally. \par \par \par \par \par Interval course: \par Seizure april 24th had a GTC sz lasting 1 min. Patient stayed at home because his wife felt he got back to normal right way. 1 sz in 6 months. \par Sleep was ok. No alcohol. \par Still having accidents with bladder in depends. \par \par \par \par HPI: Patient here for stroke and Alzheimer's\par Wife who is here with him he is stable and has not gotten worse. \par He is playing puzzles but not doing much physical activity due to weather. Getting virtual speech therapy. \par \par Seizures - x 2 since December. and January 29th\par very brief. \par compliant with Vimpat.  \par \par was dancing at his son's wedding\par indep with adls. \par \par \par \par \par \par \par VIMPAT 200 BID\par DONEPEZIL 10 MG QHS\par LISINOPRIL \par ATORVASTATIN\par NAMENDA 5 MG\par \par \par \par KEPPRA WAS STOPPED. mOST OF THE SEIZURES OCCURRED AFTER BEING DISCONNECTED \par \par went to rehab \par \par now independent with adls but is observed when he oes out

## 2023-01-12 NOTE — ASSESSMENT
[FreeTextEntry1] : seizures - continue vimpat 200 BID\par TAD - patient often would have a sz after his eeg is off. \par stroke cont asa and plavix\par cont activities. \par

## 2023-01-28 ENCOUNTER — FORM ENCOUNTER (OUTPATIENT)
Age: 63
End: 2023-01-28

## 2023-05-12 ENCOUNTER — APPOINTMENT (OUTPATIENT)
Dept: NEUROLOGY | Facility: CLINIC | Age: 63
End: 2023-05-12
Payer: COMMERCIAL

## 2023-05-12 VITALS — WEIGHT: 174 LBS | SYSTOLIC BLOOD PRESSURE: 132 MMHG | BODY MASS INDEX: 27.25 KG/M2 | DIASTOLIC BLOOD PRESSURE: 71 MMHG

## 2023-05-12 VITALS — HEART RATE: 65 BPM | OXYGEN SATURATION: 93 % | TEMPERATURE: 97.3 F | HEIGHT: 67 IN

## 2023-05-12 DIAGNOSIS — G40.909 EPILEPSY, UNSPECIFIED, NOT INTRACTABLE, W/OUT STATUS EPILEPTICUS: ICD-10-CM

## 2023-05-12 DIAGNOSIS — F02.80 ALZHEIMER'S DISEASE, UNSPECIFIED: ICD-10-CM
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
## 2023-05-12 DIAGNOSIS — G30.9 ALZHEIMER'S DISEASE, UNSPECIFIED: ICD-10-CM

## 2023-05-12 DIAGNOSIS — S06.0XAA CONCUSSION WITH LOSS OF CONSCIOUSNESS STATUS UNKNOWN, INITIAL ENCOUNTER: ICD-10-CM

## 2023-05-12 PROCEDURE — 99214 OFFICE O/P EST MOD 30 MIN: CPT

## 2023-05-12 NOTE — ASSESSMENT
[FreeTextEntry1] : seizures - continue Vimpat. Increase to 250 BID . 72 H EEG ambulatory. Consider in patient EMU admission. Check vimpat level. \par TAD - patient may be stressed as per his wife\par stroke cont asa and plavix\par Concussion_ Repeat Head CT\par cont activities. \par

## 2023-05-12 NOTE — HISTORY OF PRESENT ILLNESS
[FreeTextEntry1] : Interval seizures\par May 6th and was found on the floor  - he has no memory of what happened at that one. He was alert. He bruised his face and glasses broke. \par April 24th had another seizure and collapsed with clonic activity and bit his tongue. Brief seizure with 10 min of post ictal confusion\par April 12th  - virtual speech therapy - said Jeromy are you ok. he was trying to talk but nothing came out - lasted 90 sec. \par \par In the previous emu he had evidence of seizure risk and TAD. \par Not smoking or drinking\par wife thinks he is frustrated. She reprts that he is taking his meds religiously.\par \par \par \par \par \par Interval 1/12/23\par patient had a seizure in November and fell of the bed and had to go to [place]. 2 additional events happened in December but occurred after he had a fright about his daughter. last event was on [place] day. \par going to the gym and walking better. \par also appears at times to have more confusion occasionally. \par \par \par \par \par Interval course: \par Seizure april 24th had a GTC sz lasting 1 min. Patient stayed at home because his wife felt he got back to normal right way. 1 sz in 6 months. \par Sleep was ok. No alcohol. \par Still having accidents with bladder in depends. \par \par \par \par HPI: Patient here for stroke and Alzheimer's\par Wife who is here with him he is stable and has not gotten worse. \par He is playing puzzles but not doing much physical activity due to weather. Getting virtual speech therapy. \par \par Seizures - x 2 since December. and January 29th\par very brief. \par compliant with Vimpat.  \par \par was dancing at his son's wedding\par indep with adls. \par \par \par \par \par \par \par VIMPAT 200 BID\par DONEPEZIL 10 MG QHS\par LISINOPRIL \par ATORVASTATIN\par NAMENDA 5 MG\par \par \par \par KEPPRA WAS STOPPED. mOST OF THE SEIZURES OCCURRED AFTER BEING DISCONNECTED \par \par went to rehab \par \par now independent with adls but is observed when he oes out

## 2023-05-12 NOTE — PHYSICAL EXAM
[FreeTextEntry1] : The patient is alert and oriented x3, naming intact x3, repetition normal, follows three-step commands, and is able to participate fully in the history taking.\par Speech is normal with no evidence of dysarthria.\par Memory is intact: Immediate recall 3 out of 3, short-term 3 out of 3, remote memory intact\par Cranial nerves II through XII intact\par Motor exam: Upper and lower extremities 5 out of 5 power, normal tone. No abnormal movements noted.\par Sensory exam: Intact to light touch and pinprick. Romberg negative.\par Coordination and vestibular exam: Finger to nose intact, no evidence of truncal or appendicular ataxia. No evidence of nystagmus. no vestibular symptoms elicited with head turning during ambulation.\par Gait: slow gait but able to get out of chair easily and walks with a broad based gait. \par Reflexes: One to 2+ in upper and lower extremities. No pathological reflexes. Downgoing toes.\par \par HEENT - right eye ecchymosis  spouse

## 2023-05-21 ENCOUNTER — OUTPATIENT (OUTPATIENT)
Dept: OUTPATIENT SERVICES | Facility: HOSPITAL | Age: 63
LOS: 1 days | End: 2023-05-21
Payer: COMMERCIAL

## 2023-05-21 ENCOUNTER — APPOINTMENT (OUTPATIENT)
Dept: CT IMAGING | Facility: HOSPITAL | Age: 63
End: 2023-05-21

## 2023-05-21 DIAGNOSIS — Z98.890 OTHER SPECIFIED POSTPROCEDURAL STATES: Chronic | ICD-10-CM

## 2023-05-21 PROCEDURE — 70450 CT HEAD/BRAIN W/O DYE: CPT | Mod: 26

## 2023-05-21 PROCEDURE — 70450 CT HEAD/BRAIN W/O DYE: CPT

## 2023-06-05 ENCOUNTER — APPOINTMENT (OUTPATIENT)
Dept: NEUROLOGY | Facility: CLINIC | Age: 63
End: 2023-06-05
Payer: COMMERCIAL

## 2023-06-05 PROCEDURE — 95819 EEG AWAKE AND ASLEEP: CPT

## 2023-06-06 PROCEDURE — 95708 EEG WO VID EA 12-26HR UNMNTR: CPT

## 2023-06-07 ENCOUNTER — APPOINTMENT (OUTPATIENT)
Dept: NEUROLOGY | Facility: CLINIC | Age: 63
End: 2023-06-07

## 2023-06-07 PROCEDURE — 95700 EEG CONT REC W/VID EEG TECH: CPT

## 2023-06-07 PROCEDURE — 95721 EEG PHY/QHP>36<60 HR W/O VID: CPT

## 2023-06-07 PROCEDURE — 95708 EEG WO VID EA 12-26HR UNMNTR: CPT

## 2023-08-09 NOTE — ED PROVIDER NOTE - ENMT, MLM
[Negative] : Endocrine Airway patent, Nasal mucosa clear. Mouth with normal mucosa. Throat has no vesicles, no oropharyngeal exudates and uvula is midline.

## 2023-08-17 LAB
ALBUMIN SERPL ELPH-MCNC: 4.6 G/DL
ALP BLD-CCNC: 66 U/L
ALT SERPL-CCNC: 15 U/L
ANION GAP SERPL CALC-SCNC: 14 MMOL/L
AST SERPL-CCNC: 12 U/L
BASOPHILS # BLD AUTO: 0.03 K/UL
BASOPHILS NFR BLD AUTO: 0.6 %
BILIRUB SERPL-MCNC: 0.3 MG/DL
BUN SERPL-MCNC: 9 MG/DL
CALCIUM SERPL-MCNC: 9.1 MG/DL
CHLORIDE SERPL-SCNC: 106 MMOL/L
CO2 SERPL-SCNC: 25 MMOL/L
CREAT SERPL-MCNC: 0.79 MG/DL
EGFR: 101 ML/MIN/1.73M2
EOSINOPHIL # BLD AUTO: 0.06 K/UL
EOSINOPHIL NFR BLD AUTO: 1.2 %
GLUCOSE SERPL-MCNC: 75 MG/DL
HCT VFR BLD CALC: 31.7 %
HGB BLD-MCNC: 9.5 G/DL
IMM GRANULOCYTES NFR BLD AUTO: 0.2 %
LACOSAMIDE (VIMPAT): 13.12 UG/ML
LYMPHOCYTES # BLD AUTO: 0.9 K/UL
LYMPHOCYTES NFR BLD AUTO: 18.3 %
MAN DIFF?: NORMAL
MCHC RBC-ENTMCNC: 26.3 PG
MCHC RBC-ENTMCNC: 30 GM/DL
MCV RBC AUTO: 87.8 FL
MONOCYTES # BLD AUTO: 0.43 K/UL
MONOCYTES NFR BLD AUTO: 8.7 %
NEUTROPHILS # BLD AUTO: 3.49 K/UL
NEUTROPHILS NFR BLD AUTO: 71 %
PLATELET # BLD AUTO: 279 K/UL
POTASSIUM SERPL-SCNC: 4.4 MMOL/L
PROT SERPL-MCNC: 7 G/DL
RBC # BLD: 3.61 M/UL
RBC # FLD: 17.2 %
SODIUM SERPL-SCNC: 145 MMOL/L
WBC # FLD AUTO: 4.92 K/UL

## 2023-10-24 ENCOUNTER — APPOINTMENT (OUTPATIENT)
Dept: NEUROLOGY | Facility: CLINIC | Age: 63
End: 2023-10-24
Payer: COMMERCIAL

## 2023-10-24 VITALS
HEIGHT: 67 IN | DIASTOLIC BLOOD PRESSURE: 69 MMHG | TEMPERATURE: 98.3 F | SYSTOLIC BLOOD PRESSURE: 136 MMHG | OXYGEN SATURATION: 95 % | HEART RATE: 55 BPM | WEIGHT: 174 LBS | BODY MASS INDEX: 27.31 KG/M2

## 2023-10-24 PROCEDURE — 99214 OFFICE O/P EST MOD 30 MIN: CPT

## 2024-01-09 NOTE — ED PROVIDER NOTE - CARE PLAN
Principal Discharge DX:	Seizure   1 Principal Discharge DX:	Injury, eye  Secondary Diagnosis:	Seizure   impaired balance/pain

## 2024-02-08 ENCOUNTER — APPOINTMENT (OUTPATIENT)
Dept: ORTHOPEDIC SURGERY | Facility: CLINIC | Age: 64
End: 2024-02-08
Payer: COMMERCIAL

## 2024-02-08 VITALS — WEIGHT: 175 LBS | HEIGHT: 67 IN | BODY MASS INDEX: 27.47 KG/M2

## 2024-02-08 DIAGNOSIS — R56.9 UNSPECIFIED CONVULSIONS: ICD-10-CM
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
## 2024-02-08 PROCEDURE — 73020 X-RAY EXAM OF SHOULDER: CPT | Mod: RT

## 2024-02-08 PROCEDURE — 99204 OFFICE O/P NEW MOD 45 MIN: CPT

## 2024-02-08 PROCEDURE — 73000 X-RAY EXAM OF COLLAR BONE: CPT | Mod: RT

## 2024-02-08 RX ORDER — TETRACYCLINE HYDROCHLORIDE 500 MG/1
500 CAPSULE ORAL EVERY 6 HOURS
Qty: 56 | Refills: 0 | Status: COMPLETED | COMMUNITY
Start: 2021-10-04 | End: 2024-02-08

## 2024-02-08 RX ORDER — METRONIDAZOLE 250 MG/1
250 TABLET ORAL EVERY 6 HOURS
Qty: 56 | Refills: 0 | Status: COMPLETED | COMMUNITY
Start: 2021-10-04 | End: 2024-02-08

## 2024-02-08 RX ORDER — BISMUTH SUBSALICYLATE 262 MG/1
262 TABLET, CHEWABLE ORAL 4 TIMES DAILY
Qty: 112 | Refills: 0 | Status: COMPLETED | COMMUNITY
Start: 2021-10-04 | End: 2024-02-08

## 2024-02-08 NOTE — PHYSICAL EXAM
[UE] : Sensory: Intact in bilateral upper extremities [Normal RUE] : Right Upper Extremity: No scars, rashes, lesions, ulcers, skin intact [Normal LUE] : Left Upper Extremity: No scars, rashes, lesions, ulcers, skin intact [Normal Touch] : sensation intact for touch [de-identified] : RIGHT shoulder There is edema and resolving ecchymoses all around the right shoulder, clavicle and scapula laterally. There is visible and palpable step-off in the distal clavicle/scapula region with tenderness consistent with fracture distal clavicle/AC joint and just posterior to the acromion near the scapular spine laterally. Shoulder motion not tested due to pain with movement at the site of the fractures. Motor and sensation is grossly intact distally in the hand and arm. Normal capillary refill.  Hand is warm Skin is intact. [de-identified] :  I reviewed x-rays taken February 6, 2024 that they brought in with them from urgent care of the right shoulder 3 views which show fracture of the scapular spine/posterior acromion and very distal minimally displaced intra-articular fracture at the upper border of the lateral clavicle near the AC joint.  The AC joint appears well aligned and the clavicle coracoid joint space does not appear increased either.  There is a slight gap in the scapular/acromial fracture  Repeat x-rays taken today of the clavicle and scapula again shows similar appearance of the fractures without obvious displacement or step-off.

## 2024-02-08 NOTE — ASSESSMENT
[FreeTextEntry1] : 62 y/o with history of CVA, early dementia and seizure disorder who fell related to his seizure a few days ago apparently onto his right side and there is somewhat atypical fracture pattern involving the scapula near the lateral spine and acromion process and minimally displaced avulsion near the distal clavicle.  The AC joint appears well aligned. I do not think there is any intra-articular fracture.  I would like to get a CT scan to better evaluate the fracture pattern. I will call them with results but also see him back in a week.  We fitted him for a sling that has a strap around the waist to try to hold the sling on better for him.  He does get relief from cyclobenzaprine and helps him sleep at night so I did renew the medication.  He was given ketorolac in the urgent care but given that he is on blood thinners I recommended not taking it and certainly should check with his doctor who prescribes the Plavix before taking any NSAIDs. He can take Tylenol. Ice intermittently for pain. Further treatment whether it is nonoperative versus any consideration for surgery will be made after seeing the CT scan.

## 2024-02-08 NOTE — HISTORY OF PRESENT ILLNESS
[de-identified] :  Mr. Rojas is a 63 year old RHD man who has history of CVA, early dementia and seizures comes in for evaluation for RIGHT clavicle fracture that occurred on 2/5/24 when he was home in his kitchen and had a seizure falling onto his RIGHT side. His wife found him and helped him up. He was in a lot of pain so they went to urgent care the next day for xrays which showed clavicle fracture. He has been using a sling. He has taken Ketoralac 10 mg and cyclobenzaprine 5mg that was given to him at urgent care. His current pain is intermittent, ok at rest and 7-8/10. History was mostly obtained through the wife since Mr. Rojas has difficulty expressing himself

## 2024-02-20 ENCOUNTER — NON-APPOINTMENT (OUTPATIENT)
Age: 64
End: 2024-02-20

## 2024-02-20 ENCOUNTER — APPOINTMENT (OUTPATIENT)
Dept: ORTHOPEDIC SURGERY | Facility: CLINIC | Age: 64
End: 2024-02-20
Payer: COMMERCIAL

## 2024-02-20 PROCEDURE — 99441: CPT

## 2024-03-04 ENCOUNTER — APPOINTMENT (OUTPATIENT)
Dept: ORTHOPEDIC SURGERY | Facility: CLINIC | Age: 64
End: 2024-03-04
Payer: COMMERCIAL

## 2024-03-04 PROCEDURE — 99214 OFFICE O/P EST MOD 30 MIN: CPT

## 2024-03-04 PROCEDURE — 73030 X-RAY EXAM OF SHOULDER: CPT | Mod: RT

## 2024-03-04 NOTE — REVIEW OF SYSTEMS
[Joint Pain] : joint pain [Joint Stiffness] : joint stiffness [Convulsions] : convulsions [Easy Bleeding] : a tendency for easy bleeding [Negative] : Heme/Lymph

## 2024-03-04 NOTE — ASSESSMENT
[FreeTextEntry1] : 64 y/o with history of CVA, early dementia and seizure disorder who fell from a seizure about 4 wks ago sustaining several fractures around the scapula.  This makes for an unstable situation that often would necessitate surgery.  I had spoken to his wife about surgery and pros and cons. He is not able to remember instructions well and she states that he kept pulling off the sling and trying to use the arm which would concern me if we were to do surgery.  He also does have seizures which could also jeopardize any surgical repair. When I spoke to her with the results of the CT his pain was significantly less which she was encouraged by.  We talked about attempting nonoperative treatment.  At 4 weeks following the injury now he definitely has less pain.  I am not seeing healing of the scapular spine/acromion or coracoid fractures at this point in time.  He should continue using the sling.  They may want to have his arm tucked in under his sweatshirt or referred so he does not inadvertently try to use the arm too much.  It is challenging because of his dementia and forgetting not to use the arm.  Fortunately the pain is much less but then he is not reminded by pain either. I spoke to his wife by phone in addition to his daughter who is with him today. I will see him back in about 3 to 4 weeks to check for healing.

## 2024-03-04 NOTE — HISTORY OF PRESENT ILLNESS
[de-identified] :  Mr. Rojas is a 63 year old RHD man who has history of CVA, early dementia and seizures comes in for evaluation for RIGHT clavicle and scapula fractures that occurred on 2/5/24 when he was home in his kitchen and had a seizure falling onto his RIGHT side. He had the CT better defining the fractures. We discussed op and nonop treatment on the phone with his wife. He is wearing the sling. Pain is less-- he hasn't complained as much according to his daughter although it's hard for him to articulate fully. This is his dominate arm so he tends to try to reach but the sling is able to remind him to not use it.  She states that he still tries to take off the sling or uses his arm and they frequently have to remind him not to use it.  He seems more comfortable because he is not moaning in pain nearly as much. His daughter states that he had 1 minor seizure in the bathroom recently but did not fall.

## 2024-03-04 NOTE — REASON FOR VISIT
[Follow-Up Visit] : a follow-up visit for [Shoulder Injury] : shoulder injury [Family Member] : family member

## 2024-03-04 NOTE — PHYSICAL EXAM
[UE] : Sensory: Intact in bilateral upper extremities [Normal RUE] : Right Upper Extremity: No scars, rashes, lesions, ulcers, skin intact [Normal LUE] : Left Upper Extremity: No scars, rashes, lesions, ulcers, skin intact [Normal Touch] : sensation intact for touch [Shuffling] : shuffling [de-identified] : RIGHT shoulder Minimal edema and no ecchymoses around the shoulder. There is mild visible and palpable step-off in the distal clavicle/scapula region  Mild tenderness distal clavicle/AC joint and just posterior to the acromion near the scapular spine laterally and near the coracoid process. Shoulder motion not tested due to pain with movement at the site of the fractures. Motor and sensation is grossly intact distally in the hand and arm.  He can open and close his fingers and flex and extend the elbow. Normal capillary refill.  Hand is warm Skin is intact. [de-identified] :  x-rays ordered today to follow-up on the fracture and performed and reviewed of the right shoulder 3 views which showed fracture of the scapular spine/posterior acromion and very distal minimally displaced intra-articular fracture at the upper border of the lateral clavicle near the AC joint.  The AC joint appears well aligned.  There is a fracture at the base of the coracoid process with some gapping at the fracture site  2/16/24 CT of the right shoulder demonstrates:  1.  Acute comminuted and mildly distracted fractures of the scapula involving the base of the coracoid process and acromion. 2.  Coracoid fracture approximates the glenoid articular surface without definite intra-articular extension. 3.  Avulsion fractures of the distal clavicle at the acromion

## 2024-03-25 ENCOUNTER — APPOINTMENT (OUTPATIENT)
Dept: ORTHOPEDIC SURGERY | Facility: CLINIC | Age: 64
End: 2024-03-25
Payer: COMMERCIAL

## 2024-03-25 PROCEDURE — 73030 X-RAY EXAM OF SHOULDER: CPT | Mod: RT

## 2024-03-25 PROCEDURE — 99213 OFFICE O/P EST LOW 20 MIN: CPT

## 2024-03-25 NOTE — HISTORY OF PRESENT ILLNESS
[de-identified] :  Mr. Rojas is a 63 year old RHD man who has history of CVA, early dementia and seizures comes in for follow up for RIGHT clavicle and scapula fractures that occurred on 2/5/24 when he was home in his kitchen and had a seizure falling onto his RIGHT side. He comes in today feeling better. He has been using the sling and trying to avoid using his arm. His daughter states that he has been complaining less about any pain. He has not been taking any pain medications.  Sometimes he does remove the sling and will use his right hand for eating or reach for something that has fallen.  She states that he definitely appears more comfortable and really does not complain of pain

## 2024-03-25 NOTE — ASSESSMENT
[FreeTextEntry1] : 62 y/o with history of CVA, early dementia and seizure disorder who fell from a seizure about 7 wks ago sustaining several fractures around the scapula including a fracture at the base of the coracoid and of the scapular spine and nondisplaced partial fracture of the distal clavicle.   These fractures can be unstable and higher rate of nonunion. Clinically his does seem to be healing.  His pain is much less and he is feeling a lot better.  I would still go slow with any rehab and have him use the sling at least part-time for the next few weeks to allow for more healing before we start any rehab.  He does inadvertently use his arm at times so probably will not get too stiff. With his other medical problems and seizure disorder goal is to allow for healing and hopefully avoid surgery. He will follow-up in about 3 weeks

## 2024-03-25 NOTE — PHYSICAL EXAM
[Shuffling] : shuffling [UE] : Sensory: Intact in bilateral upper extremities [Normal RUE] : Right Upper Extremity: No scars, rashes, lesions, ulcers, skin intact [Normal LUE] : Left Upper Extremity: No scars, rashes, lesions, ulcers, skin intact [Normal Touch] : sensation intact for touch [de-identified] : RIGHT shoulder No edema or ecchymoses.  Some prominence of the distal clavicle trend AC joint There is mild visible and palpable step-off in the distal clavicle/scapula region  Mild tenderness distal clavicle/AC joint and just posterior to the acromion near the scapular spine laterally and nontender on the coracoid process. Assisted forward elevation to 90 degrees without pain Motor and sensation is grossly intact distally in the hand and arm.  He can open and close his fingers and flex and extend the elbow. Normal capillary refill.  Hand is warm Skin is intact. [de-identified] :   x-rays today to follow-up on the fracture of the right shoulder 3 views and AP clavicle which showed fracture of the scapular spine/posterior acromion and very distal minimally displaced intra-articular fracture at the upper border of the lateral clavicle near the AC joint with probable evidence of early healing/callus.  The AC joint appears well aligned.  There is a fracture at the base of the coracoid process not well visualized.   2/16/24 CT of the right shoulder demonstrates:  1.  Acute comminuted and mildly distracted fractures of the scapula involving the base of the coracoid process and acromion. 2.  Coracoid fracture approximates the glenoid articular surface without definite intra-articular extension. 3.  Avulsion fractures of the distal clavicle at the acromion

## 2024-03-28 NOTE — ED PROVIDER NOTE - EKG #1 DATE/TIME
I called patient,  answered and states patient is not available at this time. OKay to speak to  per chart.    We discussed results and recommendations. I advised call back with any changes in condition/ diet/ medications that we need to be aware of.    29-Sep-2020 23:49

## 2024-03-29 ENCOUNTER — INPATIENT (INPATIENT)
Facility: HOSPITAL | Age: 64
LOS: 2 days | Discharge: HOME | DRG: 812 | End: 2024-04-01
Attending: STUDENT IN AN ORGANIZED HEALTH CARE EDUCATION/TRAINING PROGRAM | Admitting: STUDENT IN AN ORGANIZED HEALTH CARE EDUCATION/TRAINING PROGRAM
Payer: COMMERCIAL

## 2024-03-29 VITALS
OXYGEN SATURATION: 97 % | DIASTOLIC BLOOD PRESSURE: 64 MMHG | WEIGHT: 160.06 LBS | SYSTOLIC BLOOD PRESSURE: 145 MMHG | RESPIRATION RATE: 18 BRPM | TEMPERATURE: 98 F | HEART RATE: 90 BPM

## 2024-03-29 DIAGNOSIS — I10 ESSENTIAL (PRIMARY) HYPERTENSION: ICD-10-CM

## 2024-03-29 DIAGNOSIS — S42.009A FRACTURE OF UNSPECIFIED PART OF UNSPECIFIED CLAVICLE, INITIAL ENCOUNTER FOR CLOSED FRACTURE: ICD-10-CM

## 2024-03-29 DIAGNOSIS — K21.9 GASTRO-ESOPHAGEAL REFLUX DISEASE WITHOUT ESOPHAGITIS: ICD-10-CM

## 2024-03-29 DIAGNOSIS — Z29.9 ENCOUNTER FOR PROPHYLACTIC MEASURES, UNSPECIFIED: ICD-10-CM

## 2024-03-29 DIAGNOSIS — Z98.890 OTHER SPECIFIED POSTPROCEDURAL STATES: Chronic | ICD-10-CM

## 2024-03-29 DIAGNOSIS — E78.5 HYPERLIPIDEMIA, UNSPECIFIED: ICD-10-CM

## 2024-03-29 DIAGNOSIS — F03.90 UNSPECIFIED DEMENTIA WITHOUT BEHAVIORAL DISTURBANCE: ICD-10-CM

## 2024-03-29 DIAGNOSIS — R56.9 UNSPECIFIED CONVULSIONS: ICD-10-CM

## 2024-03-29 DIAGNOSIS — I63.9 CEREBRAL INFARCTION, UNSPECIFIED: ICD-10-CM

## 2024-03-29 DIAGNOSIS — D64.9 ANEMIA, UNSPECIFIED: ICD-10-CM

## 2024-03-29 LAB
ANION GAP SERPL CALC-SCNC: 9 MMOL/L — SIGNIFICANT CHANGE UP (ref 5–17)
APTT BLD: 29.2 SEC — SIGNIFICANT CHANGE UP (ref 24.5–35.6)
BLD GP AB SCN SERPL QL: NEGATIVE — SIGNIFICANT CHANGE UP
BLD GP AB SCN SERPL QL: NEGATIVE — SIGNIFICANT CHANGE UP
BUN SERPL-MCNC: 11 MG/DL — SIGNIFICANT CHANGE UP (ref 7–23)
CALCIUM SERPL-MCNC: 9.3 MG/DL — SIGNIFICANT CHANGE UP (ref 8.4–10.5)
CHLORIDE SERPL-SCNC: 105 MMOL/L — SIGNIFICANT CHANGE UP (ref 96–108)
CO2 SERPL-SCNC: 27 MMOL/L — SIGNIFICANT CHANGE UP (ref 22–31)
CREAT SERPL-MCNC: 0.71 MG/DL — SIGNIFICANT CHANGE UP (ref 0.5–1.3)
EGFR: 104 ML/MIN/1.73M2 — SIGNIFICANT CHANGE UP
GLUCOSE SERPL-MCNC: 92 MG/DL — SIGNIFICANT CHANGE UP (ref 70–99)
HCT VFR BLD CALC: 19.9 % — CRITICAL LOW (ref 39–50)
HGB BLD-MCNC: 5.5 G/DL — CRITICAL LOW (ref 13–17)
INR BLD: 1.07 — SIGNIFICANT CHANGE UP (ref 0.85–1.18)
MCHC RBC-ENTMCNC: 16.5 PG — LOW (ref 27–34)
MCHC RBC-ENTMCNC: 27.6 GM/DL — LOW (ref 32–36)
MCV RBC AUTO: 59.8 FL — LOW (ref 80–100)
NRBC # BLD: 0 /100 WBCS — SIGNIFICANT CHANGE UP (ref 0–0)
PLATELET # BLD AUTO: 322 K/UL — SIGNIFICANT CHANGE UP (ref 150–400)
POTASSIUM SERPL-MCNC: 3.7 MMOL/L — SIGNIFICANT CHANGE UP (ref 3.5–5.3)
POTASSIUM SERPL-SCNC: 3.7 MMOL/L — SIGNIFICANT CHANGE UP (ref 3.5–5.3)
PROTHROM AB SERPL-ACNC: 12.2 SEC — SIGNIFICANT CHANGE UP (ref 9.5–13)
RBC # BLD: 3.33 M/UL — LOW (ref 4.2–5.8)
RBC # FLD: 20 % — HIGH (ref 10.3–14.5)
RH IG SCN BLD-IMP: POSITIVE — SIGNIFICANT CHANGE UP
RH IG SCN BLD-IMP: POSITIVE — SIGNIFICANT CHANGE UP
SODIUM SERPL-SCNC: 141 MMOL/L — SIGNIFICANT CHANGE UP (ref 135–145)
TROPONIN T, HIGH SENSITIVITY RESULT: 8 NG/L — SIGNIFICANT CHANGE UP (ref 0–51)
WBC # BLD: 4.48 K/UL — SIGNIFICANT CHANGE UP (ref 3.8–10.5)
WBC # FLD AUTO: 4.48 K/UL — SIGNIFICANT CHANGE UP (ref 3.8–10.5)

## 2024-03-29 PROCEDURE — 99285 EMERGENCY DEPT VISIT HI MDM: CPT

## 2024-03-29 PROCEDURE — 99223 1ST HOSP IP/OBS HIGH 75: CPT | Mod: GC

## 2024-03-29 RX ORDER — FOLIC ACID 0.8 MG
1 TABLET ORAL DAILY
Refills: 0 | Status: DISCONTINUED | OUTPATIENT
Start: 2024-03-30 | End: 2024-04-01

## 2024-03-29 RX ORDER — LACOSAMIDE 50 MG/1
1 TABLET ORAL
Refills: 0 | DISCHARGE

## 2024-03-29 RX ORDER — FOLIC ACID 0.8 MG
1 TABLET ORAL DAILY
Refills: 0 | Status: DISCONTINUED | OUTPATIENT
Start: 2024-03-29 | End: 2024-03-29

## 2024-03-29 RX ORDER — MEMANTINE HYDROCHLORIDE 10 MG/1
5 TABLET ORAL EVERY 24 HOURS
Refills: 0 | Status: DISCONTINUED | OUTPATIENT
Start: 2024-03-30 | End: 2024-04-01

## 2024-03-29 RX ORDER — LISINOPRIL 2.5 MG/1
1 TABLET ORAL
Qty: 0 | Refills: 0 | DISCHARGE

## 2024-03-29 RX ORDER — ASPIRIN/CALCIUM CARB/MAGNESIUM 324 MG
81 TABLET ORAL DAILY
Refills: 0 | Status: DISCONTINUED | OUTPATIENT
Start: 2024-03-30 | End: 2024-04-01

## 2024-03-29 RX ORDER — OMEPRAZOLE 10 MG/1
1 CAPSULE, DELAYED RELEASE ORAL
Refills: 0 | DISCHARGE

## 2024-03-29 RX ORDER — LACOSAMIDE 50 MG/1
50 TABLET ORAL EVERY 12 HOURS
Refills: 0 | Status: DISCONTINUED | OUTPATIENT
Start: 2024-03-29 | End: 2024-03-29

## 2024-03-29 RX ORDER — MEMANTINE HYDROCHLORIDE 10 MG/1
1 TABLET ORAL
Refills: 0 | DISCHARGE

## 2024-03-29 RX ORDER — LACOSAMIDE 50 MG/1
250 TABLET ORAL EVERY 12 HOURS
Refills: 0 | Status: DISCONTINUED | OUTPATIENT
Start: 2024-03-29 | End: 2024-04-01

## 2024-03-29 RX ORDER — CLOPIDOGREL BISULFATE 75 MG/1
75 TABLET, FILM COATED ORAL DAILY
Refills: 0 | Status: DISCONTINUED | OUTPATIENT
Start: 2024-03-30 | End: 2024-03-30

## 2024-03-29 RX ORDER — ATORVASTATIN CALCIUM 80 MG/1
40 TABLET, FILM COATED ORAL AT BEDTIME
Refills: 0 | Status: DISCONTINUED | OUTPATIENT
Start: 2024-03-29 | End: 2024-04-01

## 2024-03-29 RX ORDER — LACOSAMIDE 50 MG/1
200 TABLET ORAL EVERY 12 HOURS
Refills: 0 | Status: DISCONTINUED | OUTPATIENT
Start: 2024-03-29 | End: 2024-03-29

## 2024-03-29 RX ORDER — DONEPEZIL HYDROCHLORIDE 10 MG/1
10 TABLET, FILM COATED ORAL AT BEDTIME
Refills: 0 | Status: DISCONTINUED | OUTPATIENT
Start: 2024-03-29 | End: 2024-04-01

## 2024-03-29 RX ORDER — ENOXAPARIN SODIUM 100 MG/ML
40 INJECTION SUBCUTANEOUS EVERY 24 HOURS
Refills: 0 | Status: DISCONTINUED | OUTPATIENT
Start: 2024-03-30 | End: 2024-03-31

## 2024-03-29 RX ORDER — LISINOPRIL 2.5 MG/1
20 TABLET ORAL DAILY
Refills: 0 | Status: DISCONTINUED | OUTPATIENT
Start: 2024-03-30 | End: 2024-04-01

## 2024-03-29 RX ORDER — FERROUS SULFATE 325(65) MG
325 TABLET ORAL DAILY
Refills: 0 | Status: DISCONTINUED | OUTPATIENT
Start: 2024-03-30 | End: 2024-04-01

## 2024-03-29 RX ORDER — ACETAMINOPHEN 500 MG
650 TABLET ORAL EVERY 6 HOURS
Refills: 0 | Status: DISCONTINUED | OUTPATIENT
Start: 2024-03-29 | End: 2024-04-01

## 2024-03-29 RX ORDER — PANTOPRAZOLE SODIUM 20 MG/1
40 TABLET, DELAYED RELEASE ORAL
Refills: 0 | Status: DISCONTINUED | OUTPATIENT
Start: 2024-03-30 | End: 2024-04-01

## 2024-03-29 RX ORDER — LISINOPRIL 2.5 MG/1
1 TABLET ORAL
Refills: 0 | DISCHARGE

## 2024-03-29 RX ADMIN — ATORVASTATIN CALCIUM 40 MILLIGRAM(S): 80 TABLET, FILM COATED ORAL at 23:24

## 2024-03-29 RX ADMIN — LACOSAMIDE 250 MILLIGRAM(S): 50 TABLET ORAL at 23:24

## 2024-03-29 RX ADMIN — DONEPEZIL HYDROCHLORIDE 10 MILLIGRAM(S): 10 TABLET, FILM COATED ORAL at 23:23

## 2024-03-29 NOTE — H&P ADULT - NSHPPHYSICALEXAM_GEN_ALL_CORE
.  VITAL SIGNS:  T(C): 36.9 (03-29-24 @ 21:15), Max: 36.9 (03-29-24 @ 18:24)  T(F): 98.5 (03-29-24 @ 21:15), Max: 98.5 (03-29-24 @ 18:24)  HR: 58 (03-29-24 @ 21:15) (58 - 90)  BP: 140/60 (03-29-24 @ 21:15) (140/60 - 145/64)  BP(mean): --  RR: 18 (03-29-24 @ 21:15) (18 - 18)  SpO2: 99% (03-29-24 @ 21:15) (97% - 99%)  Wt(kg): --    PHYSICAL EXAM:    Constitutional: Resting comfortably in bed; NAD  Neck: supple; no JVD or thyromegaly  Respiratory: CTA B/L; no W/R/R, no retractions  Cardiac: +S1/S2; RRR; no M/R/G; PMI non-displaced  Gastrointestinal: soft, NT/ND; no rebound or guarding; +BSx4  Extremities: WWP, no clubbing or cyanosis; no peripheral edema  Musculoskeletal: No joint swelling, tenderness or erythema  Vascular: 2+ radial, PT pulses B/L  Dermatologic: skin warm, dry and intact  Neurologic: AAOx3; CNII-XII grossly intact; no focal deficits  Psychiatric: affect and characteristics of appearance, verbalizations, behaviors are appropriate

## 2024-03-29 NOTE — ED PROVIDER NOTE - CLINICAL SUMMARY MEDICAL DECISION MAKING FREE TEXT BOX
Chronic anemia requiring transfusion.  Hg 5.5 in ED.  Symptoms overall mild.  No active bleeding.  HDS.  EKG shows TW changes in precordial leads, likely from demand d/t anemia, will send trop. Pt has no CP or anginal equivalents.  Will transfuse 2U PRBC, admit. Chronic anemia requiring transfusion.  Hg 5.5 in ED.  Symptoms overall mild.  No active bleeding.  HDS.  EKG shows new RBBB, possibly 2/2 anemia, will send trop. Pt has no CP or anginal equivalents, low susp of ACS.  Will transfuse 2U PRBC, admit.

## 2024-03-29 NOTE — H&P ADULT - PROBLEM SELECTOR PLAN 2
Patient with history of seizures with last one around a month ago. He had fallen on his right side, fracturing his clavicle consequently. Was on Vimpat during that time, now taking lacosamide. Follows closely with Dr. Shazia jeffrey.     Plan:   - C/w lacosamide 250 mg AM and PM

## 2024-03-29 NOTE — H&P ADULT - HISTORY OF PRESENT ILLNESS
Mr. Rojas is a 62 yo male with pmhx of CVA w/ loop recorder, on Plavix and 81mg ASA, no residual deficits, seizures (on Vimpat), HTN, iron deficiency anemia presenting from outpatient office after labs evidenced low hemoglobin.      In the ED:  Initial vital signs: T: 98.5 F, HR: 90, BP: 145/64, R: 18, SpO2: 97% on RA  Labs: significant for hgb of 5.5, mcv of 59.8  Imaging: None  CXR: None  EKG: NSR w/ RBB  Medications: 2 Units of blood  Consults: none  Mr. Rojas is a 62 yo male with pmhx of CVA w/ loop recorder, on Plavix and ASA, dementia, seizures (on Vimpat), HTN, iron deficiency anemia presenting from outpatient office after labs evidenced low hemoglobin. Patient is with wife and son at bedside with wife. Patient denies any active hemoptysis, melena, bright red blood in the stool, hematuria, changes in bowel habits, dizziness, lightheadedness, sob on exertion. Does have history of iron deficiency anemia  with prior transfusions needed in the past (unknown when last transfusion was) but states to be taking iron pills once daily. Used to follow with a hematologist who just retired. He visited his primary care physician yesterday who obtained labs and noticed hgb to be int he 6's and consequently called the patient and his wife for him to go the ED for transfusions.         In the ED:  Initial vital signs: T: 98.5 F, HR: 90, BP: 145/64, R: 18, SpO2: 97% on RA  Labs: significant for hgb of 5.5, mcv of 59.8  Imaging: None  CXR: None  EKG: NSR w/ RBB  Medications: 2 Units of blood  Consults: none  Mr. Rojas is a 62 yo male with pmhx of CVA w/ loop recorder, on Plavix and ASA, dementia, seizures (on Vimpat), HTN, iron deficiency anemia presenting from outpatient office after labs evidenced low hemoglobin. Patient is with wife and son at bedside with wife. Patient denies any active hemoptysis, melena, bright red blood in the stool, hematuria, changes in bowel habits, dizziness, lightheadedness, sob on exertion. Does have history of iron deficiency anemia  with prior transfusions needed in the past (unknown when last transfusion was) but states to be taking iron pills once daily. Used to follow with a hematologist who just retired. He visited his primary care physician yesterday who obtained labs and noticed hgb to be int he 6's and consequently called the patient and his wife for him to go the ED for transfusions.     In the ED:  Initial vital signs: T: 98.5 F, HR: 90, BP: 145/64, R: 18, SpO2: 97% on RA  Labs: significant for hgb of 5.5, mcv of 59.8  Imaging: None  CXR: None  EKG: NSR w/ RBB  Medications: 2 Units of blood  Consults: none  Mr. Rojas is a 64 yo male with pmhx of CVA w/ loop recorder, on Plavix and 81mg ASA, no residual deficits, seizures (on Lacosamide), HTN, iron deficiency anemia, GERD presenting  after labs from recent primary care visit evidenced low hemoglobin. Patient is with wife and son at bedside with wife. Patient denies any active hemoptysis, melena, bright red blood in the stool, hematuria, changes in bowel habits, dizziness, lightheadedness, sob on exertion. Does have history of iron deficiency anemia  with prior blood transfusions needed in the past per wife (unknown when last transfusion was). States to be taking iron pills once daily. Used to follow with a hematologist who no longer is in practice. He visited his primary care physician yesterday who obtained labs and noticed hgb to be in the 6's and consequently called the patient and his wife for him to go the ED for transfusions.     In the ED:  Initial vital signs: T: 98.5 F, HR: 90, BP: 145/64, R: 18, SpO2: 97% on RA  Labs: significant for hgb of 5.5, mcv of 59.8  Imaging: None  CXR: None  EKG: NSR w/ RBBB  Medications: 2 Units of blood  Consults: none

## 2024-03-29 NOTE — H&P ADULT - ASSESSMENT
Mr. Rojas is a 62 yo male with pmhx of CVA w/ loop recorder, on Plavix and 81mg ASA, no residual deficits, seizures (on Vimpat), HTN, iron deficiency anemia presenting from outpatient office after labs evidenced low hemoglobin.   Mr. Rojas is a 62 yo male with pmhx of CVA w/ loop recorder, on Plavix and 81mg ASA, no residual deficits, seizures (on Vimpat), HTN, iron deficiency anemia, GERD presenting  after labs from recent primary care visit evidenced low hemoglobin.   Mr. Rojas is a 62 yo male with pmhx of CVA w/ loop recorder, on Plavix and 81mg ASA, no residual deficits, seizures (on Lacosamide), HTN, iron deficiency anemia, GERD presenting  after labs from recent primary care visit evidenced low hemoglobin.

## 2024-03-29 NOTE — H&P ADULT - PROBLEM SELECTOR PLAN 5
Patient with known history of elevated cholesterol. Takes atorvastatin 40 mg qhs.     Plan:   - C/w atorvastatin 40 mg qhs

## 2024-03-29 NOTE — H&P ADULT - PROBLEM SELECTOR PLAN 8
Patient with history of GERD. Taking Omeprazole 20 mg qd.     Plan:   - C/w home medication Patient with history of GERD. Taking Omeprazole 20 mg qd.     Plan:   - C/w pantoprazole 40 mg

## 2024-03-29 NOTE — ED PROVIDER NOTE - NS ED ROS FT
CONSTITUTIONAL: No fever, no chills, +fatigue  EYES: No eye redness, no visual changes  ENT: No ear pain, no sore throat  CARDIOVASCULAR: No chest pain, no palpitations  RESPIRATORY: No cough, no SOB  GI: No abdominal pain, no nausea, no vomiting, no constipation, no diarrhea  GENITOURINARY: No dysuria, no frequency, no hematuria  MUSCULOSKELETAL: No back pain, no joint pain, no myalgias  SKIN: No rash, no peripheral edema  NEURO: No headache, no confusion    ALL OTHER SYSTEMS NEGATIVE.

## 2024-03-29 NOTE — H&P ADULT - PROBLEM SELECTOR PLAN 1
Patient with known history of iron deficiency with required blood transfusions in the past however nothing as of recent. Followed with a hematologist however she is now retired and needs to reestablish care. Wife states that he is taking one iron supplement daily as prescribed.    Plan: Patient with known history of iron deficiency with required blood transfusions in the past however nothing as of recent. Followed with a hematologist however she is now retired and needs to reestablish care. Wife states that he is taking one iron supplement daily as prescribed. Denies hemoptysis, hematemesis, melena, brbpr, frequent stools, hematuria, lightheadedness, dizziness, sob on exertion. Getting 2 units of pRBC in the ED.     Plan:  - Post transfusion CBC in AM  - Iron supplements   - Transfuse if hgb <7  - Maintain active type and screen  - Consider GI consult if hgb not appropriately correcting Patient with known history of iron deficiency with required blood transfusions in the past however nothing as of recent. Followed with a hematologist however she is now retired and needs to reestablish care. Wife states that he is taking one iron supplement daily as prescribed. Denies hemoptysis, hematemesis, melena, brbpr, frequent stools, hematuria, lightheadedness, dizziness, sob on exertion, fatigue. Vital signs stable. Getting 2 units of pRBC in the ED.     Most likely GREGOR with low mcv to 59, low hct, asymptomatic indicative of chronicity vs bone marrow failure syndromes.     Plan:  - Post transfusion CBC in AM  - c/w Iron supplementation   - Transfuse if hgb <7  - Maintain active type and screen  - Follow  - Consider GI consult if hgb not appropriately correcting Patient with known history of iron deficiency with required blood transfusions in the past however nothing as of recent. Followed with a hematologist however she is now retired and needs to reestablish care. Wife states that he is taking one iron supplement daily as prescribed. Denies hemoptysis, hematemesis, melena, brbpr, frequent stools, hematuria, lightheadedness, dizziness, sob on exertion, fatigue. Vital signs stable. Getting 2 units of pRBC in the ED.     Most likely GREGOR with low mcv to 59, low hct, asymptomatic indicative of chronicity vs bone marrow failure syndromes.     Plan:  - Post transfusion CBC in AM  - c/w Iron supplementation, if iron studies low in AM may need iron transfusions in patient   - Orthostatic blood pressure x1  - Transfuse if hgb <7  - Maintain active type and screen  - No recent episode of syncope so holding off on full syncope w/u at this time  - Consider GI consult if hgb not appropriately correcting Patient with known history of iron deficiency with required blood transfusions in the past however nothing as of recent. Followed with a hematologist however she is now retired and needs to reestablish care. Wife states that he is taking one iron supplement daily as prescribed. Denies hemoptysis, hematemesis, melena, brbpr, frequent stools, hematuria, lightheadedness, dizziness, sob on exertion, fatigue. Vital signs stable. Getting 2 units of pRBC in the ED.     Most likely GREGOR with low mcv to 59, low hct, asymptomatic indicative of chronicity vs bone marrow failure syndromes.     Plan:  - Post transfusion CBC in AM  - c/w Iron supplementation, if iron studies low in AM may need iron transfusions in patient   - f/up FOBT, refusing ANGELITA.   - c/w PPI bid   - Orthostatic blood pressure x1  - Transfuse if hgb <7  - Maintain active type and screen  - No recent episode of syncope so holding off on full syncope w/u at this time  - Consider GI consult if hgb not appropriately correcting Patient with known history of iron deficiency with required blood transfusions in the past however nothing as of recent. Followed with a hematologist however she is now retired and needs to reestablish care. Wife states that he is taking one iron supplement daily as prescribed. Denies hemoptysis, hematemesis, melena, brbpr, frequent stools, hematuria, lightheadedness, dizziness, sob on exertion, fatigue. Vital signs stable. Getting 2 units of pRBC.     Most likely GREGOR with low mcv to 59, low hct, asymptomatic indicative of chronicity vs bone marrow failure syndromes.     Plan:  - Post transfusion CBC in AM  - c/w Iron supplementation, if iron studies low in AM may need iron transfusions in patient   - f/up FOBT, refusing ANGELITA.   - c/w PPI bid   - Orthostatic blood pressure x1  - Transfuse if hgb <7  - Maintain active type and screen  - No recent episode of syncope so holding off on full syncope w/u at this time  - Consider GI consult if hgb not appropriately correcting

## 2024-03-29 NOTE — H&P ADULT - SOCIAL HISTORY: TOBACCO USE
Family unable to state amount of packs per day but appears 1-2 packs per day since age 15 until 4 years ago once he had stroke.

## 2024-03-29 NOTE — H&P ADULT - PROBLEM SELECTOR PLAN 7
Patient with onset of dementia. Takes memantine 5 mg in the AM and donepezil 10 mg qhs.     Plan:   - Continue with home medications.

## 2024-03-29 NOTE — H&P ADULT - SOCIAL HISTORY: ALCOHOL USE
Family unable to state frequency and timetable but patient is notes to have drinking problem for multiple years but stopped drinking 4 years ago after he had his stroke.

## 2024-03-29 NOTE — H&P ADULT - PROBLEM SELECTOR PLAN 4
Has history of elevated blood pressure. Takes Losartan 20 mg qd.     Plan:    - C/w losartan 20 mg qd

## 2024-03-29 NOTE — H&P ADULT - PROBLEM SELECTOR PLAN 6
Patient with clavicle fracture February 3rd. Saw ortho outpatient. Pain is well controlled at this point.     Plan:   - C/w tylenol 650 mg q6h prn

## 2024-03-29 NOTE — H&P ADULT - NSHPLABSRESULTS_GEN_ALL_CORE
5.5    4.48  )-----------( 322      ( 29 Mar 2024 19:10 )             19.9       03-29    141  |  105  |  11  ----------------------------<  92  3.7   |  27  |  0.71    Ca    9.3      29 Mar 2024 19:10                Urinalysis Basic - ( 29 Mar 2024 19:10 )    Color: x / Appearance: x / SG: x / pH: x  Gluc: 92 mg/dL / Ketone: x  / Bili: x / Urobili: x   Blood: x / Protein: x / Nitrite: x   Leuk Esterase: x / RBC: x / WBC x   Sq Epi: x / Non Sq Epi: x / Bacteria: x        PT/INR - ( 29 Mar 2024 19:10 )   PT: 12.2 sec;   INR: 1.07          PTT - ( 29 Mar 2024 19:10 )  PTT:29.2 sec    Lactate Trend            CAPILLARY BLOOD GLUCOSE

## 2024-03-29 NOTE — ED ADULT NURSE NOTE - NSFALLUNIVINTERV_ED_ALL_ED
Bed/Stretcher in lowest position, wheels locked, appropriate side rails in place/Call bell, personal items and telephone in reach/Instruct patient to call for assistance before getting out of bed/chair/stretcher/Non-slip footwear applied when patient is off stretcher/[place] to call system/Physically safe environment - no spills, clutter or unnecessary equipment/Purposeful proactive rounding/Room/bathroom lighting operational, light cord in reach

## 2024-03-29 NOTE — ED ADULT TRIAGE NOTE - GLASGOW COMA SCALE: SCORE, MLM
Weight Management   AMBULATORY CARE:   Why it is important to manage your weight:  Being overweight increases your risk of health conditions such as heart disease, high blood pressure, type 2 diabetes, and certain types of cancer  It can also increase your risk for osteoarthritis, sleep apnea, and other respiratory problems  Aim for a slow, steady weight loss  Even a small amount of weight loss can lower your risk of health problems  How to lose weight safely:  A safe and healthy way to lose weight is to eat fewer calories and get regular exercise  · You can lose up about 1 pound a week by decreasing the number of calories you eat by 500 calories each day  You can decrease calories by eating smaller portion sizes or by cutting out high-calorie foods  Read labels to find out how many calories are in the foods you eat  · You can also burn calories with exercise such as walking, swimming, or biking  You will be more likely to keep weight off if you make these changes part of your lifestyle  Exercise at least 30 minutes per day on most days of the week  You can also fit in more physical activity by taking the stairs instead of the elevator or parking farther away from stores  Ask your healthcare provider about the best exercise plan for you  Healthy meal plan for weight management:  A healthy meal plan includes a variety of foods, contains fewer calories, and helps you stay healthy  A healthy meal plan includes the following:     · Eat whole-grain foods more often  A healthy meal plan should contain fiber  Fiber is the part of grains, fruits, and vegetables that is not broken down by your body  Whole-grain foods are healthy and provide extra fiber in your diet  Some examples of whole-grain foods are whole-wheat breads and pastas, oatmeal, brown rice, and bulgur  · Eat a variety of vegetables every day  Include dark, leafy greens such as spinach, kale, boston greens, and mustard greens   Eat yellow and orange vegetables such as carrots, sweet potatoes, and winter squash  · Eat a variety of fruits every day  Choose fresh or canned fruit (canned in its own juice or light syrup) instead of juice  Fruit juice has very little or no fiber  · Eat low-fat dairy foods  Drink fat-free (skim) milk or 1% milk  Eat fat-free yogurt and low-fat cottage cheese  Try low-fat cheeses such as mozzarella and other reduced-fat cheeses  · Choose meat and other protein foods that are low in fat  Choose beans or other legumes such as split peas or lentils  Choose fish, skinless poultry (chicken or turkey), or lean cuts of red meat (beef or pork)  Before you cook meat or poultry, cut off any visible fat  · Use less fat and oil  Try baking foods instead of frying them  Add less fat, such as margarine, sour cream, regular salad dressing and mayonnaise to foods  Eat fewer high-fat foods  Some examples of high-fat foods include french fries, doughnuts, ice cream, and cakes  · Eat fewer sweets  Limit foods and drinks that are high in sugar  This includes candy, cookies, regular soda, and sweetened drinks  Ways to decrease calories:   · Eat smaller portions  ? Use a small plate with smaller servings  ? Do not eat second helpings  ? When you eat at a restaurant, ask for a box and place half of your meal in the box before you eat  ? Share an entrée with someone else  · Replace high-calorie snacks with healthy, low-calorie snacks  ? Choose fresh fruit, vegetables, fat-free rice cakes, or air-popped popcorn instead of potato chips, nuts, or chocolate  ? Choose water or calorie-free drinks instead of soda or sweetened drinks  · Do not shop for groceries when you are hungry  You may be more likely to make unhealthy food choices  Take a grocery list of healthy foods and shop after you have eaten  · Eat regular meals  Do not skip meals  Skipping meals can lead to overeating later in the day   This can make it harder for you to lose weight  Eat a healthy snack in place of a meal if you do not have time to eat a regular meal  Talk with a dietitian to help you create a meal plan and schedule that is right for you  Other things to consider as you try to lose weight:   · Be aware of situations that may give you the urge to overeat, such as eating while watching television  Find ways to avoid these situations  For example, read a book, go for a walk, or do crafts  · Meet with a weight loss support group or friends who are also trying to lose weight  This may help you stay motivated to continue working on your weight loss goals  © Copyright [address] Information is for End User's use only and may not be sold, redistributed or otherwise used for commercial purposes  All illustrations and images included in CareNotes® are the copyrighted property of [employer]  or [facility] Manfred Sharif   The above information is an  only  It is not intended as medical advice for individual conditions or treatments  Talk to your doctor, nurse or pharmacist before following any medical regimen to see if it is safe and effective for you  15

## 2024-03-29 NOTE — H&P ADULT - ATTENDING COMMENTS
#Anemia: hx of GREGOR. Denies FARRELL/CP,  Last baseline ~12 in 2022 but per family has required blood transfusions since... Currently HDS, no s/s of bleeding. Active TS. F/up ANGELITA/FOBT, iron studies, b12/folate. Collateral in am. 2 large bore. Ivs. Consider IV iron. Consider inpt GI consult if not improved.    #seizure: hx of seizures, last episode 1 mon ago. . +slight dysarthria on exam, at baseline per wife.   CTH w/ chronic ischemic changes. c/w home meds.

## 2024-03-29 NOTE — ED PROVIDER NOTE - OBJECTIVE STATEMENT
62 yo M w PMH of CVA (loop recorder, on Plavix and 81mg ASA, no residual deficits), seizures (on Vimpat), HTN, iron deficiency anemia, dementia, p/w anemia on outpt labs to 6.8. Pt has received transfusions in the past for anemia. Cause of chronic anemia presumed to be iron deficiency. He c/o moderate fatigue and cold intolerance. Pt has no lightheadedness, syncope, or palpitations. No melena, hematuria, hemoptysis, hematemesis.

## 2024-03-29 NOTE — ED ADULT NURSE NOTE - OBJECTIVE STATEMENT
Low Hgb of 6.8 drawn yesterday 03/28/24. Bloodwork was for a routine PCP check up. Spouse reports increased weakness, fatigue, and poor thermoregulation x 2 weeks. Hx of dementia. No discrete pain, discomfort, N/V/D, dizziness, weakness, syncope, SOB, or chest pain. FAST (-). No obvious trauma or source of bleeding noted.

## 2024-03-29 NOTE — H&P ADULT - PROBLEM SELECTOR PLAN 3
Patient with stroke in June of 2020. Taking aspirin 81 mg and plavix 75 mg daily as well as atrovastatin 40 mg qhs since. Has prolonged history of smoking  Follow with Dr. Caballero consequently.     Plan:   - C/w aspirin 81 mg qd  - C/w plavix 75 mg qd  - C/w atorvastatin 40 mg qhs

## 2024-03-29 NOTE — ED ADULT TRIAGE NOTE - CHIEF COMPLAINT QUOTE
Patient PMH dementia, HTN, anemia to the ED for abnormal lab. Per wife, patient had routine appointment yesterday and was told hgb 6.8. Wife endorses hx of transfusions. Patient AMS at baseline, NAD.

## 2024-03-29 NOTE — H&P ADULT - NSHPREVIEWOFSYSTEMS_GEN_ALL_CORE
35.5
REVIEW OF SYSTEMS:  CONSTITUTIONAL: No weakness, fevers, or chills  EYES/ENT: No visual changes; No vertigo, throat pain, or dysphagia  NECK: No pain or stiffness  RESPIRATORY: No cough, wheezing, hemoptysis, or shortness of breath  CARDIOVASCULAR: No chest pain or palpitations  GASTROINTESTINAL: No abdominal or epigastric pain. No nausea, vomiting, or hematemesis; No diarrhea or constipation. No melena or hematochezia.  GENITOURINARY: No dysuria, frequency, or hematuria  MUSCULOSKELETAL: No joint or muscle pain or aches  NEUROLOGICAL: No numbness or weakness  SKIN: No itching or rashes

## 2024-03-29 NOTE — PATIENT PROFILE ADULT - FALL HARM RISK - HARM RISK INTERVENTIONS

## 2024-03-29 NOTE — H&P ADULT - PROBLEM SELECTOR PLAN 9
F: None  E: Replete as needed  N: DASH   DVT Proph: Lovenox subq  GI proph: Omeprazole 20   Code Status: Full Code  Dispo: [facility] F: None  E: Replete as needed  N: DASH   DVT Proph: Lovenox subq  GI proph: Pantoprazole 40 mg   Code Status: Full Code  Dispo: [facility]

## 2024-03-30 DIAGNOSIS — D62 ACUTE POSTHEMORRHAGIC ANEMIA: ICD-10-CM

## 2024-03-30 LAB
ADD ON TEST-SPECIMEN IN LAB: SIGNIFICANT CHANGE UP
ALBUMIN SERPL ELPH-MCNC: 4 G/DL — SIGNIFICANT CHANGE UP (ref 3.3–5)
ALP SERPL-CCNC: 53 U/L — SIGNIFICANT CHANGE UP (ref 40–120)
ALT FLD-CCNC: 10 U/L — SIGNIFICANT CHANGE UP (ref 10–45)
ANION GAP SERPL CALC-SCNC: 11 MMOL/L — SIGNIFICANT CHANGE UP (ref 5–17)
ANISOCYTOSIS BLD QL: SIGNIFICANT CHANGE UP
ANISOCYTOSIS BLD QL: SIGNIFICANT CHANGE UP
AST SERPL-CCNC: 11 U/L — SIGNIFICANT CHANGE UP (ref 10–40)
BASOPHILS # BLD AUTO: 0.03 K/UL — SIGNIFICANT CHANGE UP (ref 0–0.2)
BASOPHILS # BLD AUTO: 0.1 K/UL — SIGNIFICANT CHANGE UP (ref 0–0.2)
BASOPHILS NFR BLD AUTO: 0.7 % — SIGNIFICANT CHANGE UP (ref 0–2)
BASOPHILS NFR BLD AUTO: 2.6 % — HIGH (ref 0–2)
BILIRUB SERPL-MCNC: 0.8 MG/DL — SIGNIFICANT CHANGE UP (ref 0.2–1.2)
BLD GP AB SCN SERPL QL: NEGATIVE — SIGNIFICANT CHANGE UP
BUN SERPL-MCNC: 11 MG/DL — SIGNIFICANT CHANGE UP (ref 7–23)
CALCIUM SERPL-MCNC: 8.9 MG/DL — SIGNIFICANT CHANGE UP (ref 8.4–10.5)
CHLORIDE SERPL-SCNC: 105 MMOL/L — SIGNIFICANT CHANGE UP (ref 96–108)
CO2 SERPL-SCNC: 23 MMOL/L — SIGNIFICANT CHANGE UP (ref 22–31)
CREAT SERPL-MCNC: 0.71 MG/DL — SIGNIFICANT CHANGE UP (ref 0.5–1.3)
DACRYOCYTES BLD QL SMEAR: SLIGHT — SIGNIFICANT CHANGE UP
DACRYOCYTES BLD QL SMEAR: SLIGHT — SIGNIFICANT CHANGE UP
EGFR: 104 ML/MIN/1.73M2 — SIGNIFICANT CHANGE UP
ELLIPTOCYTES BLD QL SMEAR: SLIGHT — SIGNIFICANT CHANGE UP
EOSINOPHIL # BLD AUTO: 0.03 K/UL — SIGNIFICANT CHANGE UP (ref 0–0.5)
EOSINOPHIL # BLD AUTO: 0.1 K/UL — SIGNIFICANT CHANGE UP (ref 0–0.5)
EOSINOPHIL NFR BLD AUTO: 0.7 % — SIGNIFICANT CHANGE UP (ref 0–6)
EOSINOPHIL NFR BLD AUTO: 2.6 % — SIGNIFICANT CHANGE UP (ref 0–6)
FERRITIN SERPL-MCNC: 5 NG/ML — LOW (ref 30–400)
GLUCOSE SERPL-MCNC: 85 MG/DL — SIGNIFICANT CHANGE UP (ref 70–99)
HCT VFR BLD CALC: 25.9 % — LOW (ref 39–50)
HGB BLD-MCNC: 7.4 G/DL — LOW (ref 13–17)
HYPOCHROMIA BLD QL: SIGNIFICANT CHANGE UP
HYPOCHROMIA BLD QL: SIGNIFICANT CHANGE UP
IMM GRANULOCYTES NFR BLD AUTO: 1.1 % — HIGH (ref 0–0.9)
IRON SATN MFR SERPL: 19 % — SIGNIFICANT CHANGE UP (ref 16–55)
IRON SATN MFR SERPL: 60 UG/DL — SIGNIFICANT CHANGE UP (ref 45–165)
LG PLATELETS BLD QL AUTO: SLIGHT — SIGNIFICANT CHANGE UP
LYMPHOCYTES # BLD AUTO: 0.66 K/UL — LOW (ref 1–3.3)
LYMPHOCYTES # BLD AUTO: 0.96 K/UL — LOW (ref 1–3.3)
LYMPHOCYTES # BLD AUTO: 17.4 % — SIGNIFICANT CHANGE UP (ref 13–44)
LYMPHOCYTES # BLD AUTO: 21.1 % — SIGNIFICANT CHANGE UP (ref 13–44)
MACROCYTES BLD QL: SLIGHT — SIGNIFICANT CHANGE UP
MACROCYTES BLD QL: SLIGHT — SIGNIFICANT CHANGE UP
MAGNESIUM SERPL-MCNC: 2 MG/DL — SIGNIFICANT CHANGE UP (ref 1.6–2.6)
MANUAL SMEAR VERIFICATION: SIGNIFICANT CHANGE UP
MANUAL SMEAR VERIFICATION: SIGNIFICANT CHANGE UP
MCHC RBC-ENTMCNC: 19 PG — LOW (ref 27–34)
MCHC RBC-ENTMCNC: 28.6 GM/DL — LOW (ref 32–36)
MCV RBC AUTO: 66.6 FL — LOW (ref 80–100)
MICROCYTES BLD QL: SIGNIFICANT CHANGE UP
MICROCYTES BLD QL: SIGNIFICANT CHANGE UP
MONOCYTES # BLD AUTO: 0.29 K/UL — SIGNIFICANT CHANGE UP (ref 0–0.9)
MONOCYTES # BLD AUTO: 0.41 K/UL — SIGNIFICANT CHANGE UP (ref 0–0.9)
MONOCYTES NFR BLD AUTO: 7.8 % — SIGNIFICANT CHANGE UP (ref 2–14)
MONOCYTES NFR BLD AUTO: 9 % — SIGNIFICANT CHANGE UP (ref 2–14)
MYELOCYTES NFR BLD: 0.9 % — HIGH (ref 0–0)
NEUTROPHILS # BLD AUTO: 2.6 K/UL — SIGNIFICANT CHANGE UP (ref 1.8–7.4)
NEUTROPHILS # BLD AUTO: 3.07 K/UL — SIGNIFICANT CHANGE UP (ref 1.8–7.4)
NEUTROPHILS NFR BLD AUTO: 67.4 % — SIGNIFICANT CHANGE UP (ref 43–77)
NEUTROPHILS NFR BLD AUTO: 68.7 % — SIGNIFICANT CHANGE UP (ref 43–77)
OVALOCYTES BLD QL SMEAR: SLIGHT — SIGNIFICANT CHANGE UP
PHOSPHATE SERPL-MCNC: 4.5 MG/DL — SIGNIFICANT CHANGE UP (ref 2.5–4.5)
PLAT MORPH BLD: ABNORMAL
PLAT MORPH BLD: NORMAL — SIGNIFICANT CHANGE UP
PLATELET # BLD AUTO: 283 K/UL — SIGNIFICANT CHANGE UP (ref 150–400)
PLATELET CLUMP BLD QL SMEAR: ABNORMAL
POIKILOCYTOSIS BLD QL AUTO: SIGNIFICANT CHANGE UP
POIKILOCYTOSIS BLD QL AUTO: SIGNIFICANT CHANGE UP
POLYCHROMASIA BLD QL SMEAR: SIGNIFICANT CHANGE UP
POLYCHROMASIA BLD QL SMEAR: SLIGHT — SIGNIFICANT CHANGE UP
POTASSIUM SERPL-MCNC: 4 MMOL/L — SIGNIFICANT CHANGE UP (ref 3.5–5.3)
POTASSIUM SERPL-SCNC: 4 MMOL/L — SIGNIFICANT CHANGE UP (ref 3.5–5.3)
PROT SERPL-MCNC: 6.2 G/DL — SIGNIFICANT CHANGE UP (ref 6–8.3)
RBC # BLD: 3.89 M/UL — LOW (ref 4.2–5.8)
RBC # FLD: 23.2 % — HIGH (ref 10.3–14.5)
RBC BLD AUTO: ABNORMAL
RBC BLD AUTO: ABNORMAL
RH IG SCN BLD-IMP: POSITIVE — SIGNIFICANT CHANGE UP
SCHISTOCYTES BLD QL AUTO: SLIGHT — SIGNIFICANT CHANGE UP
SCHISTOCYTES BLD QL AUTO: SLIGHT — SIGNIFICANT CHANGE UP
SMUDGE CELLS # BLD: PRESENT — SIGNIFICANT CHANGE UP
SODIUM SERPL-SCNC: 139 MMOL/L — SIGNIFICANT CHANGE UP (ref 135–145)
SPHEROCYTES BLD QL SMEAR: SLIGHT — SIGNIFICANT CHANGE UP
STOMATOCYTES BLD QL SMEAR: SLIGHT — SIGNIFICANT CHANGE UP
TARGETS BLD QL SMEAR: SLIGHT — SIGNIFICANT CHANGE UP
TARGETS BLD QL SMEAR: SLIGHT — SIGNIFICANT CHANGE UP
TIBC SERPL-MCNC: 322 UG/DL — SIGNIFICANT CHANGE UP (ref 220–430)
UIBC SERPL-MCNC: 262 UG/DL — SIGNIFICANT CHANGE UP (ref 110–370)
WBC # BLD: 3.78 K/UL — LOW (ref 3.8–10.5)
WBC # FLD AUTO: 3.78 K/UL — LOW (ref 3.8–10.5)

## 2024-03-30 PROCEDURE — 99222 1ST HOSP IP/OBS MODERATE 55: CPT

## 2024-03-30 PROCEDURE — 99233 SBSQ HOSP IP/OBS HIGH 50: CPT | Mod: GC

## 2024-03-30 RX ORDER — SERTRALINE 25 MG/1
25 TABLET, FILM COATED ORAL DAILY
Refills: 0 | Status: DISCONTINUED | OUTPATIENT
Start: 2024-03-30 | End: 2024-04-01

## 2024-03-30 RX ADMIN — ENOXAPARIN SODIUM 40 MILLIGRAM(S): 100 INJECTION SUBCUTANEOUS at 06:59

## 2024-03-30 RX ADMIN — Medication 81 MILLIGRAM(S): at 11:04

## 2024-03-30 RX ADMIN — PANTOPRAZOLE SODIUM 40 MILLIGRAM(S): 20 TABLET, DELAYED RELEASE ORAL at 07:00

## 2024-03-30 RX ADMIN — SERTRALINE 25 MILLIGRAM(S): 25 TABLET, FILM COATED ORAL at 15:57

## 2024-03-30 RX ADMIN — Medication 325 MILLIGRAM(S): at 11:05

## 2024-03-30 RX ADMIN — LISINOPRIL 20 MILLIGRAM(S): 2.5 TABLET ORAL at 07:13

## 2024-03-30 RX ADMIN — Medication 1 MILLIGRAM(S): at 11:05

## 2024-03-30 RX ADMIN — LACOSAMIDE 250 MILLIGRAM(S): 50 TABLET ORAL at 17:37

## 2024-03-30 RX ADMIN — DONEPEZIL HYDROCHLORIDE 10 MILLIGRAM(S): 10 TABLET, FILM COATED ORAL at 22:35

## 2024-03-30 RX ADMIN — MEMANTINE HYDROCHLORIDE 5 MILLIGRAM(S): 10 TABLET ORAL at 07:00

## 2024-03-30 RX ADMIN — CLOPIDOGREL BISULFATE 75 MILLIGRAM(S): 75 TABLET, FILM COATED ORAL at 11:04

## 2024-03-30 RX ADMIN — ATORVASTATIN CALCIUM 40 MILLIGRAM(S): 80 TABLET, FILM COATED ORAL at 22:35

## 2024-03-30 RX ADMIN — LACOSAMIDE 250 MILLIGRAM(S): 50 TABLET ORAL at 07:00

## 2024-03-30 NOTE — PROGRESS NOTE ADULT - PROBLEM SELECTOR PLAN 5
Patient with known history of elevated cholesterol. Takes atorvastatin 40 mg qhs.     Plan:   - C/w atorvastatin 40 mg qhs Has history of elevated blood pressure. Takes Losartan 20 mg qd.     Plan:    - C/w losartan 20 mg qd

## 2024-03-30 NOTE — PROGRESS NOTE ADULT - PROBLEM SELECTOR PLAN 10
F: None  E: Replete as needed  N: DASH   DVT Proph: Lovenox subq  GI proph: Pantoprazole 40 mg   Code Status: Full Code  Dispo: [facility]

## 2024-03-30 NOTE — PROGRESS NOTE ADULT - PROBLEM SELECTOR PLAN 4
Has history of elevated blood pressure. Takes Losartan 20 mg qd.     Plan:    - C/w losartan 20 mg qd Patient with stroke in June of 2020 s/p vertebral artery stent. Taking aspirin 81 mg and plavix 75 mg daily as well as atrovastatin 40 mg qhs since. Has prolonged history of smoking  Follow with Dr. Caballero consequently.     Plan:   - C/w aspirin 81 mg qd  - C/w plavix 75 mg qd  - C/w atorvastatin 40 mg qhs

## 2024-03-30 NOTE — CONSULT NOTE ADULT - SUBJECTIVE AND OBJECTIVE BOX
Initial GI Consult Note:     HPI:  63M with PMH of CVA w/ loop recorder, on Plavix and 81mg ASA, no residual deficits, seizures (on Lacosamide), HTN, iron deficiency anemia, GERD presenting after labs from recent primary care visit evidenced low hemoglobin. Patient arrived with wife and son at bedside with wife. Patient denies any active hemoptysis, melena, bright red blood in the stool, hematuria, changes in bowel habits, dizziness, lightheadedness, sob on exertion. Does have history of iron deficiency anemia  with prior blood transfusions needed in the past per wife (unknown when last transfusion was). States to be taking iron pills once daily. Used to follow with a hematologist who no longer is in practice. He visited his primary care physician yesterday who obtained labs and noticed hgb to be in the 6's and consequently called the patient and his wife for him to go the ED for transfusions.     In the ED:  Initial vital signs: T: 98.5 F, HR: 90, BP: 145/64, R: 18, SpO2: 97% on RA  Labs: significant for hgb of 5.5, mcv of 59.8  Imaging: None  CXR: None  EKG: NSR w/ RBBB  Medications: 2 Units of blood    GI consulted for GREGOR. Patient seen and examined at bedside. Denies n/v/d/c, fevers, abdominal pain, melena, hematemesis, or hematochezia. States that he never completed Pylera treatment for H. pylori. Discussed plan for EGD and possible capsule study.     Allergies  No Known Allergies  Intolerances      Home Medications:  atorvastatin 40 mg oral tablet: 1 tab(s) orally once a day (21 Feb 2021 11:57)  donepezil 10 mg oral tablet: 1 tab(s) orally once a day (at bedtime) (09 Nov 2021 14:07)  folic acid 1 mg oral tablet: 1 tab(s) orally once a day (09 Nov 2021 14:07)  lacosamide 200 mg oral tablet: 1 tab(s) orally every 12 hours (29 Mar 2024 22:49)  lacosamide 50 mg oral tablet: 1 tab(s) orally every 12 hours (29 Mar 2024 22:50)  lisinopril 20 mg oral tablet: 1 tab(s) orally once a day (29 Mar 2024 22:48)  omeprazole 20 mg oral delayed release tablet: 1 tab(s) orally once a day (29 Mar 2024 22:58)    MEDICATIONS:  MEDICATIONS  (STANDING):  aspirin enteric coated 81 milliGRAM(s) Oral daily  atorvastatin 40 milliGRAM(s) Oral at bedtime  donepezil 10 milliGRAM(s) Oral at bedtime  enoxaparin Injectable 40 milliGRAM(s) SubCutaneous every 24 hours  ferrous    sulfate 325 milliGRAM(s) Oral daily  folic acid 1 milliGRAM(s) Oral daily  lacosamide 250 milliGRAM(s) Oral every 12 hours  lisinopril 20 milliGRAM(s) Oral daily  memantine 5 milliGRAM(s) Oral every 24 hours  pantoprazole    Tablet 40 milliGRAM(s) Oral before breakfast  sertraline 25 milliGRAM(s) Oral daily    MEDICATIONS  (PRN):  acetaminophen     Tablet .. 650 milliGRAM(s) Oral every 6 hours PRN Mild Pain (1 - 3)    PAST MEDICAL & SURGICAL HISTORY:  Hypertension      CVA (cerebral vascular accident)      Dementia, old age      Anemia      Seizure      Prophylactic measure      H/O hernia repair        FAMILY HISTORY:  Family history of early CAD      SOCIAL HISTORY:  [place]: [ ] Current, [ ] Former, [ ] Never; Pack Years:  Alcohol:  Illicit Drugs:    REVIEW OF SYSTEMS:  CONSTITUTIONAL: No weakness, fevers or chills  HEENT: No visual changes; No vertigo or throat pain   NECK: No pain or stiffness  RESPIRATORY: No cough, wheezing, hemoptysis; No shortness of breath  CARDIOVASCULAR: No chest pain or palpitations  GASTROINTESTINAL: No abdominal or epigastric pain. No nausea, vomiting, or hematemesis; No diarrhea or constipation. No melena or hematochezia.  GENITOURINARY: No dysuria, frequency or hematuria  NEUROLOGICAL: No numbness or weakness  SKIN: No itching, burning, rashes, or lesions   All other 10 review of systems is negative unless indicated above.    Vital Signs Last 24 Hrs  T(C): 36.7 (30 Mar 2024 13:39), Max: 36.9 (29 Mar 2024 18:24)  T(F): 98 (30 Mar 2024 13:39), Max: 98.5 (29 Mar 2024 18:24)  HR: 60 (30 Mar 2024 13:39) (57 - 90)  BP: 144/78 (30 Mar 2024 13:39) (125/62 - 146/75)  BP(mean): --  RR: 17 (30 Mar 2024 13:39) (17 - 19)  SpO2: 100% (30 Mar 2024 13:39) (97% - 100%)    Parameters below as of 30 Mar 2024 13:39  Patient On (Oxygen Delivery Method): room air      PHYSICAL EXAM:  General: No acute distress  Lungs: Normal respiratory effort and no intercostal retractions  Cardiovascular: RRR  Abdomen: Soft, non-tender, non-distended  Neurological: Alert and oriented x3 but poor historian   Skin: Warm and dry. No obvious rash         LABS:                        7.4    3.78  )-----------( 283      ( 30 Mar 2024 05:30 )             25.9     03-30    139  |  105  |  11  ----------------------------<  85  4.0   |  23  |  0.71    Ca    8.9      30 Mar 2024 05:30  Phos  4.5     03-30  Mg     2.0     03-30    TPro  6.2  /  Alb  4.0  /  TBili  0.8  /  DBili  x   /  AST  11  /  ALT  10  /  AlkPhos  53  03-30        PT/INR - ( 29 Mar 2024 19:10 )   PT: 12.2 sec;   INR: 1.07          PTT - ( 29 Mar 2024 19:10 )  PTT:29.2 sec    RADIOLOGY & ADDITIONAL STUDIES:     Reviewed

## 2024-03-30 NOTE — PROGRESS NOTE ADULT - PROBLEM SELECTOR PLAN 3
Patient with stroke in June of 2020 s/p vertebral artery stent. Taking aspirin 81 mg and plavix 75 mg daily as well as atrovastatin 40 mg qhs since. Has prolonged history of smoking  Follow with Dr. Caballero consequently.     Plan:   - C/w aspirin 81 mg qd  - C/w plavix 75 mg qd  - C/w atorvastatin 40 mg qhs Patient with history of seizures with last one around a month ago. He had fallen on his right side, fracturing his clavicle consequently. Was on Vimpat during that time, now taking lacosamide. Follows closely with Dr. Mccray outpatient.     Plan:   - C/w lacosamide 250 mg AM and PM

## 2024-03-30 NOTE — PROGRESS NOTE ADULT - PROBLEM SELECTOR PLAN 1
known h/o iron deficiency requiring blood transfusions, none recently. Sent from PCP office Hb 5.5. Previously seen by a hematologist however she is now retired and needs to reestablish care. Wife states that he is taking one iron supplement daily as prescribed. Denies hemoptysis, hematemesis, melena, brbpr, frequent stools, hematuria, lightheadedness, dizziness, sob on exertion, fatigue. Vital signs stable. s/p 2 units of pRBC. Orthostatics negative. Refused ANGELITA.  Seen by GI out-patient; underwent EGD 2020 & 2021 which small hiatal hernia with patchy inflammation and erosion. C-scope done showed two sessile polyps excised  Known FHx of colon CA in brother at 46 per HIE  Prior H.Pylori breath test +, per Dr. Hazel's note initialted treatment but subsequently d/c'ed due to seizure and then started on pylera (unclear if completed tx) with plan for repeat EGD    Plan:  - iron studies unreliable after transfusion  - GI consulted, planned for possible EDG on Monday - holding plavix in anticipation   - obtain collateral from wife  - c/w PPI bid   - Transfuse if hgb <7  - Maintain active type and screen

## 2024-03-30 NOTE — PROGRESS NOTE ADULT - SUBJECTIVE AND OBJECTIVE BOX
OVERNIGHT EVENTS:    SUBJECTIVE / INTERVAL HPI: Patient seen and examined at bedside.     VITAL SIGNS:  Vital Signs Last 24 Hrs  T(C): 36.6 (30 Mar 2024 06:05), Max: 36.9 (29 Mar 2024 18:24)  T(F): 97.8 (30 Mar 2024 06:05), Max: 98.5 (29 Mar 2024 18:24)  HR: 58 (30 Mar 2024 06:05) (57 - 90)  BP: 146/75 (30 Mar 2024 06:05) (125/62 - 146/75)  BP(mean): --  RR: 19 (30 Mar 2024 06:05) (18 - 19)  SpO2: 100% (30 Mar 2024 06:05) (97% - 100%)    Parameters below as of 30 Mar 2024 06:05  Patient On (Oxygen Delivery Method): room air        PHYSICAL EXAM:    General: alert, in no acute distress  HEENT: NC/AT; PERRL, anicteric sclera; MMM  Neck: supple  Cardiovascular: +S1/S2, RRR  Respiratory: CTA B/L; no W/R/R  Gastrointestinal: soft, NT/ND; +BSx4  Extremities: WWP; no edema, clubbing or cyanosis  Vascular: 2+ radial, DP/PT pulses B/L  Neurological: AAOx3; no focal deficits    MEDICATIONS:  MEDICATIONS  (STANDING):  aspirin enteric coated 81 milliGRAM(s) Oral daily  atorvastatin 40 milliGRAM(s) Oral at bedtime  clopidogrel Tablet 75 milliGRAM(s) Oral daily  donepezil 10 milliGRAM(s) Oral at bedtime  enoxaparin Injectable 40 milliGRAM(s) SubCutaneous every 24 hours  ferrous    sulfate 325 milliGRAM(s) Oral daily  folic acid 1 milliGRAM(s) Oral daily  lacosamide 250 milliGRAM(s) Oral every 12 hours  lisinopril 20 milliGRAM(s) Oral daily  memantine 5 milliGRAM(s) Oral every 24 hours  pantoprazole    Tablet 40 milliGRAM(s) Oral before breakfast    MEDICATIONS  (PRN):  acetaminophen     Tablet .. 650 milliGRAM(s) Oral every 6 hours PRN Mild Pain (1 - 3)      ALLERGIES:  Allergies    No Known Allergies    Intolerances        LABS:                        7.4    3.78  )-----------( 283      ( 30 Mar 2024 05:30 )             25.9     03-29    141  |  105  |  11  ----------------------------<  92  3.7   |  27  |  0.71    Ca    8.9      30 Mar 2024 05:30      PT/INR - ( 29 Mar 2024 19:10 )   PT: 12.2 sec;   INR: 1.07          PTT - ( 29 Mar 2024 19:10 )  PTT:29.2 sec  Urinalysis Basic - ( 29 Mar 2024 19:10 )    Color: x / Appearance: x / SG: x / pH: x  Gluc: 92 mg/dL / Ketone: x  / Bili: x / Urobili: x   Blood: x / Protein: x / Nitrite: x   Leuk Esterase: x / RBC: x / WBC x   Sq Epi: x / Non Sq Epi: x / Bacteria: x      CAPILLARY BLOOD GLUCOSE          RADIOLOGY & ADDITIONAL TESTS: Reviewed. OVERNIGHT EVENTS: s/p 2 u PRBc with good response    SUBJECTIVE / INTERVAL HPI: Patient seen and examined at bedside. Appears comfortable. Offers no complaints but notable dysarthria. Denies any dark stool or BRPR.    VITAL SIGNS:  Vital Signs Last 24 Hrs  T(C): 36.6 (30 Mar 2024 06:05), Max: 36.9 (29 Mar 2024 18:24)  T(F): 97.8 (30 Mar 2024 06:05), Max: 98.5 (29 Mar 2024 18:24)  HR: 58 (30 Mar 2024 06:05) (57 - 90)  BP: 146/75 (30 Mar 2024 06:05) (125/62 - 146/75)  BP(mean): --  RR: 19 (30 Mar 2024 06:05) (18 - 19)  SpO2: 100% (30 Mar 2024 06:05) (97% - 100%)    Parameters below as of 30 Mar 2024 06:05  Patient On (Oxygen Delivery Method): room air        PHYSICAL EXAM:  Constitutional: Resting comfortably in bed; NAD  Respiratory: CTA B/L; no W/R/R, no retractions  Cardiac: +S1/S2; RRR; no M/R/G; PMI non-displaced  Gastrointestinal: soft, NT/ND; no rebound or guarding; +BSx4  Extremities: WWP, no clubbing or cyanosis; no peripheral edema  Vascular: 2+ radial, PT pulses B/L  Dermatologic: skin warm, dry and intact ,pale    MEDICATIONS:  MEDICATIONS  (STANDING):  aspirin enteric coated 81 milliGRAM(s) Oral daily  atorvastatin 40 milliGRAM(s) Oral at bedtime  clopidogrel Tablet 75 milliGRAM(s) Oral daily  donepezil 10 milliGRAM(s) Oral at bedtime  enoxaparin Injectable 40 milliGRAM(s) SubCutaneous every 24 hours  ferrous    sulfate 325 milliGRAM(s) Oral daily  folic acid 1 milliGRAM(s) Oral daily  lacosamide 250 milliGRAM(s) Oral every 12 hours  lisinopril 20 milliGRAM(s) Oral daily  memantine 5 milliGRAM(s) Oral every 24 hours  pantoprazole    Tablet 40 milliGRAM(s) Oral before breakfast    MEDICATIONS  (PRN):  acetaminophen     Tablet .. 650 milliGRAM(s) Oral every 6 hours PRN Mild Pain (1 - 3)      ALLERGIES:  Allergies    No Known Allergies    Intolerances        LABS:                        7.4    3.78  )-----------( 283      ( 30 Mar 2024 05:30 )             25.9     03-29    141  |  105  |  11  ----------------------------<  92  3.7   |  27  |  0.71    Ca    8.9      30 Mar 2024 05:30      PT/INR - ( 29 Mar 2024 19:10 )   PT: 12.2 sec;   INR: 1.07          PTT - ( 29 Mar 2024 19:10 )  PTT:29.2 sec  Urinalysis Basic - ( 29 Mar 2024 19:10 )    Color: x / Appearance: x / SG: x / pH: x  Gluc: 92 mg/dL / Ketone: x  / Bili: x / Urobili: x   Blood: x / Protein: x / Nitrite: x   Leuk Esterase: x / RBC: x / WBC x   Sq Epi: x / Non Sq Epi: x / Bacteria: x      CAPILLARY BLOOD GLUCOSE          RADIOLOGY & ADDITIONAL TESTS: Reviewed.

## 2024-03-30 NOTE — CONSULT NOTE ADULT - ASSESSMENT
63M with PMH of CVA w/ loop recorder, on Plavix and 81mg ASA, no residual deficits, seizures (on Lacosamide), HTN, iron deficiency anemia, GERD presenting from PCP for anemia and found to have a Hb of 5.5, which improved to 7.4 s/p 2U pRBCs. Patient denies any witnessed bleeding. GI consulted for anemia.     Patient's out-patient chart and most recent EGD/colonoscopy reviewed. Patient was found to be H. pylori positive and was started on Pylera treatement but says he never completed it.     Recommendations:   - trend Hb and maintain active type and screen   - plan for EGD on Monday   - if unrevealing, will plan for VCE   - please ensure patient is NPO except medications after midnight on Sunday     Case discussed with Dr. Norman. GI Team will continue to follow.     Sheyla Waite D.O.   Gastroenterology Fellow  Weekday 7am-5pm Pager: [phone]  Weeknights/Weekend/Holiday Coverage: Please call the  for contact info

## 2024-03-30 NOTE — PROGRESS NOTE ADULT - PROBLEM SELECTOR PLAN 2
Patient with history of seizures with last one around a month ago. He had fallen on his right side, fracturing his clavicle consequently. Was on Vimpat during that time, now taking lacosamide. Follows closely with Dr. Mccray outpatient.     Plan:   - C/w lacosamide 250 mg AM and PM known h/o iron deficiency requiring blood transfusions, none recently. Sent from PCP office Hb 5.5. Previously seen by a hematologist however she is now retired and needs to reestablish care. Wife states that he is taking one iron supplement daily as prescribed. Denies hemoptysis, hematemesis, melena, brbpr, frequent stools, hematuria, lightheadedness, dizziness, sob on exertion, fatigue. Vital signs stable. s/p 2 units of pRBC. Orthostatics negative. Refused ANGELITA.  Seen by GI out-patient; underwent EGD 2020 & 2021 which small hiatal hernia with patchy inflammation and erosion. C-scope done showed two sessile polyps excised  Known FHx of colon CA in brother at 46 per HIE  Prior H.Pylori breath test +, per Dr. Hazel's note initialted treatment but subsequently d/c'ed due to seizure and then started on pylera (unclear if completed tx) with plan for repeat EGD    Plan:  - iron studies unreliable after transfusion  - GI consulted, planned for possible EDG on Monday - holding plavix in anticipation   - obtain collateral from wife  - c/w PPI bid   - Transfuse if hgb <7  - Maintain active type and screen

## 2024-03-30 NOTE — PROGRESS NOTE ADULT - PROBLEM SELECTOR PLAN 9
F: None  E: Replete as needed  N: DASH   DVT Proph: Lovenox subq  GI proph: Pantoprazole 40 mg   Code Status: Full Code  Dispo: [facility] Patient with history of GERD. Taking Omeprazole 20 mg qd.     Plan:   - C/w pantoprazole 40 mg F: None  E: Replete as needed  N: DASH   DVT Proph: Lovenox subq  GI proph: Pantoprazole 40 mg   Code Status: Full Code  Dispo: [facility]

## 2024-03-30 NOTE — PROGRESS NOTE ADULT - PROBLEM SELECTOR PLAN 6
Patient with clavicle fracture February 3rd. Saw ortho outpatient. Pain is well controlled at this point.     Plan:   - C/w tylenol 650 mg q6h prn Patient with known history of elevated cholesterol. Takes atorvastatin 40 mg qhs.     Plan:   - C/w atorvastatin 40 mg qhs

## 2024-03-30 NOTE — PROGRESS NOTE ADULT - PROBLEM SELECTOR PROBLEM 7
Dementia Clavicle fracture Closure 2 Information: This tab is for additional flaps and grafts, including complex repair and grafts and complex repair and flaps. You can also specify a different location for the additional defect, if the location is the same you do not need to select a new one. We will insert the automated text for the repair you select below just as we do for solitary flaps and grafts. Please note that at this time if you select a location with a different insurance zone you will need to override the ICD10 and CPT if appropriate.

## 2024-03-30 NOTE — PROGRESS NOTE ADULT - PROBLEM SELECTOR PLAN 8
Patient with history of GERD. Taking Omeprazole 20 mg qd.     Plan:   - C/w pantoprazole 40 mg Patient with onset of dementia. Takes memantine 5 mg in the AM and donepezil 10 mg qhs.     Plan:   - Continue with home medications.

## 2024-03-30 NOTE — PROGRESS NOTE ADULT - PROBLEM SELECTOR PLAN 7
Patient with onset of dementia. Takes memantine 5 mg in the AM and donepezil 10 mg qhs.     Plan:   - Continue with home medications. Patient with clavicle fracture February 3rd. Saw ortho outpatient. Pain is well controlled at this point.     Plan:   - C/w tylenol 650 mg q6h prn

## 2024-03-30 NOTE — CONSULT NOTE ADULT - ATTENDING COMMENTS
Recommendations:   - trend Hb and maintain active type and screen   - plan for EGD on Monday   - if unrevealing, will plan for VCE   - please ensure patient is NPO except medications after midnight on Sunday     Case discussed with Dr. Norman. GI Team will continue to follow.

## 2024-03-31 LAB
ALBUMIN SERPL ELPH-MCNC: 4.2 G/DL — SIGNIFICANT CHANGE UP (ref 3.3–5)
ALP SERPL-CCNC: 60 U/L — SIGNIFICANT CHANGE UP (ref 40–120)
ALT FLD-CCNC: 10 U/L — SIGNIFICANT CHANGE UP (ref 10–45)
ANION GAP SERPL CALC-SCNC: 8 MMOL/L — SIGNIFICANT CHANGE UP (ref 5–17)
AST SERPL-CCNC: 9 U/L — LOW (ref 10–40)
BASOPHILS # BLD AUTO: 0.04 K/UL — SIGNIFICANT CHANGE UP (ref 0–0.2)
BASOPHILS NFR BLD AUTO: 0.5 % — SIGNIFICANT CHANGE UP (ref 0–2)
BILIRUB SERPL-MCNC: 0.6 MG/DL — SIGNIFICANT CHANGE UP (ref 0.2–1.2)
BUN SERPL-MCNC: 8 MG/DL — SIGNIFICANT CHANGE UP (ref 7–23)
CALCIUM SERPL-MCNC: 9 MG/DL — SIGNIFICANT CHANGE UP (ref 8.4–10.5)
CHLORIDE SERPL-SCNC: 106 MMOL/L — SIGNIFICANT CHANGE UP (ref 96–108)
CO2 SERPL-SCNC: 27 MMOL/L — SIGNIFICANT CHANGE UP (ref 22–31)
CREAT SERPL-MCNC: 0.67 MG/DL — SIGNIFICANT CHANGE UP (ref 0.5–1.3)
EGFR: 106 ML/MIN/1.73M2 — SIGNIFICANT CHANGE UP
EOSINOPHIL # BLD AUTO: 0.07 K/UL — SIGNIFICANT CHANGE UP (ref 0–0.5)
EOSINOPHIL NFR BLD AUTO: 0.8 % — SIGNIFICANT CHANGE UP (ref 0–6)
GLUCOSE SERPL-MCNC: 94 MG/DL — SIGNIFICANT CHANGE UP (ref 70–99)
HCT VFR BLD CALC: 26.2 % — LOW (ref 39–50)
HGB BLD-MCNC: 7.6 G/DL — LOW (ref 13–17)
IMM GRANULOCYTES NFR BLD AUTO: 0.5 % — SIGNIFICANT CHANGE UP (ref 0–0.9)
LYMPHOCYTES # BLD AUTO: 0.67 K/UL — LOW (ref 1–3.3)
LYMPHOCYTES # BLD AUTO: 8.1 % — LOW (ref 13–44)
MAGNESIUM SERPL-MCNC: 2.4 MG/DL — SIGNIFICANT CHANGE UP (ref 1.6–2.6)
MCHC RBC-ENTMCNC: 18.6 PG — LOW (ref 27–34)
MCHC RBC-ENTMCNC: 29 GM/DL — LOW (ref 32–36)
MCV RBC AUTO: 64.2 FL — LOW (ref 80–100)
MONOCYTES # BLD AUTO: 0.55 K/UL — SIGNIFICANT CHANGE UP (ref 0–0.9)
MONOCYTES NFR BLD AUTO: 6.7 % — SIGNIFICANT CHANGE UP (ref 2–14)
NEUTROPHILS # BLD AUTO: 6.89 K/UL — SIGNIFICANT CHANGE UP (ref 1.8–7.4)
NEUTROPHILS NFR BLD AUTO: 83.4 % — HIGH (ref 43–77)
NRBC # BLD: 0 /100 WBCS — SIGNIFICANT CHANGE UP (ref 0–0)
PHOSPHATE SERPL-MCNC: 3.7 MG/DL — SIGNIFICANT CHANGE UP (ref 2.5–4.5)
PLATELET # BLD AUTO: 299 K/UL — SIGNIFICANT CHANGE UP (ref 150–400)
POTASSIUM SERPL-MCNC: 4 MMOL/L — SIGNIFICANT CHANGE UP (ref 3.5–5.3)
POTASSIUM SERPL-SCNC: 4 MMOL/L — SIGNIFICANT CHANGE UP (ref 3.5–5.3)
PROT SERPL-MCNC: 6.8 G/DL — SIGNIFICANT CHANGE UP (ref 6–8.3)
RBC # BLD: 4.08 M/UL — LOW (ref 4.2–5.8)
RBC # FLD: 24.2 % — HIGH (ref 10.3–14.5)
SODIUM SERPL-SCNC: 141 MMOL/L — SIGNIFICANT CHANGE UP (ref 135–145)
WBC # BLD: 8.26 K/UL — SIGNIFICANT CHANGE UP (ref 3.8–10.5)
WBC # FLD AUTO: 8.26 K/UL — SIGNIFICANT CHANGE UP (ref 3.8–10.5)

## 2024-03-31 PROCEDURE — 99232 SBSQ HOSP IP/OBS MODERATE 35: CPT | Mod: GC

## 2024-03-31 PROCEDURE — 99232 SBSQ HOSP IP/OBS MODERATE 35: CPT

## 2024-03-31 RX ADMIN — Medication 81 MILLIGRAM(S): at 11:27

## 2024-03-31 RX ADMIN — Medication 1 MILLIGRAM(S): at 11:27

## 2024-03-31 RX ADMIN — LACOSAMIDE 250 MILLIGRAM(S): 50 TABLET ORAL at 06:16

## 2024-03-31 RX ADMIN — DONEPEZIL HYDROCHLORIDE 10 MILLIGRAM(S): 10 TABLET, FILM COATED ORAL at 21:52

## 2024-03-31 RX ADMIN — PANTOPRAZOLE SODIUM 40 MILLIGRAM(S): 20 TABLET, DELAYED RELEASE ORAL at 06:16

## 2024-03-31 RX ADMIN — ATORVASTATIN CALCIUM 40 MILLIGRAM(S): 80 TABLET, FILM COATED ORAL at 21:51

## 2024-03-31 RX ADMIN — MEMANTINE HYDROCHLORIDE 5 MILLIGRAM(S): 10 TABLET ORAL at 06:16

## 2024-03-31 RX ADMIN — ENOXAPARIN SODIUM 40 MILLIGRAM(S): 100 INJECTION SUBCUTANEOUS at 06:16

## 2024-03-31 RX ADMIN — Medication 325 MILLIGRAM(S): at 11:27

## 2024-03-31 RX ADMIN — LACOSAMIDE 250 MILLIGRAM(S): 50 TABLET ORAL at 17:23

## 2024-03-31 RX ADMIN — LISINOPRIL 20 MILLIGRAM(S): 2.5 TABLET ORAL at 06:16

## 2024-03-31 RX ADMIN — SERTRALINE 25 MILLIGRAM(S): 25 TABLET, FILM COATED ORAL at 11:27

## 2024-03-31 NOTE — PROGRESS NOTE ADULT - ATTENDING COMMENTS
Recommendations:   - trend Hb and maintain active type and screen   - plan for EGD tomorrow (Dr. Hazel wanted to repeat in 2021, but patient was loss to follow-up)   - if unrevealing, will plan for VCE   - please ensure patient is NPO except medications after midnight tonight     GI Team will continue to follow.
Patient was seen and examined at bedside on 3/30/2024 at 12 pm. Patient has no acute complaints, wants to go home. Denies chest pain, SOB, N/V, abdominal pain. Denies melena or hematochezia. ROS is otherwise negative. Vitals, labwork and pertinent imaging reviewed. Exam - NAD, AAO x 4, PERRLA, EOMI, MMM, supple neck, chest - CTA b/l, CV - rrr, s1s2, no m/r/g, abd - soft, NTND, + BS, ext - wwp, psych - normal affect    Plan:  -Test for H. Pylori  -Trend CBC  -GI consulted, plan for EGD on 4/1

## 2024-03-31 NOTE — PROGRESS NOTE ADULT - PROBLEM SELECTOR PLAN 9
F: None  E: Replete as needed  N: DASH   DVT Proph: Lovenox subq  GI proph: Pantoprazole 40 mg   Code Status: Full Code  Dispo: [facility]

## 2024-03-31 NOTE — PROGRESS NOTE ADULT - PROBLEM SELECTOR PLAN 1
known h/o iron deficiency requiring blood transfusions, none recently. Sent from PCP office Hb 5.5. Previously seen by a hematologist however she is now retired and needs to reestablish care. Wife states that he is taking one iron supplement daily as prescribed. Denies hemoptysis, hematemesis, melena, brbpr, frequent stools, hematuria, lightheadedness, dizziness, sob on exertion, fatigue. Vital signs stable. s/p 2 units of pRBC. Orthostatics negative. Refused ANGELITA.  Seen by GI out-patient; underwent EGD 2020 & 2021 which small hiatal hernia with patchy inflammation and erosion. C-scope done showed two sessile polyps excised  Known FHx of colon CA in brother at 46 per HIE  Prior H.Pylori breath test +, per Dr. Hazel's note initialted treatment but subsequently d/c'ed due to seizure and then started on pylera (unclear if completed tx) with plan for repeat EGD    Plan:  - iron studies unreliable after transfusion  - GI consulted, planned for EDG on Monday - holding plavix in anticipation   - obtain collateral from wife  - c/w PPI bid   - Transfuse if hgb <7  - Maintain active type and screen

## 2024-03-31 NOTE — PROGRESS NOTE ADULT - PROBLEM SELECTOR PLAN 2
Patient with history of seizures with last one around a month ago. He had fallen on his right side, fracturing his clavicle consequently. Was on Vimpat during that time, now taking lacosamide. Follows closely with Dr. Mccray outpatient.     Plan:   - C/w lacosamide 250 mg AM and PM

## 2024-03-31 NOTE — PROGRESS NOTE ADULT - SUBJECTIVE AND OBJECTIVE BOX
OVERNIGHT EVENTS: NADIA    SUBJECTIVE / INTERVAL HPI: Patient seen and examined at bedside. Patient has no acute complaints. Denies SOB, CP. ROS is otherwise negative.       Vital Signs Last 24 Hrs  T(C): 37 (31 Mar 2024 06:03), Max: 37 (31 Mar 2024 06:03)  T(F): 98.6 (31 Mar 2024 06:03), Max: 98.6 (31 Mar 2024 06:03)  HR: 60 (31 Mar 2024 06:03) (59 - 60)  BP: 153/78 (31 Mar 2024 06:03) (144/78 - 154/72)  BP(mean): --  RR: 18 (31 Mar 2024 06:03) (17 - 18)  SpO2: 97% (31 Mar 2024 06:03) (97% - 100%)    Parameters below as of 31 Mar 2024 06:03  Patient On (Oxygen Delivery Method): room air            PHYSICAL EXAM:  Constitutional: Resting comfortably in bed; NAD  Respiratory: CTA B/L; no W/R/R, no retractions  Cardiac: +S1/S2; RRR; no M/R/G; PMI non-displaced  Gastrointestinal: soft, NT/ND; no rebound or guarding; +BSx4  Extremities: WWP, no clubbing or cyanosis; no peripheral edema  Vascular: 2+ radial, PT pulses B/L  Dermatologic: skin warm, dry and intact ,pale  Psych: normal affect        MEDICATIONS  (STANDING):  aspirin enteric coated 81 milliGRAM(s) Oral daily  atorvastatin 40 milliGRAM(s) Oral at bedtime  donepezil 10 milliGRAM(s) Oral at bedtime  ferrous    sulfate 325 milliGRAM(s) Oral daily  folic acid 1 milliGRAM(s) Oral daily  lacosamide 250 milliGRAM(s) Oral every 12 hours  lisinopril 20 milliGRAM(s) Oral daily  memantine 5 milliGRAM(s) Oral every 24 hours  pantoprazole    Tablet 40 milliGRAM(s) Oral before breakfast  sertraline 25 milliGRAM(s) Oral daily    MEDICATIONS  (PRN):  acetaminophen     Tablet .. 650 milliGRAM(s) Oral every 6 hours PRN Mild Pain (1 - 3)          ALLERGIES:  Allergies    No Known Allergies    Intolerances        LABS:                          7.6    8.26  )-----------( 299      ( 31 Mar 2024 05:30 )             26.2   03-31    141  |  106  |  8   ----------------------------<  94  4.0   |  27  |  0.67    Ca    9.0      31 Mar 2024 05:30  Phos  3.7     03-31  Mg     2.4     03-31    TPro  6.8  /  Alb  4.2  /  TBili  0.6  /  DBili  x   /  AST  9<L>  /  ALT  10  /  AlkPhos  60  03-31           PTT - ( 29 Mar 2024 19:10 )  PTT:29.2 sec  Urinalysis Basic - ( 29 Mar 2024 19:10 )    Color: x / Appearance: x / SG: x / pH: x  Gluc: 92 mg/dL / Ketone: x  / Bili: x / Urobili: x   Blood: x / Protein: x / Nitrite: x   Leuk Esterase: x / RBC: x / WBC x   Sq Epi: x / Non Sq Epi: x / Bacteria: x      CAPILLARY BLOOD GLUCOSE          RADIOLOGY & ADDITIONAL TESTS: Reviewed.

## 2024-03-31 NOTE — PROGRESS NOTE ADULT - SUBJECTIVE AND OBJECTIVE BOX
GI Consult Progress Note:     OVERNIGHT EVENTS: NADIA.    SUBJECTIVE / INTERVAL HPI:   Patient seen and examined at bedside. States that overall he feels well. Aware of plans for EGD tomorrow.       VITAL SIGNS:  Vital Signs Last 24 Hrs  T(C): 37 (31 Mar 2024 06:03), Max: 37 (31 Mar 2024 06:03)  T(F): 98.6 (31 Mar 2024 06:03), Max: 98.6 (31 Mar 2024 06:03)  HR: 60 (31 Mar 2024 06:03) (59 - 60)  BP: 153/78 (31 Mar 2024 06:03) (144/78 - 154/72)  BP(mean): --  RR: 18 (31 Mar 2024 06:03) (17 - 18)  SpO2: 97% (31 Mar 2024 06:03) (97% - 100%)    Parameters below as of 31 Mar 2024 06:03  Patient On (Oxygen Delivery Method): room air      PHYSICAL EXAM:  General: No acute distress, poor dentition   Lungs: Normal respiratory effort and no intercostal retractions  Cardiovascular: RRR  Abdomen: Soft, non-tender, non-distended  Neurological: Alert and oriented x3  Skin: Warm and dry. No obvious rash       MEDICATIONS:  MEDICATIONS  (STANDING):  aspirin enteric coated 81 milliGRAM(s) Oral daily  atorvastatin 40 milliGRAM(s) Oral at bedtime  donepezil 10 milliGRAM(s) Oral at bedtime  ferrous    sulfate 325 milliGRAM(s) Oral daily  folic acid 1 milliGRAM(s) Oral daily  lacosamide 250 milliGRAM(s) Oral every 12 hours  lisinopril 20 milliGRAM(s) Oral daily  memantine 5 milliGRAM(s) Oral every 24 hours  pantoprazole    Tablet 40 milliGRAM(s) Oral before breakfast  sertraline 25 milliGRAM(s) Oral daily    MEDICATIONS  (PRN):  acetaminophen     Tablet .. 650 milliGRAM(s) Oral every 6 hours PRN Mild Pain (1 - 3)      ALLERGIES:  Allergies    No Known Allergies    Intolerances        LABS:                        7.6    8.26  )-----------( 299      ( 31 Mar 2024 05:30 )             26.2     03-31    141  |  106  |  8   ----------------------------<  94  4.0   |  27  |  0.67    Ca    9.0      31 Mar 2024 05:30  Phos  3.7     03-31  Mg     2.4     03-31    TPro  6.8  /  Alb  4.2  /  TBili  0.6  /  DBili  x   /  AST  9<L>  /  ALT  10  /  AlkPhos  60  03-31    PT/INR - ( 29 Mar 2024 19:10 )   PT: 12.2 sec;   INR: 1.07          PTT - ( 29 Mar 2024 19:10 )  PTT:29.2 sec  Urinalysis Basic - ( 31 Mar 2024 05:30 )    Color: x / Appearance: x / SG: x / pH: x  Gluc: 94 mg/dL / Ketone: x  / Bili: x / Urobili: x   Blood: x / Protein: x / Nitrite: x   Leuk Esterase: x / RBC: x / WBC x   Sq Epi: x / Non Sq Epi: x / Bacteria: x      CAPILLARY BLOOD GLUCOSE  RADIOLOGY & ADDITIONAL TESTS: Reviewed.

## 2024-03-31 NOTE — PROGRESS NOTE ADULT - PROBLEM SELECTOR PLAN 3
Patient with stroke in June of 2020 s/p vertebral artery stent. Taking aspirin 81 mg and plavix 75 mg daily as well as atrovastatin 40 mg qhs since. Has prolonged history of smoking  Follow with Dr. Caballero consequently.     Plan:   - C/w aspirin 81 mg qd  - C/w plavix 75 mg qd  - C/w atorvastatin 40 mg qhs

## 2024-04-01 ENCOUNTER — TRANSCRIPTION ENCOUNTER (OUTPATIENT)
Age: 64
End: 2024-04-01

## 2024-04-01 ENCOUNTER — RESULT REVIEW (OUTPATIENT)
Age: 64
End: 2024-04-01

## 2024-04-01 VITALS
RESPIRATION RATE: 17 BRPM | TEMPERATURE: 98 F | HEART RATE: 53 BPM | DIASTOLIC BLOOD PRESSURE: 64 MMHG | SYSTOLIC BLOOD PRESSURE: 114 MMHG | OXYGEN SATURATION: 95 %

## 2024-04-01 PROBLEM — Z29.9 ENCOUNTER FOR PROPHYLACTIC MEASURES, UNSPECIFIED: Chronic | Status: ACTIVE | Noted: 2024-03-29

## 2024-04-01 LAB
ANION GAP SERPL CALC-SCNC: 11 MMOL/L — SIGNIFICANT CHANGE UP (ref 5–17)
APTT BLD: 30.2 SEC — SIGNIFICANT CHANGE UP (ref 24.5–35.6)
BASOPHILS # BLD AUTO: 0.04 K/UL — SIGNIFICANT CHANGE UP (ref 0–0.2)
BASOPHILS NFR BLD AUTO: 0.7 % — SIGNIFICANT CHANGE UP (ref 0–2)
BLD GP AB SCN SERPL QL: NEGATIVE — SIGNIFICANT CHANGE UP
BUN SERPL-MCNC: 13 MG/DL — SIGNIFICANT CHANGE UP (ref 7–23)
CALCIUM SERPL-MCNC: 8.9 MG/DL — SIGNIFICANT CHANGE UP (ref 8.4–10.5)
CHLORIDE SERPL-SCNC: 105 MMOL/L — SIGNIFICANT CHANGE UP (ref 96–108)
CO2 SERPL-SCNC: 22 MMOL/L — SIGNIFICANT CHANGE UP (ref 22–31)
CREAT SERPL-MCNC: 0.62 MG/DL — SIGNIFICANT CHANGE UP (ref 0.5–1.3)
EGFR: 108 ML/MIN/1.73M2 — SIGNIFICANT CHANGE UP
EOSINOPHIL # BLD AUTO: 0.1 K/UL — SIGNIFICANT CHANGE UP (ref 0–0.5)
EOSINOPHIL NFR BLD AUTO: 1.8 % — SIGNIFICANT CHANGE UP (ref 0–6)
GLUCOSE SERPL-MCNC: 96 MG/DL — SIGNIFICANT CHANGE UP (ref 70–99)
HCT VFR BLD CALC: 27 % — LOW (ref 39–50)
HGB BLD-MCNC: 7.9 G/DL — LOW (ref 13–17)
IMM GRANULOCYTES NFR BLD AUTO: 0.4 % — SIGNIFICANT CHANGE UP (ref 0–0.9)
INR BLD: 1.05 — SIGNIFICANT CHANGE UP (ref 0.85–1.18)
LYMPHOCYTES # BLD AUTO: 0.88 K/UL — LOW (ref 1–3.3)
LYMPHOCYTES # BLD AUTO: 15.5 % — SIGNIFICANT CHANGE UP (ref 13–44)
MAGNESIUM SERPL-MCNC: 2.2 MG/DL — SIGNIFICANT CHANGE UP (ref 1.6–2.6)
MCHC RBC-ENTMCNC: 18.8 PG — LOW (ref 27–34)
MCHC RBC-ENTMCNC: 29.3 GM/DL — LOW (ref 32–36)
MCV RBC AUTO: 64.3 FL — LOW (ref 80–100)
MONOCYTES # BLD AUTO: 0.53 K/UL — SIGNIFICANT CHANGE UP (ref 0–0.9)
MONOCYTES NFR BLD AUTO: 9.4 % — SIGNIFICANT CHANGE UP (ref 2–14)
NEUTROPHILS # BLD AUTO: 4.09 K/UL — SIGNIFICANT CHANGE UP (ref 1.8–7.4)
NEUTROPHILS NFR BLD AUTO: 72.2 % — SIGNIFICANT CHANGE UP (ref 43–77)
NRBC # BLD: 0 /100 WBCS — SIGNIFICANT CHANGE UP (ref 0–0)
PHOSPHATE SERPL-MCNC: 4.2 MG/DL — SIGNIFICANT CHANGE UP (ref 2.5–4.5)
PLATELET # BLD AUTO: 290 K/UL — SIGNIFICANT CHANGE UP (ref 150–400)
POTASSIUM SERPL-MCNC: 3.9 MMOL/L — SIGNIFICANT CHANGE UP (ref 3.5–5.3)
POTASSIUM SERPL-SCNC: 3.9 MMOL/L — SIGNIFICANT CHANGE UP (ref 3.5–5.3)
PROTHROM AB SERPL-ACNC: 11.9 SEC — SIGNIFICANT CHANGE UP (ref 9.5–13)
RBC # BLD: 4.2 M/UL — SIGNIFICANT CHANGE UP (ref 4.2–5.8)
RBC # FLD: 24.7 % — HIGH (ref 10.3–14.5)
RH IG SCN BLD-IMP: POSITIVE — SIGNIFICANT CHANGE UP
SODIUM SERPL-SCNC: 138 MMOL/L — SIGNIFICANT CHANGE UP (ref 135–145)
WBC # BLD: 5.66 K/UL — SIGNIFICANT CHANGE UP (ref 3.8–10.5)
WBC # FLD AUTO: 5.66 K/UL — SIGNIFICANT CHANGE UP (ref 3.8–10.5)

## 2024-04-01 PROCEDURE — 83735 ASSAY OF MAGNESIUM: CPT

## 2024-04-01 PROCEDURE — 85730 THROMBOPLASTIN TIME PARTIAL: CPT

## 2024-04-01 PROCEDURE — 86923 COMPATIBILITY TEST ELECTRIC: CPT

## 2024-04-01 PROCEDURE — 99285 EMERGENCY DEPT VISIT HI MDM: CPT

## 2024-04-01 PROCEDURE — 36415 COLL VENOUS BLD VENIPUNCTURE: CPT

## 2024-04-01 PROCEDURE — 36430 TRANSFUSION BLD/BLD COMPNT: CPT

## 2024-04-01 PROCEDURE — 88305 TISSUE EXAM BY PATHOLOGIST: CPT

## 2024-04-01 PROCEDURE — 99239 HOSP IP/OBS DSCHRG MGMT >30: CPT | Mod: GC

## 2024-04-01 PROCEDURE — 86901 BLOOD TYPING SEROLOGIC RH(D): CPT

## 2024-04-01 PROCEDURE — 80053 COMPREHEN METABOLIC PANEL: CPT

## 2024-04-01 PROCEDURE — 86850 RBC ANTIBODY SCREEN: CPT

## 2024-04-01 PROCEDURE — 85027 COMPLETE CBC AUTOMATED: CPT

## 2024-04-01 PROCEDURE — 84484 ASSAY OF TROPONIN QUANT: CPT

## 2024-04-01 PROCEDURE — 80235 DRUG ASSAY LACOSAMIDE: CPT

## 2024-04-01 PROCEDURE — 83540 ASSAY OF IRON: CPT

## 2024-04-01 PROCEDURE — 84100 ASSAY OF PHOSPHORUS: CPT

## 2024-04-01 PROCEDURE — P9016: CPT

## 2024-04-01 PROCEDURE — 93005 ELECTROCARDIOGRAM TRACING: CPT

## 2024-04-01 PROCEDURE — 88305 TISSUE EXAM BY PATHOLOGIST: CPT | Mod: 26

## 2024-04-01 PROCEDURE — 80048 BASIC METABOLIC PNL TOTAL CA: CPT

## 2024-04-01 PROCEDURE — 43239 EGD BIOPSY SINGLE/MULTIPLE: CPT | Mod: GC

## 2024-04-01 PROCEDURE — 85025 COMPLETE CBC W/AUTO DIFF WBC: CPT

## 2024-04-01 PROCEDURE — 86900 BLOOD TYPING SEROLOGIC ABO: CPT

## 2024-04-01 PROCEDURE — 83550 IRON BINDING TEST: CPT

## 2024-04-01 PROCEDURE — 82728 ASSAY OF FERRITIN: CPT

## 2024-04-01 PROCEDURE — 85610 PROTHROMBIN TIME: CPT

## 2024-04-01 RX ORDER — FERROUS SULFATE 325(65) MG
1 TABLET ORAL
Qty: 0 | Refills: 0 | DISCHARGE
Start: 2024-04-01

## 2024-04-01 RX ORDER — SERTRALINE 25 MG/1
1 TABLET, FILM COATED ORAL
Qty: 0 | Refills: 0 | DISCHARGE
Start: 2024-04-01

## 2024-04-01 RX ADMIN — Medication 1 MILLIGRAM(S): at 12:48

## 2024-04-01 RX ADMIN — LISINOPRIL 20 MILLIGRAM(S): 2.5 TABLET ORAL at 05:59

## 2024-04-01 RX ADMIN — Medication 325 MILLIGRAM(S): at 12:48

## 2024-04-01 RX ADMIN — LACOSAMIDE 250 MILLIGRAM(S): 50 TABLET ORAL at 05:59

## 2024-04-01 RX ADMIN — MEMANTINE HYDROCHLORIDE 5 MILLIGRAM(S): 10 TABLET ORAL at 06:00

## 2024-04-01 RX ADMIN — PANTOPRAZOLE SODIUM 40 MILLIGRAM(S): 20 TABLET, DELAYED RELEASE ORAL at 06:00

## 2024-04-01 RX ADMIN — SERTRALINE 25 MILLIGRAM(S): 25 TABLET, FILM COATED ORAL at 12:48

## 2024-04-01 RX ADMIN — Medication 81 MILLIGRAM(S): at 12:48

## 2024-04-01 NOTE — DISCHARGE NOTE PROVIDER - CARE PROVIDERS DIRECT ADDRESSES
[email or web address]

## 2024-04-01 NOTE — PROGRESS NOTE ADULT - ASSESSMENT
63M with PMH of CVA w/ loop recorder, on Plavix and 81mg ASA, no residual deficits, seizures (on Lacosamide), HTN, iron deficiency anemia, GERD presenting from PCP for anemia and found to have a Hb of 5.5, which improved to 7.4 s/p 2U pRBCs. Patient denies any witnessed bleeding. GI consulted for anemia.     Patient's out-patient chart and most recent EGD/colonoscopy reviewed. Patient was found to be H. pylori positive and was started on Pylera treatement but says he never completed it.     Recommendations:   - trend Hb and maintain active type and screen   - plan for EGD tomorrow (Dr. Hazel wanted to repeat in 2021, but patient was loss to follow-up)   - if unrevealing, will plan for VCE   - please ensure patient is NPO except medications after midnight tonight     GI Team will continue to follow.     Sheyla Waite D.O.   Gastroenterology Fellow  Weekday 7am-5pm Pager: [phone]  Weeknights/Weekend/Holiday Coverage: Please call the  for contact info
Mr. Rojas is a 62 yo male with pmhx of CVA w/ loop recorder, on Plavix and 81mg ASA, no residual deficits, seizures (on Lacosamide), HTN, iron deficiency anemia, GERD presenting  after labs from recent primary care visit evidenced low hemoglobin.  

## 2024-04-01 NOTE — DISCHARGE NOTE PROVIDER - ATTENDING DISCHARGE PHYSICAL EXAMINATION:
General: NAD  Head: pupils reactive  Neck: Supple; no JVD  Respiratory: CTAB; no wheezes/rales/rhonchi  Cardiovascular: Regular rhythm/rate; S1/S2+, no murmurs, rubs gallops   Gastrointestinal: Soft; NTND; bowel sounds normal and present  Extremities: WWP; no edema/cyanosis  Neurological: A&Ox2

## 2024-04-01 NOTE — DISCHARGE NOTE PROVIDER - NSDCCPCAREPLAN_GEN_ALL_CORE_FT
PRINCIPAL DISCHARGE DIAGNOSIS  Diagnosis: Anemia  Assessment and Plan of Treatment: You have a history of anemia, however were found to have abnormally low blood levels at your PCP office.  Anemia is the medical term for when a person has too few red blood cells. Red blood cells are the cells in your blood that carry oxygen. If you have too few red blood cells, your body does not get all the oxygen it needs. Most people with anemia have no symptoms. They find out they have it after their doctor does blood tests for another reason. People who do have symptoms might feel tired or weak, especially if they try to exercise or have headaches. If you experience these symptoms you should see your primary care provider for further evaluation.       PRINCIPAL DISCHARGE DIAGNOSIS  Diagnosis: Anemia  Assessment and Plan of Treatment: You have a history of anemia, however were found to have abnormally low blood levels at your PCP office.  Anemia is the medical term for when a person has too few red blood cells. Red blood cells are the cells in your blood that carry oxygen. If you have too few red blood cells, your body does not get all the oxygen it needs. Most people with anemia have no symptoms. They find out they have it after their doctor does blood tests for another reason. People who do have symptoms might feel tired or weak, especially if they try to exercise or have headaches. If you experience these symptoms you should see your primary care provider for further evaluation. An endoscopy was done this admission, which was completely normal. Please follow up with Dr Hazel for an outpatient capsule study. Please also follow up with hematology Dr Schneider on 4/11 for further management of your anemia. Continue to take iron supplements every other day as well as omeprazole daily. You may also resume taking plavix.       PRINCIPAL DISCHARGE DIAGNOSIS  Diagnosis: Anemia due to acute blood loss  Assessment and Plan of Treatment: You have a history of anemia, however were found to have abnormally low blood levels at your PCP office.  Anemia is the medical term for when a person has too few red blood cells. Red blood cells are the cells in your blood that carry oxygen. If you have too few red blood cells, your body does not get all the oxygen it needs. Most people with anemia have no symptoms. They find out they have it after their doctor does blood tests for another reason. People who do have symptoms might feel tired or weak, especially if they try to exercise or have headaches. If you experience these symptoms you should see your primary care provider for further evaluation. An endoscopy was done this admission, which was completely normal. Please follow up with Dr Hazel for an outpatient capsule study. Please also follow up with hematology Dr Schneider on 4/11 for further management of your anemia. Continue to take iron supplements every other day as well as omeprazole daily. You may also resume taking plavix.      SECONDARY DISCHARGE DIAGNOSES  Diagnosis: Anemia  Assessment and Plan of Treatment: You have a history of anemia, however were found to have abnormally low blood levels at your PCP office.  Anemia is the medical term for when a person has too few red blood cells. Red blood cells are the cells in your blood that carry oxygen. If you have too few red blood cells, your body does not get all the oxygen it needs. Most people with anemia have no symptoms. They find out they have it after their doctor does blood tests for another reason. People who do have symptoms might feel tired or weak, especially if they try to exercise or have headaches. If you experience these symptoms you should see your primary care provider for further evaluation. An endoscopy was done this admission, which was completely normal. Please follow up with Dr Hazel for an outpatient capsule study. Please also follow up with hematology Dr Schneider on 4/11 for further management of your anemia. Continue to take iron supplements every other day as well as omeprazole daily. You may also resume taking plavix.    Diagnosis: Seizure  Assessment and Plan of Treatment:     Diagnosis: CVA (cerebral vascular accident)  Assessment and Plan of Treatment: continue aspirin 81 mg qd,  plavix 75 mg qd and atorvastatin 40 mg qhs.       Diagnosis: Hypertension  Assessment and Plan of Treatment: c/w losartan    Diagnosis: Dementia  Assessment and Plan of Treatment: Continue memantine 5 mg in the AM and donepezil 10 mg    Diagnosis: Clavicle fracture  Assessment and Plan of Treatment: tylenol for pain. ortho f/u outpatient

## 2024-04-01 NOTE — PROGRESS NOTE ADULT - PROBLEM SELECTOR PLAN 9
F: None  E: Replete as needed  N: DASH   DVT Proph: Lovenox subq  GI proph: Pantoprazole 40 mg   Code Status: Full Code  Dispo: [facility] F: None  E: Replete as needed  N: DASH   DVT Proph: holding  GI proph: Pantoprazole 40 mg   Code Status: Full Code  Dispo: [facility]

## 2024-04-01 NOTE — CHART NOTE - NSCHARTNOTEFT_GEN_A_CORE
Patient is s/p EGD in endoscopy unit for anemia.     Findings are as follows:   - A sliding small size hiatal hernia was seen, the esophagogastric junction (EGJ) was noted at 38 cm, with hiatal narrowing at 40 cm from the incisors. Retroflexion view in the stomach confirmed the size and morphology of the Hill Grade 1 hiatal hernia.  - Normal mucosa was noted in the whole esophagus.  - Normal mucosa was noted in the stomach. Multiple cold forceps biopsies were performed for histology. Multiple cold forceps biopsies were performed for H. pylori in the antrum, incisura and stomach body.  - Normal mucosa was noted in the whole examined duodenum.    Recommendations:   - Advance diet as tolerated   - Return to floor for further management   - Await pathology results  - Out-patient follow-up with Dr. Hazel for a capsule study  - No contraindication to discharge from a GI perspective     Case discussed with Dr. Martinez. GI Team will sign off. Please re-consult with any questions or concerns.     Sheyla Waite D.O.   Gastroenterology Fellow  Weekday 7am-5pm Pager: [phone]  Weeknights/Weekend/Holiday Coverage: Please call the  for contact info
Consult received for "MST Score >2." Upon chart review, patient with stable weight, does not currently meet criteria for MST, RD remains available for assessment per protocol or as needed. Met with patient at bedside who reports good appetite and denies recent weight changes. Consult cleared.

## 2024-04-01 NOTE — DISCHARGE NOTE PROVIDER - NSDCCPTREATMENT_GEN_ALL_CORE_FT
PRINCIPAL PROCEDURE  Procedure: EGD  Findings and Treatment: - A sliding small size hiatal hernia was seen, the esophagogastric junction (EGJ) was noted at 38 cm, with hiatal narrowing at 40 cm from the incisors. Retroflexion view in the stomach confirmed the size and morphology of the Hill Grade 1 hiatal hernia.  - Normal mucosa was noted in the whole esophagus.  - Normal mucosa was noted in the stomach. Multiple cold forceps biopsies were performed for histology. Multiple cold forceps biopsies were performed for H. pylori in the antrum, incisura and stomach body.  - Normal mucosa was noted in the whole examined duodenum.

## 2024-04-01 NOTE — DISCHARGE NOTE PROVIDER - PROVIDER TOKENS
PROVIDER:[TOKEN:[[id number]],FOLLOWUP:[2 weeks]] PROVIDER:[TOKEN:[[id number]],SCHEDULEDAPPT:[04/11/2024],SCHEDULEDAPPTTIME:[10:20 AM]] PROVIDER:[TOKEN:[[id number]],SCHEDULEDAPPT:[04/11/2024],SCHEDULEDAPPTTIME:[10:20 AM]],PROVIDER:[TOKEN:[[id number]],FOLLOWUP:[2 weeks],ESTABLISHEDPATIENT:[T]]

## 2024-04-01 NOTE — PRE-ANESTHESIA EVALUATION ADULT - NSANTHOSAYNRD_GEN_A_CORE
No. JOSE EDUARDO screening performed.  STOP BANG Legend: 0-2 = LOW Risk; 3-4 = INTERMEDIATE Risk; 5-8 = HIGH Risk

## 2024-04-01 NOTE — PROGRESS NOTE ADULT - PROBLEM SELECTOR PLAN 1
known h/o iron deficiency requiring blood transfusions, none recently. Sent from PCP office Hb 5.5. Previously seen by a hematologist however she is now retired and needs to reestablish care. Wife states that he is taking one iron supplement daily as prescribed. Denies hemoptysis, hematemesis, melena, brbpr, frequent stools, hematuria, lightheadedness, dizziness, sob on exertion, fatigue. Vital signs stable. s/p 2 units of pRBC. Orthostatics negative. Refused ANGELITA.  Seen by GI out-patient; underwent EGD 2020 & 2021 which small hiatal hernia with patchy inflammation and erosion. C-scope done showed two sessile polyps excised  Known FHx of colon CA in brother at 46 per HIE  Prior H.Pylori breath test +, per Dr. Hazel's note initialted treatment but subsequently d/c'ed due to seizure and then started on pylera (unclear if completed tx) with plan for repeat EGD    Plan:  - iron studies unreliable after transfusion  - GI consulted, planned for possible EDG on Monday - holding plavix in anticipation   - c/w PPI bid   - Transfuse if hgb <7  - Maintain active type and screen known h/o iron deficiency requiring blood transfusions, none recently. Sent from PCP office Hb 5.5. Previously seen by a hematologist however she is now retired and needs to reestablish care. Wife states that he is taking one iron supplement daily as prescribed. Denies hemoptysis, hematemesis, melena, brbpr, frequent stools, hematuria, lightheadedness, dizziness, sob on exertion, fatigue. Vital signs stable. s/p 2 units of pRBC. Orthostatics negative. Refused ANGELITA.  Seen by GI out-patient; underwent EGD 2020 & 2021 which small hiatal hernia with patchy inflammation and erosion. C-scope done showed two sessile polyps excised  Known FHx of colon CA in brother at 46 per HIE  Prior H.Pylori breath test +, per Dr. Hazel's note initialted treatment but subsequently d/c'ed due to seizure and then started on pylera (unclear if completed tx) with plan for repeat EGD    Plan:  - iron studies unreliable after transfusion  - GI consulted, pending EGD, possible capsule study if EGD negative  - holding plavix  - c/w PPI bid   - Transfuse if hgb <7  - Maintain active type and screen

## 2024-04-01 NOTE — DISCHARGE NOTE PROVIDER - NSDCFUADDAPPT_GEN_ALL_CORE_FT
Please bring your Insurance card, Photo ID and Discharge paperwork to the following appointment:    (1) Please follow up with your Hematology/Oncology Provider, Dr. Alonzo Schneider at [address] [place] [address], [address], [place] [address] on 4/11/2024 at 10:20am.    Appointment was scheduled by Ms. NELSON Mariee, Referral Coordinator.

## 2024-04-01 NOTE — PROGRESS NOTE ADULT - SUBJECTIVE AND OBJECTIVE BOX
O/N Events:    Subjective/ROS: Patient seen and examined at bedside.     Denies Fever/Chills, HA, CP, SOB, n/v, changes in bowel/urinary habits.  12pt ROS otherwise negative.    VITALS  Vital Signs Last 24 Hrs  T(C): 36.8 (01 Apr 2024 05:34), Max: 37.1 (31 Mar 2024 21:00)  T(F): 98.2 (01 Apr 2024 05:34), Max: 98.7 (31 Mar 2024 21:00)  HR: 77 (01 Apr 2024 05:34) (62 - 77)  BP: 120/77 (01 Apr 2024 05:34) (120/77 - 148/77)  BP(mean): --  RR: 16 (01 Apr 2024 05:34) (16 - 17)  SpO2: 97% (01 Apr 2024 05:34) (96% - 97%)    Parameters below as of 01 Apr 2024 05:34  Patient On (Oxygen Delivery Method): room air        CAPILLARY BLOOD GLUCOSE          PHYSICAL EXAM  General: NAD  Head: NC/AT; MMM; PERRL; EOMI;  Neck: Supple; no JVD  Respiratory: CTAB; no wheezes/rales/rhonchi  Cardiovascular: Regular rhythm/rate; S1/S2+, no murmurs, rubs gallops   Gastrointestinal: Soft; NTND; bowel sounds normal and present  Extremities: WWP; no edema/cyanosis  Neurological: A&Ox3, CNII-XII grossly intact; no obvious focal deficits    MEDICATIONS  (STANDING):  aspirin enteric coated 81 milliGRAM(s) Oral daily  atorvastatin 40 milliGRAM(s) Oral at bedtime  donepezil 10 milliGRAM(s) Oral at bedtime  ferrous    sulfate 325 milliGRAM(s) Oral daily  folic acid 1 milliGRAM(s) Oral daily  lacosamide 250 milliGRAM(s) Oral every 12 hours  lisinopril 20 milliGRAM(s) Oral daily  memantine 5 milliGRAM(s) Oral every 24 hours  pantoprazole    Tablet 40 milliGRAM(s) Oral before breakfast  sertraline 25 milliGRAM(s) Oral daily    MEDICATIONS  (PRN):  acetaminophen     Tablet .. 650 milliGRAM(s) Oral every 6 hours PRN Mild Pain (1 - 3)      No Known Allergies      LABS                        7.9    5.66  )-----------( 290      ( 01 Apr 2024 05:30 )             27.0     04-01    138  |  105  |  13  ----------------------------<  96  3.9   |  22  |  0.62    Ca    8.9      01 Apr 2024 05:30  Phos  4.2     04-01  Mg     2.2     04-01    TPro  6.8  /  Alb  4.2  /  TBili  0.6  /  DBili  x   /  AST  9<L>  /  ALT  10  /  AlkPhos  60  03-31    PT/INR - ( 01 Apr 2024 05:30 )   PT: 11.9 sec;   INR: 1.05          PTT - ( 01 Apr 2024 05:30 )  PTT:30.2 sec  Urinalysis Basic - ( 01 Apr 2024 05:30 )    Color: x / Appearance: x / SG: x / pH: x  Gluc: 96 mg/dL / Ketone: x  / Bili: x / Urobili: x   Blood: x / Protein: x / Nitrite: x   Leuk Esterase: x / RBC: x / WBC x   Sq Epi: x / Non Sq Epi: x / Bacteria: x              IMAGING/EKG/ETC   O/N Events:    Subjective/ROS: Patient seen and examined at bedside. Resting comfortably, no complaints this morning. Denies lightheadedness, palpitations, SOB, abd pain, n/v/d, bloody stool.      VITALS  Vital Signs Last 24 Hrs  T(C): 36.8 (01 Apr 2024 05:34), Max: 37.1 (31 Mar 2024 21:00)  T(F): 98.2 (01 Apr 2024 05:34), Max: 98.7 (31 Mar 2024 21:00)  HR: 77 (01 Apr 2024 05:34) (62 - 77)  BP: 120/77 (01 Apr 2024 05:34) (120/77 - 148/77)  BP(mean): --  RR: 16 (01 Apr 2024 05:34) (16 - 17)  SpO2: 97% (01 Apr 2024 05:34) (96% - 97%)    Parameters below as of 01 Apr 2024 05:34  Patient On (Oxygen Delivery Method): room air        CAPILLARY BLOOD GLUCOSE          PHYSICAL EXAM  General: NAD  Head: pupils reactive  Neck: Supple; no JVD  Respiratory: CTAB; no wheezes/rales/rhonchi  Cardiovascular: Regular rhythm/rate; S1/S2+, no murmurs, rubs gallops   Gastrointestinal: Soft; NTND; bowel sounds normal and present  Extremities: WWP; no edema/cyanosis  Neurological: A&Ox3    MEDICATIONS  (STANDING):  aspirin enteric coated 81 milliGRAM(s) Oral daily  atorvastatin 40 milliGRAM(s) Oral at bedtime  donepezil 10 milliGRAM(s) Oral at bedtime  ferrous    sulfate 325 milliGRAM(s) Oral daily  folic acid 1 milliGRAM(s) Oral daily  lacosamide 250 milliGRAM(s) Oral every 12 hours  lisinopril 20 milliGRAM(s) Oral daily  memantine 5 milliGRAM(s) Oral every 24 hours  pantoprazole    Tablet 40 milliGRAM(s) Oral before breakfast  sertraline 25 milliGRAM(s) Oral daily    MEDICATIONS  (PRN):  acetaminophen     Tablet .. 650 milliGRAM(s) Oral every 6 hours PRN Mild Pain (1 - 3)      No Known Allergies      LABS                        7.9    5.66  )-----------( 290      ( 01 Apr 2024 05:30 )             27.0     04-01    138  |  105  |  13  ----------------------------<  96  3.9   |  22  |  0.62    Ca    8.9      01 Apr 2024 05:30  Phos  4.2     04-01  Mg     2.2     04-01    TPro  6.8  /  Alb  4.2  /  TBili  0.6  /  DBili  x   /  AST  9<L>  /  ALT  10  /  AlkPhos  60  03-31    PT/INR - ( 01 Apr 2024 05:30 )   PT: 11.9 sec;   INR: 1.05          PTT - ( 01 Apr 2024 05:30 )  PTT:30.2 sec  Urinalysis Basic - ( 01 Apr 2024 05:30 )    Color: x / Appearance: x / SG: x / pH: x  Gluc: 96 mg/dL / Ketone: x  / Bili: x / Urobili: x   Blood: x / Protein: x / Nitrite: x   Leuk Esterase: x / RBC: x / WBC x   Sq Epi: x / Non Sq Epi: x / Bacteria: x              IMAGING/EKG/ETC

## 2024-04-01 NOTE — DISCHARGE NOTE NURSING/CASE MANAGEMENT/SOCIAL WORK - NSDCFUADDAPPT_GEN_ALL_CORE_FT
Please bring your Insurance card, Photo ID and Discharge paperwork to the following appointment:    (1) Please follow up with your Hematology/Oncology Provider, Dr. Alonzo Schneider at [address] [place] [address], [address], [place] [address] on 4/11/2024 at 10:20am.    Appointment was scheduled by Ms. NELSON Mariee, Referral Coordinator.

## 2024-04-01 NOTE — DISCHARGE NOTE NURSING/CASE MANAGEMENT/SOCIAL WORK - NSDCVIVACCINE_GEN_ALL_CORE_FT
Tdap; 16-Sep-2021 03:57; Alicia Terrazas (GERALDINE); [employer]; [id number] (Exp. Date: 15-Jul-2023); IntraMuscular; Deltoid Right.; 0.5 milliLiter(s); VIS (VIS Published: 09-May-2013, VIS Presented: 16-Sep-2021);

## 2024-04-01 NOTE — PRE-ANESTHESIA EVALUATION ADULT - NSANTHPMHFT_GEN_ALL_CORE
63M with PMH of CVA w/ loop recorder, on Plavix and 81mg ASA, no residual deficits, seizures (on Lacosamide), HTN, iron deficiency anemia, GERD presenting from PCP for anemia and found to have a Hb of 5.5, which improved to 7.4 s/p 2U pRBCs. Patient denies any witnessed bleeding. GI consulted for anemia.

## 2024-04-01 NOTE — DISCHARGE NOTE PROVIDER - NSDCFUSCHEDAPPT_GEN_ALL_CORE_FT
[facility]  ORTHOSURG [address]  Scheduled Appointment: 04/15/2024     Alonzo Schneider  [facility]  HEMONC [address]  Scheduled Appointment: 04/11/2024    [facility]ica Taylor  [facility]  ORTHOSURG [address]  Scheduled Appointment: 04/15/2024

## 2024-04-01 NOTE — DISCHARGE NOTE NURSING/CASE MANAGEMENT/SOCIAL WORK - PATIENT PORTAL LINK FT
You can access the FollowMyHealth Patient Portal offered by [facility] by registering at the following website: [email or web address]. By joining [email or web address]’s FollowMyHealth portal, you will also be able to view your health information using other applications (apps) compatible with our system.

## 2024-04-01 NOTE — DISCHARGE NOTE PROVIDER - CARE PROVIDER_API CALL
Alonzo Schneider.  Hematology/Oncology  [address], [address]  [place] [address]  Phone: [phone]  Fax: [phone]  Follow Up Time: 2 weeks   Alonzo Schneider.  Hematology/Oncology  [address], [address]  [place] [address]  Phone: [phone]  Fax: [phone]  Scheduled Appointment: 04/11/2024 10:20 AM   Alonzo Schneider  Hematology/Oncology  [address], [address]  [place] [address]  Phone: [phone]  Fax: [phone]  Scheduled Appointment: 04/11/2024 10:20 AM    Foc Hazel  Gastroenterology  [address], [address]  [place] [address]  Phone: [phone]  Fax: [phone]  Established Patient  Follow Up Time: 2 weeks

## 2024-04-01 NOTE — DISCHARGE NOTE PROVIDER - NSDCMRMEDTOKEN_GEN_ALL_CORE_FT
[FreeTextEntry3] : I personally saw and examined MEDHAT BLACKBURN in detail. I spoke to HANNAH Castro regarding the assessment and plan of care. I reviewed the above assessment and plan of care, and agree. I have made changes in changes in the body of the note where appropriate.I personally reviewed the HPI, PMH, FH, SH, ROS and medications/allergies. I have spoken to HANNAH Castro regarding the history and have personally determined the assessment and plan of care, and documented this myself. I was present and participated in all key portions of the encounter and all procedures noted above. I have made changes in the body of the note where appropriate.\par \par Attesting Faculty: See Attending Signature Below \par \par \par  aspirin 81 mg oral delayed release tablet: 1 tab(s) orally once a day  atorvastatin 40 mg oral tablet: 1 tab(s) orally once a day  clopidogrel 75 mg oral tablet: 1 tab(s) orally once a day  donepezil 10 mg oral tablet: 1 tab(s) orally once a day (at bedtime)  ferrous sulfate 325 mg (65 mg elemental iron) oral tablet: 1 tab(s) orally every other day  folic acid 1 mg oral tablet: 1 tab(s) orally once a day  lacosamide 200 mg oral tablet: 1 tab(s) orally every 12 hours  lacosamide 50 mg oral tablet: 1 tab(s) orally every 12 hours  lisinopril 20 mg oral tablet: 1 tab(s) orally once a day  Namenda 5 mg oral tablet: 1 tab(s) orally every 24 hours  omeprazole 20 mg oral delayed release tablet: 1 tab(s) orally once a day  sertraline 25 mg oral tablet: 1 tab(s) orally once a day   [Time Spent: ___ minutes] : I have spent [unfilled] minutes of time on the encounter.

## 2024-04-01 NOTE — DISCHARGE NOTE PROVIDER - HOSPITAL COURSE
Mr. Rojas is a 62 yo male with pmhx of CVA w/ loop recorder, on Plavix and 81mg ASA, no residual deficits, seizures (on Lacosamide), HTN, iron deficiency anemia, GERD presenting after labs from recent primary care visit with low hemoglobin. s/p 2u pRBC with appropriate response. EGD done on 4/1.    Problem List/Main Diagnoses:   #Acute blood loss anemia.   known h/o iron deficiency requiring blood transfusions, none recently. Sent from PCP office Hb 5.5. Previously seen by a hematologist however she is now retired and needs to reestablish care. Wife states that he is taking one iron supplement daily as prescribed. Denies hemoptysis, hematemesis, melena, brbpr, frequent stools, hematuria, lightheadedness, dizziness, sob on exertion, fatigue. Vital signs stable. s/p 2 units of pRBC. Orthostatics negative. Refused ANGELITA.  Seen by GI out-patient; underwent EGD 2020 & 2021 which small hiatal hernia with patchy inflammation and erosion. C-scope done showed two sessile polyps excised  Known FHx of colon CA in brother at 46 per HIE  Prior H.Pylori breath test +, per Dr. Hazel's note initiated treatment but subsequently d/c'ed due to seizure and then started on pylera (unclear if completed tx) with plan for repeat EGD      #CVA (cerebral vascular accident).   Patient with stroke in June of 2020 s/p vertebral artery stent. Taking aspirin 81 mg and plavix 75 mg daily as well as atrovastatin 40 mg qhs since. Has prolonged history of smoking  Follow with Dr. Caballero consequently.   - C/w aspirin 81 mg qd  - C/w plavix 75 mg qd  - C/w atorvastatin 40 mg qhs.     Patient was discharged to: Home    New medications: None  Changes to old medications: None  Medications that were stopped:    Items to follow up as outpatient:    Physical exam at the time of discharge:   General: NAD  Head: pupils reactive  Neck: Supple; no JVD  Respiratory: CTAB; no wheezes/rales/rhonchi  Cardiovascular: Regular rhythm/rate; S1/S2+, no murmurs, rubs gallops   Gastrointestinal: Soft; NTND; bowel sounds normal and present  Extremities: WWP; no edema/cyanosis  Neurological: A&Ox3    LABS & STUDIES:   Mr. Rojas is a 64 yo male with pmhx of CVA w/ loop recorder, on Plavix and 81mg ASA, no residual deficits, seizures (on Lacosamide), HTN, iron deficiency anemia, GERD presenting after labs from recent primary care visit with low hemoglobin. s/p 2u pRBC with appropriate response. EGD done on 4/1.    Problem List/Main Diagnoses:   #Acute blood loss anemia.   known h/o iron deficiency requiring blood transfusions, none recently. Sent from PCP office Hb 5.5. Previously seen by a hematologist however she is now retired and needs to reestablish care. Wife states that he is taking one iron supplement daily as prescribed. Denies hemoptysis, hematemesis, melena, brbpr, frequent stools, hematuria, lightheadedness, dizziness, sob on exertion, fatigue. Vital signs stable. s/p 2 units of pRBC. Seen by GI out-patient; underwent EGD 2020 & 2021 which small hiatal hernia with patchy inflammation and erosion. C-scope done showed two sessile polyps excised. Known FHx of colon CA in brother at 46 per HIE. Prior H.Pylori breath test +, per Dr. Hazel's note initiated treatment but subsequently d/c'ed due to seizure and then started on pylera (unclear if completed tx) with plan for repeat EGD. EGD this admission stable, without acute bleeding  - f/u with GI outpatient for capsule study  - f/u with Dr Schneider for further management of anemia  - Continue taking omeprazole daily and iron supplements    #CVA (cerebral vascular accident).   Patient with stroke in June of 2020 s/p vertebral artery stent. Taking aspirin 81 mg and plavix 75 mg daily as well as atrovastatin 40 mg qhs since. Has prolonged history of smoking  Follow with Dr. Caballero consequently.   - C/w aspirin 81 mg qd  - C/w plavix 75 mg qd  - C/w atorvastatin 40 mg qhs.     Patient was discharged to: Home    New medications: None  Changes to old medications: None  Medications that were stopped: None    Items to follow up as outpatient: PCP, heme/onc, GI    Physical exam at the time of discharge:   General: NAD  Head: pupils reactive  Neck: Supple; no JVD  Respiratory: CTAB; no wheezes/rales/rhonchi  Cardiovascular: Regular rhythm/rate; S1/S2+, no murmurs, rubs gallops   Gastrointestinal: Soft; NTND; bowel sounds normal and present  Extremities: WWP; no edema/cyanosis  Neurological: A&Ox3

## 2024-04-02 LAB — LACOSAMIDE (VIMPAT) RESULT: 18.2 UG/ML — HIGH (ref 1–10)

## 2024-04-08 ENCOUNTER — NON-APPOINTMENT (OUTPATIENT)
Age: 64
End: 2024-04-08

## 2024-04-09 PROBLEM — T14.8XXA BONE FRACTURE: Status: ACTIVE | Noted: 2024-04-09

## 2024-04-09 PROBLEM — I70.90 ATHEROSCLEROSIS: Status: ACTIVE | Noted: 2024-04-09

## 2024-04-09 PROBLEM — F03.90 DEMENTIA: Status: ACTIVE | Noted: 2024-04-09

## 2024-04-10 DIAGNOSIS — F03.90 UNSPECIFIED DEMENTIA WITHOUT BEHAVIORAL DISTURBANCE: ICD-10-CM
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
## 2024-04-10 DIAGNOSIS — K21.9 GASTRO-ESOPHAGEAL REFLUX DISEASE WITHOUT ESOPHAGITIS: ICD-10-CM

## 2024-04-10 DIAGNOSIS — G40.909 EPILEPSY, UNSPECIFIED, NOT INTRACTABLE, WITHOUT STATUS EPILEPTICUS: ICD-10-CM

## 2024-04-10 DIAGNOSIS — E78.5 HYPERLIPIDEMIA, UNSPECIFIED: ICD-10-CM

## 2024-04-10 DIAGNOSIS — Z86.73 PERSONAL HISTORY OF TRANSIENT ISCHEMIC ATTACK (TIA), AND CEREBRAL INFARCTION WITHOUT RESIDUAL DEFICITS: ICD-10-CM

## 2024-04-10 DIAGNOSIS — Z87.891 PERSONAL HISTORY OF NICOTINE DEPENDENCE: ICD-10-CM

## 2024-04-10 DIAGNOSIS — K44.9 DIAPHRAGMATIC HERNIA WITHOUT OBSTRUCTION OR GANGRENE: ICD-10-CM

## 2024-04-10 DIAGNOSIS — D50.9 IRON DEFICIENCY ANEMIA, UNSPECIFIED: ICD-10-CM

## 2024-04-10 DIAGNOSIS — D62 ACUTE POSTHEMORRHAGIC ANEMIA: ICD-10-CM
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
## 2024-04-10 DIAGNOSIS — I10 ESSENTIAL (PRIMARY) HYPERTENSION: ICD-10-CM
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
## 2024-04-10 DIAGNOSIS — S42.009S FRACTURE OF UNSPECIFIED PART OF UNSPECIFIED CLAVICLE, SEQUELA: ICD-10-CM

## 2024-04-10 DIAGNOSIS — Z79.82 LONG TERM (CURRENT) USE OF ASPIRIN: ICD-10-CM

## 2024-04-11 ENCOUNTER — NON-APPOINTMENT (OUTPATIENT)
Age: 64
End: 2024-04-11

## 2024-04-11 ENCOUNTER — APPOINTMENT (OUTPATIENT)
Dept: HEMATOLOGY ONCOLOGY | Facility: CLINIC | Age: 64
End: 2024-04-11
Payer: COMMERCIAL

## 2024-04-11 ENCOUNTER — LABORATORY RESULT (OUTPATIENT)
Age: 64
End: 2024-04-11

## 2024-04-11 VITALS
DIASTOLIC BLOOD PRESSURE: 58 MMHG | WEIGHT: 160 LBS | HEIGHT: 67 IN | OXYGEN SATURATION: 94 % | SYSTOLIC BLOOD PRESSURE: 136 MMHG | TEMPERATURE: 98.3 F | HEART RATE: 59 BPM | RESPIRATION RATE: 18 BRPM | BODY MASS INDEX: 25.11 KG/M2

## 2024-04-11 DIAGNOSIS — Z72.3 LACK OF PHYSICAL EXERCISE: ICD-10-CM

## 2024-04-11 DIAGNOSIS — R56.9 UNSPECIFIED CONVULSIONS: ICD-10-CM

## 2024-04-11 DIAGNOSIS — R47.9 UNSPECIFIED SPEECH DISTURBANCES: ICD-10-CM

## 2024-04-11 DIAGNOSIS — Z86.73 PERSONAL HISTORY OF TRANSIENT ISCHEMIC ATTACK (TIA), AND CEREBRAL INFARCTION W/OUT RESIDUAL DEFICITS: ICD-10-CM

## 2024-04-11 DIAGNOSIS — Z82.49 FAMILY HISTORY OF ISCHEMIC HEART DISEASE AND OTHER DISEASES OF THE CIRCULATORY SYSTEM: ICD-10-CM

## 2024-04-11 DIAGNOSIS — Z87.891 PERSONAL HISTORY OF NICOTINE DEPENDENCE: ICD-10-CM

## 2024-04-11 DIAGNOSIS — F17.200 NICOTINE DEPENDENCE, UNSPECIFIED, UNCOMPLICATED: ICD-10-CM

## 2024-04-11 DIAGNOSIS — F03.90 UNSPECIFIED DEMENTIA W/OUT BEHAVIORAL DISTURBANCE: ICD-10-CM

## 2024-04-11 DIAGNOSIS — I70.90 UNSPECIFIED ATHEROSCLEROSIS: ICD-10-CM

## 2024-04-11 DIAGNOSIS — T14.8XXA OTHER INJURY OF UNSPECIFIED BODY REGION, INITIAL ENCOUNTER: ICD-10-CM

## 2024-04-11 DIAGNOSIS — F02.80 ALZHEIMER'S DISEASE WITH EARLY ONSET: ICD-10-CM

## 2024-04-11 DIAGNOSIS — G30.0 ALZHEIMER'S DISEASE WITH EARLY ONSET: ICD-10-CM

## 2024-04-11 LAB — LDH SERPL-CCNC: 154 U/L

## 2024-04-11 PROCEDURE — 99204 OFFICE O/P NEW MOD 45 MIN: CPT

## 2024-04-11 NOTE — REVIEW OF SYSTEMS
[Fatigue] : fatigue [Cough] : cough [Negative] : Endocrine [FreeTextEntry6] : Cough at baseline.  [de-identified] : Prior CVA residual speech deficits and early dementia.

## 2024-04-11 NOTE — ASSESSMENT
[FreeTextEntry1] : Mr. Jeromy Rojas is a 63 -year -old male who is here today due to anemia. Patient has a history of iron deficiency anemia requiring blood transfusions, with the most recent blood transfusion one week ago due to a hgb of 5.5 g/dL. Iron studies were consistent with GREGOR. While admitted, patient underwent an EGD which revealed mild reactive gastropathy (negative for H. pylori). Patient remains on DAPT due to a prior CVA.   Anemia - As per notes, patient has a history of B12 deficiency and most recently was positive for GREGOR. He is s/p 2 units of PRBCs, Hgb on 4/1/2024 was 7.9 g/dL.  - We will repeat CBC today with additional anemia labs.  - In addition, we will schedule patient for IV Venofer 300 mg x 4 doses. CBC will be drawn weekly with IV Venofer. - GI is planning to have patient return for H. Pylori breath test.

## 2024-04-11 NOTE — HISTORY OF PRESENT ILLNESS
[de-identified] : February 9, 2021 patient returns for follow-up he is again anemic... He states he had upper and lower endoscopy by Dr. Negrete however wife forgot to bring results...\par  \par  May 4, 2021 returns for follow-up for his iron deficiency anemia... Patient again was found to have H. pylori infection documented by breast test, he was restarted on antibiotics by Dr. Negrete, however 5 days into his antibiotic treatment, he had a seizure and ended up at [facility]... Upon discharge from [facility] his hemoglobin was 12.6 g/dL... [de-identified] : Mr. Jeromy Rojas is a 63 -year- old male who is here today to establish care for his anemia.   Patient was originally seen by Dr. Hoffman. In June 2020, s/p CVA. He was discharged on DAPT and subsequently developed anemia. As per note, patient's Hgb trended from 12 - 8 g/dL. Iron studies sent in February 2021, revealed a low iron saturation. Family thinks that patient received IV iron. in May 2021, patient's H. Pylori test was positive, iron studies revealed patient to be iron deficient.  He then established care with a [facility] GI doctor (10/1/2021), who recommended an EGD. Prior to this, patient had the below GI studies performed which revealed the following:  - 2/16/21 EGD: Showed a small hiatal hernia, patchy mild inflammation characterized by erosions, friability and granularity in the duodenal bulb. - 11/16/20 Colonoscopy: Showed hypertrophied anal papillae, two sessile polyps in the mid-AC -- 3 to 4mm in size that were excised, diverticulosis of sigmoid, DC, TC and AC, mid-transverse colon tattoo (repeat in 5 years) (bx --> TA). - 11/16/20 EGD: Showed a small hiatal hernia, four non-bleeding gastric antral ulcers, superficial gastric ulcer with no stigmata in the gastric body, duodenal bulb erosions and a single 4 mm angiectasia found in the 2nd portion of the duodenum that was ablated (bx --> chronic active gastritis + for H. pylori).  As per GI note, patient was scheduled due undergo repeat EGD in November 2021, however, there is not documentation that this occurred. Patient was given treatment for H. Pylori, however, he had seizures and did not complete therapy.   As stated above, patient is here today due to anemia. Patient was notified to go to the ER on 3/29/2024 due to a Hgb of 5.5. Patient's family denied active hemoptysis, melena, bright red blood in the stool, hematuria, changes in bowel habits, dizziness, lightheadedness, sob on exertion. They reported that patient was taking daily iron pills. He was admitted and received 2 units of PRBCs (has received prior blood transfusions). GI was consulted and an EGD was performed which revealed: - A sliding small size hiatal hernia was seen, the esophagogastric junction (EGJ) was noted at 38 cm, with hiatal narrowing at 40 cm from the incisors. Retroflexion view in the stomach confirmed the size and morphology of the Hill Grade 1 hiatal hernia. - Normal mucosa was noted in the whole esophagus. - Normal mucosa was noted in the stomach. Multiple cold forceps biopsies were performed for histology. Multiple cold forceps biopsies were performed for H. pylori in the antrum, incisura and stomach body. - Normal mucosa was noted in the whole examined duodenum.   EGD was negative for h. pylori, however, the GI team noted that the biopsy possibly did not capture all areas and that H. Pylori may still be +. They recommended following up with a breath test.  On 4/1/2024, patient was discharged, CBC revealed a Hgb of 7.9 g/dL with a MCV of 64.3. Ferritin was 5 and Iron Saturation was 19%. Patient remains on Plavix, ASA 81 mg due to prior CVA.  Additional documentation reports that patient has a history of B12 deficiency, he also currently takes po folic acid.   Today, family/patient report - Blood Loss: Unaware of where the bleeding is occurring.  - Reduced Absorption: Repeat H. Pylori testing pending.  - Diet (Vegan/Vegetarian Diet): Denied.  - Previous Blood Transfusions: Patient received 2 unites PRBCs with recent admission. - Previous po or IV Iron: Yes, patient has received IV Iron in 2021.    Symptoms - Fatigue: Yes.  - PICA:  Yes.  - Restless Leg Syndrome: No. - Weakness: No

## 2024-04-15 ENCOUNTER — APPOINTMENT (OUTPATIENT)
Dept: ORTHOPEDIC SURGERY | Facility: CLINIC | Age: 64
End: 2024-04-15
Payer: COMMERCIAL

## 2024-04-15 DIAGNOSIS — S42.034A NONDISPLACED FRACTURE OF LATERAL END OF RIGHT CLAVICLE, INITIAL ENCOUNTER FOR CLOSED FRACTURE: ICD-10-CM

## 2024-04-15 DIAGNOSIS — S42.134A: ICD-10-CM

## 2024-04-15 DIAGNOSIS — M54.50 LOW BACK PAIN, UNSPECIFIED: ICD-10-CM

## 2024-04-15 DIAGNOSIS — S42.191A: ICD-10-CM

## 2024-04-15 LAB
ALBUMIN MFR SERPL ELPH: 57.8 %
ALBUMIN SERPL ELPH-MCNC: 3.7 G/DL
ALBUMIN SERPL-MCNC: 4.1 G/DL
ALBUMIN/GLOB SERPL: 1.4 RATIO
ALP BLD-CCNC: 61 U/L
ALPHA1 GLOB MFR SERPL ELPH: 5.3 %
ALPHA1 GLOB SERPL ELPH-MCNC: 0.4 G/DL
ALPHA2 GLOB MFR SERPL ELPH: 10.1 %
ALPHA2 GLOB SERPL ELPH-MCNC: 0.7 G/DL
ALT SERPL-CCNC: 17 U/L
ANION GAP SERPL CALC-SCNC: 7 MMOL/L
AST SERPL-CCNC: 19 U/L
B-GLOBULIN MFR SERPL ELPH: 11.8 %
B-GLOBULIN SERPL ELPH-MCNC: 0.8 G/DL
BILIRUB SERPL-MCNC: 0.7 MG/DL
BUN SERPL-MCNC: 13 MG/DL
CALCIUM SERPL-MCNC: 9.4 MG/DL
CHLORIDE SERPL-SCNC: 110 MMOL/L
CO2 SERPL-SCNC: 31 MMOL/L
CREAT SERPL-MCNC: 0.7 MG/DL
DEPRECATED KAPPA LC FREE/LAMBDA SER: 1.22 RATIO
EGFR: 104 ML/MIN/1.73M2
FOLATE SERPL-MCNC: >20 NG/ML
GAMMA GLOB FLD ELPH-MCNC: 1.1 G/DL
GAMMA GLOB MFR SERPL ELPH: 15 %
GLUCOSE SERPL-MCNC: 98 MG/DL
INTERPRETATION SERPL IEP-IMP: NORMAL
KAPPA LC CSF-MCNC: 2.06 MG/DL
KAPPA LC SERPL-MCNC: 2.51 MG/DL
M PROTEIN SPEC IFE-MCNC: NORMAL
POTASSIUM SERPL-SCNC: 4.5 MMOL/L
PROT SERPL-MCNC: 7 G/DL
PROT SERPL-MCNC: 7.1 G/DL
PROT SERPL-MCNC: 7.1 G/DL
SODIUM SERPL-SCNC: 148 MMOL/L
TSH SERPL-ACNC: 0.4 UIU/ML
VIT B12 SERPL-MCNC: 1289 PG/ML

## 2024-04-15 PROCEDURE — 72082 X-RAY EXAM ENTIRE SPI 2/3 VW: CPT

## 2024-04-15 PROCEDURE — 72070 X-RAY EXAM THORAC SPINE 2VWS: CPT | Mod: 59

## 2024-04-15 PROCEDURE — 99213 OFFICE O/P EST LOW 20 MIN: CPT

## 2024-04-15 PROCEDURE — 99203 OFFICE O/P NEW LOW 30 MIN: CPT

## 2024-04-15 PROCEDURE — 73030 X-RAY EXAM OF SHOULDER: CPT | Mod: RT
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
## 2024-04-15 NOTE — PHYSICAL EXAM
[Shuffling] : shuffling [UE] : Sensory: Intact in bilateral upper extremities [Normal RUE] : Right Upper Extremity: No scars, rashes, lesions, ulcers, skin intact [Normal LUE] : Left Upper Extremity: No scars, rashes, lesions, ulcers, skin intact [Normal Touch] : sensation intact for touch [de-identified] : RIGHT shoulder No edema or ecchymoses.  Slight prominence of the distal clavicle trend AC joint There is mild visible and palpable step-off in the distal clavicle/scapula region with what seems to be minimal tenderness at this area No tenderness distal clavicle/AC joint.  Mildly tender on the coracoid process. Assisted forward elevation to 100 degrees without pain Motor and sensation is grossly intact distally in the hand and arm.  He can open and close his fingers and flex and extend the elbow. Normal capillary refill.  Hand is warm Skin is intact. [de-identified] : x-rays today to follow-up on the fracture of the right shoulder 3 views showed fracture of the scapular spine/posterior acromion and very distal minimally displaced intra-articular fracture at the upper border of the lateral clavicle near the AC joint with probable evidence of early healing/callus.  The AC joint appears well aligned.  There is a fracture at the base of the coracoid process still visualized.  It is difficult to see if there is healing of these fractures which are still visible but there is no change in alignment compared to initial x-rays  2/16/24 CT of the right shoulder demonstrates:  1.  Acute comminuted and mildly distracted fractures of the scapula involving the base of the coracoid process and acromion. 2.  Coracoid fracture approximates the glenoid articular surface without definite intra-articular extension. 3.  Avulsion fractures of the distal clavicle at the acromion

## 2024-04-15 NOTE — REASON FOR VISIT
[Follow-Up Visit] : a follow-up visit for [Shoulder Pain] : shoulder pain [Shoulder Injury] : shoulder injury [Family Member] : family member

## 2024-04-15 NOTE — PHYSICAL EXAM
[Normal RLE] : Right Lower Extremity: No scars, rashes, lesions, ulcers, skin intact [Normal LLE] : Left Lower Extremity: No scars, rashes, lesions, ulcers, skin intact [Normal Touch] : sensation intact for touch [Normal] : Alert and in no acute distress [de-identified] : Thoracic and lumbar spine No edema, ecchymoses, erythema, deformity. He did not have any significant or severe tenderness through the spine on palpation but did seem to indicate some He is walking with a bit of a shuffling gait which is his baseline with history of CVA and some chronic weakness. He is able to get it in and out of a chair without any severe back pain and is bending forward with just mild discomfort but no significant pain. [de-identified] : Difficulty communicating because of history of CVA, dementia and seizure disorder but can answer yes and no questions [de-identified] :  X-rays ordered, performed and reviewed today of thoracic and lumbar spine for low back injury AP and lateral views showed Mild degenerative changes.  There appears to be mild compression of T12 age-indeterminate which may be about 20%
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
## 2024-04-23 ENCOUNTER — NON-APPOINTMENT (OUTPATIENT)
Age: 64
End: 2024-04-23

## 2024-04-23 LAB — METHYLMALONATE SERPL-SCNC: 124 NMOL/L

## 2024-04-25 ENCOUNTER — APPOINTMENT (OUTPATIENT)
Dept: INFUSION THERAPY | Facility: CLINIC | Age: 64
End: 2024-04-25

## 2024-04-25 ENCOUNTER — OUTPATIENT (OUTPATIENT)
Dept: OUTPATIENT SERVICES | Facility: HOSPITAL | Age: 64
LOS: 1 days | End: 2024-04-25
Payer: COMMERCIAL

## 2024-04-25 VITALS
SYSTOLIC BLOOD PRESSURE: 134 MMHG | OXYGEN SATURATION: 97 % | TEMPERATURE: 98 F | HEIGHT: 66 IN | DIASTOLIC BLOOD PRESSURE: 77 MMHG | WEIGHT: 167.99 LBS | RESPIRATION RATE: 18 BRPM | HEART RATE: 50 BPM

## 2024-04-25 DIAGNOSIS — Z98.890 OTHER SPECIFIED POSTPROCEDURAL STATES: Chronic | ICD-10-CM

## 2024-04-25 DIAGNOSIS — D50.9 IRON DEFICIENCY ANEMIA, UNSPECIFIED: ICD-10-CM

## 2024-04-25 LAB
HCT VFR BLD CALC: 26.8 % — LOW (ref 39–50)
HGB BLD-MCNC: 7.3 G/DL — LOW (ref 13–17)
MCHC RBC-ENTMCNC: 18 PG — LOW (ref 27–34)
MCHC RBC-ENTMCNC: 27.2 GM/DL — LOW (ref 32–36)
MCV RBC AUTO: 66 FL — LOW (ref 80–100)
PLATELET # BLD AUTO: 377 K/UL — SIGNIFICANT CHANGE UP (ref 150–400)
RBC # BLD: 4.06 M/UL — LOW (ref 4.2–5.8)
RBC # FLD: 24.3 % — HIGH (ref 10.3–14.5)
WBC # BLD: 5.2 K/UL — SIGNIFICANT CHANGE UP (ref 3.8–10.5)
WBC # FLD AUTO: 5.2 K/UL — SIGNIFICANT CHANGE UP (ref 3.8–10.5)

## 2024-04-25 RX ORDER — IRON SUCROSE 20 MG/ML
300 INJECTION, SOLUTION INTRAVENOUS ONCE
Refills: 0 | Status: COMPLETED | OUTPATIENT
Start: 2024-04-25 | End: 2024-04-25

## 2024-04-25 RX ADMIN — IRON SUCROSE 176.67 MILLIGRAM(S): 20 INJECTION, SOLUTION INTRAVENOUS at 14:25

## 2024-04-25 RX ADMIN — IRON SUCROSE 300 MILLIGRAM(S): 20 INJECTION, SOLUTION INTRAVENOUS at 16:00

## 2024-05-03 ENCOUNTER — OUTPATIENT (OUTPATIENT)
Dept: OUTPATIENT SERVICES | Facility: HOSPITAL | Age: 64
LOS: 1 days | End: 2024-05-03
Payer: COMMERCIAL

## 2024-05-03 ENCOUNTER — APPOINTMENT (OUTPATIENT)
Dept: INFUSION THERAPY | Facility: CLINIC | Age: 64
End: 2024-05-03

## 2024-05-03 VITALS
WEIGHT: 156.97 LBS | DIASTOLIC BLOOD PRESSURE: 53 MMHG | RESPIRATION RATE: 20 BRPM | OXYGEN SATURATION: 95 % | HEIGHT: 66 IN | TEMPERATURE: 98 F | HEART RATE: 63 BPM | SYSTOLIC BLOOD PRESSURE: 120 MMHG

## 2024-05-03 VITALS
OXYGEN SATURATION: 98 % | HEART RATE: 50 BPM | TEMPERATURE: 98 F | DIASTOLIC BLOOD PRESSURE: 78 MMHG | SYSTOLIC BLOOD PRESSURE: 140 MMHG | RESPIRATION RATE: 18 BRPM

## 2024-05-03 DIAGNOSIS — D50.9 IRON DEFICIENCY ANEMIA, UNSPECIFIED: ICD-10-CM

## 2024-05-03 LAB
HCT VFR BLD CALC: 28.9 % — LOW (ref 39–50)
HGB BLD-MCNC: 8.1 G/DL — LOW (ref 13–17)
MCHC RBC-ENTMCNC: 19.3 PG — LOW (ref 27–34)
MCHC RBC-ENTMCNC: 28 GM/DL — LOW (ref 32–36)
MCV RBC AUTO: 69 FL — LOW (ref 80–100)
PLATELET # BLD AUTO: 273 K/UL — SIGNIFICANT CHANGE UP (ref 150–400)
RBC # BLD: 4.19 M/UL — LOW (ref 4.2–5.8)
RBC # FLD: 26.9 % — HIGH (ref 10.3–14.5)
WBC # BLD: 4.3 K/UL — SIGNIFICANT CHANGE UP (ref 3.8–10.5)
WBC # FLD AUTO: 4.3 K/UL — SIGNIFICANT CHANGE UP (ref 3.8–10.5)

## 2024-05-03 PROCEDURE — 96365 THER/PROPH/DIAG IV INF INIT: CPT

## 2024-05-03 PROCEDURE — 85027 COMPLETE CBC AUTOMATED: CPT

## 2024-05-03 PROCEDURE — 36415 COLL VENOUS BLD VENIPUNCTURE: CPT

## 2024-05-03 PROCEDURE — 86900 BLOOD TYPING SEROLOGIC ABO: CPT

## 2024-05-03 PROCEDURE — 86901 BLOOD TYPING SEROLOGIC RH(D): CPT

## 2024-05-03 PROCEDURE — 86850 RBC ANTIBODY SCREEN: CPT

## 2024-05-03 RX ORDER — IRON SUCROSE 20 MG/ML
300 INJECTION, SOLUTION INTRAVENOUS ONCE
Refills: 0 | Status: COMPLETED | OUTPATIENT
Start: 2024-05-03 | End: 2024-05-03

## 2024-05-03 RX ADMIN — IRON SUCROSE 300 MILLIGRAM(S): 20 INJECTION, SOLUTION INTRAVENOUS at 16:24

## 2024-05-03 RX ADMIN — IRON SUCROSE 176.67 MILLIGRAM(S): 20 INJECTION, SOLUTION INTRAVENOUS at 14:54

## 2024-05-10 ENCOUNTER — OUTPATIENT (OUTPATIENT)
Dept: OUTPATIENT SERVICES | Facility: HOSPITAL | Age: 64
LOS: 1 days | End: 2024-05-10
Payer: COMMERCIAL

## 2024-05-10 ENCOUNTER — APPOINTMENT (OUTPATIENT)
Dept: INFUSION THERAPY | Facility: CLINIC | Age: 64
End: 2024-05-10

## 2024-05-10 VITALS
HEART RATE: 48 BPM | HEIGHT: 66 IN | WEIGHT: 167.99 LBS | OXYGEN SATURATION: 97 % | TEMPERATURE: 98 F | DIASTOLIC BLOOD PRESSURE: 73 MMHG | SYSTOLIC BLOOD PRESSURE: 144 MMHG | RESPIRATION RATE: 18 BRPM

## 2024-05-10 DIAGNOSIS — Z98.890 OTHER SPECIFIED POSTPROCEDURAL STATES: Chronic | ICD-10-CM

## 2024-05-10 DIAGNOSIS — D50.9 IRON DEFICIENCY ANEMIA, UNSPECIFIED: ICD-10-CM

## 2024-05-10 LAB
ANISOCYTOSIS BLD QL: SIGNIFICANT CHANGE UP
BASOPHILS # BLD AUTO: 0.04 K/UL — SIGNIFICANT CHANGE UP (ref 0–0.2)
BASOPHILS NFR BLD AUTO: 0.9 % — SIGNIFICANT CHANGE UP (ref 0–2)
ELLIPTOCYTES BLD QL SMEAR: SIGNIFICANT CHANGE UP
EOSINOPHIL # BLD AUTO: 0.07 K/UL — SIGNIFICANT CHANGE UP (ref 0–0.5)
EOSINOPHIL NFR BLD AUTO: 1.7 % — SIGNIFICANT CHANGE UP (ref 0–6)
HCT VFR BLD CALC: 29.6 % — LOW (ref 39–50)
HGB BLD-MCNC: 8.3 G/DL — LOW (ref 13–17)
HYPOCHROMIA BLD QL: SIGNIFICANT CHANGE UP
LYMPHOCYTES # BLD AUTO: 0.4 K/UL — LOW (ref 1–3.3)
LYMPHOCYTES # BLD AUTO: 9.6 % — LOW (ref 13–44)
MACROCYTES BLD QL: SLIGHT — SIGNIFICANT CHANGE UP
MANUAL SMEAR VERIFICATION: SIGNIFICANT CHANGE UP
MCHC RBC-ENTMCNC: 20.2 PG — LOW (ref 27–34)
MCHC RBC-ENTMCNC: 28 GM/DL — LOW (ref 32–36)
MCV RBC AUTO: 72 FL — LOW (ref 80–100)
MICROCYTES BLD QL: SIGNIFICANT CHANGE UP
MONOCYTES # BLD AUTO: 0.15 K/UL — SIGNIFICANT CHANGE UP (ref 0–0.9)
MONOCYTES NFR BLD AUTO: 3.5 % — SIGNIFICANT CHANGE UP (ref 2–14)
NEUTROPHILS # BLD AUTO: 3.54 K/UL — SIGNIFICANT CHANGE UP (ref 1.8–7.4)
NEUTROPHILS NFR BLD AUTO: 84.3 % — HIGH (ref 43–77)
OVALOCYTES BLD QL SMEAR: SLIGHT — SIGNIFICANT CHANGE UP
PLAT MORPH BLD: NORMAL — SIGNIFICANT CHANGE UP
PLATELET # BLD AUTO: 230 K/UL — SIGNIFICANT CHANGE UP (ref 150–400)
POIKILOCYTOSIS BLD QL AUTO: SIGNIFICANT CHANGE UP
POLYCHROMASIA BLD QL SMEAR: SIGNIFICANT CHANGE UP
RBC # BLD: 4.11 M/UL — LOW (ref 4.2–5.8)
RBC # FLD: 29.6 % — HIGH (ref 10.3–14.5)
RBC BLD AUTO: ABNORMAL
SCHISTOCYTES BLD QL AUTO: SLIGHT — SIGNIFICANT CHANGE UP
TARGETS BLD QL SMEAR: SLIGHT — SIGNIFICANT CHANGE UP
WBC # BLD: 4.2 K/UL — SIGNIFICANT CHANGE UP (ref 3.8–10.5)
WBC # FLD AUTO: 4.2 K/UL — SIGNIFICANT CHANGE UP (ref 3.8–10.5)

## 2024-05-10 PROCEDURE — 86850 RBC ANTIBODY SCREEN: CPT

## 2024-05-10 PROCEDURE — 85025 COMPLETE CBC W/AUTO DIFF WBC: CPT

## 2024-05-10 PROCEDURE — 86900 BLOOD TYPING SEROLOGIC ABO: CPT

## 2024-05-10 PROCEDURE — 86901 BLOOD TYPING SEROLOGIC RH(D): CPT

## 2024-05-10 PROCEDURE — 96366 THER/PROPH/DIAG IV INF ADDON: CPT

## 2024-05-10 PROCEDURE — 96365 THER/PROPH/DIAG IV INF INIT: CPT

## 2024-05-10 PROCEDURE — 36415 COLL VENOUS BLD VENIPUNCTURE: CPT

## 2024-05-10 RX ORDER — IRON SUCROSE 20 MG/ML
300 INJECTION, SOLUTION INTRAVENOUS ONCE
Refills: 0 | Status: COMPLETED | OUTPATIENT
Start: 2024-05-10 | End: 2024-05-10

## 2024-05-10 RX ADMIN — IRON SUCROSE 300 MILLIGRAM(S): 20 INJECTION, SOLUTION INTRAVENOUS at 13:07

## 2024-05-10 RX ADMIN — IRON SUCROSE 176.67 MILLIGRAM(S): 20 INJECTION, SOLUTION INTRAVENOUS at 11:30

## 2024-05-13 ENCOUNTER — APPOINTMENT (OUTPATIENT)
Dept: ORTHOPEDIC SURGERY | Facility: CLINIC | Age: 64
End: 2024-05-13
Payer: COMMERCIAL

## 2024-05-13 DIAGNOSIS — S22.080A WEDGE COMPRESSION FRACTURE OF T11-T12 VERTEBRA, INITIAL ENCOUNTER FOR CLOSED FRACTURE: ICD-10-CM

## 2024-05-13 PROCEDURE — 99213 OFFICE O/P EST LOW 20 MIN: CPT
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
## 2024-05-16 ENCOUNTER — OUTPATIENT (OUTPATIENT)
Dept: OUTPATIENT SERVICES | Facility: HOSPITAL | Age: 64
LOS: 1 days | End: 2024-05-16
Payer: COMMERCIAL

## 2024-05-16 ENCOUNTER — APPOINTMENT (OUTPATIENT)
Dept: INFUSION THERAPY | Facility: CLINIC | Age: 64
End: 2024-05-16

## 2024-05-16 VITALS
SYSTOLIC BLOOD PRESSURE: 122 MMHG | TEMPERATURE: 98 F | OXYGEN SATURATION: 97 % | DIASTOLIC BLOOD PRESSURE: 63 MMHG | HEIGHT: 66 IN | RESPIRATION RATE: 17 BRPM | HEART RATE: 51 BPM | WEIGHT: 167.99 LBS

## 2024-05-16 DIAGNOSIS — Z98.890 OTHER SPECIFIED POSTPROCEDURAL STATES: Chronic | ICD-10-CM

## 2024-05-16 DIAGNOSIS — D50.9 IRON DEFICIENCY ANEMIA, UNSPECIFIED: ICD-10-CM

## 2024-05-16 LAB
HCT VFR BLD CALC: 33.7 % — LOW (ref 39–50)
HGB BLD-MCNC: 9.7 G/DL — LOW (ref 13–17)
MCHC RBC-ENTMCNC: 21.7 PG — LOW (ref 27–34)
MCHC RBC-ENTMCNC: 28.8 GM/DL — LOW (ref 32–36)
MCV RBC AUTO: 75.2 FL — LOW (ref 80–100)
PLATELET # BLD AUTO: 303 K/UL — SIGNIFICANT CHANGE UP (ref 150–400)
RBC # BLD: 4.48 M/UL — SIGNIFICANT CHANGE UP (ref 4.2–5.8)
RBC # FLD: 32 % — HIGH (ref 10.3–14.5)
WBC # BLD: 4.3 K/UL — SIGNIFICANT CHANGE UP (ref 3.8–10.5)
WBC # FLD AUTO: 4.3 K/UL — SIGNIFICANT CHANGE UP (ref 3.8–10.5)

## 2024-05-16 PROCEDURE — 96365 THER/PROPH/DIAG IV INF INIT: CPT

## 2024-05-16 PROCEDURE — 86850 RBC ANTIBODY SCREEN: CPT

## 2024-05-16 PROCEDURE — 85027 COMPLETE CBC AUTOMATED: CPT

## 2024-05-16 PROCEDURE — 86900 BLOOD TYPING SEROLOGIC ABO: CPT

## 2024-05-16 PROCEDURE — 36415 COLL VENOUS BLD VENIPUNCTURE: CPT

## 2024-05-16 PROCEDURE — 86901 BLOOD TYPING SEROLOGIC RH(D): CPT

## 2024-05-16 RX ORDER — IRON SUCROSE 20 MG/ML
300 INJECTION, SOLUTION INTRAVENOUS ONCE
Refills: 0 | Status: COMPLETED | OUTPATIENT
Start: 2024-05-16 | End: 2024-05-16

## 2024-05-16 RX ADMIN — IRON SUCROSE 176.67 MILLIGRAM(S): 20 INJECTION, SOLUTION INTRAVENOUS at 10:14

## 2024-05-16 RX ADMIN — IRON SUCROSE 300 MILLIGRAM(S): 20 INJECTION, SOLUTION INTRAVENOUS at 11:30

## 2024-06-05 PROBLEM — D50.9 IRON DEFICIENCY ANEMIA, UNSPECIFIED IRON DEFICIENCY ANEMIA TYPE: Status: ACTIVE | Noted: 2020-10-01

## 2024-06-06 ENCOUNTER — APPOINTMENT (OUTPATIENT)
Dept: HEMATOLOGY ONCOLOGY | Facility: CLINIC | Age: 64
End: 2024-06-06
Payer: COMMERCIAL

## 2024-06-06 ENCOUNTER — NON-APPOINTMENT (OUTPATIENT)
Age: 64
End: 2024-06-06

## 2024-06-06 VITALS
WEIGHT: 153 LBS | HEART RATE: 53 BPM | TEMPERATURE: 98.3 F | RESPIRATION RATE: 18 BRPM | SYSTOLIC BLOOD PRESSURE: 118 MMHG | HEIGHT: 67 IN | BODY MASS INDEX: 24.01 KG/M2 | OXYGEN SATURATION: 96 % | DIASTOLIC BLOOD PRESSURE: 66 MMHG

## 2024-06-06 DIAGNOSIS — D50.9 IRON DEFICIENCY ANEMIA, UNSPECIFIED: ICD-10-CM

## 2024-06-06 LAB
FERRITIN SERPL-MCNC: 82 NG/ML
HCT VFR BLD CALC: 35 %
HGB BLD-MCNC: 10.3 G/DL
IRON SATN MFR SERPL: NORMAL %
IRON SERPL-MCNC: 304 UG/DL
LYMPHOCYTES # BLD AUTO: 0.7 K/UL
LYMPHOCYTES NFR BLD AUTO: 14 %
MAN DIFF?: NO
MCHC RBC-ENTMCNC: 24.7 PG
MCHC RBC-ENTMCNC: 29.4 GM/DL
MCV RBC AUTO: 83.9 FL
NEUTROPHILS # BLD AUTO: 3.8 K/UL
NEUTROPHILS NFR BLD AUTO: 77.1 %
PLATELET # BLD AUTO: 344 K/UL
RBC # BLD: 4.09 M/UL
RBC # BLD: 4.17 M/UL
RBC # FLD: 27.7 %
RETICS # AUTO: 1.9 %
RETICS AGGREG/RBC NFR: 76.1 K/UL
TIBC SERPL-MCNC: NORMAL UG/DL
UIBC SERPL-MCNC: < 16.8 UG/DL
WBC # FLD AUTO: 4.9 K/UL

## 2024-06-06 PROCEDURE — 99214 OFFICE O/P EST MOD 30 MIN: CPT

## 2024-06-06 RX ORDER — MEMANTINE HYDROCHLORIDE 5 MG/1
5 TABLET, FILM COATED ORAL DAILY
Qty: 90 | Refills: 3 | Status: ACTIVE | COMMUNITY
Start: 2021-12-09

## 2024-06-06 RX ORDER — ATORVASTATIN CALCIUM 40 MG/1
40 TABLET, FILM COATED ORAL
Qty: 90 | Refills: 2 | Status: ACTIVE | COMMUNITY
Start: 2020-07-14

## 2024-06-06 RX ORDER — CYCLOBENZAPRINE HYDROCHLORIDE 5 MG/1
5 TABLET, FILM COATED ORAL
Qty: 10 | Refills: 1 | Status: ACTIVE | COMMUNITY
Start: 2024-02-08

## 2024-06-06 RX ORDER — CHOLECALCIFEROL (VITAMIN D3) 25 MCG
2500 TABLET,CHEWABLE ORAL
Qty: 90 | Refills: 1 | Status: COMPLETED | COMMUNITY
Start: 2020-06-17 | End: 2024-06-06

## 2024-06-06 RX ORDER — DONEPEZIL HYDROCHLORIDE 10 MG/1
10 TABLET ORAL
Qty: 90 | Refills: 3 | Status: ACTIVE | COMMUNITY
Start: 2020-10-06

## 2024-06-06 RX ORDER — SERTRALINE 25 MG/1
25 TABLET, FILM COATED ORAL DAILY
Qty: 30 | Refills: 5 | Status: ACTIVE | COMMUNITY
Start: 2023-05-12

## 2024-06-06 RX ORDER — MULTIVIT-MIN/FOLIC/VIT K/LYCOP 400-300MCG
1000 TABLET ORAL DAILY
Qty: 90 | Refills: 1 | Status: ACTIVE | COMMUNITY
Start: 2020-06-21

## 2024-06-06 RX ORDER — OMEPRAZOLE 20 MG/1
20 CAPSULE, DELAYED RELEASE ORAL DAILY
Qty: 1 | Refills: 2 | Status: ACTIVE | COMMUNITY
Start: 2020-11-16

## 2024-06-06 RX ORDER — FOLIC ACID 1 MG/1
1 TABLET ORAL
Qty: 90 | Refills: 2 | Status: ACTIVE | COMMUNITY
Start: 2020-06-17

## 2024-06-06 RX ORDER — IRON PS COMPLEX/B12/FOLIC ACID 150-25-1
150-25-1 CAPSULE ORAL
Qty: 90 | Refills: 0 | Status: COMPLETED | COMMUNITY
Start: 2021-02-03 | End: 2024-06-06

## 2024-06-06 RX ORDER — UBIDECARENONE 200 MG
200 CAPSULE ORAL DAILY
Qty: 90 | Refills: 1 | Status: ACTIVE | COMMUNITY
Start: 2019-10-18

## 2024-06-06 RX ORDER — CLOPIDOGREL BISULFATE 75 MG/1
75 TABLET, FILM COATED ORAL
Qty: 90 | Refills: 3 | Status: ACTIVE | COMMUNITY
Start: 2020-07-14

## 2024-06-06 RX ORDER — IRON ASPGLY,PS/C/B12/FA/CA/SUC 150-25-1
50-100 CAPSULE ORAL
Qty: 90 | Refills: 0 | Status: ACTIVE | COMMUNITY
Start: 2021-02-03

## 2024-06-06 RX ORDER — VITAMIN K2 90 MCG
125 MCG CAPSULE ORAL
Qty: 90 | Refills: 1 | Status: ACTIVE | COMMUNITY
Start: 2017-01-19

## 2024-06-06 RX ORDER — LACOSAMIDE 200 MG/1
200 TABLET ORAL
Qty: 60 | Refills: 5 | Status: ACTIVE | COMMUNITY
Start: 2021-11-02

## 2024-06-06 RX ORDER — LACOSAMIDE 50 MG/1
50 TABLET ORAL
Qty: 60 | Refills: 5 | Status: ACTIVE | COMMUNITY
Start: 2023-05-12

## 2024-06-06 NOTE — REVIEW OF SYSTEMS
[Fatigue] : fatigue [Cough] : cough [Negative] : Endocrine [FreeTextEntry2] : Please see HPI. [FreeTextEntry6] : Cough at baseline.  [de-identified] : Prior CVA residual speech deficits and early dementia.

## 2024-06-06 NOTE — HISTORY OF PRESENT ILLNESS
[de-identified] : Mr. Jeromy Rojas is a 63 -year- old male who is here today to establish care for his anemia.   Patient was originally seen by Dr. Hoffman. In June 2020, s/p CVA. He was discharged on DAPT and subsequently developed anemia. As per note, patient's Hgb trended from 12 - 8 g/dL. Iron studies sent in February 2021, revealed a low iron saturation. Family thinks that patient received IV iron. in May 2021, patient's H. Pylori test was positive, iron studies revealed patient to be iron deficient.  He then established care with a [facility] GI doctor (10/1/2021), who recommended an EGD. Prior to this, patient had the below GI studies performed which revealed the following:  - 2/16/21 EGD: Showed a small hiatal hernia, patchy mild inflammation characterized by erosions, friability and granularity in the duodenal bulb. - 11/16/20 Colonoscopy: Showed hypertrophied anal papillae, two sessile polyps in the mid-AC -- 3 to 4mm in size that were excised, diverticulosis of sigmoid, DC, TC and AC, mid-transverse colon tattoo (repeat in 5 years) (bx --> TA). - 11/16/20 EGD: Showed a small hiatal hernia, four non-bleeding gastric antral ulcers, superficial gastric ulcer with no stigmata in the gastric body, duodenal bulb erosions and a single 4 mm angiectasia found in the 2nd portion of the duodenum that was ablated (bx --> chronic active gastritis + for H. pylori).  As per GI note, patient was scheduled due undergo repeat EGD in November 2021, however, there is not documentation that this occurred. Patient was given treatment for H. Pylori, however, he had seizures and did not complete therapy.   As stated above, patient is here today due to anemia. Patient was notified to go to the ER on 3/29/2024 due to a Hgb of 5.5. Patient's family denied active hemoptysis, melena, bright red blood in the stool, hematuria, changes in bowel habits, dizziness, lightheadedness, sob on exertion. They reported that patient was taking daily iron pills. He was admitted and received 2 units of PRBCs (has received prior blood transfusions). GI was consulted and an EGD was performed which revealed: - A sliding small size hiatal hernia was seen, the esophagogastric junction (EGJ) was noted at 38 cm, with hiatal narrowing at 40 cm from the incisors. Retroflexion view in the stomach confirmed the size and morphology of the Hill Grade 1 hiatal hernia. - Normal mucosa was noted in the whole esophagus. - Normal mucosa was noted in the stomach. Multiple cold forceps biopsies were performed for histology. Multiple cold forceps biopsies were performed for H. pylori in the antrum, incisura and stomach body. - Normal mucosa was noted in the whole examined duodenum.   EGD was negative for h. pylori, however, the GI team noted that the biopsy possibly did not capture all areas and that H. Pylori may still be +. They recommended following up with a breath test.  On 4/1/2024, patient was discharged, CBC revealed a Hgb of 7.9 g/dL with a MCV of 64.3. Ferritin was 5 and Iron Saturation was 19%. Patient remains on Plavix, ASA 81 mg due to prior CVA.  Additional documentation reports that patient has a history of B12 deficiency, he also currently takes po folic acid.   Today, family/patient report - Blood Loss: Unaware of where the bleeding is occurring.  - Reduced Absorption: Repeat H. Pylori testing pending.  - Diet (Vegan/Vegetarian Diet): Denied.  - Previous Blood Transfusions: Patient received 2 unites PRBCs with recent admission. - Previous po or IV Iron: Yes, patient has received IV Iron in 2021.    Symptoms - Fatigue: Yes.  - PICA:  Yes.  - Restless Leg Syndrome: No. - Weakness: No             [de-identified] : Patient is here today for a follow up visit; patient was last seen on 4/11/2024.  Hgb on 4/11/2024 was 7.7 g/dL. Workup was consistent with GREGOR and patient received 1200 mg of IV Venofer divided between 4 doses. Patient's last dose was on 5/16/2024. Hgb on 5/16 was 9.7 g/dL. Patient's wife reported that she called GI but did not receive a call back. His wife will be calling a different GI office.  Patient is denying: dark/bloody stools or bleeding from any additional sources. Overall, he is feeling well.

## 2024-06-06 NOTE — ASSESSMENT
[FreeTextEntry1] : Mr. Jeromy Rojas is a 63 -year -old male who is here today due to anemia. Patient has a history of iron deficiency anemia requiring blood transfusions, with the most recent blood transfusion one week ago due to a hgb of 5.5 g/dL. Iron studies were consistent with GREGOR. While admitted, patient underwent an EGD which revealed mild reactive gastropathy (negative for H. pylori). Patient remains on DAPT due to a prior CVA.   Anemia - Workup was consistent with GREGOR, patient is now s/p 1200 mg of IV Venofer. - We will repeat CBC today with additional anemia labs. Last CBC on 5/16 showed response to iron, Hgb was 9.7 g/dL, and today's CBC is 10.3 g/dL with a normal MCV. May need additional iron infusions. - Patient should continue to follow up with the GI Team. We will potentially see patient back in 3 months.

## 2024-06-17 ENCOUNTER — APPOINTMENT (OUTPATIENT)
Dept: ORTHOPEDIC SURGERY | Facility: CLINIC | Age: 64
End: 2024-06-17

## 2024-06-17 NOTE — PHYSICAL EXAM
[Shuffling] : shuffling [UE] : Sensory: Intact in bilateral upper extremities [Normal RUE] : Right Upper Extremity: No scars, rashes, lesions, ulcers, skin intact [Normal LUE] : Left Upper Extremity: No scars, rashes, lesions, ulcers, skin intact [Normal RLE] : Right Lower Extremity: No scars, rashes, lesions, ulcers, skin intact [Normal LLE] : Left Lower Extremity: No scars, rashes, lesions, ulcers, skin intact [Normal Touch] : sensation intact for touch [Normal] : Alert and in no acute distress [de-identified] : Thoracic and lumbar spine No edema, ecchymoses, erythema, deformity. On firm palpation through the cervical, thoracic and lumbar spine there is no significant pain. He can bend forward reaching below his knees without any significant pain or difficulty. Gait is at his baseline which is with some weakness from the CVA in the right leg and shuffling  [de-identified] : Difficulty communicating because of history of CVA, dementia and seizure disorder but can answer yes and no questions [de-identified] :   X-rays 4/15/24 of thoracic and lumbar spine AP and lateral views showed Mild degenerative changes.  There appears to be mild compression of T12 age-indeterminate which may be about 20%
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
## 2024-06-29 NOTE — DISCHARGE NOTE PROVIDER - NSDCDCMDCOMP_GEN_ALL_CORE
EMERGENCY DEPARTMENT ENCOUNTER    Pt Name: Aster Craft  MRN: [id number]  Pt :   1946  Room Number:  16  Date of encounter:  2024  PCP: Yolie Cotto MD  ED Provider: Andrew Guerrier PA-C    Historian: Patient, nursing notes      HPI:  Chief Complaint: Shortness of breath, chest pain        Context: Aster Craft is a 78 y.o. female who presents to the ED c/o chest pain since yesterday.  Patient also reports shortness of breath and headache.  Patient states she is currently being treated with Macrobid for a UTI diagnosed over days ago.  Patient denies cough, fever, dizziness, syncope, or any other complaint today.      PAST MEDICAL HISTORY  Past Medical History:   Diagnosis Date    Arthritis     Breast cancer     RIGHT BREAST STAGE 3    Diabetes mellitus     Elevated cholesterol     GERD (gastroesophageal reflux disease) 2021    History of cancer chemotherapy     Hypertension     Hyperthyroidism 2022    Kidney stone     Lichen sclerosus     Shingles     Thickened endometrium 2018    Urinary incontinence     Urinary tract infection          PAST SURGICAL HISTORY  Past Surgical History:   Procedure Laterality Date    CATARACT EXTRACTION Right 01/10/2023    CERVICAL CONE BIOPSY      CHOLECYSTECTOMY  2010    COLONOSCOPY N/A 2018    Procedure: COLONOSCOPY;  Surgeon: Trenton Lyons MD;  Location: [facility] OR;  Service: Gastroenterology    DILATATION AND CURETTAGE      ENDOSCOPY N/A 2018    Procedure: ESOPHAGOGASTRODUODENOSCOPY;  Surgeon: Trenton Lyons MD;  Location: [facility] OR;  Service: Gastroenterology    MASTECTOMY Right 10/1998    MASTECTOMY Left 2013    TUBAL ABDOMINAL LIGATION           FAMILY HISTORY  Family History   Problem Relation Age of Onset    Breast cancer Mother     Diabetes Mother     Cancer Mother     Ovarian cancer Maternal Aunt     Cancer Maternal Aunt          SOCIAL HISTORY  Social History     Socioeconomic History    Marital status:     Tobacco Use    Smoking status: Never     Passive exposure: Never    Smokeless tobacco: Never   Vaping Use    Vaping status: Never Used   Substance and Sexual Activity    Alcohol use: No    Drug use: No    Sexual activity: Not Currently         ALLERGIES  Patient has no known allergies.        REVIEW OF SYSTEMS  Review of Systems   Constitutional:  Negative for chills and fever.   HENT:  Negative for congestion and sore throat.    Respiratory:  Positive for shortness of breath. Negative for cough, choking, chest tightness and wheezing.    Cardiovascular:  Positive for chest pain.   Gastrointestinal:  Negative for abdominal pain, nausea and vomiting.   Genitourinary:  Negative for dysuria.   Musculoskeletal:  Negative for back pain.   Skin:  Negative for wound.   Neurological:  Negative for dizziness and headaches.   Psychiatric/Behavioral:  Negative for confusion.    All other systems reviewed and are negative.         All systems reviewed and negative except for those discussed in HPI.       PHYSICAL EXAM    I have reviewed the triage vital signs and nursing notes.    ED Triage Vitals [06/29/24 1626]   Temp Heart Rate Resp BP SpO2   98.4 °F (36.9 °C) 92 20 128/70 91 %      Temp src Heart Rate Source Patient Position BP Location FiO2 (%)   Oral Monitor Sitting Left arm --       Physical Exam  Vitals and nursing note reviewed.   Constitutional:       General: She is not in acute distress.     Appearance: She is not ill-appearing, toxic-appearing or diaphoretic.   HENT:      Head: Normocephalic and atraumatic.      Mouth/Throat:      Mouth: Mucous membranes are moist.      Pharynx: Oropharynx is clear.   Eyes:      Extraocular Movements: Extraocular movements intact.   Cardiovascular:      Rate and Rhythm: Normal rate.      Heart sounds: Normal heart sounds.   Pulmonary:      Effort: Pulmonary effort is normal. No respiratory distress.      Breath sounds: Normal breath sounds. No decreased breath sounds, wheezing,  rhonchi or rales.   Chest:      Chest wall: No tenderness or crepitus.   Abdominal:      Tenderness: There is no abdominal tenderness.   Musculoskeletal:      Right lower leg: No edema.      Left lower leg: No edema.   Skin:     General: Skin is warm and dry.      Findings: No rash.   Neurological:      Mental Status: She is alert.             LAB RESULTS  Recent Results (from the past 24 hour(s))   Comprehensive Metabolic Panel    Collection Time: 06/29/24  4:34 PM    Specimen: Blood   Result Value Ref Range    Glucose 167 (H) 65 - 99 mg/dL    BUN 11 8 - 23 mg/dL    Creatinine 0.54 (L) 0.57 - 1.00 mg/dL    Sodium 140 136 - 145 mmol/L    Potassium 4.1 3.5 - 5.2 mmol/L    Chloride 103 98 - 107 mmol/L    CO2 25.6 22.0 - 29.0 mmol/L    Calcium 9.2 8.6 - 10.5 mg/dL    Total Protein 7.2 6.0 - 8.5 g/dL    Albumin 4.2 3.5 - 5.2 g/dL    ALT (SGPT) 22 1 - 33 U/L    AST (SGOT) 20 1 - 32 U/L    Alkaline Phosphatase 98 39 - 117 U/L    Total Bilirubin 0.8 0.0 - 1.2 mg/dL    Globulin 3.0 gm/dL    A/G Ratio 1.4 g/dL    BUN/Creatinine Ratio 20.4 7.0 - 25.0    Anion Gap 11.4 5.0 - 15.0 mmol/L    eGFR 95.0 >60.0 mL/min/1.73   BNP    Collection Time: 06/29/24  4:34 PM    Specimen: Blood   Result Value Ref Range    proBNP 233.4 0.0 - 1,800.0 pg/mL   Single High Sensitivity Troponin T    Collection Time: 06/29/24  4:34 PM    Specimen: Blood   Result Value Ref Range    HS Troponin T 10 <14 ng/L   Green Top (Gel)    Collection Time: 06/29/24  4:34 PM   Result Value Ref Range    Extra Tube Hold for add-ons.    Lavender Top    Collection Time: 06/29/24  4:34 PM   Result Value Ref Range    Extra Tube hold for add-on    Gold Top - SST    Collection Time: 06/29/24  4:34 PM   Result Value Ref Range    Extra Tube Hold for add-ons.    Light Blue Top    Collection Time: 06/29/24  4:34 PM   Result Value Ref Range    Extra Tube Hold for add-ons.    CBC Auto Differential    Collection Time: 06/29/24  4:34 PM    Specimen: Blood   Result Value Ref  Range    WBC 10.59 3.40 - 10.80 10*3/mm3    RBC 4.25 3.77 - 5.28 10*6/mm3    Hemoglobin 13.3 12.0 - 15.9 g/dL    Hematocrit 39.1 34.0 - 46.6 %    MCV 92.0 79.0 - 97.0 fL    MCH 31.3 26.6 - 33.0 pg    MCHC 34.0 31.5 - 35.7 g/dL    RDW 12.9 12.3 - 15.4 %    RDW-SD 43.3 37.0 - 54.0 fl    MPV 9.2 6.0 - 12.0 fL    Platelets 219 140 - 450 10*3/mm3    Neutrophil % 81.7 (H) 42.7 - 76.0 %    Lymphocyte % 12.8 (L) 19.6 - 45.3 %    Monocyte % 3.7 (L) 5.0 - 12.0 %    Eosinophil % 1.3 0.3 - 6.2 %    Basophil % 0.2 0.0 - 1.5 %    Immature Grans % 0.3 0.0 - 0.5 %    Neutrophils, Absolute 8.65 (H) 1.70 - 7.00 10*3/mm3    Lymphocytes, Absolute 1.36 0.70 - 3.10 10*3/mm3    Monocytes, Absolute 0.39 0.10 - 0.90 10*3/mm3    Eosinophils, Absolute 0.14 0.00 - 0.40 10*3/mm3    Basophils, Absolute 0.02 0.00 - 0.20 10*3/mm3    Immature Grans, Absolute 0.03 0.00 - 0.05 10*3/mm3    nRBC 0.0 0.0 - 0.2 /100 WBC   Procalcitonin    Collection Time: 06/29/24  4:34 PM    Specimen: Blood   Result Value Ref Range    Procalcitonin 0.12 0.00 - 0.25 ng/mL   D-dimer, Quantitative    Collection Time: 06/29/24  4:34 PM    Specimen: Blood   Result Value Ref Range    D-Dimer, Quantitative 0.31 0.00 - 0.77 MCGFEU/mL   Respiratory Panel PCR w/COVID-19(SARS-CoV-2) [facility]/TANYA In-House, NP Swab in UTM/VTM, 2 HR TAT - Swab, Nasopharynx    Collection Time: 06/29/24  5:15 PM    Specimen: Nasopharynx; Swab   Result Value Ref Range    ADENOVIRUS, PCR Not Detected Not Detected    Coronavirus 229E Not Detected Not Detected    Coronavirus HKU1 Not Detected Not Detected    Coronavirus NL63 Not Detected Not Detected    Coronavirus OC43 Not Detected Not Detected    COVID19 Not Detected Not Detected - Ref. Range    Human Metapneumovirus Not Detected Not Detected    Human Rhinovirus/Enterovirus Not Detected Not Detected    Influenza A PCR Not Detected Not Detected    Influenza B PCR Not Detected Not Detected    Parainfluenza Virus 1 Not Detected Not Detected     Parainfluenza Virus 2 Not Detected Not Detected    Parainfluenza Virus 3 Not Detected Not Detected    Parainfluenza Virus 4 Not Detected Not Detected    RSV, PCR Not Detected Not Detected    Bordetella pertussis pcr Not Detected Not Detected    Bordetella parapertussis PCR Not Detected Not Detected    Chlamydophila pneumoniae PCR Not Detected Not Detected    Mycoplasma pneumo by PCR Not Detected Not Detected   High Sensitivity Troponin T    Collection Time: 06/29/24  6:32 PM    Specimen: Blood   Result Value Ref Range    HS Troponin T 10 <14 ng/L   Blood Gas, Arterial With Co-Ox    Collection Time: 06/29/24  7:24 PM    Specimen: Arterial Blood   Result Value Ref Range    Site Right Radial     Alex's Test Positive     pH, Arterial 7.394 7.300 - 7.500 pH units    pCO2, Arterial 44.1 35.0 - 45.0 mm Hg    pO2, Arterial 72.5 (L) 75.0 - 100.0 mm Hg    HCO3, Arterial 26.9 22.0 - 28.0 mmol/L    Base Excess, Arterial 1.6 0.0 - 2.0 mmol/L    O2 Saturation, Arterial 93.5 (L) 94.0 - 100.0 %    Hematocrit, Blood Gas 39.9 %    Oxyhemoglobin 92.3 (L) 94 - 99 %    Methemoglobin 0.70 0.00 - 1.50 %    Carboxyhemoglobin 0.6 0 - 2 %    A-a DO2 21.9 mmHg    Barometric Pressure for Blood Gas 734 mmHg    Modality Nasal Cannula     Flow Rate 2.0 lpm    Ventilator Mode NA     Collected by [id number]     pH, Temp Corrected      pCO2, Temperature Corrected      pO2, Temperature Corrected         If labs were ordered, I independently reviewed the results and considered them in treating the patient.        RADIOLOGY  XR Chest 1 View    Result Date: 6/29/2024  PROCEDURE: XR CHEST 1 VW-  HISTORY: SOA Triage Protocol  COMPARISON: 2/16/2024  FINDINGS:  Portable view of the chest demonstrates the lungs to be grossly clear. There is no evidence of effusion, pneumothorax or other significant pleural disease. The mediastinum is unremarkable.  The heart size is normal.      Unremarkable portable chest.    This report was signed and finalized on 6/29/2024  6:11 PM by Isaiah Abbott MD.       I ordered and independently reviewed the above noted radiographic studies.      I viewed images of chest x-ray which showed no acute cardiopulmonary process per my independent interpretation.    See radiologist's dictation for official interpretation.        PROCEDURES    Procedures    ECG 12 Lead ED Triage Standing Order; SOA   Final Result          MEDICATIONS GIVEN IN ER    Medications   sodium chloride 0.9 % flush 10 mL (has no administration in time range)   ketorolac (TORADOL) injection 15 mg (15 mg Intravenous Given 6/29/24 1715)   ondansetron (ZOFRAN) injection 4 mg (4 mg Intravenous Given 6/29/24 1715)   sodium chloride 0.9 % bolus 1,000 mL (0 mL Intravenous Stopped 6/29/24 1954)   ipratropium-albuterol (DUO-NEB) nebulizer solution 3 mL (3 mL Nebulization Given 6/29/24 1738)   methylPREDNISolone sodium succinate (SOLU-Medrol) injection 125 mg (125 mg Intravenous Given 6/29/24 1725)         MEDICAL DECISION MAKING, PROGRESS, and CONSULTS    All labs, if obtained, have been independently reviewed by me.  All radiology studies, if obtained, have been reviewed by me and the radiologist dictating the report.  All EKG's, if obtained, have been independently viewed and interpreted by me/my attending physician.      Discussion below represents my analysis of pertinent findings related to patient's condition, differential diagnosis, treatment plan and final disposition.    78-year-old female presenting with chest pain and shortness of breath she is upright alert and oriented communicating normal sentences in no acute distress.  Her vital signs as interpreted by me are stable with a initial O2 saturation is interpreted by me as 91% on room air.  She is normotensive mildly tachycardic with a rate of 92 initially which increased to 101, she maintained a rate in the low 100s throughout the ED course.    Extensive workup initiated to elucidate patient's chest pain and shortness of  breath.  CBC showed no leukocytosis normal H&H.  CMP is nonactionable procalcitonin normal.  Respiratory panel unremarkable high-sensitivity troponin was within normal limits EKG showed no concerning signs for ischemic changes per ED attending interpretation including normal sinus rhythm, sinus tachycardia, normal axis.  D-dimer was within normal limits lowering any suspicion for PE or dissection.  A blood gas was ordered and found to be largely unremarkable with the patient's pH within normal limits.  proBNP normal.  However patient became increasingly hypoxic with her O2 saturation dropping to 88% she was placed on 1.5 L nasal cannula.  Walking O2 sats showed a drop in O2 down to 88% on room air while walking.  Patient has no oxygen at home this is a new oxygen requirement.  Given these findings I contacted hospital medicine Dr. See who agreed to admit the patient to [facility] for acute hypoxic respiratory failure.                     Differential diagnosis:    Differential diagnosis included but was not limited to shortness of breath, pneumonia, acute coronary syndrome, acute hypoxic respiratory failure, pulmonary edema, flu, COVID, among others      Additional sources:    - Discussed/ obtained information from independent historians: None    - External (non-ED) record review: Office visit with endocrinology from 2/27/2024 regarding patient's thyroid mass and low TSH levels    - Chronic or social conditions impacting care: None    Orders placed during this visit:  Orders Placed This Encounter   Procedures    Respiratory Panel PCR w/COVID-19(SARS-CoV-2) [facility] In-House, NP Swab in UTM/VTM, 2 HR TAT - Swab, Nasopharynx    XR Chest 1 View    Burke Draw    Comprehensive Metabolic Panel    BNP    Single High Sensitivity Troponin T    CBC Auto Differential    Procalcitonin    D-dimer, Quantitative    High Sensitivity Troponin T    High Sensitivity Troponin T 2Hr    Blood  Gas, Arterial -With Co-Ox Panel: Yes    Blood Gas, Arterial With Co-Ox    NPO Diet NPO Type: Strict NPO    Undress & Gown    Continuous Pulse Oximetry    Vital Signs    Oxygen Therapy- Nasal Cannula; Titrate 1-6 LPM Per SpO2; 90 - 95%    ECG 12 Lead ED Triage Standing Order; SOA    Insert Peripheral IV    Initiate Observation Status    CBC & Differential    Green Top (Gel)    Lavender Top    Gold Top - SST    Light Blue Top         Additional orders considered but not ordered: Considered a CT PE study however patient's D-dimer was within normal limits significantly lowering suspicion for PE or dissection      ED Course:    Consultants: Hospital medicine attending Dr. See                Shared Decision Making:  After my consideration of clinical presentation and any laboratory/radiology studies obtained, I discussed the findings with the patient/patient representative who is in agreement with the treatment plan and the final disposition.   Risks and benefits of discharge and/or observation/admission were discussed.       AS OF 20:11 EDT VITALS:    BP - 127/77  HR - 104  TEMP - 98.3 °F (36.8 °C) (Oral)  O2 SATS - 94%                  DIAGNOSIS  Final diagnoses:   Acute hypoxic respiratory failure   Chest pain, unspecified type         DISPOSITION  Admit to [facility]      Please note that portions of this document were completed with voice recognition software.      Andrew Guerrier PA-C  06/29/24 2011     This document is complete and the patient is ready for discharge.

## 2024-08-28 ENCOUNTER — NON-APPOINTMENT (OUTPATIENT)
Age: 64
End: 2024-08-28

## 2024-08-29 ENCOUNTER — APPOINTMENT (OUTPATIENT)
Dept: NEUROLOGY | Facility: CLINIC | Age: 64
End: 2024-08-29
Payer: COMMERCIAL

## 2024-08-29 ENCOUNTER — NON-APPOINTMENT (OUTPATIENT)
Age: 64
End: 2024-08-29

## 2024-08-29 VITALS
TEMPERATURE: 97.3 F | DIASTOLIC BLOOD PRESSURE: 77 MMHG | OXYGEN SATURATION: 96 % | HEIGHT: 67 IN | HEART RATE: 60 BPM | WEIGHT: 152 LBS | SYSTOLIC BLOOD PRESSURE: 148 MMHG | BODY MASS INDEX: 23.86 KG/M2

## 2024-08-29 DIAGNOSIS — R56.9 UNSPECIFIED CONVULSIONS: ICD-10-CM

## 2024-08-29 DIAGNOSIS — G40.909 EPILEPSY, UNSPECIFIED, NOT INTRACTABLE, W/OUT STATUS EPILEPTICUS: ICD-10-CM

## 2024-08-29 DIAGNOSIS — I69.320 APHASIA FOLLOWING CEREBRAL INFARCTION: ICD-10-CM

## 2024-08-29 DIAGNOSIS — I63.9 CEREBRAL INFARCTION, UNSPECIFIED: ICD-10-CM

## 2024-08-29 DIAGNOSIS — F03.90 UNSPECIFIED DEMENTIA W/OUT BEHAVIORAL DISTURBANCE: ICD-10-CM

## 2024-08-29 PROCEDURE — 99214 OFFICE O/P EST MOD 30 MIN: CPT

## 2024-09-03 PROBLEM — R56.9 CONVULSION, NON-EPILEPTIC: Status: ACTIVE | Noted: 2024-09-03

## 2024-09-03 NOTE — ASSESSMENT
[FreeTextEntry1] : Jeromy Rojas is a 63-year-old male who is here for seizure follow-up Overall, increased episodes of seizure activity but will need to assess if pseudo seizures or real seizures. Patient also having more difficulty with speech. Will have to r/o new stroke Plan -72hr EEG to r/o pseudo seizures due to frequency of episodes - Continue Vimpat 250mg BID for now, will reassess increase in dosage after EEG is done -MRI head to r/o new stroke d/t worsening speech impairment. - Refilled all medications - F/U after imaging and EEG no

## 2024-09-03 NOTE — HISTORY OF PRESENT ILLNESS
[FreeTextEntry1] : Jeromy Rojas is a 63-year-old male who is here for seizure follow-up  Since last visit, wife reports he has been having seizures monthly. They reported 1 seizure in May, 1 seizure in June, 2 seizures in July and 3 seizures this month. Last episode was 3 days ago. The seizures are similar to his past episodes. The seizure lasts for a minute but per wife, it takes him along time to move, he is able to talk but cannot move for almost 20 minutes. They are unable to tell if there are triggers causing the seizures this time. Two episodes, he was in bed, watching TV, they heard the banging of his head onto headboard and saw him trembling. He was admitted to the hospital in May for anemia. He received blood transfusion x2 and outpatient as well. He remains independent with all ADLs and no episode of incontinence. Denies falls His wife states he refuses to talk these days as he gets frustrated easily when the words cannot come out when he wants to.  Current medications VIMPAT 250mg BID DONEPEZIL 10 MG QHS LISINOPRIL 20 ATORVASTATIN 40 NAMENDA 5 MG Sertraline 25 ASA81 Plavix 75 Folic acid Protonix   Interval hx 10/24/23 He had a seizure 10/18/23 for a couple seconds. He was sitting and watching TV, he seized and fell with no head injury. Post seizure, he was confused for a couple minutes but it took him about 20 minutes to get up off of the floor due to his left side being "dead weight" as per his wife. He continues to Vimpat every day and hasn't missed a day. Unknown if there was anything that could have triggered the seizure. A month prior, he had a seizure possibly due to the brightness of the light. The seizure only lasted seconds with some shaking. He didn't go to the ER and hasn't had any new imaging after these events.  CT scan from May was normal.  ______ Interval seizures May 6th and was found on the floor  - he has no memory of what happened at that one. He was alert. He bruised his face and glasses broke.  April 24th had another seizure and collapsed with clonic activity and bit his tongue. Brief seizure with 10 min of post ictal confusion April 12th  - virtual speech therapy - said Jeromy are you ok. he was trying to talk but nothing came out - lasted 90 sec.   In the previous emu he had evidence of seizure risk and TAD.  Not smoking or drinking wife thinks he is frustrated. She reprts that he is taking his meds religiously.      Interval 1/12/23 patient had a seizure in November and fell of the bed and had to go to [place]. 2 additional events happened in December but occurred after he had a fright about his daughter. last event was on [place] day.  going to the gym and walking better.  also appears at times to have more confusion occasionally.      Interval course:  Seizure april 24th had a GTC sz lasting 1 min. Patient stayed at home because his wife felt he got back to normal right way. 1 sz in 6 months.  Sleep was ok. No alcohol.  Still having accidents with bladder in depends.     HPI: Patient here for stroke and Alzheimer's Wife who is here with him he is stable and has not gotten worse.  He is playing puzzles but not doing much physical activity due to weather. Getting virtual speech therapy.   Seizures - x 2 since December. and January 29th very brief.  compliant with Vimpat.    was dancing at his son's wedding indep with adls.    KEPPRA WAS STOPPED. mOST OF THE SEIZURES OCCURRED AFTER BEING DISCONNECTED   went to rehab   now independent with adls but is observed when he oes out

## 2024-09-03 NOTE — HISTORY OF PRESENT ILLNESS
[FreeTextEntry1] : Jeromy Rojas is a 63-year-old male who is here for seizure follow-up  Since last visit, wife reports he has been having seizures monthly. They reported 1 seizure in May, 1 seizure in June, 2 seizures in July and 3 seizures this month. Last episode was 3 days ago. The seizures are similar to his past episodes. The seizure lasts for a minute but per wife, it takes him along time to move, he is able to talk but cannot move for almost 20 minutes. They are unable to tell if there are triggers causing the seizures this time. Two episodes, he was in bed, watching TV, they heard the banging of his head onto headboard and saw him trembling. He was admitted to the hospital in May for anemia. He received blood transfusion x2 and outpatient as well. He remains independent with all ADLs and no episode of incontinence. Denies falls His wife states he refuses to talk these days as he gets frustrated easily when the words cannot come out when he wants to.  Current medications VIMPAT 250mg BID DONEPEZIL 10 MG QHS LISINOPRIL 20 ATORVASTATIN 40 NAMENDA 5 MG Sertraline 25 ASA81 Plavix 75 Folic acid Protonix   Interval hx 10/24/23 He had a seizure 10/18/23 for a couple seconds. He was sitting and watching TV, he seized and fell with no head injury. Post seizure, he was confused for a couple minutes but it took him about 20 minutes to get up off of the floor due to his left side being "dead weight" as per his wife. He continues to Vimpat every day and hasn't missed a day. Unknown if there was anything that could have triggered the seizure. A month prior, he had a seizure possibly due to the brightness of the light. The seizure only lasted seconds with some shaking. He didn't go to the ER and hasn't had any new imaging after these events.  CT scan from May was normal.  ______ Interval seizures May 6th and was found on the floor  - he has no memory of what happened at that one. He was alert. He bruised his face and glasses broke.  April 24th had another seizure and collapsed with clonic activity and bit his tongue. Brief seizure with 10 min of post ictal confusion April 12th  - virtual speech therapy - said Jeromy are you ok. he was trying to talk but nothing came out - lasted 90 sec.   In the previous emu he had evidence of seizure risk and TAD.  Not smoking or drinking wife thinks he is frustrated. She reprts that he is taking his meds religiously.      Interval 1/12/23 patient had a seizure in November and fell of the bed and had to go to [place]. 2 additional events happened in December but occurred after he had a fright about his daughter. last event was on [place] day.  going to the gym and walking better.  also appears at times to have more confusion occasionally.      Interval course:  Seizure april 24th had a GTC sz lasting 1 min. Patient stayed at home because his wife felt he got back to normal right way. 1 sz in 6 months.  Sleep was ok. No alcohol.  Still having accidents with bladder in depends.     HPI: Patient here for stroke and Alzheimer's Wife who is here with him he is stable and has not gotten worse.  He is playing puzzles but not doing much physical activity due to weather. Getting virtual speech therapy.   Seizures - x 2 since December. and January 29th very brief.  compliant with Vimpat.    was dancing at his son's wedding indep with adls.    KEPPRA WAS STOPPED. mOST OF THE SEIZURES OCCURRED AFTER BEING DISCONNECTED   went to rehab   now independent with adls but is observed when he oes out

## 2024-09-03 NOTE — PHYSICAL EXAM
[FreeTextEntry1] : The patient is alert and oriented x2, naming intact x3/3 with cues, repetition normal, follows three-step commands, able to participate with some history taking, wife had to assist with questions  He has some aphasia noted. Speech impairment noted (at baseline per wife since stroke) Memory is impaired Immediate recall 1 out of 3, short-term 1 out of 3, remote memory impaired Cranial nerves II through XII intact Motor exam: Upper and lower extremities 5 out of 5 power, normal tone. No abnormal movements noted. Sensory exam: Intact to light touch and pinprick. Romberg negative. Coordination and vestibular exam: Some difficulty with finger to nose, more noticeable on left, no evidence of truncal or appendicular ataxia. No evidence of nystagmus. no vestibular symptoms elicited with head turning during ambulation. Gait: slow gait but able to get out of chair easily and walks with a broad based gait.  Reflexes: One to 2+ in upper and lower extremities. hyperreflexia noted in BUE and BLE, Downgoing toes.

## 2024-09-03 NOTE — ASSESSMENT
[FreeTextEntry1] : Jeromy Rojas is a 63-year-old male who is here for seizure follow-up Overall, increased episodes of seizure activity but will need to assess if pseudo seizures or real seizures. Patient also having more difficulty with speech. Will have to r/o new stroke Plan -72hr EEG to r/o pseudo seizures due to frequency of episodes - Continue Vimpat 250mg BID for now, will reassess increase in dosage after EEG is done -MRI head to r/o new stroke d/t worsening speech impairment. - Refilled all medications - F/U after imaging and EEG

## 2024-09-05 ENCOUNTER — APPOINTMENT (OUTPATIENT)
Dept: HEMATOLOGY ONCOLOGY | Facility: CLINIC | Age: 64
End: 2024-09-05
Payer: COMMERCIAL

## 2024-09-05 VITALS
HEIGHT: 67 IN | WEIGHT: 151 LBS | OXYGEN SATURATION: 97 % | BODY MASS INDEX: 23.7 KG/M2 | TEMPERATURE: 97.9 F | SYSTOLIC BLOOD PRESSURE: 144 MMHG | HEART RATE: 43 BPM | RESPIRATION RATE: 18 BRPM | DIASTOLIC BLOOD PRESSURE: 67 MMHG

## 2024-09-05 DIAGNOSIS — D64.9 ANEMIA, UNSPECIFIED: ICD-10-CM

## 2024-09-05 PROCEDURE — 99214 OFFICE O/P EST MOD 30 MIN: CPT | Mod: 25

## 2024-09-05 PROCEDURE — 36415 COLL VENOUS BLD VENIPUNCTURE: CPT

## 2024-09-05 PROCEDURE — G2211 COMPLEX E/M VISIT ADD ON: CPT | Mod: NC

## 2024-09-05 NOTE — HISTORY OF PRESENT ILLNESS
[de-identified] : Mr. Jeromy Rojas is a 63 -year- old male who is here today to f/u iron def anemia.   Patient was originally seen by Dr. Hoffman. In June 2020, s/p CVA. He was discharged on DAPT and subsequently developed anemia. As per note, patient's Hgb trended from 12 - 8 g/dL. Iron studies sent in February 2021, revealed a low iron saturation. Family thinks that patient received IV iron. in May 2021, patient's H. Pylori test was positive, iron studies revealed patient to be iron deficient.  He then established care with a [facility] GI doctor (10/1/2021), who recommended an EGD. Prior to this, patient had the below GI studies performed which revealed the following:  - 2/16/21 EGD: Showed a small hiatal hernia, patchy mild inflammation characterized by erosions, friability and granularity in the duodenal bulb. - 11/16/20 Colonoscopy: Showed hypertrophied anal papillae, two sessile polyps in the mid-AC -- 3 to 4mm in size that were excised, diverticulosis of sigmoid, DC, TC and AC, mid-transverse colon tattoo (repeat in 5 years) (bx --> TA). - 11/16/20 EGD: Showed a small hiatal hernia, four non-bleeding gastric antral ulcers, superficial gastric ulcer with no stigmata in the gastric body, duodenal bulb erosions and a single 4 mm angiectasia found in the 2nd portion of the duodenum that was ablated (bx --> chronic active gastritis + for H. pylori).  As per GI note, patient was scheduled due undergo repeat EGD in November 2021, however, there is not documentation that this occurred. Patient was given treatment for H. Pylori, however, he had seizures and did not complete therapy.   As stated above, patient is here today due to anemia. Patient was notified to go to the ER on 3/29/2024 due to a Hgb of 5.5. Patient's family denied active hemoptysis, melena, bright red blood in the stool, hematuria, changes in bowel habits, dizziness, lightheadedness, sob on exertion. They reported that patient was taking daily iron pills. He was admitted and received 2 units of PRBCs (has received prior blood transfusions). GI was consulted and an EGD was performed which revealed: - A sliding small size hiatal hernia was seen, the esophagogastric junction (EGJ) was noted at 38 cm, with hiatal narrowing at 40 cm from the incisors. Retroflexion view in the stomach confirmed the size and morphology of the Hill Grade 1 hiatal hernia. - Normal mucosa was noted in the whole esophagus. - Normal mucosa was noted in the stomach. Multiple cold forceps biopsies were performed for histology. Multiple cold forceps biopsies were performed for H. pylori in the antrum, incisura and stomach body. - Normal mucosa was noted in the whole examined duodenum.   EGD was negative for h. pylori, however, the GI team noted that the biopsy possibly did not capture all areas and that H. Pylori may still be +. They recommended following up with a breath test.  On 4/1/2024, patient was discharged, CBC revealed a Hgb of 7.9 g/dL with a MCV of 64.3. Ferritin was 5 and Iron Saturation was 19%. Patient remains on Plavix, ASA 81 mg due to prior CVA.  Additional documentation reports that patient has a history of B12 deficiency, he also currently takes po folic acid.   Rec'd venofer x4 May 2024 with good response. Returns for reassessment

## 2024-09-05 NOTE — PHYSICAL EXAM
[Normal] : affect appropriate [de-identified] : Normal work of breathing on room air.  Speaking full sentences normal respiratory rate on room air.  Speaking full sentences without increased work of breathing

## 2024-09-05 NOTE — ASSESSMENT
[FreeTextEntry1] : Mr. Jeromy Rojas is a 63 -year -old male who is here today due to anemia. Patient has a history of iron deficiency anemia requiring blood transfusions, with the most recent blood transfusion one week ago due to a hgb of 5.5 g/dL. Iron studies were consistent with GREGOR. While admitted, patient underwent an EGD which revealed mild reactive gastropathy (negative for H. pylori). Patient remains on DAPT due to a prior CVA.   Anemia - Workup was consistent with GREGOR, patient is now s/p 1200 mg of IV Venofer in May 2024. Repeat afterwards w adequate response - Now hb improved, but iron levels again deficient per ferritin <30. Ordered another round of venofer and will re-assess in 3 mo

## 2024-09-05 NOTE — REVIEW OF SYSTEMS
[FreeTextEntry2] : Please see HPI. [FreeTextEntry6] : Cough at baseline.  [de-identified] : Prior CVA residual speech deficits and early dementia.

## 2024-09-06 ENCOUNTER — NON-APPOINTMENT (OUTPATIENT)
Age: 64
End: 2024-09-06

## 2024-09-06 LAB
FERRITIN SERPL-MCNC: 25 NG/ML
FOLATE SERPL-MCNC: >20 NG/ML
IRON SATN MFR SERPL: 101 %
IRON SERPL-MCNC: 300 UG/DL
TIBC SERPL-MCNC: 296 UG/DL
UIBC SERPL-MCNC: >4 UG/DL
VIT B12 SERPL-MCNC: 899 PG/ML

## 2024-09-20 ENCOUNTER — APPOINTMENT (OUTPATIENT)
Dept: GASTROENTEROLOGY | Facility: CLINIC | Age: 64
End: 2024-09-20
Payer: COMMERCIAL

## 2024-09-20 VITALS
OXYGEN SATURATION: 96 % | DIASTOLIC BLOOD PRESSURE: 66 MMHG | WEIGHT: 149 LBS | TEMPERATURE: 95.54 F | HEIGHT: 67 IN | BODY MASS INDEX: 23.39 KG/M2 | HEART RATE: 53 BPM | SYSTOLIC BLOOD PRESSURE: 130 MMHG

## 2024-09-20 DIAGNOSIS — R63.4 ABNORMAL WEIGHT LOSS: ICD-10-CM

## 2024-09-20 DIAGNOSIS — A04.8 OTHER SPECIFIED BACTERIAL INTESTINAL INFECTIONS: ICD-10-CM

## 2024-09-20 DIAGNOSIS — D50.9 IRON DEFICIENCY ANEMIA, UNSPECIFIED: ICD-10-CM

## 2024-09-20 DIAGNOSIS — D64.9 ANEMIA, UNSPECIFIED: ICD-10-CM

## 2024-09-20 PROCEDURE — 99214 OFFICE O/P EST MOD 30 MIN: CPT

## 2024-09-20 RX ORDER — SODIUM SULFATE, POTASSIUM SULFATE AND MAGNESIUM SULFATE 1.6; 3.13; 17.5 G/177ML; G/177ML; G/177ML
17.5-3.13-1.6 SOLUTION ORAL
Qty: 1 | Refills: 0 | Status: ACTIVE | COMMUNITY
Start: 2024-09-20 | End: 1900-01-01

## 2024-09-20 RX ORDER — PANTOPRAZOLE 40 MG/1
40 TABLET, DELAYED RELEASE ORAL
Refills: 0 | Status: ACTIVE | COMMUNITY

## 2024-09-20 NOTE — END OF VISIT
[] : Fellow [FreeTextEntry3] : Pt seen and d/w fellow.  Will plan on a CT scan to evaluate weight loss as well as a colonoscopy to evaluate for anemia.

## 2024-09-20 NOTE — ASSESSMENT
[FreeTextEntry1] : 1. Iron deficiency anemia 2. Unintentional weight loss Pt with ongoing weight loss and GREGOR. While this could be due to slow oozing while on DAPT and dementia, he has enough risk factors to warrant further evaluation for malignancy. - CT C/A/P w/ contrast - Colonoscopy at [facility] with Suprep once seizure workup complete, r/b/a discussed - If CT and colo negative would plan for VCE - Agree with IV iron and recheck CBC (appears not to have resulted yet) - Wife to discuss with Dr. Guillen whether DAPT can be reduced to monotherapy or if risk of recurrent CVA would be too high  3. H/o H. pylori infection - Check breath test today - Although pt is on PPI if breath test is negative given his more pressing issues above can consider breath testing off of PPI at a later time

## 2024-09-20 NOTE — HISTORY OF PRESENT ILLNESS
[de-identified] : 4/1/24: Small hiatal hernia, normal mucosa, bx neg for H. pylori. 2/16/21: Small hiatal hernia, duodenitis. 11/16/20: Small hiatal hernia, gastric ulcers, duodenal bulb erosions, 4mm D2 AVM (s/p APC), bx with H. pylori. [FreeTextEntry1] : 11/16/20: 3-4mm TA x2, mid-transverse colon tattoo.

## 2024-09-24 ENCOUNTER — APPOINTMENT (OUTPATIENT)
Dept: INFUSION THERAPY | Facility: CLINIC | Age: 64
End: 2024-09-24

## 2024-09-24 ENCOUNTER — TRANSCRIPTION ENCOUNTER (OUTPATIENT)
Age: 64
End: 2024-09-24

## 2024-09-24 ENCOUNTER — OUTPATIENT (OUTPATIENT)
Dept: OUTPATIENT SERVICES | Facility: HOSPITAL | Age: 64
LOS: 1 days | End: 2024-09-24
Payer: COMMERCIAL

## 2024-09-24 VITALS
HEART RATE: 58 BPM | OXYGEN SATURATION: 96 % | DIASTOLIC BLOOD PRESSURE: 76 MMHG | TEMPERATURE: 98 F | SYSTOLIC BLOOD PRESSURE: 146 MMHG | WEIGHT: 154.98 LBS | RESPIRATION RATE: 18 BRPM | HEIGHT: 66 IN

## 2024-09-24 VITALS
SYSTOLIC BLOOD PRESSURE: 144 MMHG | DIASTOLIC BLOOD PRESSURE: 75 MMHG | TEMPERATURE: 97 F | RESPIRATION RATE: 18 BRPM | HEART RATE: 51 BPM | OXYGEN SATURATION: 96 %

## 2024-09-24 DIAGNOSIS — D50.9 IRON DEFICIENCY ANEMIA, UNSPECIFIED: ICD-10-CM

## 2024-09-24 DIAGNOSIS — Z98.890 OTHER SPECIFIED POSTPROCEDURAL STATES: Chronic | ICD-10-CM

## 2024-09-24 LAB
HCT VFR BLD CALC: 37.7 %
HGB BLD-MCNC: 11.9 G/DL
MCHC RBC-ENTMCNC: 29.9 PG
MCHC RBC-ENTMCNC: 31.6 GM/DL
MCV RBC AUTO: 94.7 FL
PLATELET # BLD AUTO: 331 K/UL
RBC # BLD: 3.98 M/UL
RBC # FLD: 15.4 %
UREA BREATH TEST QL: NEGATIVE
WBC # FLD AUTO: 5.25 K/UL

## 2024-09-24 PROCEDURE — 96365 THER/PROPH/DIAG IV INF INIT: CPT

## 2024-09-24 RX ORDER — IRON SUCROSE 20 MG/ML
300 INJECTION, SOLUTION INTRAVENOUS ONCE
Refills: 0 | Status: COMPLETED | OUTPATIENT
Start: 2024-09-24 | End: 2024-09-24

## 2024-09-24 RX ADMIN — IRON SUCROSE 176.67 MILLIGRAM(S): 20 INJECTION, SOLUTION INTRAVENOUS at 09:31

## 2024-09-24 RX ADMIN — IRON SUCROSE 300 MILLIGRAM(S): 20 INJECTION, SOLUTION INTRAVENOUS at 11:01

## 2024-09-25 ENCOUNTER — OUTPATIENT (OUTPATIENT)
Dept: OUTPATIENT SERVICES | Facility: HOSPITAL | Age: 64
LOS: 1 days | End: 2024-09-25

## 2024-09-25 ENCOUNTER — APPOINTMENT (OUTPATIENT)
Dept: MRI IMAGING | Facility: HOSPITAL | Age: 64
End: 2024-09-25
Payer: COMMERCIAL

## 2024-09-25 DIAGNOSIS — Z98.890 OTHER SPECIFIED POSTPROCEDURAL STATES: Chronic | ICD-10-CM

## 2024-09-25 PROCEDURE — 70551 MRI BRAIN STEM W/O DYE: CPT | Mod: 26

## 2024-09-25 PROCEDURE — 70551 MRI BRAIN STEM W/O DYE: CPT

## 2024-09-26 ENCOUNTER — NON-APPOINTMENT (OUTPATIENT)
Age: 64
End: 2024-09-26

## 2024-10-01 ENCOUNTER — OUTPATIENT (OUTPATIENT)
Dept: OUTPATIENT SERVICES | Facility: HOSPITAL | Age: 64
LOS: 1 days | End: 2024-10-01
Payer: COMMERCIAL

## 2024-10-01 ENCOUNTER — APPOINTMENT (OUTPATIENT)
Dept: INFUSION THERAPY | Facility: CLINIC | Age: 64
End: 2024-10-01

## 2024-10-01 VITALS
DIASTOLIC BLOOD PRESSURE: 76 MMHG | HEART RATE: 70 BPM | WEIGHT: 149.91 LBS | SYSTOLIC BLOOD PRESSURE: 137 MMHG | RESPIRATION RATE: 18 BRPM | HEIGHT: 66 IN | TEMPERATURE: 98 F | OXYGEN SATURATION: 99 %

## 2024-10-01 DIAGNOSIS — D50.9 IRON DEFICIENCY ANEMIA, UNSPECIFIED: ICD-10-CM

## 2024-10-01 DIAGNOSIS — Z98.890 OTHER SPECIFIED POSTPROCEDURAL STATES: Chronic | ICD-10-CM

## 2024-10-01 PROCEDURE — 96366 THER/PROPH/DIAG IV INF ADDON: CPT

## 2024-10-01 PROCEDURE — 96365 THER/PROPH/DIAG IV INF INIT: CPT

## 2024-10-01 RX ORDER — IRON SUCROSE 20 MG/ML
300 INJECTION, SOLUTION INTRAVENOUS ONCE
Refills: 0 | Status: COMPLETED | OUTPATIENT
Start: 2024-10-01 | End: 2024-10-01

## 2024-10-01 RX ADMIN — IRON SUCROSE 176.67 MILLIGRAM(S): 20 INJECTION, SOLUTION INTRAVENOUS at 09:29

## 2024-10-01 RX ADMIN — IRON SUCROSE 300 MILLIGRAM(S): 20 INJECTION, SOLUTION INTRAVENOUS at 11:00

## 2024-10-07 ENCOUNTER — OUTPATIENT (OUTPATIENT)
Dept: OUTPATIENT SERVICES | Facility: HOSPITAL | Age: 64
LOS: 1 days | End: 2024-10-07
Payer: COMMERCIAL

## 2024-10-07 ENCOUNTER — APPOINTMENT (OUTPATIENT)
Dept: INFUSION THERAPY | Facility: CLINIC | Age: 64
End: 2024-10-07

## 2024-10-07 VITALS
WEIGHT: 153 LBS | TEMPERATURE: 97 F | OXYGEN SATURATION: 97 % | SYSTOLIC BLOOD PRESSURE: 141 MMHG | RESPIRATION RATE: 16 BRPM | DIASTOLIC BLOOD PRESSURE: 70 MMHG | HEART RATE: 47 BPM | HEIGHT: 66 IN

## 2024-10-07 VITALS
TEMPERATURE: 98 F | DIASTOLIC BLOOD PRESSURE: 62 MMHG | SYSTOLIC BLOOD PRESSURE: 125 MMHG | OXYGEN SATURATION: 95 % | RESPIRATION RATE: 16 BRPM | HEART RATE: 45 BPM

## 2024-10-07 DIAGNOSIS — D50.9 IRON DEFICIENCY ANEMIA, UNSPECIFIED: ICD-10-CM

## 2024-10-07 DIAGNOSIS — Z98.890 OTHER SPECIFIED POSTPROCEDURAL STATES: Chronic | ICD-10-CM

## 2024-10-07 PROCEDURE — 96365 THER/PROPH/DIAG IV INF INIT: CPT

## 2024-10-07 RX ORDER — IRON SUCROSE 20 MG/ML
300 INJECTION, SOLUTION INTRAVENOUS ONCE
Refills: 0 | Status: COMPLETED | OUTPATIENT
Start: 2024-10-07 | End: 2024-10-07

## 2024-10-07 RX ADMIN — IRON SUCROSE 176.67 MILLIGRAM(S): 20 INJECTION, SOLUTION INTRAVENOUS at 08:41

## 2024-10-07 RX ADMIN — IRON SUCROSE 300 MILLIGRAM(S): 20 INJECTION, SOLUTION INTRAVENOUS at 10:11

## 2024-10-13 ENCOUNTER — OUTPATIENT (OUTPATIENT)
Dept: OUTPATIENT SERVICES | Facility: HOSPITAL | Age: 64
LOS: 1 days | End: 2024-10-13
Payer: COMMERCIAL

## 2024-10-13 DIAGNOSIS — Z98.890 OTHER SPECIFIED POSTPROCEDURAL STATES: Chronic | ICD-10-CM

## 2024-10-13 PROCEDURE — 74177 CT ABD & PELVIS W/CONTRAST: CPT

## 2024-10-13 PROCEDURE — 82565 ASSAY OF CREATININE: CPT

## 2024-10-13 PROCEDURE — 71260 CT THORAX DX C+: CPT

## 2024-10-13 PROCEDURE — 71260 CT THORAX DX C+: CPT | Mod: 26

## 2024-10-13 PROCEDURE — 74177 CT ABD & PELVIS W/CONTRAST: CPT | Mod: 26

## 2024-10-15 ENCOUNTER — OUTPATIENT (OUTPATIENT)
Dept: OUTPATIENT SERVICES | Facility: HOSPITAL | Age: 64
LOS: 1 days | End: 2024-10-15
Payer: COMMERCIAL

## 2024-10-15 ENCOUNTER — APPOINTMENT (OUTPATIENT)
Dept: INFUSION THERAPY | Facility: CLINIC | Age: 64
End: 2024-10-15

## 2024-10-15 VITALS
HEART RATE: 55 BPM | HEIGHT: 66 IN | SYSTOLIC BLOOD PRESSURE: 114 MMHG | OXYGEN SATURATION: 99 % | TEMPERATURE: 98 F | RESPIRATION RATE: 18 BRPM | DIASTOLIC BLOOD PRESSURE: 74 MMHG | WEIGHT: 151.9 LBS

## 2024-10-15 DIAGNOSIS — D50.9 IRON DEFICIENCY ANEMIA, UNSPECIFIED: ICD-10-CM

## 2024-10-15 DIAGNOSIS — Z98.890 OTHER SPECIFIED POSTPROCEDURAL STATES: Chronic | ICD-10-CM

## 2024-10-15 PROCEDURE — 96365 THER/PROPH/DIAG IV INF INIT: CPT

## 2024-10-15 RX ORDER — IRON SUCROSE 20 MG/ML
300 INJECTION, SOLUTION INTRAVENOUS ONCE
Refills: 0 | Status: COMPLETED | OUTPATIENT
Start: 2024-10-15 | End: 2024-10-15

## 2024-10-15 RX ADMIN — IRON SUCROSE 300 MILLIGRAM(S): 20 INJECTION, SOLUTION INTRAVENOUS at 12:24

## 2024-10-15 RX ADMIN — IRON SUCROSE 176.67 MILLIGRAM(S): 20 INJECTION, SOLUTION INTRAVENOUS at 11:00

## 2024-10-23 ENCOUNTER — APPOINTMENT (OUTPATIENT)
Dept: NEUROLOGY | Facility: CLINIC | Age: 64
End: 2024-10-23
Payer: COMMERCIAL

## 2024-10-23 PROCEDURE — 95816 EEG AWAKE AND DROWSY: CPT

## 2024-10-24 PROCEDURE — 95708 EEG WO VID EA 12-26HR UNMNTR: CPT

## 2024-10-25 PROCEDURE — 95700 EEG CONT REC W/VID EEG TECH: CPT

## 2024-10-25 PROCEDURE — 95708 EEG WO VID EA 12-26HR UNMNTR: CPT

## 2024-10-25 PROCEDURE — 95721 EEG PHY/QHP>36<60 HR W/O VID: CPT

## 2024-10-30 ENCOUNTER — TRANSCRIPTION ENCOUNTER (OUTPATIENT)
Age: 64
End: 2024-10-30

## 2024-10-30 ENCOUNTER — APPOINTMENT (OUTPATIENT)
Dept: NEUROLOGY | Facility: CLINIC | Age: 64
End: 2024-10-30

## 2024-10-30 PROBLEM — K86.2 PANCREATIC CYST: Status: ACTIVE | Noted: 2024-10-30

## 2024-11-06 ENCOUNTER — APPOINTMENT (OUTPATIENT)
Dept: NEUROLOGY | Facility: CLINIC | Age: 64
End: 2024-11-06

## 2024-11-08 ENCOUNTER — NON-APPOINTMENT (OUTPATIENT)
Age: 64
End: 2024-11-08

## 2024-11-11 ENCOUNTER — INPATIENT (INPATIENT)
Facility: HOSPITAL | Age: 64
LOS: 3 days | Discharge: HOME | End: 2024-11-15
Attending: PSYCHIATRY & NEUROLOGY | Admitting: PSYCHIATRY & NEUROLOGY
Payer: COMMERCIAL

## 2024-11-11 VITALS
TEMPERATURE: 99 F | WEIGHT: 153 LBS | DIASTOLIC BLOOD PRESSURE: 83 MMHG | OXYGEN SATURATION: 94 % | HEIGHT: 66 IN | SYSTOLIC BLOOD PRESSURE: 147 MMHG | RESPIRATION RATE: 18 BRPM | HEART RATE: 52 BPM

## 2024-11-11 DIAGNOSIS — E78.5 HYPERLIPIDEMIA, UNSPECIFIED: ICD-10-CM

## 2024-11-11 DIAGNOSIS — K21.9 GASTRO-ESOPHAGEAL REFLUX DISEASE WITHOUT ESOPHAGITIS: ICD-10-CM

## 2024-11-11 DIAGNOSIS — Z98.890 OTHER SPECIFIED POSTPROCEDURAL STATES: Chronic | ICD-10-CM

## 2024-11-11 DIAGNOSIS — Z29.9 ENCOUNTER FOR PROPHYLACTIC MEASURES, UNSPECIFIED: ICD-10-CM

## 2024-11-11 DIAGNOSIS — R56.9 UNSPECIFIED CONVULSIONS: ICD-10-CM

## 2024-11-11 DIAGNOSIS — Z86.73 PERSONAL HISTORY OF TRANSIENT ISCHEMIC ATTACK (TIA), AND CEREBRAL INFARCTION WITHOUT RESIDUAL DEFICITS: ICD-10-CM

## 2024-11-11 DIAGNOSIS — I10 ESSENTIAL (PRIMARY) HYPERTENSION: ICD-10-CM

## 2024-11-11 LAB
ALBUMIN SERPL ELPH-MCNC: 4.4 G/DL — SIGNIFICANT CHANGE UP (ref 3.3–5)
ALP SERPL-CCNC: 54 U/L — SIGNIFICANT CHANGE UP (ref 40–120)
ALT FLD-CCNC: 13 U/L — SIGNIFICANT CHANGE UP (ref 10–45)
ANION GAP SERPL CALC-SCNC: 8 MMOL/L — SIGNIFICANT CHANGE UP (ref 5–17)
APPEARANCE UR: CLEAR — SIGNIFICANT CHANGE UP
APTT BLD: 31.6 SEC — SIGNIFICANT CHANGE UP (ref 24.5–35.6)
AST SERPL-CCNC: 10 U/L — SIGNIFICANT CHANGE UP (ref 10–40)
BASOPHILS # BLD AUTO: 0.02 K/UL — SIGNIFICANT CHANGE UP (ref 0–0.2)
BASOPHILS NFR BLD AUTO: 0.4 % — SIGNIFICANT CHANGE UP (ref 0–2)
BILIRUB SERPL-MCNC: 0.3 MG/DL — SIGNIFICANT CHANGE UP (ref 0.2–1.2)
BILIRUB UR-MCNC: NEGATIVE — SIGNIFICANT CHANGE UP
BLD GP AB SCN SERPL QL: NEGATIVE — SIGNIFICANT CHANGE UP
BUN SERPL-MCNC: 10 MG/DL — SIGNIFICANT CHANGE UP (ref 7–23)
CALCIUM SERPL-MCNC: 8.8 MG/DL — SIGNIFICANT CHANGE UP (ref 8.4–10.5)
CHLORIDE SERPL-SCNC: 104 MMOL/L — SIGNIFICANT CHANGE UP (ref 96–108)
CO2 SERPL-SCNC: 30 MMOL/L — SIGNIFICANT CHANGE UP (ref 22–31)
COLOR SPEC: SIGNIFICANT CHANGE UP
CREAT SERPL-MCNC: 0.69 MG/DL — SIGNIFICANT CHANGE UP (ref 0.5–1.3)
DIFF PNL FLD: NEGATIVE — SIGNIFICANT CHANGE UP
EGFR: 104 ML/MIN/1.73M2 — SIGNIFICANT CHANGE UP
EOSINOPHIL # BLD AUTO: 0.04 K/UL — SIGNIFICANT CHANGE UP (ref 0–0.5)
EOSINOPHIL NFR BLD AUTO: 0.8 % — SIGNIFICANT CHANGE UP (ref 0–6)
GLUCOSE SERPL-MCNC: 131 MG/DL — HIGH (ref 70–99)
GLUCOSE UR QL: NEGATIVE MG/DL — SIGNIFICANT CHANGE UP
HCT VFR BLD CALC: 40.9 % — SIGNIFICANT CHANGE UP (ref 39–50)
HGB BLD-MCNC: 13.3 G/DL — SIGNIFICANT CHANGE UP (ref 13–17)
IMM GRANULOCYTES NFR BLD AUTO: 0.2 % — SIGNIFICANT CHANGE UP (ref 0–0.9)
INR BLD: 1.08 — SIGNIFICANT CHANGE UP (ref 0.85–1.16)
KETONES UR-MCNC: NEGATIVE MG/DL — SIGNIFICANT CHANGE UP
LEUKOCYTE ESTERASE UR-ACNC: NEGATIVE — SIGNIFICANT CHANGE UP
LYMPHOCYTES # BLD AUTO: 0.74 K/UL — LOW (ref 1–3.3)
LYMPHOCYTES # BLD AUTO: 15 % — SIGNIFICANT CHANGE UP (ref 13–44)
MCHC RBC-ENTMCNC: 29.8 PG — SIGNIFICANT CHANGE UP (ref 27–34)
MCHC RBC-ENTMCNC: 32.5 G/DL — SIGNIFICANT CHANGE UP (ref 32–36)
MCV RBC AUTO: 91.5 FL — SIGNIFICANT CHANGE UP (ref 80–100)
MONOCYTES # BLD AUTO: 0.32 K/UL — SIGNIFICANT CHANGE UP (ref 0–0.9)
MONOCYTES NFR BLD AUTO: 6.5 % — SIGNIFICANT CHANGE UP (ref 2–14)
NEUTROPHILS # BLD AUTO: 3.8 K/UL — SIGNIFICANT CHANGE UP (ref 1.8–7.4)
NEUTROPHILS NFR BLD AUTO: 77.1 % — HIGH (ref 43–77)
NITRITE UR-MCNC: NEGATIVE — SIGNIFICANT CHANGE UP
NRBC # BLD: 0 /100 WBCS — SIGNIFICANT CHANGE UP (ref 0–0)
PH UR: 7 — SIGNIFICANT CHANGE UP (ref 5–8)
PLATELET # BLD AUTO: 211 K/UL — SIGNIFICANT CHANGE UP (ref 150–400)
POTASSIUM SERPL-MCNC: 4 MMOL/L — SIGNIFICANT CHANGE UP (ref 3.5–5.3)
POTASSIUM SERPL-SCNC: 4 MMOL/L — SIGNIFICANT CHANGE UP (ref 3.5–5.3)
PROT SERPL-MCNC: 7.1 G/DL — SIGNIFICANT CHANGE UP (ref 6–8.3)
PROT UR-MCNC: NEGATIVE MG/DL — SIGNIFICANT CHANGE UP
PROTHROM AB SERPL-ACNC: 12.4 SEC — SIGNIFICANT CHANGE UP (ref 9.9–13.4)
RBC # BLD: 4.47 M/UL — SIGNIFICANT CHANGE UP (ref 4.2–5.8)
RBC # FLD: 12.8 % — SIGNIFICANT CHANGE UP (ref 10.3–14.5)
RH IG SCN BLD-IMP: POSITIVE — SIGNIFICANT CHANGE UP
SODIUM SERPL-SCNC: 142 MMOL/L — SIGNIFICANT CHANGE UP (ref 135–145)
SP GR SPEC: 1.02 — SIGNIFICANT CHANGE UP (ref 1–1.03)
UROBILINOGEN FLD QL: 1 MG/DL — SIGNIFICANT CHANGE UP (ref 0.2–1)
WBC # BLD: 4.93 K/UL — SIGNIFICANT CHANGE UP (ref 3.8–10.5)
WBC # FLD AUTO: 4.93 K/UL — SIGNIFICANT CHANGE UP (ref 3.8–10.5)

## 2024-11-11 PROCEDURE — 93010 ELECTROCARDIOGRAM REPORT: CPT

## 2024-11-11 PROCEDURE — 99223 1ST HOSP IP/OBS HIGH 75: CPT

## 2024-11-11 PROCEDURE — 99285 EMERGENCY DEPT VISIT HI MDM: CPT

## 2024-11-11 RX ORDER — LORAZEPAM 2 MG
2 TABLET ORAL ONCE
Refills: 0 | Status: DISCONTINUED | OUTPATIENT
Start: 2024-11-11 | End: 2024-11-15

## 2024-11-11 RX ORDER — PANTOPRAZOLE SODIUM 40 MG/1
40 TABLET, DELAYED RELEASE ORAL DAILY
Refills: 0 | Status: DISCONTINUED | OUTPATIENT
Start: 2024-11-11 | End: 2024-11-12

## 2024-11-11 RX ORDER — CLOPIDOGREL 75 MG/1
75 TABLET ORAL DAILY
Refills: 0 | Status: DISCONTINUED | OUTPATIENT
Start: 2024-11-11 | End: 2024-11-15

## 2024-11-11 RX ORDER — PANTOPRAZOLE SODIUM 40 MG/1
40 TABLET, DELAYED RELEASE ORAL
Refills: 0 | DISCHARGE

## 2024-11-11 RX ORDER — ACETAMINOPHEN 500 MG
650 TABLET ORAL EVERY 6 HOURS
Refills: 0 | Status: DISCONTINUED | OUTPATIENT
Start: 2024-11-11 | End: 2024-11-15

## 2024-11-11 RX ORDER — SERTRALINE HYDROCHLORIDE 50 MG/1
25 TABLET, FILM COATED ORAL DAILY
Refills: 0 | Status: DISCONTINUED | OUTPATIENT
Start: 2024-11-11 | End: 2024-11-15

## 2024-11-11 RX ORDER — LISINOPRIL 40 MG
20 TABLET ORAL DAILY
Refills: 0 | Status: DISCONTINUED | OUTPATIENT
Start: 2024-11-11 | End: 2024-11-15

## 2024-11-11 RX ORDER — ASPIRIN/MAG CARB/ALUMINUM AMIN 325 MG
81 TABLET ORAL DAILY
Refills: 0 | Status: DISCONTINUED | OUTPATIENT
Start: 2024-11-11 | End: 2024-11-15

## 2024-11-11 RX ORDER — LACOSAMIDE 100 MG/1
250 TABLET ORAL
Refills: 0 | Status: DISCONTINUED | OUTPATIENT
Start: 2024-11-11 | End: 2024-11-13

## 2024-11-11 RX ORDER — INFLUENZ VIR VAC TV P-SURF2003 15MCG/.5ML
0.5 SYRINGE (ML) INTRAMUSCULAR ONCE
Refills: 0 | Status: DISCONTINUED | OUTPATIENT
Start: 2024-11-11 | End: 2024-11-15

## 2024-11-11 RX ORDER — DONEPEZIL HYDROCHLORIDE 23 MG/1
10 TABLET ORAL AT BEDTIME
Refills: 0 | Status: DISCONTINUED | OUTPATIENT
Start: 2024-11-11 | End: 2024-11-15

## 2024-11-11 RX ORDER — FOLIC ACID 1 MG/1
1 TABLET ORAL DAILY
Refills: 0 | Status: DISCONTINUED | OUTPATIENT
Start: 2024-11-11 | End: 2024-11-15

## 2024-11-11 RX ORDER — MEMANTINE HYDROCHLORIDE 21 MG/1
5 CAPSULE, EXTENDED RELEASE ORAL DAILY
Refills: 0 | Status: DISCONTINUED | OUTPATIENT
Start: 2024-11-11 | End: 2024-11-15

## 2024-11-11 RX ADMIN — Medication 40 MILLIGRAM(S): at 21:47

## 2024-11-11 RX ADMIN — LACOSAMIDE 250 MILLIGRAM(S): 100 TABLET ORAL at 17:18

## 2024-11-11 RX ADMIN — DONEPEZIL HYDROCHLORIDE 10 MILLIGRAM(S): 23 TABLET ORAL at 21:47

## 2024-11-11 NOTE — H&P ADULT - NSHPLABSRESULTS_GEN_ALL_CORE
CBC Full  -  ( 11 Nov 2024 09:34 )  WBC Count : 4.93 K/uL  RBC Count : 4.47 M/uL  Hemoglobin : 13.3 g/dL  Hematocrit : 40.9 %  Platelet Count - Automated : 211 K/uL  Mean Cell Volume : 91.5 fl  Mean Cell Hemoglobin : 29.8 pg  Mean Cell Hemoglobin Concentration : 32.5 g/dL  Auto Neutrophil # : 3.80 K/uL  Auto Lymphocyte # : 0.74 K/uL  Auto Monocyte # : 0.32 K/uL  Auto Eosinophil # : 0.04 K/uL  Auto Basophil # : 0.02 K/uL  Auto Neutrophil % : 77.1 %  Auto Lymphocyte % : 15.0 %  Auto Monocyte % : 6.5 %  Auto Eosinophil % : 0.8 %  Auto Basophil % : 0.4 %    11-11    142  |  104  |  10  ----------------------------<  131[H]  4.0   |  30  |  0.69    Ca    8.8      11 Nov 2024 09:34    TPro  7.1  /  Alb  4.4  /  TBili  0.3  /  DBili  x   /  AST  10  /  ALT  13  /  AlkPhos  54  11-11    LIVER FUNCTIONS - ( 11 Nov 2024 09:34 )  Alb: 4.4 g/dL / Pro: 7.1 g/dL / ALK PHOS: 54 U/L / ALT: 13 U/L / AST: 10 U/L / GGT: x           PT/INR - ( 11 Nov 2024 09:34 )   PT: 12.4 sec;   INR: 1.08       PTT - ( 11 Nov 2024 09:34 )  PTT:31.6 sec

## 2024-11-11 NOTE — PATIENT PROFILE ADULT - FALL HARM RISK - RISK INTERVENTIONS
Assistance OOB with selected safe patient handling equipment/Assistance with ambulation/Communicate Fall Risk and Risk Factors to all staff, patient, and family/Discuss with provider need for PT consult/Monitor gait and stability/Reinforce activity limits and safety measures with patient and family/Visual Cue: Yellow wristband/Bed in lowest position, wheels locked, appropriate side rails in place/Call bell, personal items and telephone in reach/Instruct patient to call for assistance before getting out of bed or chair/Non-slip footwear when patient is out of bed/[place] to call system/Physically safe environment - no spills, clutter or unnecessary equipment/Purposeful Proactive Rounding/Room/bathroom lighting operational, light cord in reach

## 2024-11-11 NOTE — H&P ADULT - PROBLEM SELECTOR PLAN 7
Nutrition & Prophylaxis:    F: None  E:  K>4, Mg>2, Phos >3  N: Regular diet  DVT PPx: SCDs  GI PPx: Protonix 40 mg PO QD  Dispo: [id number]  Code: FULL CODE Nutrition & Prophylaxis:    F: None  E:  K>4, Mg>2, Phos >3  N: DASH diet  DVT PPx: SCDs  GI PPx: Protonix 40 mg PO QD  Dispo: 7[facility]LLMAN  Code: FULL CODE

## 2024-11-11 NOTE — CHART NOTE - NSCHARTNOTEFT_GEN_A_CORE
[employer] Cognitive Assessment    Visuospatial/ Executive 1/5  Naming 2/3  Attention 1/6  Language 0/3  Abstraction 0/2  Delayed Recall 0/5  Orientation 2/6    Total 6/30

## 2024-11-11 NOTE — ED PROVIDER NOTE - OBJECTIVE STATEMENT
64-year-old male with history of CVA, seizures, dementia presents today as he was sent in by his neurologist Dr. Guillen for admission for seizures.  Patient has a history of both epileptic and nonepileptic seizures and had an EEG done on October 23 that showed worsening seizure activity.  Patient was contacted by Dr. Guillen and was sent to the ED for admission and evaluation by epilepsy.  I spoke to Dr. Guillen who states that he spoke to Dr. Gonzáles today and she is aware of patient.  Patient is on Vimpat 250 mg daily.  Per wife, patient is compliant with all his medications.  Patient denies any complaints.  Most of history is obtained from his wife who is at bedside.  No other complaints. 64 yo male with pmhx of CVA w/ loop recorder, on Plavix and 81mg ASA, no residual deficits, seizures (on Lacosamide), HTN, iron deficiency anemia, GERD p    64-year-old male with history of CVA, seizures, dementia presents today as he was sent in by his neurologist Dr. Guillen for admission for seizures.  Patient has a history of both epileptic and nonepileptic seizures and had an EEG done on October 23 that showed worsening seizure activity.  Patient was contacted by Dr. Guillen and was sent to the ED for admission and evaluation by epilepsy.  I spoke to Dr. Guillen who states that he spoke to Dr. Gonzáles today and she is aware of patient.  Patient is on Vimpat 250 mg daily.  Per wife, patient is compliant with all his medications.  Patient denies any complaints.  Most of history is obtained from his wife who is at bedside.  No other complaints. 64-year-old male with history of CVA, seizures (Vimpat 250 mg daily), cognitive dysfunction, HTN, iron deficiency anemia, GERD presents today as he was sent in by his neurologist Dr. Guillen for admission for seizures.  Patient has a history of both epileptic and nonepileptic seizures and had an EEG done on October 23 that showed worsening seizure activity.  Patient was contacted by Dr. Guillen and was sent to the ED for admission and evaluation by epilepsy. I spoke to Dr. Guillen who states that he spoke to Dr. Gonzáles today and she is aware of patient.  Patient is on Vimpat 250 mg daily.  Per wife, patient is compliant with all his medications.  Patient denies any complaints.  Most of history is obtained from his wife who is at bedside. Denies CP, SOB, abd pain, headaches, focal neuro sxs, fevers, chills, N/V/D.

## 2024-11-11 NOTE — H&P ADULT - PROBLEM SELECTOR PLAN 1
Onset of seizures started 9 months after having Stroke and have been occurring every week in October and last seizure was November 3rd, Recent ambulatory EEG from October 22-25, 2024 showed increased seizure activity within right frontotemporal region and 1 seizure recorded.    1. Admit to EMU  2. VEEG monitoring   3. Ativan 2mg IVP PRN for seizures > 2mins  4. Labs including  5. Neurological assessment Q8hrs  6. Maintain Seizure/Fall/Aspiration precautions Onset of seizures started 9 months after having Stroke and have been occurring every week in October and last seizure was November 3rd, Recent ambulatory EEG from October 22-25, 2024 showed right occasional frontotemporal epileptiform discharges with 1 recorded seizure from the same region.    1. Admit to EMU  2. VEEG monitoring   3. Ativan 2mg IVP PRN for seizures > 2mins  4. Follow up Labs including vimpat level  5. Neurological & vitals assessment Q8hrs  6. Maintain Seizure/Fall/Aspiration precautions Onset of seizures started 9 months after having Stroke and have been occurring every week in October and last seizure was November 3rd, Recent ambulatory EEG from October 22-25, 2024 showed right occasional frontotemporal epileptiform discharges with 1 recorded seizure from the same region.    1. Admit to EMU  2. VEEG monitoring   3. Ativan 2mg IVP PRN for seizures > 2mins  4. Follow up Labs including vimpat level  5. Continue home med Lacosamide 250mg BID,   6. Neurological & vitals assessment Q8hrs  7. Maintain Seizure/Fall/Aspiration precautions

## 2024-11-11 NOTE — ED ADULT NURSE NOTE - OBJECTIVE STATEMENT
Per wife pt arrived for admit to neuro for epilepsy seizures. Pt hx stroke with residual deficits with speech difficulties and dementia. Pt denies acute pain.

## 2024-11-11 NOTE — PATIENT PROFILE ADULT - NSPROMEDSBROUGHTTOHOSP_GEN_A_NUR
Incoming refill request on patient's medication: Normal Request.    No protocol for requested medication.  Medication:   Outpatient Medication Detail   Disp Refills Start End    amphetamine-dextroamphetamine (ADDERALL XR) 25 MG 24 hr capsule 30 capsule 0 10/4/2024 --    Sig - Route: Take 1 capsule by mouth daily. Begin taking on October 4, 2024. - Oral    Outpatient Medication Detail   Disp Refills Start End    amphetamine-dextroamphetamine (ADDERALL) 10 MG tablet 30 tablet 0 10/4/2024 --    Sig - Route: Take 1 tablet by mouth daily. Begin taking on October 4, 2024. - Oral      Last office visit date: 9/4/24  Pharmacy: [address]     Order pended, routed to clinician for review.     CONTROLLED MEDICATION:  Medication: (ADDERALL XR) 25 MG  Last Written Ordered date: 9/4/24 for 10/4/24  Last PDMP Rx Dispense date (Sold date, if available): Dispensed: 10/16/2024    Medication: (ADDERALL) 10 MG  Last Written Ordered date: 9/4/24 and 10/4/24  Last PDMP Rx Dispense date (Sold date, if available): Dispensed: 10/4/2024    Follow Up Recommendation: 3 Months.     No Show/Cancels in Last 12 Months: None    Next Visit Date: 12/4/2024    Routed To The Physician Only.       no

## 2024-11-11 NOTE — H&P ADULT - ASSESSMENT
63-year-old-right-handed male w PMH of Stroke (2020) w/ loop recorder on Plavix and 81mg, ASA, no motor residual deficits, early dementia (2020), hernia repair, HTN, HLD, GERD, seizure disorder recently started on Vimpat 250 mg bid, is here for elective EMU admission for increased frequency of his seizures while undergoing vEEG monitoring.

## 2024-11-11 NOTE — ED ADULT TRIAGE NOTE - CHIEF COMPLAINT QUOTE
Sent for admission per neuro for epileptic seizures, Dr Chery  Last seizure 11/3  PMH stroke with residual deficits with speech difficulties and dementia

## 2024-11-11 NOTE — H&P ADULT - PROBLEM SELECTOR PLAN 2
Pt has H/O stroke 2020    - Continue home med aspirin Pt has H/O stroke 2020    - Continue home med aspirin 81mg PO Daily  - Continue home med Plavix 75mg PO Daily

## 2024-11-11 NOTE — H&P ADULT - NSHPPHYSICALEXAM_GEN_ALL_CORE
Constitutional: Appearance is consistent with chronologic age. No acute distress  Cardiovascular: Regular rate and rhythm, no murmurs, rubs, or gallops. Extremities are warm and well perfused. Capillary refill is less than 2 seconds.   Pulmonary: Anterior breath sounds clear bilaterally, no crackles or wheezing. No use of accessory muscles  GI: Positive bowel sounds. Abdomen soft, non-distended, non-tender. No guarding or rebound.  Extremities: No significant deformity or joint abnormality. Radial and DP pulses +2, No edema.  Skin: normal color, texture and turgor with no lesions or eruptions.    Neurologic:  -Mental status: + Disoriented to date and situation. Awake, alert, oriented to person, place. Speech is fluent with intact naming, repetition, and comprehension, no dysarthria. + Mild aphasia. Recall 1/3. Follows commands. Attention/concentration intact.   -Cranial nerves:   II: Visual fields are full to confrontation.  III, IV, VI: Extraocular movements are intact without nystagmus. Pupils equally round and reactive to light. 3mm Brisk.   V:  Facial sensation V1-V3 equal and intact   VII: Face is symmetric with normal eye closure and smile  VIII: Hearing is bilaterally intact to finger rub  IX, X: Uvula is midline and soft palate rises symmetrically  XI: Head turning and shoulder shrug are intact.  XII: Tongue protrudes midline  Motor: Normal bulk and tone. No pronator drift.   Strength:     [] Upper extremity                      Delt       Bicep    Tricep                                                  R         5/5        5/5        5/5       5/5                                               L          5-/5      5-/5       5-/5      5-/5  [] Lower extremity                       HF          KE          KF        DF         PF                                               R        5/5        5/5        5/5       5/5       5/5                                               L         5-/5        5-/5      5-/5      5-/5     5-/5    Sensation: Intact to light touch in all extremities.  Coordination: No dysmetria on finger-to-nose bilaterally  Reflexes: 2+ b/l biceps, triceps, patella and achilles. Plantar response downgoing b/l   Gait: Toe/heel and Tandem unsteady. Romberg negative.

## 2024-11-11 NOTE — H&P ADULT - NSICDXPASTMEDICALHX_GEN_ALL_CORE_FT
PAST MEDICAL HISTORY:  Anemia     CVA (cerebral vascular accident)     Dementia, old age     Hypertension     Prophylactic measure     Seizure

## 2024-11-11 NOTE — H&P ADULT - HISTORY OF PRESENT ILLNESS
Jeromy Rojas is a 63-year-old male w PMH of Stroke (20.....),  seizure disorder recently started on Vimpat 250 mg bid, is here for EMU admission for increased frequency of his seizures.      From OP notes:   Reviewed EEG with patient's wife - increased seizure activity within right frontotemporal region and 1 seizure   recorded. Plan for EMU admission next week, patient plans to present to ED on Monday and alert the office prior to   arrival. Patient's wife verbalized understanding and will call with any additional questions or concerns.    Since last visit, wife reports he has been having seizures monthly. They reported 1 seizure in May, 1 seizure in   June, 2 seizures in July and 3 seizures this month. Last episode was 3 days ago. The seizures are similar to his   past episodes. The seizure lasts for a minute but per wife, it takes him along time to move, he is able to talk but   cannot move for almost 20 minutes. They are unable to tell if there are triggers causing the seizures this time. Two  episodes, he was in bed, watching TV, they heard the banging of his head onto headboard and saw him trembling.  He was admitted to the hospital in May for anemia. He received blood transfusion x2 and outpatient as well.  He remains independent with all ADLs and no episode of incontinence. Denies falls  His wife states he refuses to talk these days as he gets frustrated easily when the words cannot come out when   he wants to.  Current medications  VIMPAT 250mg BID  DONEPEZIL 10 MG QHS  LISINOPRIL 20  ATORVASTATIN 40  NAMENDA 5 MG  Sertraline 25  ASA81  Plavix 75  Folic acid  Protonix  Interval hx 10/24/23  He had a seizure 10/18/23 for a couple seconds. He was sitting and watching TV, he seized and fell with no head   injury. Post seizure, he was confused for a couple minutes but it took him about 20 minutes to get up off of the   floor due to his left side being "dead weight" as per his wife. He continues to Vimpat every day and hasn't missed   a day. Unknown if there was anything that could have triggered the seizure. A month prior, he had a seizure   possibly due to the brightness of the light. The seizure only lasted seconds with some shaking. He didn't go to the   ER and hasn't had any new imaging after these events.  CT scan from May was normal.  ______  Interval seizures  May 6th and was found on the floor - he has no memory of what happened at that one. He was alert. He bruised   his face and glasses broke.   April 24th had another seizure and collapsed with clonic activity and bit his tongue. Brief seizure with 10 min of   post ictal confusion  April 12th - virtual speech therapy - said Jeromy are you ok. he was trying to talk but nothing came out - lasted 90   sec.             In the previous emu he had evidence of seizure risk and TAD.   Not smoking or drinking  wife thinks he is frustrated. She reprts that he is taking his meds religiously.  Interval 1/12/23  patient had a seizure in November and fell of the bed and had to go to [place]. 2 additional events happened in   December but occurred after he had a fright about his daughter. last event was on Janet day.   going to the gym and walking better.   also appears at times to have more confusion occasionally.   Interval course:   Seizure april 24th had a GTC sz lasting 1 min. Patient stayed at home because his wife felt he got back to normal   right way. 1 sz in 6 months.   Sleep was ok. No alcohol.   Still having accidents with bladder in depends.   HPI: Patient here for stroke and Alzheimer's  Wife who is here with him he is stable and has not gotten worse.   He is playing puzzles but not doing much physical activity due to weather. Getting virtual speech therapy.   Seizures - x 2 since December. and January 29th  very brief.   compliant with Vimpat.   was dancing at his son's wedding  indep with adls.   KEPPRA WAS STOPPED. mOST OF THE SEIZURES OCCURRED AFTER BEING DISCONNECTED   went to rehab   now independent with adls but is observed when he oes out          63-year-old-right-handed male w PMH of Stroke (2020) w/ loop recorder on Plavix and 81mg, ASA, no motor residual deficits, early dementia (2020), hernia repair, HTN, HLD, GERD, seizure disorder recently started on Vimpat 250 mg bid, is here for elective EMU admission for increased frequency of his seizures while undergoing vEEG monitoring. Wife at bedside reported seizures started 9 months after having stroke and have been occurring every week in October and last seizure was November 3rd, when patient and family went out to see a movie. Wife endorse patient started leaning towards the left side in the car, ultimately pushing her and had rhythmic shaking of the right arm that last for ~1 minute but pos ictal was confused for ~40 minutes. Denies any tongue bite, or fecal incontinence. Wife reported patient has been incontinent and wears adult diapers since his stroke in 2020. Recent ambulatory EEG from October 22-25, 2024 showed increased seizure activity within right frontotemporal region and 1 seizure recorded. Denies any head trauma with LOC, febrile seizures, family history of seizures, meningitis, encephalitis or any brain related conditions.                      63-year-old-right-handed male w PMH of Stroke (2020) w/ loop recorder on Plavix and 81mg, ASA, no motor residual deficits, early dementia (2020), hernia repair, HTN, HLD, GERD, seizure disorder recently started on Vimpat 250 mg bid, is here for elective EMU admission for increased frequency of his seizures while undergoing vEEG monitoring. Wife at bedside reported seizures started 9 months after having stroke and have been occurring every week in October and last seizure was November 3rd, when patient and family went out to see a movie. Wife endorse patient started leaning towards the left side in the car, ultimately pushing her and had rhythmic shaking of the right arm that last for ~1 minute but pos ictal was confused for ~40 minutes. Denies any tongue bite, or fecal incontinence. Wife reported patient has been incontinent and wears adult diapers since his stroke in 2020. Recent ambulatory EEG from October 22-25, 2024 showed increased seizure activity within right frontotemporal region and 1 seizure recorded. Denies any head trauma with LOC, febrile seizures, family history of seizures, meningitis, encephalitis or any brain related conditions.  63-year-old-right-handed male w PMH of Stroke (2020) w/ loop recorder on Plavix and 81mg, ASA, no motor residual deficits, early dementia (2020), hernia repair, HTN, HLD, GERD, seizure disorder recently started on Vimpat 250 mg BID (, is here for elective EMU admission for increased frequency of his seizures while undergoing vEEG monitoring. Wife at bedside reported seizures started 9 months after having stroke and have been occurring every week in October and last seizure was November 3rd, when patient and family went out to see a movie. Wife endorse patient started leaning towards the left side in the car, ultimately pushing her and had rhythmic shaking of the right arm that last for ~1 minute but pos ictal was confused for ~40 minutes. Denies any tongue bite, or fecal incontinence. Wife reported patient has been incontinent and wears adult diapers since his stroke in 2020. Recent ambulatory EEG from October 22-25, 2024 showed right occasional frontotemporal epileptiform discharges with 1 recorded seizure from the same region. Denies any head trauma with LOC, febrile seizures, family history of seizures, meningitis, encephalitis or any brain related conditions.

## 2024-11-11 NOTE — H&P ADULT - PROBLEM SELECTOR PLAN 6
Nutrition & Prophylaxis:    F: None  E:  K>4, Mg>2, Phos >3  N: Regular diet  DVT PPx: SCDs  GI PPx: Protonix 40 mg PO QD  Dispo: [id number]  Code: FULL CODE Pt has H/O early dementia (2020).    -Continue home med Donepezil 10mg daily at bedtime

## 2024-11-11 NOTE — ED PROVIDER NOTE - CLINICAL SUMMARY MEDICAL DECISION MAKING FREE TEXT BOX
64-year-old male with history of CVA, seizures (Vimpat 250 mg daily), cognitive dysfunction, HTN, iron deficiency anemia, GERD presents today as he was sent in by his neurologist Dr. Guillen for admission for seizures.  Patient has a history of both epileptic and nonepileptic seizures and had an EEG done on October 23 that showed worsening seizure activity.  Patient was contacted by Dr. Guillen and was sent to the ED for admission and evaluation by epilepsy. I spoke to Dr. Guillen who states that he spoke to Dr. Gonzáles today and she is aware of patient.  Patient is on Vimpat 250 mg daily.  Per wife, patient is compliant with all his medications.  Patient denies any complaints.  Most of history is obtained from his wife who is at bedside. Denies CP, SOB, abd pain, headaches, focal neuro sxs, fevers, chills, N/V/D.     ED course: Vital signs noted.  Patient mildly hypertensive and bradycardic with heart rate in 50s.  ECG with sinus bradycardia, HR 47, no acute ST-T changes.  Labs/studies noted and unremarkable. ase discussed with Dr. Guillen.  Case also discussed with epilepsy team and patient admitted to the epilepsy service.  Plan discussed with patient and family. Stable in ED.

## 2024-11-11 NOTE — H&P ADULT - NSHPREVIEWOFSYSTEMS_GEN_ALL_CORE
CONSTITUTIONAL:  No weight loss, fever, chills, weakness or fatigue.   HEENT:  Eyes:  No visual loss, blurred vision, double vision or yellow sclerae. Ears, Nose, Throat:  No hearing loss, sneezing, congestion, runny nose or sore throat.  SKIN:  No rash or itching.  CARDIOVASCULAR:  No chest pain, chest pressure or chest discomfort. No palpitations or edema.  RESPIRATORY:  No shortness of breath, cough or sputum.  GASTROINTESTINAL:  No anorexia, nausea, vomiting or diarrhea. No abdominal pain or blood.  GENITOURINARY: No Burning on urination.   NEUROLOGICAL:  see HPI  MUSCULOSKELETAL:  No muscle, back pain, joint pain or stiffness.

## 2024-11-11 NOTE — H&P ADULT - NS ATTEND AMEND GEN_ALL_CORE FT
Follows with Dr. Guillen as outpt  - dementia, post-stroke epilepsy; about a seizure every week; here to monitor for events.    Pt poor cognitive function but pleasant.    Admit to monitor for events and potential change medication regimen

## 2024-11-12 LAB
APTT BLD: 32.9 SEC — SIGNIFICANT CHANGE UP (ref 24.5–35.6)
INR BLD: 1.04 — SIGNIFICANT CHANGE UP (ref 0.85–1.16)
PROTHROM AB SERPL-ACNC: 12 SEC — SIGNIFICANT CHANGE UP (ref 9.9–13.4)

## 2024-11-12 PROCEDURE — 93010 ELECTROCARDIOGRAM REPORT: CPT

## 2024-11-12 PROCEDURE — 99223 1ST HOSP IP/OBS HIGH 75: CPT

## 2024-11-12 PROCEDURE — 99233 SBSQ HOSP IP/OBS HIGH 50: CPT

## 2024-11-12 PROCEDURE — 95720 EEG PHY/QHP EA INCR W/VEEG: CPT

## 2024-11-12 RX ORDER — PANTOPRAZOLE SODIUM 40 MG/1
40 TABLET, DELAYED RELEASE ORAL DAILY
Refills: 0 | Status: DISCONTINUED | OUTPATIENT
Start: 2024-11-12 | End: 2024-11-15

## 2024-11-12 RX ORDER — QUETIAPINE FUMARATE 200 MG/1
12.5 TABLET ORAL ONCE
Refills: 0 | Status: COMPLETED | OUTPATIENT
Start: 2024-11-12 | End: 2024-11-12

## 2024-11-12 RX ADMIN — FOLIC ACID 1 MILLIGRAM(S): 1 TABLET ORAL at 11:34

## 2024-11-12 RX ADMIN — Medication 40 MILLIGRAM(S): at 21:40

## 2024-11-12 RX ADMIN — CLOPIDOGREL 75 MILLIGRAM(S): 75 TABLET ORAL at 11:34

## 2024-11-12 RX ADMIN — Medication 20 MILLIGRAM(S): at 06:41

## 2024-11-12 RX ADMIN — DONEPEZIL HYDROCHLORIDE 10 MILLIGRAM(S): 23 TABLET ORAL at 21:40

## 2024-11-12 RX ADMIN — LACOSAMIDE 250 MILLIGRAM(S): 100 TABLET ORAL at 06:41

## 2024-11-12 RX ADMIN — Medication 81 MILLIGRAM(S): at 11:33

## 2024-11-12 RX ADMIN — PANTOPRAZOLE SODIUM 40 MILLIGRAM(S): 40 TABLET, DELAYED RELEASE ORAL at 11:34

## 2024-11-12 RX ADMIN — LACOSAMIDE 250 MILLIGRAM(S): 100 TABLET ORAL at 18:03

## 2024-11-12 RX ADMIN — SERTRALINE HYDROCHLORIDE 25 MILLIGRAM(S): 50 TABLET, FILM COATED ORAL at 11:33

## 2024-11-12 RX ADMIN — MEMANTINE HYDROCHLORIDE 5 MILLIGRAM(S): 21 CAPSULE, EXTENDED RELEASE ORAL at 11:33

## 2024-11-12 RX ADMIN — QUETIAPINE FUMARATE 12.5 MILLIGRAM(S): 200 TABLET ORAL at 22:18

## 2024-11-12 NOTE — CONSULT NOTE ADULT - ASSESSMENT
64yo M w PMH of Stroke (2020) w/ loop recorder on Plavix and 81mg, ASA, no motor residual deficits, early dementia (2020), hernia repair, HTN, HLD, GERD, seizure disorder recently started on Vimpat 250 mg bid, admitted on 11/11/2024 for elective EMU admission for increased frequency of his seizures while undergoing vEEG monitoring. vEEG placed on 11/11/2024 shows frequent right temporal polymorphic delta activity and rare rIght frontal epileptiform spikes.    #Epilepsy   VEEG: Frequent (10-49%) right temporal polymorphic delta activity.Rare rIght frontal (maximal F8/F4) spikes, no seizures.  On Vimpat at home, continue   Management as per epilepsy team     #Stroke   Continue home Asa, Plavix and atorvastatin     #HTN  Continue home lisinopril     #GERD  Continue home protonix     #Early onset dementia   Continue home Donepezil    DVT PPx: not on chemical DVT ppx  SCD boots

## 2024-11-12 NOTE — PROGRESS NOTE ADULT - PROBLEM SELECTOR PLAN 3
Pt has H/O HTN    - Continue home med. Pt has H/O HTN    - Continue home med Lisinopril 20mg PO daily

## 2024-11-12 NOTE — EEG REPORT - NS EEG TEXT BOX
[facility] Department of Neurology  Epilepsy Monitoring Unit video-Electroencephalogram  [address] [place] [address], [address] [place] [id number], T: [phone]    Patient Name:	DARIELA TOM    :	1960  MRN:	[id number]    Study Start Date/Time:	2024, 3:59:41 PM  Study End Date/Time:	    Referred by:  Celina Guillen MD    Brief Clinical History:  DARIELA TOM is a 64 year old Male with dementia, stroke and epilepsy and recent ambEEG showed right fronto-temporal epileptiform activity and a seizure; study performed to investigate for seizures or markers of epilepsy.   Technologist notes: -  Diagnosis Code:  R56.9 convulsions/seizure    Patient Medication:  LCM 250mg bid    Acquisition Details:  Electroencephalography was acquired using a minimum of 21 channels on an [employer] system v 9.3.1 with electrode placement according to the standard International 10-20 system following ACNS (American Clinical Neurophysiology Society) guidelines.  Anterior temporal T1 and T2 electrodes were utilized whenever possible.  The [employer] automated spike & seizure detections were reviewed in detail, in addition to the entire raw EEG.  The live video was monitored continuously by trained technicians to identify events and specialty nurses trained in seizure management supervised the care of the patient in the epilepsy unit.    Findings:  Day 1 2024, 3:59:41 PM to next morning at 07:00 AM.  Background:  continuous, with predominantly alpha and beta frequencies.  Voltage:  Normal (20+ uV)  Organization:  Appropriate anterior-posterior gradient  Posterior Dominant Rhythm:  9 Hz symmetric, well-organized, and well-modulated  Sleep:  Symmetric, synchronous spindles and K complexes.  Focal abnormalities:  Frequent (10-49%) right temporal polymorphic delta activity.  Spontaneous Activity:  Rare (<1/Hr)   RIght frontal (maximal F8/F4)  Epileptiform spikes (20-70msec).   Events:  •	No electrographic seizures or significant clinical events occurred during this study.  Provocations:  •	Hyperventilation: was not performed.  •	Photic stimulation: was not performed.  Daily Summary:    •	Frequent (10-49%) right temporal polymorphic delta activity.  •	Rare rIght frontal (maximal F8/F4) spikes, no seizures.      Bernard Lundy MD  Attending Neurologist, [facility] Epilepsy Program

## 2024-11-12 NOTE — PROGRESS NOTE ADULT - SUBJECTIVE AND OBJECTIVE BOX
EPILEPSY PROGRESS NOTE:  No events overnight. No complaints currently.    REVIEW OF SYSTEMS:  CONSTITUTIONAL:  No weight loss, fever, chills, weakness or fatigue.   HEENT:  Eyes:  No visual loss, blurred vision, double vision or yellow sclerae. Ears, Nose, Throat:  No hearing loss, sneezing, congestion, runny nose or sore throat.  SKIN:  No rash or itching.  CARDIOVASCULAR:  No chest pain, chest pressure or chest discomfort. No palpitations or edema.  RESPIRATORY:  No shortness of breath, cough or sputum.  GASTROINTESTINAL:  No anorexia, nausea, vomiting or diarrhea. No abdominal pain or blood.  GENITOURINARY: No Burning on urination.   NEUROLOGICAL:  see HPI  MUSCULOSKELETAL:  No muscle, back pain, joint pain or stiffness.    MEDICATIONS:   MEDICATIONS  (STANDING):  aspirin enteric coated 81 milliGRAM(s) Oral daily  atorvastatin 40 milliGRAM(s) Oral at bedtime  clopidogrel Tablet 75 milliGRAM(s) Oral daily  donepezil 10 milliGRAM(s) Oral at bedtime  folic acid 1 milliGRAM(s) Oral daily  influenza   Vaccine 0.5 milliLiter(s) IntraMuscular once  lacosamide 250 milliGRAM(s) Oral two times a day  lisinopril 20 milliGRAM(s) Oral daily  memantine 5 milliGRAM(s) Oral daily  pantoprazole   Suspension 40 milliGRAM(s) Oral daily  sertraline 25 milliGRAM(s) Oral daily    MEDICATIONS  (PRN):  acetaminophen     Tablet .. 650 milliGRAM(s) Oral every 6 hours PRN Temp greater or equal to 38C (100.4F), Mild Pain (1 - 3)  LORazepam   Injectable 2 milliGRAM(s) IV Push once PRN Seizure Activity      VITAL SIGNS:  Vital Signs Last 24 Hrs  T(C): 36.8 (12 Nov 2024 05:36), Max: 37.3 (11 Nov 2024 09:14)  T(F): 98.3 (12 Nov 2024 05:36), Max: 99.1 (11 Nov 2024 09:14)  HR: 41 (12 Nov 2024 05:36) (41 - 62)  BP: 125/60 (12 Nov 2024 05:36) (125/60 - 160/76)  BP(mean): --  RR: 17 (12 Nov 2024 05:36) (16 - 18)  SpO2: 95% (12 Nov 2024 05:36) (94% - 97%)    Parameters below as of 12 Nov 2024 05:36  Patient On (Oxygen Delivery Method): room air        PHYSICAL EXAM:  Constitutional: Appearance is consistent with chronologic age. No acute distress  Cardiovascular: Regular rate and rhythm, no murmurs, rubs, or gallops. Extremities are warm and well perfused. Capillary refill is less than 2 seconds. No carotid bruits.   Pulmonary: Anterior breath sounds clear bilaterally, no crackles or wheezing. No use of accessory muscles  GI: Positive bowel sounds. Abdomen soft, non-distended, non-tender. No guarding or rebound. No masses.  Extremities: No significant deformity or joint abnormality. Radial and DP pulses +2, No edema.  Skin: normal color, texture and turgor with no lesions or eruptions.    Neurologic:  -Mental status: Awake, alert, oriented to person, place, and date. Speech is fluent with intact naming, repetition, and comprehension, no dysarthria. Recent and remote memory intact. Follows commands. Attention/concentration intact.   -Cranial nerves:   II: Visual fields are full to confrontation.  III, IV, VI: Extraocular movements are intact without nystagmus. Pupils equally round and reactive to light. 3mm Brisk.   V:  Facial sensation V1-V3 equal and intact   VII: Face is symmetric with normal eye closure and smile  VIII: Hearing is bilaterally intact to finger rub  IX, X: Uvula is midline and soft palate rises symmetrically  XI: Head turning and shoulder shrug are intact.  XII: Tongue protrudes midline  Motor: Normal bulk and tone. No pronator drift.   Strength:     [] Upper extremity                      Delt       Bicep    Tricep                                                  R         5/5        5/5        5/5       5/5                                               L          5/5        5/5        5/5       5/5  [] Lower extremity                       HF          KE          KF        DF         PF                                               R        5/5        5/5        5/5       5/5       5/5                                               L         5/5        5/5       5/5       5/5        5/5    Rapid alternating movements intact and symmetric  Sensation: Intact to light touch in all extremities.  Coordination: No dysmetria on finger-to-nose and heel-to-shin bilaterally  Reflexes: 2+ b/l biceps, triceps, patella and achilles. Plantar response downgoing b/l   Gait: Toe/heel/ Tandem/ Romberg    LABS:  CBC Full  -  ( 11 Nov 2024 09:34 )  WBC Count : 4.93 K/uL  RBC Count : 4.47 M/uL  Hemoglobin : 13.3 g/dL  Hematocrit : 40.9 %  Platelet Count - Automated : 211 K/uL  Mean Cell Volume : 91.5 fl  Mean Cell Hemoglobin : 29.8 pg  Mean Cell Hemoglobin Concentration : 32.5 g/dL  Auto Neutrophil # : 3.80 K/uL  Auto Lymphocyte # : 0.74 K/uL  Auto Monocyte # : 0.32 K/uL  Auto Eosinophil # : 0.04 K/uL  Auto Basophil # : 0.02 K/uL  Auto Neutrophil % : 77.1 %  Auto Lymphocyte % : 15.0 %  Auto Monocyte % : 6.5 %  Auto Eosinophil % : 0.8 %  Auto Basophil % : 0.4 %    11-11    142  |  104  |  10  ----------------------------<  131[H]  4.0   |  30  |  0.69    Ca    8.8      11 Nov 2024 09:34    TPro  7.1  /  Alb  4.4  /  TBili  0.3  /  DBili  x   /  AST  10  /  ALT  13  /  AlkPhos  54  11-11    LIVER FUNCTIONS - ( 11 Nov 2024 09:34 )  Alb: 4.4 g/dL / Pro: 7.1 g/dL / ALK PHOS: 54 U/L / ALT: 13 U/L / AST: 10 U/L / GGT: x           PT/INR - ( 12 Nov 2024 07:46 )   PT: 12.0 sec;   INR: 1.04          PTT - ( 12 Nov 2024 07:46 )  PTT:32.9 sec      Radiology and Other Testings:    EEG: EPILEPSY PROGRESS NOTE:  Patient was bradycardic 41-51 last night, EP called to interrogate internal loop recorder. Patient reported no auras/seizure events overnight. No complaints currently.    REVIEW OF SYSTEMS:  CONSTITUTIONAL:  No weight loss, fever, chills, weakness or fatigue.   HEENT:  Eyes:  No visual loss, blurred vision, double vision or yellow sclerae. Ears, Nose, Throat:  No hearing loss, sneezing, congestion, runny nose or sore throat.  SKIN:  No rash or itching.  CARDIOVASCULAR:  No chest pain, chest pressure or chest discomfort. No palpitations or edema.  RESPIRATORY:  No shortness of breath, cough or sputum.  GASTROINTESTINAL:  No anorexia, nausea, vomiting or diarrhea. No abdominal pain or blood.  GENITOURINARY: No Burning on urination.   NEUROLOGICAL:  see HPI  MUSCULOSKELETAL:  No muscle, back pain, joint pain or stiffness.    MEDICATIONS:  MEDICATIONS  (STANDING):  aspirin enteric coated 81 milliGRAM(s) Oral daily  atorvastatin 40 milliGRAM(s) Oral at bedtime  clopidogrel Tablet 75 milliGRAM(s) Oral daily  donepezil 10 milliGRAM(s) Oral at bedtime  folic acid 1 milliGRAM(s) Oral daily  influenza   Vaccine 0.5 milliLiter(s) IntraMuscular once  lacosamide 250 milliGRAM(s) Oral two times a day  lisinopril 20 milliGRAM(s) Oral daily  memantine 5 milliGRAM(s) Oral daily  pantoprazole   Suspension 40 milliGRAM(s) Oral daily  sertraline 25 milliGRAM(s) Oral daily    MEDICATIONS  (PRN):  acetaminophen     Tablet .. 650 milliGRAM(s) Oral every 6 hours PRN Temp greater or equal to 38C (100.4F), Mild Pain (1 - 3)  LORazepam   Injectable 2 milliGRAM(s) IV Push once PRN Seizure Activity      VITAL SIGNS:  Vital Signs Last 24 Hrs  T(C): 36.8 (12 Nov 2024 05:36), Max: 37.3 (11 Nov 2024 09:14)  T(F): 98.3 (12 Nov 2024 05:36), Max: 99.1 (11 Nov 2024 09:14)  HR: 41 (12 Nov 2024 05:36) (41 - 62)  BP: 125/60 (12 Nov 2024 05:36) (125/60 - 160/76)  BP(mean): --  RR: 17 (12 Nov 2024 05:36) (16 - 18)  SpO2: 95% (12 Nov 2024 05:36) (94% - 97%)    Parameters below as of 12 Nov 2024 05:36  Patient On (Oxygen Delivery Method): room air        PHYSICAL EXAM:  Constitutional: Appearance is consistent with chronologic age. No acute distress  Cardiovascular: Regular rate and rhythm, no murmurs, rubs, or gallops. Extremities are warm and well perfused. Capillary refill is less than 2 seconds.  Pulmonary: Anterior breath sounds clear bilaterally, no crackles or wheezing. No use of accessory muscles  GI: Positive bowel sounds. Abdomen soft, non-distended, non-tender. No guarding or rebound. No masses.  Extremities: No significant deformity or joint abnormality. Radial and DP pulses +2, No edema.  Skin: normal color, texture and turgor with no lesions or eruptions.    Neurologic:  -Mental status: Awake, alert, oriented to person, and place. Disoriented to date and situation. Speech is fluent with intact naming, repetition, and comprehension, no dysarthria. Recall 2/3 with prompts. Follows commands. Attention/concentration intact.   -Cranial nerves:   II: Visual fields are full to confrontation.  III, IV, VI: Extraocular movements are intact without nystagmus. Pupils equally round and reactive to light. 2mm Brisk.   V:  Facial sensation V1-V3 equal and intact   VII: Face is symmetric with normal eye closure and smile  VIII: Hearing is bilaterally intact to finger rub  IX, X: Uvula is midline and soft palate rises symmetrically  XI: Head turning and shoulder shrug are intact.  XII: Tongue protrudes midline  Motor: Normal bulk and tone. Strength:     [] Upper extremity                      Delt       Bicep    Tricep                                                  R         5/5        5/5        5/5       5/5                                               L          5-/5      5-/5       5-/5      5-/5  [] Lower extremity                       HF          KE          KF        DF         PF                                               R        5/5        5/5        5/5       5/5       5/5                                               L         5-/5        5-/5      5-/5      5-/5     5-/5    Sensation: Intact to light touch in all extremities.  Coordination: No dysmetria on finger-to-nose bilaterally  Reflexes: 2+ b/l biceps, triceps, patella and achilles. Plantar response downgoing b/l   Gait: Deferred    LABS:  CBC Full  -  ( 11 Nov 2024 09:34 )  WBC Count : 4.93 K/uL  RBC Count : 4.47 M/uL  Hemoglobin : 13.3 g/dL  Hematocrit : 40.9 %  Platelet Count - Automated : 211 K/uL  Mean Cell Volume : 91.5 fl  Mean Cell Hemoglobin : 29.8 pg  Mean Cell Hemoglobin Concentration : 32.5 g/dL  Auto Neutrophil # : 3.80 K/uL  Auto Lymphocyte # : 0.74 K/uL  Auto Monocyte # : 0.32 K/uL  Auto Eosinophil # : 0.04 K/uL  Auto Basophil # : 0.02 K/uL  Auto Neutrophil % : 77.1 %  Auto Lymphocyte % : 15.0 %  Auto Monocyte % : 6.5 %  Auto Eosinophil % : 0.8 %  Auto Basophil % : 0.4 %    11-11    142  |  104  |  10  ----------------------------<  131[H]  4.0   |  30  |  0.69    Ca    8.8      11 Nov 2024 09:34    TPro  7.1  /  Alb  4.4  /  TBili  0.3  /  DBili  x   /  AST  10  /  ALT  13  /  AlkPhos  54  11-11    LIVER FUNCTIONS - ( 11 Nov 2024 09:34 )  Alb: 4.4 g/dL / Pro: 7.1 g/dL / ALK PHOS: 54 U/L / ALT: 13 U/L / AST: 10 U/L / GGT: x           PT/INR - ( 12 Nov 2024 07:46 )   PT: 12.0 sec;   INR: 1.04     PTT - ( 12 Nov 2024 07:46 )  PTT:32.9 sec        EEG:  Findings:  Day 1 11/11/2024, 3:59:41 PM to next morning at 07:00 AM.  Background:  continuous, with predominantly alpha and beta frequencies.  Voltage:  Normal (20+ uV)  Organization:  Appropriate anterior-posterior gradient  Posterior Dominant Rhythm:  9 Hz symmetric, well-organized, and well-modulated  Sleep:  Symmetric, synchronous spindles and K complexes.  Focal abnormalities:  Frequent (10-49%) right temporal polymorphic delta activity.  Spontaneous Activity:  Rare (<1/Hr)   RIght frontal (maximal F8/F4)  Epileptiform spikes (20-70msec).   Events:  •	No electrographic seizures or significant clinical events occurred during this study.  Provocations:  •	Hyperventilation: was not performed.  •	Photic stimulation: was not performed.  Daily Summary:    •	Frequent (10-49%) right temporal polymorphic delta activity.  •	Rare rIght frontal (maximal F8/F4) spikes, no seizures.      Bernard Lundy MD  Attending Neurologist, [facility] EPILEPSY PROGRESS NOTE:  Patient was bradycardic 41-51 last night, EP called to interrogate internal loop recorder. Patient reported no auras/seizure events overnight. No complaints currently.    --Addendum--  EP reported that the Loop Recorder battery is  (Loop recorder was placed in ~ and usually last for about ~3years), they have no concerns at this time as the patient is asymptomatic. As per wife at bedside, next cardiology appt is 2024.    REVIEW OF SYSTEMS:  CONSTITUTIONAL:  No weight loss, fever, chills, weakness or fatigue.   HEENT:  Eyes:  No visual loss, blurred vision, double vision or yellow sclerae. Ears, Nose, Throat:  No hearing loss, sneezing, congestion, runny nose or sore throat.  SKIN:  No rash or itching.  CARDIOVASCULAR:  No chest pain, chest pressure or chest discomfort. No palpitations or edema.  RESPIRATORY:  No shortness of breath, cough or sputum.  GASTROINTESTINAL:  No anorexia, nausea, vomiting or diarrhea. No abdominal pain or blood.  GENITOURINARY: No Burning on urination.   NEUROLOGICAL:  see HPI  MUSCULOSKELETAL:  No muscle, back pain, joint pain or stiffness.    MEDICATIONS:  MEDICATIONS  (STANDING):  aspirin enteric coated 81 milliGRAM(s) Oral daily  atorvastatin 40 milliGRAM(s) Oral at bedtime  clopidogrel Tablet 75 milliGRAM(s) Oral daily  donepezil 10 milliGRAM(s) Oral at bedtime  folic acid 1 milliGRAM(s) Oral daily  influenza   Vaccine 0.5 milliLiter(s) IntraMuscular once  lacosamide 250 milliGRAM(s) Oral two times a day  lisinopril 20 milliGRAM(s) Oral daily  memantine 5 milliGRAM(s) Oral daily  pantoprazole   Suspension 40 milliGRAM(s) Oral daily  sertraline 25 milliGRAM(s) Oral daily    MEDICATIONS  (PRN):  acetaminophen     Tablet .. 650 milliGRAM(s) Oral every 6 hours PRN Temp greater or equal to 38C (100.4F), Mild Pain (1 - 3)  LORazepam   Injectable 2 milliGRAM(s) IV Push once PRN Seizure Activity      VITAL SIGNS:  Vital Signs Last 24 Hrs  T(C): 36.8 (2024 05:36), Max: 37.3 (2024 09:14)  T(F): 98.3 (2024 05:36), Max: 99.1 (2024 09:14)  HR: 41 (2024 05:36) (41 - 62)  BP: 125/60 (2024 05:36) (125/60 - 160/76)  BP(mean): --  RR: 17 (2024 05:36) (16 - 18)  SpO2: 95% (2024 05:36) (94% - 97%)    Parameters below as of 2024 05:36  Patient On (Oxygen Delivery Method): room air        PHYSICAL EXAM:  Constitutional: Appearance is consistent with chronologic age. No acute distress  Cardiovascular: Regular rate and rhythm, no murmurs, rubs, or gallops. Extremities are warm and well perfused. Capillary refill is less than 2 seconds.  Pulmonary: Anterior breath sounds clear bilaterally, no crackles or wheezing. No use of accessory muscles  GI: Positive bowel sounds. Abdomen soft, non-distended, non-tender. No guarding or rebound. No masses.  Extremities: No significant deformity or joint abnormality. Radial and DP pulses +2, No edema.  Skin: normal color, texture and turgor with no lesions or eruptions.    Neurologic:  -Mental status: Awake, alert, oriented to person, and place. Disoriented to date and situation. Speech is fluent with intact naming, repetition, and comprehension, no dysarthria. Recall 2/3 with prompts. Follows commands. Attention/concentration intact.   -Cranial nerves:   II: Visual fields are full to confrontation.  III, IV, VI: Extraocular movements are intact without nystagmus. Pupils equally round and reactive to light. 2mm Brisk.   V:  Facial sensation V1-V3 equal and intact   VII: Face is symmetric with normal eye closure and smile  VIII: Hearing is bilaterally intact to finger rub  IX, X: Uvula is midline and soft palate rises symmetrically  XI: Head turning and shoulder shrug are intact.  XII: Tongue protrudes midline  Motor: Normal bulk and tone. Strength:     [] Upper extremity                      Delt       Bicep    Tricep                                                  R         5/5        5/5        5/5       5/5                                               L          5-/5      5-/5       5-/5      5-/5  [] Lower extremity                       HF          KE          KF        DF         PF                                               R        5/5        5/5        5/5       5/5       5/5                                               L         5-/5        5-/5      5-/5      5-/5     5-/5    Sensation: Intact to light touch in all extremities.  Coordination: No dysmetria on finger-to-nose bilaterally  Reflexes: 2+ b/l biceps, triceps, patella and achilles. Plantar response downgoing b/l   Gait: Deferred    LABS:  CBC Full  -  ( 2024 09:34 )  WBC Count : 4.93 K/uL  RBC Count : 4.47 M/uL  Hemoglobin : 13.3 g/dL  Hematocrit : 40.9 %  Platelet Count - Automated : 211 K/uL  Mean Cell Volume : 91.5 fl  Mean Cell Hemoglobin : 29.8 pg  Mean Cell Hemoglobin Concentration : 32.5 g/dL  Auto Neutrophil # : 3.80 K/uL  Auto Lymphocyte # : 0.74 K/uL  Auto Monocyte # : 0.32 K/uL  Auto Eosinophil # : 0.04 K/uL  Auto Basophil # : 0.02 K/uL  Auto Neutrophil % : 77.1 %  Auto Lymphocyte % : 15.0 %  Auto Monocyte % : 6.5 %  Auto Eosinophil % : 0.8 %  Auto Basophil % : 0.4 %        142  |  104  |  10  ----------------------------<  131[H]  4.0   |  30  |  0.69    Ca    8.8      2024 09:34    TPro  7.1  /  Alb  4.4  /  TBili  0.3  /  DBili  x   /  AST  10  /  ALT  13  /  AlkPhos  54  11-11    LIVER FUNCTIONS - ( 2024 09:34 )  Alb: 4.4 g/dL / Pro: 7.1 g/dL / ALK PHOS: 54 U/L / ALT: 13 U/L / AST: 10 U/L / GGT: x           PT/INR - ( 2024 07:46 )   PT: 12.0 sec;   INR: 1.04     PTT - ( 2024 07:46 )  PTT:32.9 sec        EEG:  Findings:  Day 1 2024, 3:59:41 PM to next morning at 07:00 AM.  Background:  continuous, with predominantly alpha and beta frequencies.  Voltage:  Normal (20+ uV)  Organization:  Appropriate anterior-posterior gradient  Posterior Dominant Rhythm:  9 Hz symmetric, well-organized, and well-modulated  Sleep:  Symmetric, synchronous spindles and K complexes.  Focal abnormalities:  Frequent (10-49%) right temporal polymorphic delta activity.  Spontaneous Activity:  Rare (<1/Hr)   RIght frontal (maximal F8/F4)  Epileptiform spikes (20-70msec).   Events:  •	No electrographic seizures or significant clinical events occurred during this study.  Provocations:  •	Hyperventilation: was not performed.  •	Photic stimulation: was not performed.  Daily Summary:    •	Frequent (10-49%) right temporal polymorphic delta activity.  •	Rare rIght frontal (maximal F8/F4) spikes, no seizures.      Bernard Lundy MD  Attending Neurologist, [facility]

## 2024-11-12 NOTE — PROGRESS NOTE ADULT - PROBLEM SELECTOR PLAN 1
Onset of seizures started 9 months after having Stroke and have been occurring every week in October and last seizure was November 3rd, Recent ambulatory EEG from October 22-25, 2024 showed increased seizure activity within right frontotemporal region and 1 seizure recorded.    - Continue VEEG monitoring   - Ativan 2mg IVP PRN for seizures > 2mins  - Labs including Vimpat level  - Neurological & Vitals assessment Q8hrs  - Maintain Seizure/Fall/Aspiration precautions. Onset of seizures started 9 months after having Stroke and have been occurring every week in October and last seizure was November 3rd. Recent ambulatory EEG from October 22-25, 2024 right occasional frontotemporal epileptiform discharges with 1 recorded seizure from the same region. vEEG placed on 11/11/2024 shows frequent right temporal polymorphic delta activity and rare rIght frontal epileptiform spikes.    - Continue VEEG monitoring   - Ativan 2mg IVP PRN for seizures > 2mins  - Labs including Vimpat level  - Neurological & Vitals assessment Q8hrs  - Maintain Seizure/Fall/Aspiration precautions. Onset of seizures started 9 months after having Stroke and have been occurring every week in October and last seizure was November 3rd. Recent ambulatory EEG from October 22-25, 2024 right occasional frontotemporal epileptiform discharges with 1 recorded seizure from the same region. vEEG placed on 11/11/2024 shows frequent right temporal polymorphic delta activity and rare rIght frontal epileptiform spikes.    - Continue VEEG monitoring   - Ativan 2mg IVP PRN for seizures > 2mins  - Continue home med Lacosamide 250mg BID, consider max to 300mg BID  - Follow up Labs including Vimpat level  - Neurological & Vitals assessment Q8hrs  - Maintain Seizure/Fall/Aspiration precautions.

## 2024-11-12 NOTE — PROGRESS NOTE ADULT - ASSESSMENT
63-year-old-right-handed male w PMH of Stroke (2020) w/ loop recorder on Plavix and 81mg, ASA, no motor residual deficits, early dementia (2020), hernia repair, HTN, HLD, GERD, seizure disorder recently started on Vimpat 250 mg bid, is here for elective EMU admission for increased frequency of his seizures while undergoing vEEG monitoring. 63-year-old-right-handed male w PMH of Stroke (2020) w/ loop recorder on Plavix and 81mg, ASA, no motor residual deficits, early dementia (2020), hernia repair, HTN, HLD, GERD, seizure disorder recently started on Vimpat 250 mg bid, is here for elective EMU admission for increased frequency of his seizures while undergoing vEEG monitoring. vEEG placed on 11/11/2024 shows frequent right temporal polymorphic delta activity and rare rIght frontal epileptiform spikes. 63-year-old-right-handed male w PMH of Stroke (2020) w/ loop recorder on Plavix and 81mg, ASA, no motor residual deficits, early dementia (2020), hernia repair, HTN, HLD, GERD, seizure disorder recently started on Vimpat 250 mg bid, admitted on 11/11/2024 for elective EMU admission for increased frequency of his seizures while undergoing vEEG monitoring. vEEG placed on 11/11/2024 shows frequent right temporal polymorphic delta activity and rare rIght frontal epileptiform spikes.

## 2024-11-12 NOTE — CONSULT NOTE ADULT - SUBJECTIVE AND OBJECTIVE BOX
HPI:  63-year-old-right-handed male w PMH of Stroke () w/ loop recorder on Plavix and 81mg, ASA, no motor residual deficits, early dementia (), hernia repair, HTN, HLD, GERD, seizure disorder recently started on Vimpat 250 mg bid, is here for elective EMU admission for increased frequency of his seizures while undergoing vEEG monitoring. Wife at bedside reported seizures started 9 months after having stroke and have been occurring every week in October and last seizure was , when patient and family went out to see a movie. Wife endorse patient started leaning towards the left side in the car, ultimately pushing her and had rhythmic shaking of the right arm that last for ~1 minute but pos ictal was confused for ~40 minutes. Denies any tongue bite, or fecal incontinence. Wife reported patient has been incontinent and wears adult diapers since his stroke in . Recent ambulatory EEG from -2024 showed increased seizure activity within right frontotemporal region and 1 seizure recorded. Denies any head trauma with LOC, febrile seizures, family history of seizures, meningitis, encephalitis or any brain related conditions.  (2024 11:48)      PAST MEDICAL & SURGICAL HISTORY:  Hypertension      CVA (cerebral vascular accident)      Dementia, old age      Anemia      Seizure      Prophylactic measure      H/O hernia repair          Home Medications:  atorvastatin 40 mg oral tablet: 1 tab(s) orally once a day (2021 11:57)  donepezil 10 mg oral tablet: 1 tab(s) orally once a day (at bedtime) (2021 14:07)  folic acid 1 mg oral tablet: 1 tab(s) orally once a day (2021 14:07)  lacosamide 200 mg oral tablet: 1 tab(s) orally every 12 hours (29 Mar 2024 22:49)  lacosamide 50 mg oral tablet: 1 tab(s) orally every 12 hours (29 Mar 2024 22:50)  lisinopril 20 mg oral tablet: 1 tab(s) orally once a day (29 Mar 2024 22:48)  Pantoprazole: 40 milligram(s) orally once a day (2024 18:25)  sertraline 25 mg oral tablet: 1 tab(s) orally once a day (2024 11:46)      Allergies    No Known Allergies    Intolerances        FAMILY HISTORY:  Family history of early CAD        Social History:  Lives with Wife and 2 adult children. Retired 4 years ago, [employer]- . (2024 11:48)      REVIEW OF SYSTEMS:  CONSTITUTIONAL: No fever, weight loss  EYES: No eye pain, visual disturbances, or discharge  ENMT:  No difficulty hearing, tinnitus, vertigo; No throat pain  NECK: No pain or stiffness  RESPIRATORY: No cough, wheezing, or hemoptysis; No dyspnea  CARDIOVASCULAR: No chest pain, palpitations, dizziness, or leg swelling  GASTROINTESTINAL: No abdominal pain. No nausea, vomiting, or hematemesis; No diarrhea or constipation. No melena or hematochezia.  GENITOURINARY: No dysuria, frequency, or hematuria  NEUROLOGICAL: No headaches, memory loss, numbness, or tremors  SKIN: No itching, burning, rashes, or lesions   LYMPH NODES: No enlarged glands  ENDOCRINE: No heat or cold intolerance;  MUSCULOSKELETAL: No joint pain or swelling;   PSYCHIATRIC: No mood swings, or difficulty sleeping  HEME/LYMPH: No easy bruising, or bleeding gums  ALLERGY AND IMMUNOLOGIC: No hives or eczema    CURRENT MEDICATIONS:   acetaminophen     Tablet .. 650 milliGRAM(s) Oral every 6 hours PRN  aspirin enteric coated 81 milliGRAM(s) Oral daily  atorvastatin 40 milliGRAM(s) Oral at bedtime  clopidogrel Tablet 75 milliGRAM(s) Oral daily  donepezil 10 milliGRAM(s) Oral at bedtime  folic acid 1 milliGRAM(s) Oral daily  influenza   Vaccine 0.5 milliLiter(s) IntraMuscular once  lacosamide 250 milliGRAM(s) Oral two times a day  lisinopril 20 milliGRAM(s) Oral daily  LORazepam   Injectable 2 milliGRAM(s) IV Push once PRN  memantine 5 milliGRAM(s) Oral daily  pantoprazole    Tablet 40 milliGRAM(s) Oral daily  sertraline 25 milliGRAM(s) Oral daily      VITAL SIGNS, INS/OUTS (last 24 hours):  Vital Signs Last 24 Hrs  T(C): 37.1 (2024 12:43), Max: 37.1 (2024 12:43)  T(F): 98.7 (2024 12:43), Max: 98.7 (2024 12:43)  HR: 55 (2024 12:43) (41 - 55)  BP: 146/73 (2024 12:43) (125/60 - 156/79)  BP(mean): --  RR: 16 (2024 12:43) (16 - 18)  SpO2: 96% (2024 12:43) (94% - 97%)    Parameters below as of 2024 12:43  Patient On (Oxygen Delivery Method): room air      I&O's Summary      PHYSICAL EXAM:  Gen: Reclining in bed at time of exam, appears stated age  HEENT: NCAT, MMM, clear OP  Neck: supple, trachea at midline  CV: RRR, +S1/S2  Pulm: adequate respiratory effort, no increase in work of breathing  Abd: soft, NTND  Skin: warm and dry, no new rashes vs prior report  Ext: WWP, no LE edema  Neuro: AOx3, no gross focal neurological deficits  Psych: affect and behavior appropriate, pleasant at time of interview    BASIC LABS:                        13.3   4.93  )-----------( 211      ( 2024 09:34 )             40.9     11-    142  |  104  |  10  ----------------------------<  131[H]  4.0   |  30  |  0.69    Ca    8.8      2024 09:34    TPro  7.1  /  Alb  4.4  /  TBili  0.3  /  DBili  x   /  AST  10  /  ALT  13  /  AlkPhos  54  11-11    PT/INR - ( 2024 07:46 )   PT: 12.0 sec;   INR: 1.04          PTT - ( 2024 07:46 )  PTT:32.9 sec  Urinalysis Basic - ( 2024 09:34 )    Color: Dark Yellow / Appearance: Clear / S.020 / pH: x  Gluc: 131 mg/dL / Ketone: Negative mg/dL  / Bili: Negative / Urobili: 1.0 mg/dL   Blood: x / Protein: Negative mg/dL / Nitrite: Negative   Leuk Esterase: Negative / RBC: x / WBC x   Sq Epi: x / Non Sq Epi: x / Bacteria: x      CAPILLARY BLOOD GLUCOSE          OTHER LABS:        MICRODATA:    Urinalysis with Rflx Culture (collected 2024 09:34)        IMAGING:    EKG:    #Diet - Diet, DASH/TLC:   Sodium & Cholesterol Restricted (24 @ 14:57) [Active]        #DVT PPx -  #Dispo -  HPI:  63-year-old-right-handed male w PMH of Stroke () w/ loop recorder on Plavix and 81mg, ASA, no motor residual deficits, early dementia (), hernia repair, HTN, HLD, GERD, seizure disorder recently started on Vimpat 250 mg bid, is here for elective EMU admission for increased frequency of his seizures while undergoing vEEG monitoring. Wife at bedside reported seizures started 9 months after having stroke and have been occurring every week in October and last seizure was , when patient and family went out to see a movie. Wife endorse patient started leaning towards the left side in the car, ultimately pushing her and had rhythmic shaking of the right arm that last for ~1 minute but pos ictal was confused for ~40 minutes. Denies any tongue bite, or fecal incontinence. Wife reported patient has been incontinent and wears adult diapers since his stroke in . Recent ambulatory EEG from -2024 showed increased seizure activity within right frontotemporal region and 1 seizure recorded. Denies any head trauma with LOC, febrile seizures, family history of seizures, meningitis, encephalitis or any brain related conditions.  (2024 11:48)      PAST MEDICAL & SURGICAL HISTORY:  Hypertension      CVA (cerebral vascular accident)      Dementia, old age      Anemia      Seizure      Prophylactic measure      H/O hernia repair          Home Medications:  atorvastatin 40 mg oral tablet: 1 tab(s) orally once a day (2021 11:57)  donepezil 10 mg oral tablet: 1 tab(s) orally once a day (at bedtime) (2021 14:07)  folic acid 1 mg oral tablet: 1 tab(s) orally once a day (2021 14:07)  lacosamide 200 mg oral tablet: 1 tab(s) orally every 12 hours (29 Mar 2024 22:49)  lacosamide 50 mg oral tablet: 1 tab(s) orally every 12 hours (29 Mar 2024 22:50)  lisinopril 20 mg oral tablet: 1 tab(s) orally once a day (29 Mar 2024 22:48)  Pantoprazole: 40 milligram(s) orally once a day (2024 18:25)  sertraline 25 mg oral tablet: 1 tab(s) orally once a day (2024 11:46)      Allergies    No Known Allergies    Intolerances        FAMILY HISTORY:  Family history of early CAD        Social History:  Lives with Wife and 2 adult children. Retired 4 years ago, [employer]- . (2024 11:48)      CURRENT MEDICATIONS:   acetaminophen     Tablet .. 650 milliGRAM(s) Oral every 6 hours PRN  aspirin enteric coated 81 milliGRAM(s) Oral daily  atorvastatin 40 milliGRAM(s) Oral at bedtime  clopidogrel Tablet 75 milliGRAM(s) Oral daily  donepezil 10 milliGRAM(s) Oral at bedtime  folic acid 1 milliGRAM(s) Oral daily  influenza   Vaccine 0.5 milliLiter(s) IntraMuscular once  lacosamide 250 milliGRAM(s) Oral two times a day  lisinopril 20 milliGRAM(s) Oral daily  LORazepam   Injectable 2 milliGRAM(s) IV Push once PRN  memantine 5 milliGRAM(s) Oral daily  pantoprazole    Tablet 40 milliGRAM(s) Oral daily  sertraline 25 milliGRAM(s) Oral daily      VITAL SIGNS, INS/OUTS (last 24 hours):  Vital Signs Last 24 Hrs  T(C): 37.1 (2024 12:43), Max: 37.1 (2024 12:43)  T(F): 98.7 (2024 12:43), Max: 98.7 (2024 12:43)  HR: 55 (2024 12:43) (41 - 55)  BP: 146/73 (2024 12:43) (125/60 - 156/79)  BP(mean): --  RR: 16 (2024 12:43) (16 - 18)  SpO2: 96% (2024 12:43) (94% - 97%)    Parameters below as of 2024 12:43  Patient On (Oxygen Delivery Method): room air      I&O's Summary      PHYSICAL EXAM:  Gen: Reclining in bed at time of exam, appears stated age  HEENT: MMM  Neck: supple  CV: RRR, +S1/S2  Pulm: adequate respiratory effort, no increase in work of breathing  Abd: soft, NTND  Ext: WWP, no LE edema  Neuro: AOx3, no gross focal neurological deficits. A bit forgetful   Psych: affect and behavior appropriate, pleasant at time of interview    BASIC LABS:                        13.3   4.93  )-----------( 211      ( 2024 09:34 )             40.9         142  |  104  |  10  ----------------------------<  131[H]  4.0   |  30  |  0.69    Ca    8.8      2024 09:34    TPro  7.1  /  Alb  4.4  /  TBili  0.3  /  DBili  x   /  AST  10  /  ALT  13  /  AlkPhos  54      PT/INR - ( 2024 07:46 )   PT: 12.0 sec;   INR: 1.04          PTT - ( 2024 07:46 )  PTT:32.9 sec  Urinalysis Basic - ( 2024 09:34 )    Color: Dark Yellow / Appearance: Clear / S.020 / pH: x  Gluc: 131 mg/dL / Ketone: Negative mg/dL  / Bili: Negative / Urobili: 1.0 mg/dL   Blood: x / Protein: Negative mg/dL / Nitrite: Negative   Leuk Esterase: Negative / RBC: x / WBC x   Sq Epi: x / Non Sq Epi: x / Bacteria: x      CAPILLARY BLOOD GLUCOSE          OTHER LABS:        MICRODATA:    Urinalysis with Rflx Culture (collected 2024 09:34)        IMAGING:    EKG:    #Diet - Diet, DASH/TLC:   Sodium & Cholesterol Restricted (24 @ 14:57) [Active]        #DVT PPx -  #Dispo -

## 2024-11-12 NOTE — CONSULT NOTE ADULT - TIME BILLING
Review of hospital course, labs, vitals, medical records.   Bedside exam and interview   Discussed plan of care with primary team ACP and housestaff   Documenting the encounter
WDL

## 2024-11-13 ENCOUNTER — TRANSCRIPTION ENCOUNTER (OUTPATIENT)
Age: 64
End: 2024-11-13

## 2024-11-13 PROCEDURE — 99233 SBSQ HOSP IP/OBS HIGH 50: CPT

## 2024-11-13 PROCEDURE — 95720 EEG PHY/QHP EA INCR W/VEEG: CPT

## 2024-11-13 RX ORDER — LACOSAMIDE 100 MG/1
50 TABLET ORAL ONCE
Refills: 0 | Status: DISCONTINUED | OUTPATIENT
Start: 2024-11-13 | End: 2024-11-13

## 2024-11-13 RX ORDER — LACOSAMIDE 100 MG/1
300 TABLET ORAL
Refills: 0 | Status: DISCONTINUED | OUTPATIENT
Start: 2024-11-13 | End: 2024-11-15

## 2024-11-13 RX ORDER — LACOSAMIDE 100 MG/1
2 TABLET ORAL
Qty: 120 | Refills: 0
Start: 2024-11-13 | End: 2024-12-12

## 2024-11-13 RX ORDER — LACOSAMIDE 100 MG/1
2 TABLET ORAL
Qty: 120 | Refills: 0
Start: 2024-11-13 | End: 2024-12-13

## 2024-11-13 RX ORDER — DIVALPROEX SODIUM 250 MG/1
500 TABLET, FILM COATED, EXTENDED RELEASE ORAL
Refills: 0 | Status: DISCONTINUED | OUTPATIENT
Start: 2024-11-13 | End: 2024-11-15

## 2024-11-13 RX ADMIN — DIVALPROEX SODIUM 500 MILLIGRAM(S): 250 TABLET, FILM COATED, EXTENDED RELEASE ORAL at 18:14

## 2024-11-13 RX ADMIN — LACOSAMIDE 300 MILLIGRAM(S): 100 TABLET ORAL at 21:33

## 2024-11-13 RX ADMIN — LACOSAMIDE 50 MILLIGRAM(S): 100 TABLET ORAL at 01:46

## 2024-11-13 RX ADMIN — SERTRALINE HYDROCHLORIDE 25 MILLIGRAM(S): 50 TABLET, FILM COATED ORAL at 11:25

## 2024-11-13 RX ADMIN — LACOSAMIDE 300 MILLIGRAM(S): 100 TABLET ORAL at 07:45

## 2024-11-13 RX ADMIN — CLOPIDOGREL 75 MILLIGRAM(S): 75 TABLET ORAL at 11:25

## 2024-11-13 RX ADMIN — FOLIC ACID 1 MILLIGRAM(S): 1 TABLET ORAL at 11:25

## 2024-11-13 RX ADMIN — Medication 20 MILLIGRAM(S): at 07:45

## 2024-11-13 RX ADMIN — Medication 81 MILLIGRAM(S): at 11:25

## 2024-11-13 RX ADMIN — Medication 40 MILLIGRAM(S): at 21:33

## 2024-11-13 RX ADMIN — PANTOPRAZOLE SODIUM 40 MILLIGRAM(S): 40 TABLET, DELAYED RELEASE ORAL at 11:25

## 2024-11-13 RX ADMIN — DONEPEZIL HYDROCHLORIDE 10 MILLIGRAM(S): 23 TABLET ORAL at 21:33

## 2024-11-13 RX ADMIN — MEMANTINE HYDROCHLORIDE 5 MILLIGRAM(S): 21 CAPSULE, EXTENDED RELEASE ORAL at 11:25

## 2024-11-13 NOTE — PROGRESS NOTE ADULT - PROBLEM SELECTOR PLAN 1
Onset of seizures started 9 months after having Stroke (2020) and have been occurring every week in October and last seizure was November 3rd. Recent ambulatory EEG from October 22-25, 2024 right occasional frontotemporal epileptiform discharges with 1 recorded seizure from the same region. vEEG placed on 11/11/2024 shows frequent right temporal polymorphic delta activity and rare rIght frontal epileptiform spikes.    - Continue VEEG monitoring   - Ativan 2mg IVP PRN for seizures > 2mins  - 11/13/2024 VImpat increased from 250mg to 300mg BID  - Follow up Labs including Vimpat level  - Neurological & Vitals assessment Q8hrs  - Maintain Seizure/Fall/Aspiration precautions. Onset of seizures started 9 months after having Stroke (2020) and have been occurring every week in October and last seizure was November 3rd. Recent ambulatory EEG from October 22-25, 2024 right occasional frontotemporal epileptiform discharges with 1 recorded seizure from the same region. vEEG placed on 11/11/2024 shows frequent right temporal polymorphic delta activity and rare rIght frontal epileptiform spikes. On 11/12/2024 EEG showed electrographic seizure and clinical generalized clonic movement in all extremities.    - Continue VEEG monitoring   - Ativan 2mg IVP PRN for seizures > 2mins  - 11/13/2024 VImpat increased from 250mg to 300mg BID  - 11/13/2024 Plan to start second AED  - Follow up Labs including Vimpat level  - Neurological & Vitals assessment Q8hrs  - Maintain Seizure/Fall/Aspiration precautions. Onset of seizures started 9 months after having Stroke (2020) and have been occurring every week in October and last seizure was November 3rd. Recent ambulatory EEG from October 22-25, 2024 right occasional frontotemporal epileptiform discharges with 1 recorded seizure from the same region. vEEG placed on 11/11/2024 shows frequent right temporal polymorphic delta activity and rare rIght frontal epileptiform spikes. On 11/12/2024 EEG showed electrographic seizure and clinical generalized clonic movement in all extremities.    - Continue VEEG monitoring   - Ativan 2mg IVP PRN for seizures > 2mins  - 11/13/2024 VImpat increased from 250mg to 300mg BID  - 11/13/2024 Started Depakote 500mg BID with first dose now  - Follow up Labs including Vimpat level  - Neurological & Vitals assessment Q8hrs  - Maintain Seizure/Fall/Aspiration precautions.

## 2024-11-13 NOTE — DISCHARGE NOTE PROVIDER - NSDCFUSCHEDAPPT_GEN_ALL_CORE_FT
Alonzo Schneider  [facility] Physician [facility]  [facility] [address]  Scheduled Appointment: 12/05/2024    Fco Hazel  [facility]  GASTRO  [address]  Scheduled Appointment: 12/19/2024    Celina Guillen  [facility]  NEUROLOGY [address]  Scheduled Appointment: 01/10/2025

## 2024-11-13 NOTE — DISCHARGE NOTE PROVIDER - NSDCCPCAREPLAN_GEN_ALL_CORE_FT
PRINCIPAL DISCHARGE DIAGNOSIS  Diagnosis: Seizure  Assessment and Plan of Treatment:   - continue Lacosamide 300mg twice a day  - continue Divalproex  mg in the morning and 1000 mg in the evening  - f/u w neurologist (Dr. Chery) and epileptologist if necessary  - continue all  your home medications  You have a brain condition where you are prone to getting seizures. Seizures are abnormal electrical activity in your brain which can cause you have different symptoms such as staring off into space, jerking movements, loss of consciousness, and more. Everyone’s seizure is unique. Seizures are dangerous because they can cause you to stop breathing, cause permanent damage to your brain, and puts you at risk for falls and environmental hazards. Please continue taking your seizure medications as prescribed. Call 911 or seek immediate care if your seizure lasts longer than 5 minutes, have trouble breathing, have a second seizure within 24 hours of your first, or if you are injured during a seizure. Please follow with your neurologist, to be evaluated for your seizures.

## 2024-11-13 NOTE — PROGRESS NOTE ADULT - PROBLEM SELECTOR PLAN 7
Nutrition & Prophylaxis:    F: None  E:  K>4, Mg>2, Phos >3  N: DASH Diet  DVT PPx: SCDs  GI PPx: Protonix 40 mg PO QD  Dispo: [id number]  Code: FULL CODE.

## 2024-11-13 NOTE — DISCHARGE NOTE PROVIDER - CARE PROVIDER_API CALL
Bernard Lundy  Neurology  [address] [address]  Phone: [phone]  Fax: [phone]  Established Patient  Follow Up Time:

## 2024-11-13 NOTE — PROVIDER CONTACT NOTE (OTHER) - BACKGROUND
Patient on Veeg, was getting increasingly confused and difficult to reorient. Previously was pulling out leads. Seroquil 12.5mg given.

## 2024-11-13 NOTE — DISCHARGE NOTE PROVIDER - HOSPITAL COURSE
EPILEPSY DISCHARGE NOTE:   HPI:  63-year-old-right-handed male w PMH of Stroke (2020) w/ loop recorder on Plavix and 81mg, ASA, no motor residual deficits, early dementia (2020), hernia repair, HTN, HLD, GERD, seizure disorder recently started on Vimpat 250 mg BID (, is here for elective EMU admission for increased frequency of his seizures while undergoing vEEG monitoring. Wife at bedside reported seizures started 9 months after having stroke and have been occurring every week in October and last seizure was November 3rd, when patient and family went out to see a movie. Wife endorse patient started leaning towards the left side in the car, ultimately pushing her and had rhythmic shaking of the right arm that last for ~1 minute but pos ictal was confused for ~40 minutes. Denies any tongue bite, or fecal incontinence. Wife reported patient has been incontinent and wears adult diapers since his stroke in 2020. Recent ambulatory EEG from October 22-25, 2024 showed right occasional frontotemporal epileptiform discharges with 1 recorded seizure from the same region. Denies any head trauma with LOC, febrile seizures, family history of seizures, meningitis, encephalitis or any brain related conditions.  (11 Nov 2024 11:48)    Hospital course include vEEG monitoring placed........, and EEG showed.... from date to date. Additional tests performed on date, and the results revealed ...... Medical issues and the interventions (if any). Patient is neurologically stable and medically cleared for discharge to home/subacute rehab/acute rehab.     Medications adjusted:    New Medications:    IMAGING/PROCEDURE:    EDUCATION:   Patient given written education on SUDEP:     Follow-up:   Please follow-up with  (neurologist) in 4 - 6 weeks ( will call you with a date and time).   Please follow-up with your PCP in 2 weeks  Please follow-up with other specialists in 2 - 4 weeks.      EPILEPSY DISCHARGE NOTE:   HPI:  63-year-old-right-handed male w PMH of Stroke (2020) w/ loop recorder on Plavix and 81mg, ASA, no motor residual deficits, early dementia (2020), hernia repair, HTN, HLD, GERD, seizure disorder recently started on Vimpat 250 mg BID (, is here for elective EMU admission for increased frequency of his seizures while undergoing vEEG monitoring. Wife at bedside reported seizures started 9 months after having stroke and have been occurring every week in October and last seizure was November 3rd, when patient and family went out to see a movie. Wife endorse patient started leaning towards the left side in the car, ultimately pushing her and had rhythmic shaking of the right arm that last for ~1 minute but pos ictal was confused for ~40 minutes. Denies any tongue bite, or fecal incontinence. Wife reported patient has been incontinent and wears adult diapers since his stroke in 2020. Recent ambulatory EEG from October 22-25, 2024 showed right occasional frontotemporal epileptiform discharges with 1 recorded seizure from the same region. Denies any head trauma with LOC, febrile seizures, family history of seizures, meningitis, encephalitis or any brain related conditions.     Hospital course include vEEG monitoring placed 11/11/2024, and EEG showed.... from date to date. Additional tests performed on date, and the results revealed ...... Medical issues and the interventions (if any). Patient is neurologically stable and medically cleared for discharge to home.     Medications adjusted:    New Medications:    IMAGING/PROCEDURE:    EDUCATION:   Patient given written education on SUDEP:     Follow-up:   Please follow-up with  (neurologist) in 4 - 6 weeks ( will call you with a date and time).   Please follow-up with your PCP in 2 weeks  Please follow-up with other specialists in 2 - 4 weeks.      EPILEPSY DISCHARGE NOTE:   HPI:  63-year-old-right-handed male w PMH of Stroke (2020) w/ loop recorder on Plavix and 81mg, ASA, no motor residual deficits, early dementia (2020), hernia repair, HTN, HLD, GERD, seizure disorder recently started on Vimpat 250 mg BID (, is here for elective EMU admission for increased frequency of his seizures while undergoing vEEG monitoring. Wife at bedside reported seizures started 9 months after having stroke and have been occurring every week in October and last seizure was November 3rd, when patient and family went out to see a movie. Wife endorse patient started leaning towards the left side in the car, ultimately pushing her and had rhythmic shaking of the right arm that last for ~1 minute but pos ictal was confused for ~40 minutes. Denies any tongue bite, or fecal incontinence. Wife reported patient has been incontinent and wears adult diapers since his stroke in 2020. Recent ambulatory EEG from October 22-25, 2024 showed right occasional frontotemporal epileptiform discharges with 1 recorded seizure from the same region. Denies any head trauma with LOC, febrile seizures, family history of seizures, meningitis, encephalitis or any brain related conditions.     Hospital course include vEEG monitoring placed 11/11/2024, and EEG showed: Electrographic onset of seizure follow by clinical generalized clonic movement in all extremities with electrographic right temporal periodic spike and wave discharges with right fronto-temporal Beta fast activity followed by generalized spike and wave discharges. Frequent (10-49%) right temporal polymorphic delta activity. Lacosamide increased to 300mg bid and Depakote up-titrated 500 mg AM, 1000 mg PM. Subsequent VEEG with some improvement w/o clinical seizures, only rare right frontal (maximal F8/F4) spikes, no seizures.   . from date to date. Additional tests performed on date, and the results revealed ...... Medical issues and the interventions (if any). Patient is neurologically stable and medically cleared for discharge to home.     Medications adjusted:    New Medications:    IMAGING/PROCEDURE:    EDUCATION:   Patient given written education on SUDEP:     Follow-up:   Please follow-up with  (neurologist) in 4 - 6 weeks ( will call you with a date and time).   Please follow-up with your PCP in 2 weeks  Please follow-up with other specialists in 2 - 4 weeks.      EPILEPSY DISCHARGE NOTE:   HPI:  63-year-old-right-handed male w PMH of Stroke (2020) w/ loop recorder on Plavix and 81mg, ASA, no motor residual deficits, early dementia (2020), hernia repair, HTN, HLD, GERD, seizure disorder recently started on Vimpat 250 mg BID (, is here for elective EMU admission for increased frequency of his seizures while undergoing vEEG monitoring. Wife at bedside reported seizures started 9 months after having stroke and have been occurring every week in October and last seizure was November 3rd, when patient and family went out to see a movie. Wife endorse patient started leaning towards the left side in the car, ultimately pushing her and had rhythmic shaking of the right arm that last for ~1 minute but pos ictal was confused for ~40 minutes. Denies any tongue bite, or fecal incontinence. Wife reported patient has been incontinent and wears adult diapers since his stroke in 2020. Recent ambulatory EEG from October 22-25, 2024 showed right occasional frontotemporal epileptiform discharges with 1 recorded seizure from the same region. Denies any head trauma with LOC, febrile seizures, family history of seizures, meningitis, encephalitis or any brain related conditions.     Hospital course include vEEG monitoring placed 11/11/2024, and EEG showed: Electrographic onset of seizure follow by clinical generalized clonic movement in all extremities with electrographic right temporal periodic spike and wave discharges with right fronto-temporal Beta fast activity followed by generalized spike and wave discharges. Frequent (10-49%) right temporal polymorphic delta activity. Lacosamide increased to 300mg bid and Depakote up-titrated 500 mg AM, 1000 mg PM. Subsequent VEEG with some improvement w/o clinical seizures, only rare right frontal (maximal F8/F4) spikes, no seizures. Patient is neurologically stable and medically cleared for discharge to home.     Medications adjusted: Lacosamide increased from 250 bid to 300 bid    New Medications: Depakote (Divalrpoex ) 500 mg AM and 1000 mg PM    IMAGING/PROCEDURE: No    EDUCATION:   Patient given written education on SUDEP: Yes    Follow-up:   Please follow-up with  (neurologist) in 4 - 6 weeks ( will call you with a date and time).   Please follow-up with your PCP in 2 weeks  Please follow-up with other specialists in 2 - 4 weeks.      EPILEPSY DISCHARGE NOTE:   HPI:  63-year-old-right-handed male w PMH of Stroke (2020) w/ loop recorder on Plavix and 81mg, ASA, no motor residual deficits, early dementia (2020), hernia repair, HTN, HLD, GERD, seizure disorder recently started on Vimpat 250 mg BID (, is here for elective EMU admission for increased frequency of his seizures while undergoing vEEG monitoring. Wife at bedside reported seizures started 9 months after having stroke and have been occurring every week in October and last seizure was November 3rd, when patient and family went out to see a movie. Wife endorse patient started leaning towards the left side in the car, ultimately pushing her and had rhythmic shaking of the right arm that last for ~1 minute but pos ictal was confused for ~40 minutes. Denies any tongue bite, or fecal incontinence. Wife reported patient has been incontinent and wears adult diapers since his stroke in 2020. Recent ambulatory EEG from October 22-25, 2024 showed right occasional frontotemporal epileptiform discharges with 1 recorded seizure from the same region. Denies any head trauma with LOC, febrile seizures, family history of seizures, meningitis, encephalitis or any brain related conditions.     Hospital course include vEEG monitoring placed 11/11/2024, and EEG showed:  rare right frontal (maximal F8/F4) spikes, Electrographic onset of seizure follow by clinical generalized clonic movement in all extremities with electrographic right temporal periodic spike and wave discharges with right fronto-temporal Beta fast activity followed by generalized spike and wave discharges. Frequent (10-49%) right temporal polymorphic delta activity. Lacosamide increased to 300mg bid and Depakote up-titrated 500 mg AM, 1000 mg PM. Subsequent VEEG with improvement w/o clinical seizures, and frontal spikes disappeared. Patient is neurologically stable and medically cleared for discharge to home.     Medications adjusted: Lacosamide increased from 250 bid to 300 bid    New Medications: Depakote (Divalrpoex ) 500 mg AM and 1000 mg PM    IMAGING/PROCEDURE: No    EDUCATION:   Patient given written education on SUDEP: Yes    Follow-up:   Please follow-up with  (neurologist) in 4 - 6 weeks ( will call you with a date and time).   Please follow-up with your PCP in 2 weeks  Please follow-up with other specialists in 2 - 4 weeks.

## 2024-11-13 NOTE — EEG REPORT - NS EEG TEXT BOX
[facility] Department of Neurology  Inpatient Epilepsy Monitoring Unit video-Electroencephalography Report    Acquisition Details:  Electroencephalography was acquired using a minimum of 21 channels on an [employer] Neurology system v 9.3.1 with electrode placement according to the standard International 10-20 system following ACNS (American Clinical Neurophysiology Society) guidelines for Long-Term Video EEG monitoring.  Anterior temporal T1 and T2 electrodes were utilized whenever possible.   The [employer] automated spike & seizure detections were all reviewed in detail, in addition to extensive portions of raw EEG.  Specially-trained nurses were available for seizure-related events.  Continuous live-time video monitoring of the patients for seizure-related and safety events was performed by specially-trained technicians.        Daily Updates (from 07:00 am until 07:00 am):  Day 2 11/12/2024, 7:00 AM to next morning at 07:00 AM.  Meds:Vimpat 300 mg bid  Background:  continuous, with predominantly alpha and beta frequencies.  Voltage:  Normal (20+ uV)  Organization:  Appropriate anterior-posterior gradient  Posterior Dominant Rhythm:  9 Hz symmetric, well-organized, and well-modulated  Sleep:  Symmetric, synchronous spindles and K complexes.  Focal abnormalities:  Frequent (10-49%) right temporal polymorphic delta activity.  Spontaneous Activity:  Rare (<1/Hr)   RIght frontal (maximal F8/F4)  Epileptiform spikes (20-70msec).   Events:at 12:39 AM he had electrographic onset seizure follow by clinical  generalized clonic movement in extremities with EEG start with right temporal periodic spike and wave discharges with right fronto-temporal Beta fast activity follow by generalized spike and wave discharges  Provocations:  •	Hyperventilation: was not performed.  •	Photic stimulation: was not performed.    Daily Summary:       -Electrographic onset of seizure follow by clinical generalized clonic movement in all extremities with electrographic right temporal periodic spike and wave discharges with right fronto-temporal Beta fast activity followed by generalized spike and wave discharges             -Frequent (10-49%) right temporal polymorphic delta activity.    -Rare rIght frontal (maximal F8/F4) spikes, no seizures.      Elizabeth Kingston MD  CNP Fellow    Bernard Lundy MD  Attending Neurologist, [facility] Epilepsy Program

## 2024-11-13 NOTE — DISCHARGE NOTE PROVIDER - NSDCFUADDINST_GEN_ALL_CORE_FT
Seizure Safety Instructions  1. [facility] law mandates you to self-report your seizure disorder to [facility], and be seizure-free for 1yr before you can drive.   2. Avoid swimming, diving, taking a bath, cooking, use of motorized machineries.  3. Avoid climbing a ladder, trees or any height.  4. Use machines with safety switches.  5. Always be aware of your surroundings and make sure family and friends are aware of your seizures.  6. Use non-breakable dishes.  7. Consider wearing a medical bracelet to inform people you have epilepsy in case of an emergency.

## 2024-11-13 NOTE — DISCHARGE NOTE PROVIDER - ATTENDING DISCHARGE PHYSICAL EXAMINATION:
Awake and alert, no expressive or receptive language errors.  EOM full, no nystagmus, face symmetric, phonation normal  No pronator drift

## 2024-11-13 NOTE — PROGRESS NOTE ADULT - SUBJECTIVE AND OBJECTIVE BOX
EPILEPSY PROGRESS NOTE:  No events overnight. No complaints currently.    REVIEW OF SYSTEMS:  CONSTITUTIONAL:  No weight loss, fever, chills, weakness or fatigue.   HEENT:  Eyes:  No visual loss, blurred vision, double vision or yellow sclerae. Ears, Nose, Throat:  No hearing loss, sneezing, congestion, runny nose or sore throat.  SKIN:  No rash or itching.  CARDIOVASCULAR:  No chest pain, chest pressure or chest discomfort. No palpitations or edema.  RESPIRATORY:  No shortness of breath, cough or sputum.  GASTROINTESTINAL:  No anorexia, nausea, vomiting or diarrhea. No abdominal pain or blood.  GENITOURINARY: No Burning on urination.   NEUROLOGICAL:  see HPI  MUSCULOSKELETAL:  No muscle, back pain, joint pain or stiffness.    MEDICATIONS:   MEDICATIONS  (STANDING):  aspirin enteric coated 81 milliGRAM(s) Oral daily  atorvastatin 40 milliGRAM(s) Oral at bedtime  clopidogrel Tablet 75 milliGRAM(s) Oral daily  donepezil 10 milliGRAM(s) Oral at bedtime  folic acid 1 milliGRAM(s) Oral daily  influenza   Vaccine 0.5 milliLiter(s) IntraMuscular once  lacosamide 300 milliGRAM(s) Oral <User Schedule>  lisinopril 20 milliGRAM(s) Oral daily  memantine 5 milliGRAM(s) Oral daily  pantoprazole    Tablet 40 milliGRAM(s) Oral daily  sertraline 25 milliGRAM(s) Oral daily    MEDICATIONS  (PRN):  acetaminophen     Tablet .. 650 milliGRAM(s) Oral every 6 hours PRN Temp greater or equal to 38C (100.4F), Mild Pain (1 - 3)  LORazepam   Injectable 2 milliGRAM(s) IV Push once PRN Seizure Activity      VITAL SIGNS:  Vital Signs Last 24 Hrs  T(C): 36.9 (13 Nov 2024 05:56), Max: 37.1 (12 Nov 2024 12:43)  T(F): 98.4 (13 Nov 2024 05:56), Max: 98.7 (12 Nov 2024 12:43)  HR: 54 (13 Nov 2024 05:56) (53 - 69)  BP: 131/65 (13 Nov 2024 05:56) (131/65 - 201/77)  BP(mean): 87 (13 Nov 2024 05:56) (87 - 87)  RR: 17 (13 Nov 2024 05:56) (16 - 17)  SpO2: 95% (13 Nov 2024 05:56) (95% - 97%)    Parameters below as of 13 Nov 2024 05:56  Patient On (Oxygen Delivery Method): room air        PHYSICAL EXAM:  Constitutional: Appearance is consistent with chronologic age. No acute distress  Cardiovascular: Regular rate and rhythm, no murmurs, rubs, or gallops. Extremities are warm and well perfused. Capillary refill is less than 2 seconds. No carotid bruits.   Pulmonary: Anterior breath sounds clear bilaterally, no crackles or wheezing. No use of accessory muscles  GI: Positive bowel sounds. Abdomen soft, non-distended, non-tender. No guarding or rebound. No masses.  Extremities: No significant deformity or joint abnormality. Radial and DP pulses +2, No edema.  Skin: normal color, texture and turgor with no lesions or eruptions.    Neurologic:  -Mental status: Awake, alert, oriented to person, place, and date. Speech is fluent with intact naming, repetition, and comprehension, no dysarthria. Recent and remote memory intact. Follows commands. Attention/concentration intact.   -Cranial nerves:   II: Visual fields are full to confrontation.  III, IV, VI: Extraocular movements are intact without nystagmus. Pupils equally round and reactive to light. 3mm Brisk.   V:  Facial sensation V1-V3 equal and intact   VII: Face is symmetric with normal eye closure and smile  VIII: Hearing is bilaterally intact to finger rub  IX, X: Uvula is midline and soft palate rises symmetrically  XI: Head turning and shoulder shrug are intact.  XII: Tongue protrudes midline  Motor: Normal bulk and tone. No pronator drift.   Strength:     [] Upper extremity                      Delt       Bicep    Tricep                                                  R         5/5        5/5        5/5       5/5                                               L          5/5        5/5        5/5       5/5  [] Lower extremity                       HF          KE          KF        DF         PF                                               R        5/5        5/5        5/5       5/5       5/5                                               L         5/5        5/5       5/5       5/5        5/5    Rapid alternating movements intact and symmetric  Sensation: Intact to light touch in all extremities.  Coordination: No dysmetria on finger-to-nose and heel-to-shin bilaterally  Reflexes: 2+ b/l biceps, triceps, patella and achilles. Plantar response downgoing b/l   Gait: Toe/heel/ Tandem/ Romberg    LABS:  CBC Full  -  ( 11 Nov 2024 09:34 )  WBC Count : 4.93 K/uL  RBC Count : 4.47 M/uL  Hemoglobin : 13.3 g/dL  Hematocrit : 40.9 %  Platelet Count - Automated : 211 K/uL  Mean Cell Volume : 91.5 fl  Mean Cell Hemoglobin : 29.8 pg  Mean Cell Hemoglobin Concentration : 32.5 g/dL  Auto Neutrophil # : 3.80 K/uL  Auto Lymphocyte # : 0.74 K/uL  Auto Monocyte # : 0.32 K/uL  Auto Eosinophil # : 0.04 K/uL  Auto Basophil # : 0.02 K/uL  Auto Neutrophil % : 77.1 %  Auto Lymphocyte % : 15.0 %  Auto Monocyte % : 6.5 %  Auto Eosinophil % : 0.8 %  Auto Basophil % : 0.4 %    11-11    142  |  104  |  10  ----------------------------<  131[H]  4.0   |  30  |  0.69    Ca    8.8      11 Nov 2024 09:34    TPro  7.1  /  Alb  4.4  /  TBili  0.3  /  DBili  x   /  AST  10  /  ALT  13  /  AlkPhos  54  11-11    LIVER FUNCTIONS - ( 11 Nov 2024 09:34 )  Alb: 4.4 g/dL / Pro: 7.1 g/dL / ALK PHOS: 54 U/L / ALT: 13 U/L / AST: 10 U/L / GGT: x           PT/INR - ( 12 Nov 2024 07:46 )   PT: 12.0 sec;   INR: 1.04          PTT - ( 12 Nov 2024 07:46 )  PTT:32.9 sec      Radiology and Other Testings:    EEG: EPILEPSY PROGRESS NOTE:  No events overnight. No complaints currently.    REVIEW OF SYSTEMS:  CONSTITUTIONAL:  No weight loss, fever, chills, weakness or fatigue.   HEENT:  Eyes:  No visual loss, blurred vision, double vision or yellow sclerae. Ears, Nose, Throat:  No hearing loss, sneezing, congestion, runny nose or sore throat.  SKIN:  No rash or itching.  CARDIOVASCULAR:  No chest pain, chest pressure or chest discomfort. No palpitations or edema.  RESPIRATORY:  No shortness of breath, cough or sputum.  GASTROINTESTINAL:  No anorexia, nausea, vomiting or diarrhea. No abdominal pain or blood.  GENITOURINARY: No Burning on urination.   NEUROLOGICAL:  see HPI  MUSCULOSKELETAL:  No muscle, back pain, joint pain or stiffness.    MEDICATIONS:   MEDICATIONS  (STANDING):  aspirin enteric coated 81 milliGRAM(s) Oral daily  atorvastatin 40 milliGRAM(s) Oral at bedtime  clopidogrel Tablet 75 milliGRAM(s) Oral daily  donepezil 10 milliGRAM(s) Oral at bedtime  folic acid 1 milliGRAM(s) Oral daily  influenza   Vaccine 0.5 milliLiter(s) IntraMuscular once  lacosamide 300 milliGRAM(s) Oral <User Schedule>  lisinopril 20 milliGRAM(s) Oral daily  memantine 5 milliGRAM(s) Oral daily  pantoprazole    Tablet 40 milliGRAM(s) Oral daily  sertraline 25 milliGRAM(s) Oral daily    MEDICATIONS  (PRN):  acetaminophen     Tablet .. 650 milliGRAM(s) Oral every 6 hours PRN Temp greater or equal to 38C (100.4F), Mild Pain (1 - 3)  LORazepam   Injectable 2 milliGRAM(s) IV Push once PRN Seizure Activity      VITAL SIGNS:  Vital Signs Last 24 Hrs  T(C): 36.9 (13 Nov 2024 05:56), Max: 37.1 (12 Nov 2024 12:43)  T(F): 98.4 (13 Nov 2024 05:56), Max: 98.7 (12 Nov 2024 12:43)  HR: 54 (13 Nov 2024 05:56) (53 - 69)  BP: 131/65 (13 Nov 2024 05:56) (131/65 - 201/77)  BP(mean): 87 (13 Nov 2024 05:56) (87 - 87)  RR: 17 (13 Nov 2024 05:56) (16 - 17)  SpO2: 95% (13 Nov 2024 05:56) (95% - 97%)    Parameters below as of 13 Nov 2024 05:56  Patient On (Oxygen Delivery Method): room air        PHYSICAL EXAM:  Constitutional: Appearance is consistent with chronologic age. No acute distress  Cardiovascular: Regular rate and rhythm, no murmurs, rubs, or gallops. Extremities are warm and well perfused. Capillary refill is less than 2 seconds.   Pulmonary: Anterior breath sounds clear bilaterally, no crackles or wheezing. No use of accessory muscles  GI: Positive bowel sounds. Abdomen soft, non-distended, non-tender. No guarding or rebound.   Extremities: No significant deformity or joint abnormality. Radial and DP pulses +2, No edema.  Skin: normal color, texture and turgor with no lesions or eruptions.    Neurologic:  -Mental status: Awake, alert, oriented to person, place, and date. Speech is fluent with intact naming, repetition, and comprehension, no dysarthria. Recent and remote memory intact. Follows commands. Attention/concentration intact.   -Cranial nerves:   II: Visual fields are full to confrontation.  III, IV, VI: Extraocular movements are intact without nystagmus. Pupils equally round and reactive to light. 3mm Brisk.   V:  Facial sensation V1-V3 equal and intact   VII: Face is symmetric with normal eye closure and smile  VIII: Hearing is bilaterally intact to finger rub  IX, X: Uvula is midline and soft palate rises symmetrically  XI: Head turning and shoulder shrug are intact.  XII: Tongue protrudes midline  Motor: Normal bulk and tone. No pronator drift.   Strength:     [] Upper extremity                      Delt       Bicep    Tricep                                                  R         5/5        5/5        5/5       5/5                                               L          5/5        5/5        5/5       5/5  [] Lower extremity                       HF          KE          KF        DF         PF                                               R        5/5        5/5        5/5       5/5       5/5                                               L         5/5        5/5       5/5       5/5        5/5    Sensation: Intact to light touch in all extremities.  Coordination: No dysmetria on finger-to-nose and heel-to-shin bilaterally  Reflexes: 2+ b/l biceps, triceps, patella and achilles. Plantar response downgoing b/l   Gait: Deferred    LABS:  CBC Full  -  ( 11 Nov 2024 09:34 )  WBC Count : 4.93 K/uL  RBC Count : 4.47 M/uL  Hemoglobin : 13.3 g/dL  Hematocrit : 40.9 %  Platelet Count - Automated : 211 K/uL  Mean Cell Volume : 91.5 fl  Mean Cell Hemoglobin : 29.8 pg  Mean Cell Hemoglobin Concentration : 32.5 g/dL  Auto Neutrophil # : 3.80 K/uL  Auto Lymphocyte # : 0.74 K/uL  Auto Monocyte # : 0.32 K/uL  Auto Eosinophil # : 0.04 K/uL  Auto Basophil # : 0.02 K/uL  Auto Neutrophil % : 77.1 %  Auto Lymphocyte % : 15.0 %  Auto Monocyte % : 6.5 %  Auto Eosinophil % : 0.8 %  Auto Basophil % : 0.4 %    11-11    142  |  104  |  10  ----------------------------<  131[H]  4.0   |  30  |  0.69    Ca    8.8      11 Nov 2024 09:34    TPro  7.1  /  Alb  4.4  /  TBili  0.3  /  DBili  x   /  AST  10  /  ALT  13  /  AlkPhos  54  11-11    LIVER FUNCTIONS - ( 11 Nov 2024 09:34 )  Alb: 4.4 g/dL / Pro: 7.1 g/dL / ALK PHOS: 54 U/L / ALT: 13 U/L / AST: 10 U/L / GGT: x           PT/INR - ( 12 Nov 2024 07:46 )   PT: 12.0 sec;   INR: 1.04          PTT - ( 12 Nov 2024 07:46 )  PTT:32.9 sec      Radiology and Other Testings:    EEG: EPILEPSY PROGRESS NOTE:  Patient had a seizure at 12:39am but pt did not recall any auras. As per nursing attendant at bedside, patient was confused, pulling lines and trying to get out of bed, so pt was placed on observation. Lacosamide was increased from 250mg to 300mg BID, with plan to add on new anti-epileptic agent. No complaints currently.    REVIEW OF SYSTEMS:  CONSTITUTIONAL:  No weight loss, fever, chills, weakness or fatigue.   HEENT:  Eyes:  No visual loss, blurred vision, double vision or yellow sclerae. Ears, Nose, Throat:  No hearing loss, sneezing, congestion, runny nose or sore throat.  SKIN:  No rash or itching.  CARDIOVASCULAR:  No chest pain, chest pressure or chest discomfort. No palpitations or edema.  RESPIRATORY:  No shortness of breath, cough or sputum.  GASTROINTESTINAL:  No anorexia, nausea, vomiting or diarrhea. No abdominal pain or blood.  GENITOURINARY: No Burning on urination.   NEUROLOGICAL:  see HPI  MUSCULOSKELETAL:  No muscle, back pain, joint pain or stiffness.    MEDICATIONS:   MEDICATIONS  (STANDING):  aspirin enteric coated 81 milliGRAM(s) Oral daily  atorvastatin 40 milliGRAM(s) Oral at bedtime  clopidogrel Tablet 75 milliGRAM(s) Oral daily  donepezil 10 milliGRAM(s) Oral at bedtime  folic acid 1 milliGRAM(s) Oral daily  influenza   Vaccine 0.5 milliLiter(s) IntraMuscular once  lacosamide 300 milliGRAM(s) Oral <User Schedule>  lisinopril 20 milliGRAM(s) Oral daily  memantine 5 milliGRAM(s) Oral daily  pantoprazole    Tablet 40 milliGRAM(s) Oral daily  sertraline 25 milliGRAM(s) Oral daily    MEDICATIONS  (PRN):  acetaminophen     Tablet .. 650 milliGRAM(s) Oral every 6 hours PRN Temp greater or equal to 38C (100.4F), Mild Pain (1 - 3)  LORazepam   Injectable 2 milliGRAM(s) IV Push once PRN Seizure Activity      VITAL SIGNS:  Vital Signs Last 24 Hrs  T(C): 36.9 (13 Nov 2024 05:56), Max: 37.1 (12 Nov 2024 12:43)  T(F): 98.4 (13 Nov 2024 05:56), Max: 98.7 (12 Nov 2024 12:43)  HR: 54 (13 Nov 2024 05:56) (53 - 69)  BP: 131/65 (13 Nov 2024 05:56) (131/65 - 201/77)  BP(mean): 87 (13 Nov 2024 05:56) (87 - 87)  RR: 17 (13 Nov 2024 05:56) (16 - 17)  SpO2: 95% (13 Nov 2024 05:56) (95% - 97%)    Parameters below as of 13 Nov 2024 05:56  Patient On (Oxygen Delivery Method): room air        PHYSICAL EXAM:  Constitutional: Appearance is consistent with chronologic age. No acute distress  Cardiovascular: Regular rate and rhythm, no murmurs, rubs, or gallops. Extremities are warm and well perfused. Capillary refill is less than 2 seconds.  Pulmonary: Anterior breath sounds clear bilaterally, no crackles or wheezing. No use of accessory muscles  GI: Positive bowel sounds. Abdomen soft, non-distended, non-tender. No guarding or rebound. No masses.  Extremities: No significant deformity or joint abnormality. Radial and DP pulses +2, No edema.  Skin: normal color, texture and turgor with no lesions or eruptions.    Neurologic:  -Mental status: Awake, alert, oriented to person, and place. Disoriented to date and situation. Speech is fluent with intact naming, repetition, and comprehension, no dysarthria. Recall 2/3 with prompts. Follows commands. Attention/concentration intact.   -Cranial nerves:   II: Visual fields are full to confrontation.  III, IV, VI: Extraocular movements are intact without nystagmus. Pupils equally round and reactive to light. 2mm Brisk.   V:  Facial sensation V1-V3 equal and intact   VII: Face is symmetric with normal eye closure and smile  VIII: Hearing is bilaterally intact to finger rub  IX, X: Uvula is midline and soft palate rises symmetrically  XI: Head turning and shoulder shrug are intact.  XII: Tongue protrudes midline  Motor: Normal bulk and tone. Strength:     [] Upper extremity                      Delt       Bicep    Tricep                                                  R         5/5        5/5        5/5       5/5                                               L          5-/5      5-/5       5-/5      5-/5  [] Lower extremity                       HF          KE          KF        DF         PF                                               R        5/5        5/5        5/5       5/5       5/5                                               L         5-/5        5-/5      5-/5      5-/5     5-/5    Sensation: Intact to light touch in all extremities.  Coordination: No dysmetria on finger-to-nose bilaterally  Reflexes: 2+ b/l biceps, triceps, patella and achilles. Plantar response mute  Gait: Deferred    LABS:  CBC Full  -  ( 11 Nov 2024 09:34 )  WBC Count : 4.93 K/uL  RBC Count : 4.47 M/uL  Hemoglobin : 13.3 g/dL  Hematocrit : 40.9 %  Platelet Count - Automated : 211 K/uL  Mean Cell Volume : 91.5 fl  Mean Cell Hemoglobin : 29.8 pg  Mean Cell Hemoglobin Concentration : 32.5 g/dL  Auto Neutrophil # : 3.80 K/uL  Auto Lymphocyte # : 0.74 K/uL  Auto Monocyte # : 0.32 K/uL  Auto Eosinophil # : 0.04 K/uL  Auto Basophil # : 0.02 K/uL  Auto Neutrophil % : 77.1 %  Auto Lymphocyte % : 15.0 %  Auto Monocyte % : 6.5 %  Auto Eosinophil % : 0.8 %  Auto Basophil % : 0.4 %    11-11    142  |  104  |  10  ----------------------------<  131[H]  4.0   |  30  |  0.69    Ca    8.8      11 Nov 2024 09:34    TPro  7.1  /  Alb  4.4  /  TBili  0.3  /  DBili  x   /  AST  10  /  ALT  13  /  AlkPhos  54  11-11    LIVER FUNCTIONS - ( 11 Nov 2024 09:34 )  Alb: 4.4 g/dL / Pro: 7.1 g/dL / ALK PHOS: 54 U/L / ALT: 13 U/L / AST: 10 U/L / GGT: x           PT/INR - ( 12 Nov 2024 07:46 )   PT: 12.0 sec;   INR: 1.04          PTT - ( 12 Nov 2024 07:46 )  PTT:32.9 sec      EEG:  Daily Updates (from 07:00 am until 07:00 am):  Day 2 11/12/2024, 7:00 AM to next morning at 07:00 AM.  Meds:Vimpat 300 mg bid  Background:  continuous, with predominantly alpha and beta frequencies.  Voltage:  Normal (20+ uV)  Organization:  Appropriate anterior-posterior gradient  Posterior Dominant Rhythm:  9 Hz symmetric, well-organized, and well-modulated  Sleep:  Symmetric, synchronous spindles and K complexes.  Focal abnormalities:  Frequent (10-49%) right temporal polymorphic delta activity.  Spontaneous Activity:  Rare (<1/Hr)   RIght frontal (maximal F8/F4)  Epileptiform spikes (20-70msec).   Events:at 12:39 AM he had electrographic onset seizure follow by clinical  generalized clonic movement in extremities with EEG start with right temporal periodic spike and wave discharges with right fronto-temporal Beta fast activity follow by generalized spike and wave discharges  Provocations:  •	Hyperventilation: was not performed.  •	Photic stimulation: was not performed.    Daily Summary:       -Electrographic onset of seizure follow by clinical generalized clonic movement in all extremities with electrographic right temporal periodic spike and wave discharges with right fronto-temporal Beta fast activity followed by generalized spike and wave discharges             -Frequent (10-49%) right temporal polymorphic delta activity.    -Rare rIght frontal (maximal F8/F4) spikes, no seizures.      Elizabeth Kingston MD  CNP Fellow    Bernard Lundy MD  Attending Neurologist, [facility]

## 2024-11-13 NOTE — PROVIDER CONTACT NOTE (OTHER) - SITUATION
Patient found seizing upon entry to the room. Tonic clonic seizure lasting 2 mins. Patient turned to the side, mouth suctioned and placed on oxygen.

## 2024-11-13 NOTE — DISCHARGE NOTE PROVIDER - NSDCMRMEDTOKEN_GEN_ALL_CORE_FT
aspirin 81 mg oral delayed release tablet: 1 tab(s) orally once a day  atorvastatin 40 mg oral tablet: 1 tab(s) orally once a day  clopidogrel 75 mg oral tablet: 1 tab(s) orally once a day  donepezil 10 mg oral tablet: 1 tab(s) orally once a day (at bedtime)  folic acid 1 mg oral tablet: 1 tab(s) orally once a day  lacosamide 200 mg oral tablet: 1 tab(s) orally every 12 hours  lacosamide 50 mg oral tablet: 1 tab(s) orally every 12 hours  lisinopril 20 mg oral tablet: 1 tab(s) orally once a day  Namenda 5 mg oral tablet: 1 tab(s) orally every 24 hours  Pantoprazole: 40 milligram(s) orally once a day  sertraline 25 mg oral tablet: 1 tab(s) orally once a day   aspirin 81 mg oral delayed release tablet: 1 tab(s) orally once a day  atorvastatin 40 mg oral tablet: 1 tab(s) orally once a day  clopidogrel 75 mg oral tablet: 1 tab(s) orally once a day  donepezil 10 mg oral tablet: 1 tab(s) orally once a day (at bedtime)  folic acid 1 mg oral tablet: 1 tab(s) orally once a day  lacosamide 150 mg oral tablet: 2 tab(s) orally every 12 hours MDD: 300mg  lacosamide 200 mg oral tablet: 1 tab(s) orally every 12 hours  lacosamide 50 mg oral tablet: 1 tab(s) orally every 12 hours  lisinopril 20 mg oral tablet: 1 tab(s) orally once a day  Namenda 5 mg oral tablet: 1 tab(s) orally every 24 hours  Pantoprazole: 40 milligram(s) orally once a day  sertraline 25 mg oral tablet: 1 tab(s) orally once a day   aspirin 81 mg oral delayed release tablet: 1 tab(s) orally once a day  atorvastatin 40 mg oral tablet: 1 tab(s) orally once a day  clopidogrel 75 mg oral tablet: 1 tab(s) orally once a day  divalproex sodium 500 mg oral delayed release tablet: 1 tab(s) orally once a day (in the morning) 500 mg (1 tab) in the morning, and 1000 mg (2 tab) in the evening MDD: 2000 mg  divalproex sodium 500 mg oral delayed release tablet: 2 tab(s) orally once a day (at bedtime)  donepezil 10 mg oral tablet: 1 tab(s) orally once a day (at bedtime)  folic acid 1 mg oral tablet: 1 tab(s) orally once a day  lacosamide 150 mg oral tablet: 2 tab(s) orally every 12 hours MDD: 300mg  lisinopril 20 mg oral tablet: 1 tab(s) orally once a day  Namenda 5 mg oral tablet: 1 tab(s) orally every 24 hours  Pantoprazole: 40 milligram(s) orally once a day  sertraline 25 mg oral tablet: 1 tab(s) orally once a day

## 2024-11-14 PROCEDURE — 99232 SBSQ HOSP IP/OBS MODERATE 35: CPT

## 2024-11-14 PROCEDURE — 99233 SBSQ HOSP IP/OBS HIGH 50: CPT

## 2024-11-14 PROCEDURE — 95720 EEG PHY/QHP EA INCR W/VEEG: CPT

## 2024-11-14 RX ORDER — MELATONIN 5 MG
5 TABLET ORAL ONCE
Refills: 0 | Status: COMPLETED | OUTPATIENT
Start: 2024-11-14 | End: 2024-11-14

## 2024-11-14 RX ADMIN — FOLIC ACID 1 MILLIGRAM(S): 1 TABLET ORAL at 11:39

## 2024-11-14 RX ADMIN — Medication 40 MILLIGRAM(S): at 22:20

## 2024-11-14 RX ADMIN — MEMANTINE HYDROCHLORIDE 5 MILLIGRAM(S): 21 CAPSULE, EXTENDED RELEASE ORAL at 11:39

## 2024-11-14 RX ADMIN — PANTOPRAZOLE SODIUM 40 MILLIGRAM(S): 40 TABLET, DELAYED RELEASE ORAL at 11:39

## 2024-11-14 RX ADMIN — DIVALPROEX SODIUM 500 MILLIGRAM(S): 250 TABLET, FILM COATED, EXTENDED RELEASE ORAL at 07:08

## 2024-11-14 RX ADMIN — LACOSAMIDE 300 MILLIGRAM(S): 100 TABLET ORAL at 20:02

## 2024-11-14 RX ADMIN — CLOPIDOGREL 75 MILLIGRAM(S): 75 TABLET ORAL at 11:39

## 2024-11-14 RX ADMIN — Medication 5 MILLIGRAM(S): at 23:16

## 2024-11-14 RX ADMIN — SERTRALINE HYDROCHLORIDE 25 MILLIGRAM(S): 50 TABLET, FILM COATED ORAL at 11:39

## 2024-11-14 RX ADMIN — DONEPEZIL HYDROCHLORIDE 10 MILLIGRAM(S): 23 TABLET ORAL at 22:20

## 2024-11-14 RX ADMIN — Medication 20 MILLIGRAM(S): at 07:08

## 2024-11-14 RX ADMIN — DIVALPROEX SODIUM 500 MILLIGRAM(S): 250 TABLET, FILM COATED, EXTENDED RELEASE ORAL at 17:26

## 2024-11-14 RX ADMIN — Medication 81 MILLIGRAM(S): at 11:39

## 2024-11-14 RX ADMIN — LACOSAMIDE 300 MILLIGRAM(S): 100 TABLET ORAL at 08:00

## 2024-11-14 NOTE — DIETITIAN INITIAL EVALUATION ADULT - ADD RECOMMEND
1. Continue DASH/TLC diet.   >>Encourage & monitor PO intake. [place] dietary preferences as able.   2. Monitor GI tolerance, weight trends, labs, & skin integrity.  3. Defer bowel and pain regimens to team.   >>Consider add bowel regimen.  4. RD to remain available for diet education/intervention prn.

## 2024-11-14 NOTE — DIETITIAN INITIAL EVALUATION ADULT - PERTINENT MEDS FT
MEDICATIONS  (STANDING):  aspirin enteric coated 81 milliGRAM(s) Oral daily  atorvastatin 40 milliGRAM(s) Oral at bedtime  clopidogrel Tablet 75 milliGRAM(s) Oral daily  divalproex  milliGRAM(s) Oral two times a day  donepezil 10 milliGRAM(s) Oral at bedtime  folic acid 1 milliGRAM(s) Oral daily  influenza   Vaccine 0.5 milliLiter(s) IntraMuscular once  lacosamide 300 milliGRAM(s) Oral <User Schedule>  lisinopril 20 milliGRAM(s) Oral daily  memantine 5 milliGRAM(s) Oral daily  pantoprazole    Tablet 40 milliGRAM(s) Oral daily  sertraline 25 milliGRAM(s) Oral daily    MEDICATIONS  (PRN):  acetaminophen     Tablet .. 650 milliGRAM(s) Oral every 6 hours PRN Temp greater or equal to 38C (100.4F), Mild Pain (1 - 3)  LORazepam   Injectable 2 milliGRAM(s) IV Push once PRN Seizure Activity

## 2024-11-14 NOTE — DIETITIAN INITIAL EVALUATION ADULT - PROBLEM SELECTOR PLAN 2
Pt has H/O stroke 2020    - Continue home med aspirin 81mg PO Daily  - Continue home med Plavix 75mg PO Daily

## 2024-11-14 NOTE — PROGRESS NOTE ADULT - ASSESSMENT
62yo M w PMH of Stroke (2020) w/ loop recorder on Plavix and 81mg, ASA, no motor residual deficits, early dementia (2020), hernia repair, HTN, HLD, GERD, seizure disorder recently started on Vimpat 250 mg bid, admitted on 11/11/2024 for elective EMU admission for increased frequency of his seizures while undergoing vEEG monitoring. vEEG placed on 11/11/2024 shows frequent right temporal polymorphic delta activity and rare rIght frontal epileptiform spikes.    #Epilepsy   VEEG:  - No epileptiform discharges, how ever it can’t ruled it out.  VImpat increased from 250mg to 300mg BID  Started Depakote 500mg BID  Management as per epilepsy team     #Stroke   Continue home Asa, Plavix and atorvastatin     #HTN  Continue home lisinopril     #GERD  Continue home protonix     #Early onset dementia   Continue home Donepezil    DVT PPx: not on chemical DVT ppx as per primary team   SCD boots

## 2024-11-14 NOTE — EEG REPORT - NS EEG TEXT BOX
[facility] Department of Neurology  Inpatient Epilepsy Monitoring Unit video-Electroencephalography Report    Acquisition Details:  Electroencephalography was acquired using a minimum of 21 channels on an [employer] Neurology system v 9.3.1 with electrode placement according to the standard International 10-20 system following ACNS (American Clinical Neurophysiology Society) guidelines for Long-Term Video EEG monitoring.  Anterior temporal T1 and T2 electrodes were utilized whenever possible.   The [employer] automated spike & seizure detections were all reviewed in detail, in addition to extensive portions of raw EEG.  Specially-trained nurses were available for seizure-related events.  Continuous live-time video monitoring of the patients for seizure-related and safety events was performed by specially-trained technicians.    Daily Updates (from 07:00 am until 07:00 am):  Day 2 11/13/2024, 7:00 AM to next morning at 07:00 AM.  Meds:Vimpat 300 mg bid,started Depakote 500 mg bid,  Background:  continuous, with predominantly alpha and beta frequencies.  Voltage:  Normal (20+ uV)  Organization:  Appropriate anterior-posterior gradient  Posterior Dominant Rhythm:  9 Hz symmetric, well-organized, and well-modulated  Sleep:  Symmetric, synchronous spindles and K complexes.  Focal abnormalities:  Frequent (10-49%) right temporal polymorphic delta activity.  Spontaneous Activity:  No epileptiform discharges    Events: None  Provocations:  •	Hyperventilation: was not performed.  •	Photic stimulation: was not performed.  Daily Summary:                      -Frequent (10-49%) right temporal polymorphic delta activity.   - No epileptiform discharges, how ever it can’t ruled it out.        Elizabeth Kingston MD  CNP Fellow    Bernard Lundy MD  Attending Neurologist, [facility] Epilepsy Program

## 2024-11-14 NOTE — PROGRESS NOTE ADULT - ASSESSMENT
63-year-old-right-handed male w PMH of Stroke (2020) w/ loop recorder on Plavix and 81mg, ASA, no motor residual deficits, early dementia (2020), hernia repair, HTN, HLD, GERD, seizure disorder recently started on Vimpat 250 mg bid, admitted on 11/11/2024 for elective EMU admission for increased frequency of his seizures while undergoing vEEG monitoring. vEEG placed on 11/11/2024 shows frequent right temporal polymorphic delta activity and rare rIght frontal epileptiform spikes. On 11/12/2024 EEG showed electrographic seizure and clinical generalized clonic movement in all extremities.

## 2024-11-14 NOTE — PROGRESS NOTE ADULT - SUBJECTIVE AND OBJECTIVE BOX
Patient is a 64y old  Male who presents with a chief complaint of increased seizure frequency (14 Nov 2024 08:27)      INTERVAL HPI/OVERNIGHT EVENTS: offers no new complaints; current symptoms resolving    MEDICATIONS  (STANDING):  aspirin enteric coated 81 milliGRAM(s) Oral daily  atorvastatin 40 milliGRAM(s) Oral at bedtime  clopidogrel Tablet 75 milliGRAM(s) Oral daily  divalproex  milliGRAM(s) Oral two times a day  donepezil 10 milliGRAM(s) Oral at bedtime  folic acid 1 milliGRAM(s) Oral daily  influenza   Vaccine 0.5 milliLiter(s) IntraMuscular once  lacosamide 300 milliGRAM(s) Oral <User Schedule>  lisinopril 20 milliGRAM(s) Oral daily  memantine 5 milliGRAM(s) Oral daily  pantoprazole    Tablet 40 milliGRAM(s) Oral daily  sertraline 25 milliGRAM(s) Oral daily    MEDICATIONS  (PRN):  acetaminophen     Tablet .. 650 milliGRAM(s) Oral every 6 hours PRN Temp greater or equal to 38C (100.4F), Mild Pain (1 - 3)  LORazepam   Injectable 2 milliGRAM(s) IV Push once PRN Seizure Activity      __________________________________________________  REVIEW OF SYSTEMS:    CONSTITUTIONAL: No fever,   EYES: no acute visual disturbances  NECK: No pain or stiffness  RESPIRATORY: No cough; No shortness of breath  CARDIOVASCULAR: No chest pain, no palpitations  GASTROINTESTINAL: No pain. No nausea or vomiting; No diarrhea   NEUROLOGICAL: No headache or numbness, no tremors  MUSCULOSKELETAL: No joint pain, no muscle pain  GENITOURINARY: no dysuria, no frequency, no hesitancy  PSYCHIATRY: no depression , no anxiety  ALL OTHER  ROS negative        Vital Signs Last 24 Hrs  T(C): 36.7 (14 Nov 2024 06:20), Max: 37.1 (13 Nov 2024 12:45)  T(F): 98.1 (14 Nov 2024 06:20), Max: 98.8 (13 Nov 2024 12:45)  HR: 42 (14 Nov 2024 06:20) (42 - 58)  BP: 135/75 (14 Nov 2024 06:20) (128/80 - 135/75)  BP(mean): 95 (14 Nov 2024 06:20) (95 - 95)  RR: 17 (14 Nov 2024 06:20) (17 - 18)  SpO2: 96% (14 Nov 2024 06:20) (95% - 96%)    Parameters below as of 14 Nov 2024 06:20  Patient On (Oxygen Delivery Method): room air        ________________________________________________  PHYSICAL EXAM:  GENERAL: NAD  HEENT: Normocephalic;  conjunctivae and sclerae clear; moist mucous membranes;   NECK : supple  CHEST/LUNG: Clear to auscultation bilaterally with good air entry   HEART: S1 S2  regular; no murmurs, gallops or rubs  ABDOMEN: Soft, Nontender, Nondistended; Bowel sounds present  EXTREMITIES: no cyanosis; no edema; no calf tenderness  SKIN: warm and dry; no rash  NERVOUS SYSTEM:  Awake and alert; Oriented  to place, person and time ; no new deficits    _________________________________________________  LABS:              CAPILLARY BLOOD GLUCOSE            RADIOLOGY & ADDITIONAL TESTS:      Plan of care was discussed with patient and /or primary care giver; all questions and concerns were addressed and care was aligned with patient's wishes.       Patient is a 64y old  Male who presents with a chief complaint of increased seizure frequency (14 Nov 2024 08:27)      INTERVAL HPI/OVERNIGHT EVENTS: offers no new complaints; Patient in his usual state od health. Had a seizure yesterday.     MEDICATIONS  (STANDING):  aspirin enteric coated 81 milliGRAM(s) Oral daily  atorvastatin 40 milliGRAM(s) Oral at bedtime  clopidogrel Tablet 75 milliGRAM(s) Oral daily  divalproex  milliGRAM(s) Oral two times a day  donepezil 10 milliGRAM(s) Oral at bedtime  folic acid 1 milliGRAM(s) Oral daily  influenza   Vaccine 0.5 milliLiter(s) IntraMuscular once  lacosamide 300 milliGRAM(s) Oral <User Schedule>  lisinopril 20 milliGRAM(s) Oral daily  memantine 5 milliGRAM(s) Oral daily  pantoprazole    Tablet 40 milliGRAM(s) Oral daily  sertraline 25 milliGRAM(s) Oral daily    MEDICATIONS  (PRN):  acetaminophen     Tablet .. 650 milliGRAM(s) Oral every 6 hours PRN Temp greater or equal to 38C (100.4F), Mild Pain (1 - 3)  LORazepam   Injectable 2 milliGRAM(s) IV Push once PRN Seizure Activity    Vital Signs Last 24 Hrs  T(C): 36.7 (14 Nov 2024 06:20), Max: 37.1 (13 Nov 2024 12:45)  T(F): 98.1 (14 Nov 2024 06:20), Max: 98.8 (13 Nov 2024 12:45)  HR: 42 (14 Nov 2024 06:20) (42 - 58)  BP: 135/75 (14 Nov 2024 06:20) (128/80 - 135/75)  BP(mean): 95 (14 Nov 2024 06:20) (95 - 95)  RR: 17 (14 Nov 2024 06:20) (17 - 18)  SpO2: 96% (14 Nov 2024 06:20) (95% - 96%)    Parameters below as of 14 Nov 2024 06:20  Patient On (Oxygen Delivery Method): room air        ________________________________________________  PHYSICAL EXAM:  GENERAL: NAD  HEENT: moist mucous membranes;   NECK : supple  CHEST/LUNG: Clear to auscultation bilaterally with good air entry   HEART: S1 S2  regular;  ABDOMEN: Soft, Nontender, Nondistended  EXTREMITIES: no cyanosis; no edema; no calf tenderness  NERVOUS SYSTEM:  no new deficits    _________________________________________________  LABS:              CAPILLARY BLOOD GLUCOSE            RADIOLOGY & ADDITIONAL TESTS:      Plan of care was discussed with patient and /or primary care giver; all questions and concerns were addressed and care was aligned with patient's wishes.

## 2024-11-14 NOTE — DIETITIAN INITIAL EVALUATION ADULT - PROBLEM SELECTOR PLAN 7
Nutrition & Prophylaxis:    F: None  E:  K>4, Mg>2, Phos >3  N: DASH diet  DVT PPx: SCDs  GI PPx: Protonix 40 mg PO QD  Dispo: [id number]  Code: FULL CODE

## 2024-11-14 NOTE — DIETITIAN INITIAL EVALUATION ADULT - OTHER INFO
63M with PMH of stroke (2020), early dementia (2020), hernia repair, HTN, HLD, GERD, and seizure disorder who presented with increased seizure frequency, admitted for vEEG monitoring.     Pt seen on 7WO for assessment. Labs and medication orders reviewed. Ordered for folic acid. No updated labs 11/14. On DASH/TLC diet. Pt sitting up in bed eating breakfast independently on visit without overt signs of difficulty. Pt intermittently confused throughout visit, consistent with baseline per nursing. Pt reports good appetite and intake. Wt history per [facility] HIE: (6/6) 153lb, (8/29) 152lb, (10/16) 152lb; current admission wt 155lb/70.2kg indicates wt stability x5 months. RD observed pt with no overt signs of wasting. Pt denies difficulty chewing/swallowing. Denies nausea/vomiting/diarrhea/constipation, note no recorded BMs since admission 11/11. No pain reported. No known food allergies. No Oriental orthodox/ethnic/cultural food preferences noted. No pressure injuries or edema documented, Bar score 19. See nutrition recommendations. RD to remain available.

## 2024-11-14 NOTE — PROGRESS NOTE ADULT - PROBLEM SELECTOR PLAN 1
Onset of seizures started 9 months after having Stroke (2020) and have been occurring every week in October and last seizure was November 3rd. Recent ambulatory EEG from October 22-25, 2024 right occasional frontotemporal epileptiform discharges with 1 recorded seizure from the same region. vEEG placed on 11/11/2024 shows frequent right temporal polymorphic delta activity and rare rIght frontal epileptiform spikes. On 11/12/2024 EEG showed electrographic seizure and clinical generalized clonic movement in all extremities.    - Continue VEEG monitoring   - Ativan 2mg IVP PRN for seizures > 2mins  - 11/13/2024 VImpat increased from 250mg to 300mg BID  - 11/13/2024 Started Depakote 500mg BID with first dose now  - Follow up Labs including Vimpat level  - Neurological & Vitals assessment Q8hrs  - Maintain Seizure/Fall/Aspiration precautions. Onset of seizures started 9 months after having Stroke (2020) and have been occurring every week in October and last seizure was November 3rd. Recent ambulatory EEG from October 22-25, 2024 right occasional frontotemporal epileptiform discharges with 1 recorded seizure from the same region. vEEG placed on 11/11/2024 shows frequent right temporal polymorphic delta activity and rare rIght frontal epileptiform spikes. On 11/12/2024 EEG showed electrographic seizure and clinical generalized clonic movement in all extremities.    - Continue VEEG monitoring   - Ativan 2mg IVP PRN for seizures > 2mins  - 11/13/2024 VImpat increased from 250mg to 300mg BID  - 11/13/2024 Started Depakote 500mg BID with first dose now  - 11/24 c/w  bid,  bid  - Follow up Labs including Vimpat level  - Neurological & Vitals assessment Q8hrs  - Maintain Seizure/Fall/Aspiration precautions.

## 2024-11-14 NOTE — PROGRESS NOTE ADULT - ATTENDING COMMENTS
improved EEG on VPA 500mg bid, also LCM up to 300mg bid  No AE's.    Will watch for another night on vEEG.  Likely dc tmrw

## 2024-11-14 NOTE — PROGRESS NOTE ADULT - SUBJECTIVE AND OBJECTIVE BOX
EPILEPSY PROGRESS NOTE:  Patient had a seizure overnight, at ****  but pt did not recall any auras.      REVIEW OF SYSTEMS:  CONSTITUTIONAL:  No weight loss, fever, chills, weakness or fatigue.   HEENT:  Eyes:  No visual loss, blurred vision, double vision or yellow sclerae. Ears, Nose, Throat:  No hearing loss, sneezing, congestion, runny nose or sore throat.  SKIN:  No rash or itching.  CARDIOVASCULAR:  No chest pain, chest pressure or chest discomfort. No palpitations or edema.  RESPIRATORY:  No shortness of breath, cough or sputum.  GASTROINTESTINAL:  No anorexia, nausea, vomiting or diarrhea. No abdominal pain or blood.  GENITOURINARY: No Burning on urination.   NEUROLOGICAL:  see HPI  MUSCULOSKELETAL:  No muscle, back pain, joint pain or stiffness.    MEDICATIONS:   MEDICATIONS  (STANDING):  aspirin enteric coated 81 milliGRAM(s) Oral daily  atorvastatin 40 milliGRAM(s) Oral at bedtime  clopidogrel Tablet 75 milliGRAM(s) Oral daily  donepezil 10 milliGRAM(s) Oral at bedtime  folic acid 1 milliGRAM(s) Oral daily  influenza   Vaccine 0.5 milliLiter(s) IntraMuscular once  lacosamide 300 milliGRAM(s) Oral <User Schedule>  lisinopril 20 milliGRAM(s) Oral daily  memantine 5 milliGRAM(s) Oral daily  pantoprazole    Tablet 40 milliGRAM(s) Oral daily  sertraline 25 milliGRAM(s) Oral daily    MEDICATIONS  (PRN):  acetaminophen     Tablet .. 650 milliGRAM(s) Oral every 6 hours PRN Temp greater or equal to 38C (100.4F), Mild Pain (1 - 3)  LORazepam   Injectable 2 milliGRAM(s) IV Push once PRN Seizure Activity      VITAL SIGNS:  Vital Signs Last 24 Hrs  T(C): 36.9 (13 Nov 2024 05:56), Max: 37.1 (12 Nov 2024 12:43)  T(F): 98.4 (13 Nov 2024 05:56), Max: 98.7 (12 Nov 2024 12:43)  HR: 54 (13 Nov 2024 05:56) (53 - 69)  BP: 131/65 (13 Nov 2024 05:56) (131/65 - 201/77)  BP(mean): 87 (13 Nov 2024 05:56) (87 - 87)  RR: 17 (13 Nov 2024 05:56) (16 - 17)  SpO2: 95% (13 Nov 2024 05:56) (95% - 97%)    Parameters below as of 13 Nov 2024 05:56  Patient On (Oxygen Delivery Method): room air        PHYSICAL EXAM:  Constitutional: Appearance is consistent with chronologic age. No acute distress  Cardiovascular: Regular rate and rhythm, no murmurs, rubs, or gallops. Extremities are warm and well perfused. Capillary refill is less than 2 seconds.  Pulmonary: Anterior breath sounds clear bilaterally, no crackles or wheezing. No use of accessory muscles  GI: Positive bowel sounds. Abdomen soft, non-distended, non-tender. No guarding or rebound. No masses.  Extremities: No significant deformity or joint abnormality. Radial and DP pulses +2, No edema.  Skin: normal color, texture and turgor with no lesions or eruptions.    Neurologic:  -Mental status: Awake, alert, oriented to person, and place. Disoriented to date and situation. Speech is fluent with intact naming, repetition, and comprehension, no dysarthria. Recall 2/3 with prompts. Follows commands. Attention/concentration intact.   -Cranial nerves:   II: Visual fields are full to confrontation.  III, IV, VI: Extraocular movements are intact without nystagmus. Pupils equally round and reactive to light. 2mm Brisk.   V:  Facial sensation V1-V3 equal and intact   VII: Face is symmetric with normal eye closure and smile  VIII: Hearing is bilaterally intact to finger rub  IX, X: Uvula is midline and soft palate rises symmetrically  XI: Head turning and shoulder shrug are intact.  XII: Tongue protrudes midline  Motor: Normal bulk and tone. Strength:     [] Upper extremity                      Delt       Bicep    Tricep                                                  R         5/5        5/5        5/5       5/5                                               L          5-/5      5-/5       5-/5      5-/5  [] Lower extremity                       HF          KE          KF        DF         PF                                               R        5/5        5/5        5/5       5/5       5/5                                               L         5-/5        5-/5      5-/5      5-/5     5-/5    Sensation: Intact to light touch in all extremities.  Coordination: No dysmetria on finger-to-nose bilaterally  Reflexes: 2+ b/l biceps, triceps, patella and achilles. Plantar response mute  Gait: Deferred    LABS:  CBC Full  -  ( 11 Nov 2024 09:34 )  WBC Count : 4.93 K/uL  RBC Count : 4.47 M/uL  Hemoglobin : 13.3 g/dL  Hematocrit : 40.9 %  Platelet Count - Automated : 211 K/uL  Mean Cell Volume : 91.5 fl  Mean Cell Hemoglobin : 29.8 pg  Mean Cell Hemoglobin Concentration : 32.5 g/dL  Auto Neutrophil # : 3.80 K/uL  Auto Lymphocyte # : 0.74 K/uL  Auto Monocyte # : 0.32 K/uL  Auto Eosinophil # : 0.04 K/uL  Auto Basophil # : 0.02 K/uL  Auto Neutrophil % : 77.1 %  Auto Lymphocyte % : 15.0 %  Auto Monocyte % : 6.5 %  Auto Eosinophil % : 0.8 %  Auto Basophil % : 0.4 %    11-11    142  |  104  |  10  ----------------------------<  131[H]  4.0   |  30  |  0.69    Ca    8.8      11 Nov 2024 09:34    TPro  7.1  /  Alb  4.4  /  TBili  0.3  /  DBili  x   /  AST  10  /  ALT  13  /  AlkPhos  54  11-11    LIVER FUNCTIONS - ( 11 Nov 2024 09:34 )  Alb: 4.4 g/dL / Pro: 7.1 g/dL / ALK PHOS: 54 U/L / ALT: 13 U/L / AST: 10 U/L / GGT: x           PT/INR - ( 12 Nov 2024 07:46 )   PT: 12.0 sec;   INR: 1.04          PTT - ( 12 Nov 2024 07:46 )  PTT:32.9 sec      EEG:  Daily Updates (from 07:00 am until 07:00 am):  Day 2 11/12/2024, 7:00 AM to next morning at 07:00 AM.  Meds:Vimpat 300 mg bid  Background:  continuous, with predominantly alpha and beta frequencies.  Voltage:  Normal (20+ uV)  Organization:  Appropriate anterior-posterior gradient  Posterior Dominant Rhythm:  9 Hz symmetric, well-organized, and well-modulated  Sleep:  Symmetric, synchronous spindles and K complexes.  Focal abnormalities:  Frequent (10-49%) right temporal polymorphic delta activity.  Spontaneous Activity:  Rare (<1/Hr)   RIght frontal (maximal F8/F4)  Epileptiform spikes (20-70msec).   Events:at 12:39 AM he had electrographic onset seizure follow by clinical  generalized clonic movement in extremities with EEG start with right temporal periodic spike and wave discharges with right fronto-temporal Beta fast activity follow by generalized spike and wave discharges  Provocations:  •	Hyperventilation: was not performed.  •	Photic stimulation: was not performed.    Daily Summary:       -Electrographic onset of seizure follow by clinical generalized clonic movement in all extremities with electrographic right temporal periodic spike and wave discharges with right fronto-temporal Beta fast activity followed by generalized spike and wave discharges             -Frequent (10-49%) right temporal polymorphic delta activity.    -Rare rIght frontal (maximal F8/F4) spikes, no seizures.      Elizabeth Kingston MD  CNP Fellow    Bernard Lundy MD  Attending Neurologist, [facility] Epilepsy Program EPILEPSY PROGRESS NOTE:  No events overnight. No new compliants.     REVIEW OF SYSTEMS:  CONSTITUTIONAL:  No weight loss, fever, chills, weakness or fatigue.   HEENT:  Eyes:  No visual loss, blurred vision, double vision or yellow sclerae. Ears, Nose, Throat:  No hearing loss, sneezing, congestion, runny nose or sore throat.  SKIN:  No rash or itching.  CARDIOVASCULAR:  No chest pain, chest pressure or chest discomfort. No palpitations or edema.  RESPIRATORY:  No shortness of breath, cough or sputum.  GASTROINTESTINAL:  No anorexia, nausea, vomiting or diarrhea. No abdominal pain or blood.  GENITOURINARY: No Burning on urination.   NEUROLOGICAL:  see HPI  MUSCULOSKELETAL:  No muscle, back pain, joint pain or stiffness.    MEDICATIONS:   MEDICATIONS  (STANDING):  aspirin enteric coated 81 milliGRAM(s) Oral daily  atorvastatin 40 milliGRAM(s) Oral at bedtime  clopidogrel Tablet 75 milliGRAM(s) Oral daily  donepezil 10 milliGRAM(s) Oral at bedtime  folic acid 1 milliGRAM(s) Oral daily  influenza   Vaccine 0.5 milliLiter(s) IntraMuscular once  lacosamide 300 milliGRAM(s) Oral <User Schedule>  lisinopril 20 milliGRAM(s) Oral daily  memantine 5 milliGRAM(s) Oral daily  pantoprazole    Tablet 40 milliGRAM(s) Oral daily  sertraline 25 milliGRAM(s) Oral daily    MEDICATIONS  (PRN):  acetaminophen     Tablet .. 650 milliGRAM(s) Oral every 6 hours PRN Temp greater or equal to 38C (100.4F), Mild Pain (1 - 3)  LORazepam   Injectable 2 milliGRAM(s) IV Push once PRN Seizure Activity      VITAL SIGNS:  Vital Signs Last 24 Hrs  T(C): 36.9 (13 Nov 2024 05:56), Max: 37.1 (12 Nov 2024 12:43)  T(F): 98.4 (13 Nov 2024 05:56), Max: 98.7 (12 Nov 2024 12:43)  HR: 54 (13 Nov 2024 05:56) (53 - 69)  BP: 131/65 (13 Nov 2024 05:56) (131/65 - 201/77)  BP(mean): 87 (13 Nov 2024 05:56) (87 - 87)  RR: 17 (13 Nov 2024 05:56) (16 - 17)  SpO2: 95% (13 Nov 2024 05:56) (95% - 97%)    Parameters below as of 13 Nov 2024 05:56  Patient On (Oxygen Delivery Method): room air        PHYSICAL EXAM:  Constitutional: Appearance is consistent with chronologic age. No acute distress  Cardiovascular: Regular rate and rhythm, no murmurs, rubs, or gallops. Extremities are warm and well perfused. Capillary refill is less than 2 seconds.  Pulmonary: Anterior breath sounds clear bilaterally, no crackles or wheezing. No use of accessory muscles  GI: Positive bowel sounds. Abdomen soft, non-distended, non-tender. No guarding or rebound. No masses.  Extremities: No significant deformity or joint abnormality. Radial and DP pulses +2, No edema.  Skin: normal color, texture and turgor with no lesions or eruptions.    Neurologic:  -Mental status: Awake, alert, oriented to person, and place. Disoriented to date and situation. Speech is fluent with intact naming, repetition, and comprehension, no dysarthria. Recall 2/3 with prompts. Follows commands. Attention/concentration intact.   -Cranial nerves:   II: Visual fields are full to confrontation.  III, IV, VI: Extraocular movements are intact without nystagmus. Pupils equally round and reactive to light. 2mm Brisk.   V:  Facial sensation V1-V3 equal and intact   VII: Face is symmetric with normal eye closure and smile  VIII: Hearing is bilaterally intact to finger rub  IX, X: Uvula is midline and soft palate rises symmetrically  XI: Head turning and shoulder shrug are intact.  XII: Tongue protrudes midline  Motor: Normal bulk and tone. Strength:     [] Upper extremity                      Delt       Bicep    Tricep                                                  R         5/5        5/5        5/5       5/5                                               L          5-/5      5-/5       5-/5      5-/5  [] Lower extremity                       HF          KE          KF        DF         PF                                               R        5/5        5/5        5/5       5/5       5/5                                               L         5-/5        5-/5      5-/5      5-/5     5-/5    Sensation: Intact to light touch in all extremities.  Coordination: No dysmetria on finger-to-nose bilaterally  Reflexes: 2+ b/l biceps, triceps, patella and achilles. Plantar response mute  Gait: Deferred    LABS:  CBC Full  -  ( 11 Nov 2024 09:34 )  WBC Count : 4.93 K/uL  RBC Count : 4.47 M/uL  Hemoglobin : 13.3 g/dL  Hematocrit : 40.9 %  Platelet Count - Automated : 211 K/uL  Mean Cell Volume : 91.5 fl  Mean Cell Hemoglobin : 29.8 pg  Mean Cell Hemoglobin Concentration : 32.5 g/dL  Auto Neutrophil # : 3.80 K/uL  Auto Lymphocyte # : 0.74 K/uL  Auto Monocyte # : 0.32 K/uL  Auto Eosinophil # : 0.04 K/uL  Auto Basophil # : 0.02 K/uL  Auto Neutrophil % : 77.1 %  Auto Lymphocyte % : 15.0 %  Auto Monocyte % : 6.5 %  Auto Eosinophil % : 0.8 %  Auto Basophil % : 0.4 %    11-11    142  |  104  |  10  ----------------------------<  131[H]  4.0   |  30  |  0.69    Ca    8.8      11 Nov 2024 09:34    TPro  7.1  /  Alb  4.4  /  TBili  0.3  /  DBili  x   /  AST  10  /  ALT  13  /  AlkPhos  54  11-11    LIVER FUNCTIONS - ( 11 Nov 2024 09:34 )  Alb: 4.4 g/dL / Pro: 7.1 g/dL / ALK PHOS: 54 U/L / ALT: 13 U/L / AST: 10 U/L / GGT: x           PT/INR - ( 12 Nov 2024 07:46 )   PT: 12.0 sec;   INR: 1.04          PTT - ( 12 Nov 2024 07:46 )  PTT:32.9 sec      EEG:  Daily Updates (from 07:00 am until 07:00 am):  Day 2 11/12/2024, 7:00 AM to next morning at 07:00 AM.  Meds:Vimpat 300 mg bid  Background:  continuous, with predominantly alpha and beta frequencies.  Voltage:  Normal (20+ uV)  Organization:  Appropriate anterior-posterior gradient  Posterior Dominant Rhythm:  9 Hz symmetric, well-organized, and well-modulated  Sleep:  Symmetric, synchronous spindles and K complexes.  Focal abnormalities:  Frequent (10-49%) right temporal polymorphic delta activity.  Spontaneous Activity:  Rare (<1/Hr)   RIght frontal (maximal F8/F4)  Epileptiform spikes (20-70msec).   Events:at 12:39 AM he had electrographic onset seizure follow by clinical  generalized clonic movement in extremities with EEG start with right temporal periodic spike and wave discharges with right fronto-temporal Beta fast activity follow by generalized spike and wave discharges  Provocations:  •	Hyperventilation: was not performed.  •	Photic stimulation: was not performed.    Daily Summary:       -Electrographic onset of seizure follow by clinical generalized clonic movement in all extremities with electrographic right temporal periodic spike and wave discharges with right fronto-temporal Beta fast activity followed by generalized spike and wave discharges             -Frequent (10-49%) right temporal polymorphic delta activity.    -Rare rIght frontal (maximal F8/F4) spikes, no seizures.      Elizabeth Kingston MD  CNP Fellow    Bernard Lundy MD  Attending Neurologist, [facility] Epilepsy Program EPILEPSY PROGRESS NOTE:  No events overnight. No new complaints.   VPA started and not able to find frontal spikes overnight.    REVIEW OF SYSTEMS:  CONSTITUTIONAL:  No weight loss, fever, chills, weakness or fatigue.   HEENT:  Eyes:  No visual loss, blurred vision, double vision or yellow sclerae. Ears, Nose, Throat:  No hearing loss, sneezing, congestion, runny nose or sore throat.  SKIN:  No rash or itching.  CARDIOVASCULAR:  No chest pain, chest pressure or chest discomfort. No palpitations or edema.  RESPIRATORY:  No shortness of breath, cough or sputum.  GASTROINTESTINAL:  No anorexia, nausea, vomiting or diarrhea. No abdominal pain or blood.  GENITOURINARY: No Burning on urination.   NEUROLOGICAL:  see HPI  MUSCULOSKELETAL:  No muscle, back pain, joint pain or stiffness.    MEDICATIONS:   MEDICATIONS  (STANDING):  aspirin enteric coated 81 milliGRAM(s) Oral daily  atorvastatin 40 milliGRAM(s) Oral at bedtime  clopidogrel Tablet 75 milliGRAM(s) Oral daily  donepezil 10 milliGRAM(s) Oral at bedtime  folic acid 1 milliGRAM(s) Oral daily  influenza   Vaccine 0.5 milliLiter(s) IntraMuscular once  lacosamide 300 milliGRAM(s) Oral <User Schedule>  lisinopril 20 milliGRAM(s) Oral daily  memantine 5 milliGRAM(s) Oral daily  pantoprazole    Tablet 40 milliGRAM(s) Oral daily  sertraline 25 milliGRAM(s) Oral daily    MEDICATIONS  (PRN):  acetaminophen     Tablet .. 650 milliGRAM(s) Oral every 6 hours PRN Temp greater or equal to 38C (100.4F), Mild Pain (1 - 3)  LORazepam   Injectable 2 milliGRAM(s) IV Push once PRN Seizure Activity      VITAL SIGNS:  Vital Signs Last 24 Hrs  T(C): 36.9 (13 Nov 2024 05:56), Max: 37.1 (12 Nov 2024 12:43)  T(F): 98.4 (13 Nov 2024 05:56), Max: 98.7 (12 Nov 2024 12:43)  HR: 54 (13 Nov 2024 05:56) (53 - 69)  BP: 131/65 (13 Nov 2024 05:56) (131/65 - 201/77)  BP(mean): 87 (13 Nov 2024 05:56) (87 - 87)  RR: 17 (13 Nov 2024 05:56) (16 - 17)  SpO2: 95% (13 Nov 2024 05:56) (95% - 97%)    Parameters below as of 13 Nov 2024 05:56  Patient On (Oxygen Delivery Method): room air        PHYSICAL EXAM:  Constitutional: Appearance is consistent with chronologic age. No acute distress  Cardiovascular: Regular rate and rhythm, no murmurs, rubs, or gallops. Extremities are warm and well perfused. Capillary refill is less than 2 seconds.  Pulmonary: Anterior breath sounds clear bilaterally, no crackles or wheezing. No use of accessory muscles  GI: Positive bowel sounds. Abdomen soft, non-distended, non-tender. No guarding or rebound. No masses.  Extremities: No significant deformity or joint abnormality. Radial and DP pulses +2, No edema.  Skin: normal color, texture and turgor with no lesions or eruptions.    Neurologic:  -Mental status: Awake, alert, oriented to person, and place. Disoriented to date and situation. Speech is fluent with intact naming, repetition, and comprehension, no dysarthria. Recall 2/3 with prompts. Follows commands. Attention/concentration intact.   -Cranial nerves:   II: Visual fields are full to confrontation.  III, IV, VI: Extraocular movements are intact without nystagmus. Pupils equally round and reactive to light. 2mm Brisk.   V:  Facial sensation V1-V3 equal and intact   VII: Face is symmetric with normal eye closure and smile  VIII: Hearing is bilaterally intact to finger rub  IX, X: Uvula is midline and soft palate rises symmetrically  XI: Head turning and shoulder shrug are intact.  XII: Tongue protrudes midline  Motor: Normal bulk and tone. Strength:     [] Upper extremity                      Delt       Bicep    Tricep                                                  R         5/5        5/5        5/5       5/5                                               L          5-/5      5-/5       5-/5      5-/5  [] Lower extremity                       HF          KE          KF        DF         PF                                               R        5/5        5/5        5/5       5/5       5/5                                               L         5-/5        5-/5      5-/5      5-/5     5-/5    Sensation: Intact to light touch in all extremities.  Coordination: No dysmetria on finger-to-nose bilaterally  Reflexes: 2+ b/l biceps, triceps, patella and achilles. Plantar response mute  Gait: Deferred    LABS:  CBC Full  -  ( 11 Nov 2024 09:34 )  WBC Count : 4.93 K/uL  RBC Count : 4.47 M/uL  Hemoglobin : 13.3 g/dL  Hematocrit : 40.9 %  Platelet Count - Automated : 211 K/uL  Mean Cell Volume : 91.5 fl  Mean Cell Hemoglobin : 29.8 pg  Mean Cell Hemoglobin Concentration : 32.5 g/dL  Auto Neutrophil # : 3.80 K/uL  Auto Lymphocyte # : 0.74 K/uL  Auto Monocyte # : 0.32 K/uL  Auto Eosinophil # : 0.04 K/uL  Auto Basophil # : 0.02 K/uL  Auto Neutrophil % : 77.1 %  Auto Lymphocyte % : 15.0 %  Auto Monocyte % : 6.5 %  Auto Eosinophil % : 0.8 %  Auto Basophil % : 0.4 %    11-11    142  |  104  |  10  ----------------------------<  131[H]  4.0   |  30  |  0.69    Ca    8.8      11 Nov 2024 09:34    TPro  7.1  /  Alb  4.4  /  TBili  0.3  /  DBili  x   /  AST  10  /  ALT  13  /  AlkPhos  54  11-11    LIVER FUNCTIONS - ( 11 Nov 2024 09:34 )  Alb: 4.4 g/dL / Pro: 7.1 g/dL / ALK PHOS: 54 U/L / ALT: 13 U/L / AST: 10 U/L / GGT: x           PT/INR - ( 12 Nov 2024 07:46 )   PT: 12.0 sec;   INR: 1.04          PTT - ( 12 Nov 2024 07:46 )  PTT:32.9 sec      EEG:  Daily Updates (from 07:00 am until 07:00 am):  Day 2 11/12/2024, 7:00 AM to next morning at 07:00 AM.  Meds:Vimpat 300 mg bid  Background:  continuous, with predominantly alpha and beta frequencies.  Voltage:  Normal (20+ uV)  Organization:  Appropriate anterior-posterior gradient  Posterior Dominant Rhythm:  9 Hz symmetric, well-organized, and well-modulated  Sleep:  Symmetric, synchronous spindles and K complexes.  Focal abnormalities:  Frequent (10-49%) right temporal polymorphic delta activity.  Spontaneous Activity:  Rare (<1/Hr)   RIght frontal (maximal F8/F4)  Epileptiform spikes (20-70msec).   Events:at 12:39 AM he had electrographic onset seizure follow by clinical  generalized clonic movement in extremities with EEG start with right temporal periodic spike and wave discharges with right fronto-temporal Beta fast activity follow by generalized spike and wave discharges  Provocations:  •	Hyperventilation: was not performed.  •	Photic stimulation: was not performed.    Daily Summary:       -Electrographic onset of seizure follow by clinical generalized clonic movement in all extremities with electrographic right temporal periodic spike and wave discharges with right fronto-temporal Beta fast activity followed by generalized spike and wave discharges             -Frequent (10-49%) right temporal polymorphic delta activity.    -Rare rIght frontal (maximal F8/F4) spikes, no seizures.      Elizabeth Kingston MD  CNP Fellow    Bernard Lundy MD  Attending Neurologist, [facility] Epilepsy [facility]

## 2024-11-14 NOTE — DIETITIAN INITIAL EVALUATION ADULT - EDUCATION DIETARY MODIFICATIONS
Education not appropriate at this time in setting of pt cognitive status, RD remains available for dietary education prn.

## 2024-11-15 ENCOUNTER — TRANSCRIPTION ENCOUNTER (OUTPATIENT)
Age: 64
End: 2024-11-15

## 2024-11-15 VITALS
SYSTOLIC BLOOD PRESSURE: 126 MMHG | TEMPERATURE: 98 F | HEART RATE: 54 BPM | DIASTOLIC BLOOD PRESSURE: 64 MMHG | OXYGEN SATURATION: 96 % | RESPIRATION RATE: 17 BRPM

## 2024-11-15 DIAGNOSIS — I10 ESSENTIAL (PRIMARY) HYPERTENSION: ICD-10-CM

## 2024-11-15 LAB
ANION GAP SERPL CALC-SCNC: 10 MMOL/L — SIGNIFICANT CHANGE UP (ref 5–17)
BUN SERPL-MCNC: 19 MG/DL — SIGNIFICANT CHANGE UP (ref 7–23)
CALCIUM SERPL-MCNC: 8.9 MG/DL — SIGNIFICANT CHANGE UP (ref 8.4–10.5)
CHLORIDE SERPL-SCNC: 107 MMOL/L — SIGNIFICANT CHANGE UP (ref 96–108)
CO2 SERPL-SCNC: 27 MMOL/L — SIGNIFICANT CHANGE UP (ref 22–31)
CREAT SERPL-MCNC: 0.7 MG/DL — SIGNIFICANT CHANGE UP (ref 0.5–1.3)
EGFR: 104 ML/MIN/1.73M2 — SIGNIFICANT CHANGE UP
GLUCOSE SERPL-MCNC: 105 MG/DL — HIGH (ref 70–99)
HCT VFR BLD CALC: 40.8 % — SIGNIFICANT CHANGE UP (ref 39–50)
HGB BLD-MCNC: 13.5 G/DL — SIGNIFICANT CHANGE UP (ref 13–17)
LACOSAMIDE (VIMPAT) RESULT: 13.38 UG/ML — HIGH (ref 1–10)
MAGNESIUM SERPL-MCNC: 2.2 MG/DL — SIGNIFICANT CHANGE UP (ref 1.6–2.6)
MCHC RBC-ENTMCNC: 30.7 PG — SIGNIFICANT CHANGE UP (ref 27–34)
MCHC RBC-ENTMCNC: 33.1 G/DL — SIGNIFICANT CHANGE UP (ref 32–36)
MCV RBC AUTO: 92.7 FL — SIGNIFICANT CHANGE UP (ref 80–100)
NRBC # BLD: 0 /100 WBCS — SIGNIFICANT CHANGE UP (ref 0–0)
PHOSPHATE SERPL-MCNC: 3.6 MG/DL — SIGNIFICANT CHANGE UP (ref 2.5–4.5)
PLATELET # BLD AUTO: 207 K/UL — SIGNIFICANT CHANGE UP (ref 150–400)
POTASSIUM SERPL-MCNC: 4.3 MMOL/L — SIGNIFICANT CHANGE UP (ref 3.5–5.3)
POTASSIUM SERPL-SCNC: 4.3 MMOL/L — SIGNIFICANT CHANGE UP (ref 3.5–5.3)
RBC # BLD: 4.4 M/UL — SIGNIFICANT CHANGE UP (ref 4.2–5.8)
RBC # FLD: 13.2 % — SIGNIFICANT CHANGE UP (ref 10.3–14.5)
SODIUM SERPL-SCNC: 144 MMOL/L — SIGNIFICANT CHANGE UP (ref 135–145)
WBC # BLD: 4.37 K/UL — SIGNIFICANT CHANGE UP (ref 3.8–10.5)
WBC # FLD AUTO: 4.37 K/UL — SIGNIFICANT CHANGE UP (ref 3.8–10.5)

## 2024-11-15 PROCEDURE — 80235 DRUG ASSAY LACOSAMIDE: CPT

## 2024-11-15 PROCEDURE — 85730 THROMBOPLASTIN TIME PARTIAL: CPT

## 2024-11-15 PROCEDURE — 99239 HOSP IP/OBS DSCHRG MGMT >30: CPT

## 2024-11-15 PROCEDURE — 85025 COMPLETE CBC W/AUTO DIFF WBC: CPT

## 2024-11-15 PROCEDURE — 99232 SBSQ HOSP IP/OBS MODERATE 35: CPT

## 2024-11-15 PROCEDURE — 36415 COLL VENOUS BLD VENIPUNCTURE: CPT

## 2024-11-15 PROCEDURE — 95720 EEG PHY/QHP EA INCR W/VEEG: CPT

## 2024-11-15 PROCEDURE — 84100 ASSAY OF PHOSPHORUS: CPT

## 2024-11-15 PROCEDURE — 81003 URINALYSIS AUTO W/O SCOPE: CPT

## 2024-11-15 PROCEDURE — 93005 ELECTROCARDIOGRAM TRACING: CPT

## 2024-11-15 PROCEDURE — 80053 COMPREHEN METABOLIC PANEL: CPT

## 2024-11-15 PROCEDURE — 86850 RBC ANTIBODY SCREEN: CPT

## 2024-11-15 PROCEDURE — 83735 ASSAY OF MAGNESIUM: CPT

## 2024-11-15 PROCEDURE — 85027 COMPLETE CBC AUTOMATED: CPT

## 2024-11-15 PROCEDURE — 80048 BASIC METABOLIC PNL TOTAL CA: CPT

## 2024-11-15 PROCEDURE — 86900 BLOOD TYPING SEROLOGIC ABO: CPT

## 2024-11-15 PROCEDURE — 86901 BLOOD TYPING SEROLOGIC RH(D): CPT

## 2024-11-15 PROCEDURE — 95700 EEG CONT REC W/VID EEG TECH: CPT

## 2024-11-15 PROCEDURE — 85610 PROTHROMBIN TIME: CPT

## 2024-11-15 PROCEDURE — 95716 VEEG EA 12-26HR CONT MNTR: CPT

## 2024-11-15 PROCEDURE — 99285 EMERGENCY DEPT VISIT HI MDM: CPT | Mod: 25

## 2024-11-15 RX ORDER — ENOXAPARIN SODIUM 40MG/0.4ML
40 SYRINGE (ML) SUBCUTANEOUS EVERY 24 HOURS
Refills: 0 | Status: DISCONTINUED | OUTPATIENT
Start: 2024-11-15 | End: 2024-11-15

## 2024-11-15 RX ORDER — DIVALPROEX SODIUM 250 MG/1
2 TABLET, FILM COATED, EXTENDED RELEASE ORAL
Qty: 60 | Refills: 5
Start: 2024-11-15 | End: 2025-05-13

## 2024-11-15 RX ORDER — DIVALPROEX SODIUM 250 MG/1
1 TABLET, FILM COATED, EXTENDED RELEASE ORAL
Qty: 30 | Refills: 5
Start: 2024-11-15 | End: 2025-05-13

## 2024-11-15 RX ADMIN — DIVALPROEX SODIUM 500 MILLIGRAM(S): 250 TABLET, FILM COATED, EXTENDED RELEASE ORAL at 06:47

## 2024-11-15 RX ADMIN — Medication 81 MILLIGRAM(S): at 11:30

## 2024-11-15 RX ADMIN — LACOSAMIDE 300 MILLIGRAM(S): 100 TABLET ORAL at 07:56

## 2024-11-15 RX ADMIN — SERTRALINE HYDROCHLORIDE 25 MILLIGRAM(S): 50 TABLET, FILM COATED ORAL at 13:23

## 2024-11-15 RX ADMIN — PANTOPRAZOLE SODIUM 40 MILLIGRAM(S): 40 TABLET, DELAYED RELEASE ORAL at 11:30

## 2024-11-15 RX ADMIN — Medication 40 MILLIGRAM(S): at 06:46

## 2024-11-15 RX ADMIN — FOLIC ACID 1 MILLIGRAM(S): 1 TABLET ORAL at 11:30

## 2024-11-15 RX ADMIN — MEMANTINE HYDROCHLORIDE 5 MILLIGRAM(S): 21 CAPSULE, EXTENDED RELEASE ORAL at 12:46

## 2024-11-15 RX ADMIN — Medication 20 MILLIGRAM(S): at 06:47

## 2024-11-15 RX ADMIN — CLOPIDOGREL 75 MILLIGRAM(S): 75 TABLET ORAL at 11:30

## 2024-11-15 NOTE — PROGRESS NOTE ADULT - ASSESSMENT
64yo M w PMH of Stroke (2020) w/ loop recorder on Plavix and 81mg, ASA, no motor residual deficits, early dementia (2020), hernia repair, HTN, HLD, GERD, seizure disorder recently started on Vimpat 250 mg bid, admitted on 11/11/2024 for elective EMU admission for increased frequency of his seizures while undergoing vEEG monitoring. vEEG placed on 11/11/2024 shows frequent right temporal polymorphic delta activity and rare rIght frontal epileptiform spikes.    #Epilepsy   VEEG:  - No epileptiform discharges  VImpat increased from 250mg to 300mg BID  Started Depakote 500mg BID  Management as per epilepsy team     #Stroke   Continue home Asa, Plavix and atorvastatin     #HTN  Continue home lisinopril     #GERD  Continue home protonix     #Early onset dementia   Continue home Donepezil    DVT PPx: not on chemical DVT ppx as per primary team   SCD boots

## 2024-11-15 NOTE — PROGRESS NOTE ADULT - ASSESSMENT
63-year-old-right-handed male w PMH of Stroke (2020) w/ loop recorder on Plavix and 81mg, ASA, no motor residual deficits, early dementia (2020), hernia repair, HTN, HLD, GERD, seizure disorder recently started on Vimpat 250 mg bid, admitted on 11/11/2024 for elective EMU admission for increased frequency of his seizures while undergoing vEEG monitoring. vEEG placed on 11/11/2024 shows frequent right temporal polymorphic delta activity and rare rIght frontal epileptiform spikes. On 11/12/2024 EEG showed electrographic seizure and clinical generalized clonic movement in all extremities. EEG is better after adding the second ASM (VPA).

## 2024-11-15 NOTE — DISCHARGE NOTE NURSING/CASE MANAGEMENT/SOCIAL WORK - PATIENT PORTAL LINK FT
You can access the FollowMyHealth Patient Portal offered by [facility] by registering at the following website: [email or web address]. By joining [facility] FollowMyHealth portal, you will also be able to view your health information using other applications (apps) compatible with our system.

## 2024-11-15 NOTE — PROGRESS NOTE ADULT - PROBLEM SELECTOR PLAN 2
Pt has H/O stroke 2020    - Continue home med aspirin 81mg PO Daily  - Continue home med Plavix 75mg PO Daily.

## 2024-11-15 NOTE — DISCHARGE NOTE NURSING/CASE MANAGEMENT/SOCIAL WORK - FLU SEASON?
[address] [facility]      Instructions to Patients    The following are the instructions for patients who have had a procedure in our office today. Patient name: Lisandro Kaye    Radionuclide Activity: 40mCi of 99mTc-Tetrofosmin (Myoview)    Date Administered: 6/2/2023    Expires: 48 hours after scheduled appointment time      Patient may resume normal activity unless otherwise instructed. Patient may resume medications as normal.  If the need should arise, patient may call [phone] between the hours of 8:00am-4:30pm.  After hours there is at least one physician on-call at all times for those patients needing assistance. Patients may call [phone] and the answering service will direct the patient to one of our physicians for assistance. After the patient's test if they are going to be leaving from an airport in the near future they should take this letter with them to verify the test and radionuclide used for their test.      This letter verifies that the above named bearer received an injection of a radionuclide for medical purpose/usage only.         Electronically signed by Janene Milner on 6/2/2023 at 10:12 AM Yes...

## 2024-11-15 NOTE — PROGRESS NOTE ADULT - REASON FOR ADMISSION
increased seizure frequency

## 2024-11-15 NOTE — DISCHARGE NOTE NURSING/CASE MANAGEMENT/SOCIAL WORK - FINANCIAL ASSISTANCE
[facility] provides services at a reduced cost to those who are determined to be eligible through [facility]’s financial assistance program. Information regarding [facility]’s financial assistance program can be found by going to [email or web address] or by calling [phone].

## 2024-11-15 NOTE — PROGRESS NOTE ADULT - SUBJECTIVE AND OBJECTIVE BOX
EPILEPSY PROGRESS NOTE:  No events overnight. No new complaints. on [id number]/d and /d.     REVIEW OF SYSTEMS:  CONSTITUTIONAL:  No weight loss, fever, chills, weakness or fatigue.   HEENT:  Eyes:  No visual loss, blurred vision, double vision or yellow sclerae. Ears, Nose, Throat:  No hearing loss, sneezing, congestion, runny nose or sore throat.  SKIN:  No rash or itching.  CARDIOVASCULAR:  No chest pain, chest pressure or chest discomfort. No palpitations or edema.  RESPIRATORY:  No shortness of breath, cough or sputum.  GASTROINTESTINAL:  No anorexia, nausea, vomiting or diarrhea. No abdominal pain or blood.  GENITOURINARY: No Burning on urination.   NEUROLOGICAL:  see HPI  MUSCULOSKELETAL:  No muscle, back pain, joint pain or stiffness.    MEDICATIONS:   MEDICATIONS  (STANDING):  aspirin enteric coated 81 milliGRAM(s) Oral daily  atorvastatin 40 milliGRAM(s) Oral at bedtime  clopidogrel Tablet 75 milliGRAM(s) Oral daily  donepezil 10 milliGRAM(s) Oral at bedtime  folic acid 1 milliGRAM(s) Oral daily  influenza   Vaccine 0.5 milliLiter(s) IntraMuscular once  lacosamide 300 milliGRAM(s) Oral <User Schedule>  lisinopril 20 milliGRAM(s) Oral daily  memantine 5 milliGRAM(s) Oral daily  pantoprazole    Tablet 40 milliGRAM(s) Oral daily  sertraline 25 milliGRAM(s) Oral daily    MEDICATIONS  (PRN):  acetaminophen     Tablet .. 650 milliGRAM(s) Oral every 6 hours PRN Temp greater or equal to 38C (100.4F), Mild Pain (1 - 3)  LORazepam   Injectable 2 milliGRAM(s) IV Push once PRN Seizure Activity      VITAL SIGNS:  Vital Signs Last 24 Hrs  T(C): 36.9 (13 Nov 2024 05:56), Max: 37.1 (12 Nov 2024 12:43)  T(F): 98.4 (13 Nov 2024 05:56), Max: 98.7 (12 Nov 2024 12:43)  HR: 54 (13 Nov 2024 05:56) (53 - 69)  BP: 131/65 (13 Nov 2024 05:56) (131/65 - 201/77)  BP(mean): 87 (13 Nov 2024 05:56) (87 - 87)  RR: 17 (13 Nov 2024 05:56) (16 - 17)  SpO2: 95% (13 Nov 2024 05:56) (95% - 97%)    Parameters below as of 13 Nov 2024 05:56  Patient On (Oxygen Delivery Method): room air        PHYSICAL EXAM:  Constitutional: Appearance is consistent with chronologic age. No acute distress  Cardiovascular: Regular rate and rhythm, no murmurs, rubs, or gallops. Extremities are warm and well perfused. Capillary refill is less than 2 seconds.  Pulmonary: Anterior breath sounds clear bilaterally, no crackles or wheezing. No use of accessory muscles  GI: Positive bowel sounds. Abdomen soft, non-distended, non-tender. No guarding or rebound. No masses.  Extremities: No significant deformity or joint abnormality. Radial and DP pulses +2, No edema.  Skin: normal color, texture and turgor with no lesions or eruptions.    Neurologic:  -Mental status: Awake, alert, oriented to person, and place. Disoriented to date and situation. Speech is fluent with intact naming, repetition, and comprehension, no dysarthria. Recall 2/3 with prompts. Follows commands. Attention/concentration intact.   -Cranial nerves:   II: Visual fields are full to confrontation.  III, IV, VI: Extraocular movements are intact without nystagmus. Pupils equally round and reactive to light. 2mm Brisk.   V:  Facial sensation V1-V3 equal and intact   VII: Face is symmetric with normal eye closure and smile  VIII: Hearing is bilaterally intact to finger rub  IX, X: Uvula is midline and soft palate rises symmetrically  XI: Head turning and shoulder shrug are intact.  XII: Tongue protrudes midline  Motor: Normal bulk and tone. Strength:     [] Upper extremity                      Delt       Bicep    Tricep                                                  R         5/5        5/5        5/5       5/5                                               L          5-/5      5-/5       5-/5      5-/5  [] Lower extremity                       HF          KE          KF        DF         PF                                               R        5/5        5/5        5/5       5/5       5/5                                               L         5-/5        5-/5      5-/5      5-/5     5-/5    Sensation: Intact to light touch in all extremities.  Coordination: No dysmetria on finger-to-nose bilaterally  Reflexes: 2+ b/l biceps, triceps, patella and achilles. Plantar response mute  Gait: Deferred    LABS:  CBC Full  -  ( 11 Nov 2024 09:34 )  WBC Count : 4.93 K/uL  RBC Count : 4.47 M/uL  Hemoglobin : 13.3 g/dL  Hematocrit : 40.9 %  Platelet Count - Automated : 211 K/uL  Mean Cell Volume : 91.5 fl  Mean Cell Hemoglobin : 29.8 pg  Mean Cell Hemoglobin Concentration : 32.5 g/dL  Auto Neutrophil # : 3.80 K/uL  Auto Lymphocyte # : 0.74 K/uL  Auto Monocyte # : 0.32 K/uL  Auto Eosinophil # : 0.04 K/uL  Auto Basophil # : 0.02 K/uL  Auto Neutrophil % : 77.1 %  Auto Lymphocyte % : 15.0 %  Auto Monocyte % : 6.5 %  Auto Eosinophil % : 0.8 %  Auto Basophil % : 0.4 %    11-11    142  |  104  |  10  ----------------------------<  131[H]  4.0   |  30  |  0.69    Ca    8.8      11 Nov 2024 09:34    TPro  7.1  /  Alb  4.4  /  TBili  0.3  /  DBili  x   /  AST  10  /  ALT  13  /  AlkPhos  54  11-11    LIVER FUNCTIONS - ( 11 Nov 2024 09:34 )  Alb: 4.4 g/dL / Pro: 7.1 g/dL / ALK PHOS: 54 U/L / ALT: 13 U/L / AST: 10 U/L / GGT: x           PT/INR - ( 12 Nov 2024 07:46 )   PT: 12.0 sec;   INR: 1.04          PTT - ( 12 Nov 2024 07:46 )  PTT:32.9 sec      EEG:  Daily Updates (from 07:00 am until 07:00 am):  Day 2 11/12/2024, 7:00 AM to next morning at 07:00 AM.  Meds:Vimpat 300 mg bid  Background:  continuous, with predominantly alpha and beta frequencies.  Voltage:  Normal (20+ uV)  Organization:  Appropriate anterior-posterior gradient  Posterior Dominant Rhythm:  9 Hz symmetric, well-organized, and well-modulated  Sleep:  Symmetric, synchronous spindles and K complexes.  Focal abnormalities:  Frequent (10-49%) right temporal polymorphic delta activity.  Spontaneous Activity:  Rare (<1/Hr)   RIght frontal (maximal F8/F4)  Epileptiform spikes (20-70msec).   Events:at 12:39 AM he had electrographic onset seizure follow by clinical  generalized clonic movement in extremities with EEG start with right temporal periodic spike and wave discharges with right fronto-temporal Beta fast activity follow by generalized spike and wave discharges  Provocations:  •	Hyperventilation: was not performed.  •	Photic stimulation: was not performed.    Daily Summary:       -Electrographic onset of seizure follow by clinical generalized clonic movement in all extremities with electrographic right temporal periodic spike and wave discharges with right fronto-temporal Beta fast activity followed by generalized spike and wave discharges             -Frequent (10-49%) right temporal polymorphic delta activity.    -Rare rIght frontal (maximal F8/F4) spikes, no seizures.      Elizabeth Kingston MD  CNP Fellow    Bernard Lundy MD  Attending Neurologist, [facility] Epilepsy Program

## 2024-11-15 NOTE — PROGRESS NOTE ADULT - PROBLEM SELECTOR PLAN 4
Pt has H/O HLD    - Continue home med Atorvastatin 40mg PO QD.

## 2024-11-15 NOTE — PROGRESS NOTE ADULT - PROBLEM SELECTOR PLAN 5
Pt has H/O GERD    - Continue home med Protonix 40 mg PO QD.

## 2024-11-15 NOTE — DISCHARGE NOTE NURSING/CASE MANAGEMENT/SOCIAL WORK - NSDCVIVACCINE_GEN_ALL_CORE_FT
Tdap; 16-Sep-2021 03:57; Alicia Terrazas (GERALDINE); [employer]; [id number] (Exp. Date: 15-Jul-2023); IntraMuscular; Deltoid Right.; 0.5 milliLiter(s); VIS (VIS Published: 09-May-2013, VIS Presented: 16-Sep-2021);

## 2024-11-15 NOTE — PROGRESS NOTE ADULT - SUBJECTIVE AND OBJECTIVE BOX
Patient is a 64y old  Male who presents with a chief complaint of increased seizure frequency (15 Nov 2024 09:12)      INTERVAL HPI/OVERNIGHT EVENTS: NAOE. offers no new complaints; Sitting in bed comfortably, eating breakfast     MEDICATIONS  (STANDING):  aspirin enteric coated 81 milliGRAM(s) Oral daily  atorvastatin 40 milliGRAM(s) Oral at bedtime  clopidogrel Tablet 75 milliGRAM(s) Oral daily  divalproex  milliGRAM(s) Oral two times a day  donepezil 10 milliGRAM(s) Oral at bedtime  enoxaparin Injectable 40 milliGRAM(s) SubCutaneous every 24 hours  folic acid 1 milliGRAM(s) Oral daily  influenza   Vaccine 0.5 milliLiter(s) IntraMuscular once  lacosamide 300 milliGRAM(s) Oral <User Schedule>  lisinopril 20 milliGRAM(s) Oral daily  memantine 5 milliGRAM(s) Oral daily  pantoprazole    Tablet 40 milliGRAM(s) Oral daily  sertraline 25 milliGRAM(s) Oral daily    MEDICATIONS  (PRN):  acetaminophen     Tablet .. 650 milliGRAM(s) Oral every 6 hours PRN Temp greater or equal to 38C (100.4F), Mild Pain (1 - 3)  LORazepam   Injectable 2 milliGRAM(s) IV Push once PRN Seizure Activity      Vital Signs Last 24 Hrs  T(C): 36.8 (15 Nov 2024 12:10), Max: 36.8 (15 Nov 2024 12:10)  T(F): 98.2 (15 Nov 2024 12:10), Max: 98.2 (15 Nov 2024 12:10)  HR: 54 (15 Nov 2024 12:10) (54 - 72)  BP: 126/64 (15 Nov 2024 12:10) (108/72 - 156/69)  BP(mean): 84 (15 Nov 2024 06:04) (84 - 84)  RR: 17 (15 Nov 2024 12:10) (16 - 17)  SpO2: 96% (15 Nov 2024 12:10) (96% - 97%)    Parameters below as of 15 Nov 2024 12:10  Patient On (Oxygen Delivery Method): room air        ________________________________________________  PHYSICAL EXAM:  GENERAL: NAD  HEENT:moist mucous membranes;   NECK : supple  CHEST/LUNG: Clear to auscultation bilaterally  HEART: S1 S2  regular  ABDOMEN: Soft, Nontender, Nondistended;  EXTREMITIES: no cyanosis; no edema; no calf tenderness  NERVOUS SYSTEM: no new deficits    _________________________________________________  LABS:                        13.5   4.37  )-----------( 207      ( 15 Nov 2024 05:30 )             40.8     11-15    144  |  107  |  19  ----------------------------<  105[H]  4.3   |  27  |  0.70    Ca    8.9      15 Nov 2024 05:30  Phos  3.6     11-15  Mg     2.2     11-15        Urinalysis Basic - ( 15 Nov 2024 05:30 )    Color: x / Appearance: x / SG: x / pH: x  Gluc: 105 mg/dL / Ketone: x  / Bili: x / Urobili: x   Blood: x / Protein: x / Nitrite: x   Leuk Esterase: x / RBC: x / WBC x   Sq Epi: x / Non Sq Epi: x / Bacteria: x      CAPILLARY BLOOD GLUCOSE            RADIOLOGY & ADDITIONAL TESTS:      Plan of care was discussed with patient and /or primary care giver; all questions and concerns were addressed and care was aligned with patient's wishes.

## 2024-11-15 NOTE — EEG REPORT - NS EEG TEXT BOX
[facility] Department of Neurology  Inpatient Epilepsy Monitoring Unit video-Electroencephalography Report    Acquisition Details:  Electroencephalography was acquired using a minimum of 21 channels on an [employer] Neurology system v 9.3.1 with electrode placement according to the standard International 10-20 system following ACNS (American Clinical Neurophysiology Society) guidelines for Long-Term Video EEG monitoring.  Anterior temporal T1 and T2 electrodes were utilized whenever possible.   The XLTEK automated spike & seizure detections were all reviewed in detail, in addition to extensive portions of raw EEG.  Specially-trained nurses were available for seizure-related events.  Continuous live-time video monitoring of the patients for seizure-related and safety events was performed by specially-trained technicians.        Daily Updates (from 07:00 am until 07:00 am):  Day 3 11/14/2024, 7:00 AM to next morning at 07:00 AM.  Meds:Vimpat 300 mg bid,  Depakote 500 mg bid,  Background:  continuous, with predominantly alpha and beta frequencies.  Voltage:  Normal (20+ uV)  Organization:  Appropriate anterior-posterior gradient  Posterior Dominant Rhythm:  9 Hz symmetric, well-organized, and well-modulated  Sleep:  Symmetric, synchronous spindles and K complexes.  Focal abnormalities:  Frequent (10-49%) right temporal polymorphic delta activity.  Spontaneous Activity:  Rare (<1/Hr)   right temporal poly spike discharges   Events: None  Provocations:  •	Hyperventilation: was not performed.  •	Photic stimulation: was not performed.  Daily Summary:                      -Frequent (10-49%) right temporal polymorphic delta activity.   -Rare (<1/Hr)  right temporal  poly spike epileptiform discharges       Elizabeth Kingston MD  CNP Fellow    Bernard Lundy MD  Attending Neurologist, [facility] Epilepsy Program

## 2024-11-15 NOTE — PROGRESS NOTE ADULT - PROBLEM SELECTOR PLAN 1
Onset of seizures started 9 months after having Stroke (2020) and have been occurring every week in October and last seizure was November 3rd. Recent ambulatory EEG from October 22-25, 2024 right occasional frontotemporal epileptiform discharges with 1 recorded seizure from the same region. vEEG placed on 11/11/2024 shows frequent right temporal polymorphic delta activity and rare rIght frontal epileptiform spikes. On 11/12/2024 EEG showed electrographic seizure and clinical generalized clonic movement in all extremities.    - Can d/c VEEG monitoring, d/c home   - Ativan 2mg IVP PRN for seizures > 2mins  - 11/13/2024 VImpat increased from 250mg to 300mg BID  - 11/13/2024 Started Depakote 500mg BID with first dose now  - 11/24 c/w  bid,  bid  - Follow up Labs including Vimpat level  - Neurological & Vitals assessment Q8hrs  - Maintain Seizure/Fall/Aspiration precautions.

## 2024-11-15 NOTE — PROGRESS NOTE ADULT - TIME BILLING
Review of hospital course, labs, vitals, medical records.   Bedside exam and interview   Discussed plan of care with primary team ACP and housestaff   Documenting the encounter
Review of hospital course, labs, vitals, medical records.   Bedside exam and interview   Discussed plan of care with primary team ACP and housestaff   Documenting the encounter

## 2024-11-17 NOTE — PHYSICAL EXAM
[FreeTextEntry1] : The patient is alert and oriented x3, naming intact x3, repetition normal, follows three-step commands, and is able to participate fully in the history taking.\par Speech is normal with no evidence of dysarthria.\par Memory is intact: Immediate recall 3 out of 3, short-term 3 out of 3, remote memory intact\par Cranial nerves II through XII intact\par Motor exam: Upper and lower extremities 5 out of 5 power, normal tone. No abnormal movements noted.\par Sensory exam: Intact to light touch and pinprick. Romberg negative.\par Coordination and vestibular exam: Finger to nose intact, no evidence of truncal or appendicular ataxia. No evidence of nystagmus. no vestibular symptoms elicited with head turning during ambulation.\par Gait: slow gait but able to get out of chair easily and walks with a broad based gait. \par Reflexes: One to 2+ in upper and lower extremities. No pathological reflexes. Downgoing toes.\par \par  3 = A little assistance
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
## 2024-12-05 ENCOUNTER — APPOINTMENT (OUTPATIENT)
Dept: HEMATOLOGY ONCOLOGY | Facility: CLINIC | Age: 64
End: 2024-12-05
Payer: COMMERCIAL

## 2024-12-05 VITALS
WEIGHT: 148 LBS | OXYGEN SATURATION: 94 % | SYSTOLIC BLOOD PRESSURE: 171 MMHG | TEMPERATURE: 98.3 F | RESPIRATION RATE: 18 BRPM | HEART RATE: 53 BPM | DIASTOLIC BLOOD PRESSURE: 80 MMHG | HEIGHT: 67 IN | BODY MASS INDEX: 23.23 KG/M2

## 2024-12-05 DIAGNOSIS — D64.9 ANEMIA, UNSPECIFIED: ICD-10-CM

## 2024-12-05 PROCEDURE — 99214 OFFICE O/P EST MOD 30 MIN: CPT

## 2024-12-05 PROCEDURE — G2211 COMPLEX E/M VISIT ADD ON: CPT | Mod: NC

## 2024-12-12 RX ORDER — LACOSAMIDE 150 MG/1
150 TABLET ORAL
Qty: 120 | Refills: 0 | Status: ACTIVE | COMMUNITY
Start: 2024-12-05 | End: 1900-01-01

## 2024-12-20 ENCOUNTER — APPOINTMENT (OUTPATIENT)
Dept: NEUROLOGY | Facility: CLINIC | Age: 64
End: 2024-12-20

## 2025-01-03 ENCOUNTER — APPOINTMENT (OUTPATIENT)
Dept: NEUROLOGY | Facility: CLINIC | Age: 65
End: 2025-01-03
Payer: COMMERCIAL

## 2025-01-03 VITALS
TEMPERATURE: 98.6 F | HEIGHT: 67 IN | HEART RATE: 52 BPM | SYSTOLIC BLOOD PRESSURE: 128 MMHG | OXYGEN SATURATION: 96 % | DIASTOLIC BLOOD PRESSURE: 78 MMHG

## 2025-01-03 DIAGNOSIS — F03.90 UNSPECIFIED DEMENTIA W/OUT BEHAVIORAL DISTURBANCE: ICD-10-CM

## 2025-01-03 DIAGNOSIS — I63.9 CEREBRAL INFARCTION, UNSPECIFIED: ICD-10-CM

## 2025-01-03 DIAGNOSIS — R26.81 UNSTEADINESS ON FEET: ICD-10-CM

## 2025-01-03 DIAGNOSIS — G40.909 EPILEPSY, UNSPECIFIED, NOT INTRACTABLE, W/OUT STATUS EPILEPTICUS: ICD-10-CM

## 2025-01-03 PROCEDURE — 99215 OFFICE O/P EST HI 40 MIN: CPT

## 2025-01-03 RX ORDER — DIVALPROEX SODIUM 500 MG/1
500 TABLET, DELAYED RELEASE ORAL
Refills: 0 | Status: ACTIVE | COMMUNITY

## 2025-01-06 ENCOUNTER — TRANSCRIPTION ENCOUNTER (OUTPATIENT)
Age: 65
End: 2025-01-06

## 2025-01-06 LAB
ALBUMIN SERPL ELPH-MCNC: 5 G/DL
ALP BLD-CCNC: 63 U/L
ALT SERPL-CCNC: 14 U/L
ANION GAP SERPL CALC-SCNC: 13 MMOL/L
AST SERPL-CCNC: 15 U/L
BILIRUB SERPL-MCNC: 0.4 MG/DL
BUN SERPL-MCNC: 10 MG/DL
CALCIUM SERPL-MCNC: 9.9 MG/DL
CHLORIDE SERPL-SCNC: 99 MMOL/L
CO2 SERPL-SCNC: 28 MMOL/L
CREAT SERPL-MCNC: 0.75 MG/DL
EGFR: 101 ML/MIN/1.73M2
GLUCOSE SERPL-MCNC: 92 MG/DL
LACOSAMIDE (VIMPAT): 17.41 UG/ML
POTASSIUM SERPL-SCNC: 4.6 MMOL/L
PROT SERPL-MCNC: 7.7 G/DL
SODIUM SERPL-SCNC: 140 MMOL/L
VALPROATE SERPL-MCNC: 71 UG/ML

## 2025-01-08 ENCOUNTER — APPOINTMENT (OUTPATIENT)
Dept: NEUROLOGY | Facility: CLINIC | Age: 65
End: 2025-01-08

## 2025-01-23 ENCOUNTER — APPOINTMENT (OUTPATIENT)
Dept: NEUROLOGY | Facility: CLINIC | Age: 65
End: 2025-01-23
Payer: COMMERCIAL

## 2025-01-23 PROCEDURE — 95816 EEG AWAKE AND DROWSY: CPT

## 2025-01-24 ENCOUNTER — NON-APPOINTMENT (OUTPATIENT)
Age: 65
End: 2025-01-24

## 2025-01-26 NOTE — ED ADULT TRIAGE NOTE - GLASGOW COMA SCALE: SCORE, MLM
"Christel Ndiaye Alejandro Mosley MD  [facility]         15

## 2025-02-14 NOTE — ED ADULT NURSE NOTE - NS ED NOTE ABUSE SUSPICION NEGLECT YN
[FreeTextEntry1] :   Impression/plan: 58-year-old female with pelvic organ prolapse-total procidentia.   -Options for management of POP discussed. Reviewed conservative management with PFE or a pessary. Surgical plan would include repair with WALLACE. RBAPC discussed. All questions answered. Patient is leaning toward surgical management.  -Informed patient that she would need to have uterine polyp addressed prior to surgery. Recommend evaluation by Dr. Banerjee. Contact information provided.  -UDS required prior to surgical intervention. Will schedule appointment.  -Urine culture today. Will f/u with result.   I, Dr. Dougie Hoffman, personally performed the evaluation and management (E/M) services for this new patient.  That E/M includes conducting the clinically appropriate initial history &/or exam, assessing all conditions, and establishing the plan of care.  Today, my TAMIKA Mario Alberto, was here to observe &/or participate in the visit & follow plan of care established by me.     No

## 2025-03-06 ENCOUNTER — APPOINTMENT (OUTPATIENT)
Dept: HEMATOLOGY ONCOLOGY | Facility: CLINIC | Age: 65
End: 2025-03-06
Payer: COMMERCIAL

## 2025-03-06 VITALS
WEIGHT: 147 LBS | HEART RATE: 63 BPM | OXYGEN SATURATION: 95 % | DIASTOLIC BLOOD PRESSURE: 73 MMHG | TEMPERATURE: 98.4 F | HEIGHT: 67 IN | BODY MASS INDEX: 23.07 KG/M2 | RESPIRATION RATE: 18 BRPM | SYSTOLIC BLOOD PRESSURE: 153 MMHG

## 2025-03-06 VITALS — SYSTOLIC BLOOD PRESSURE: 142 MMHG | DIASTOLIC BLOOD PRESSURE: 79 MMHG

## 2025-03-06 DIAGNOSIS — D64.9 ANEMIA, UNSPECIFIED: ICD-10-CM

## 2025-03-06 PROCEDURE — 99214 OFFICE O/P EST MOD 30 MIN: CPT

## 2025-03-14 LAB
FERRITIN SERPL-MCNC: 120 NG/ML
FOLATE SERPL-MCNC: >20 NG/ML
HCT VFR BLD CALC: 36.1 %
HGB BLD-MCNC: 12 G/DL
IRON SATN MFR SERPL: 43 %
IRON SERPL-MCNC: 103 UG/DL
LYMPHOCYTES # BLD AUTO: 0.9 K/UL
LYMPHOCYTES NFR BLD AUTO: 24 %
MAN DIFF?: NO
MCHC RBC-ENTMCNC: 30.8 PG
MCHC RBC-ENTMCNC: 33.2 G/DL
MCV RBC AUTO: 92.8 FL
NEUTROPHILS # BLD AUTO: 2.5 K/UL
NEUTROPHILS NFR BLD AUTO: 66.4 %
PLATELET # BLD AUTO: 200 K/UL
RBC # BLD: 3.89 M/UL
RBC # FLD: 14.4 %
TIBC SERPL-MCNC: 240 UG/DL
UIBC SERPL-MCNC: 137 UG/DL
VIT B12 SERPL-MCNC: 1125 PG/ML
WBC # FLD AUTO: 3.8 K/UL

## 2025-03-17 ENCOUNTER — APPOINTMENT (OUTPATIENT)
Dept: GASTROENTEROLOGY | Facility: HOSPITAL | Age: 65
End: 2025-03-17

## 2025-03-17 ENCOUNTER — OUTPATIENT (OUTPATIENT)
Dept: OUTPATIENT SERVICES | Facility: HOSPITAL | Age: 65
LOS: 1 days | Discharge: HOME | End: 2025-03-17
Payer: COMMERCIAL

## 2025-03-17 VITALS
DIASTOLIC BLOOD PRESSURE: 70 MMHG | WEIGHT: 145.51 LBS | RESPIRATION RATE: 15 BRPM | OXYGEN SATURATION: 95 % | HEIGHT: 67 IN | TEMPERATURE: 97 F | HEART RATE: 58 BPM | SYSTOLIC BLOOD PRESSURE: 150 MMHG

## 2025-03-17 DIAGNOSIS — Z98.890 OTHER SPECIFIED POSTPROCEDURAL STATES: Chronic | ICD-10-CM

## 2025-03-17 PROCEDURE — 45378 DIAGNOSTIC COLONOSCOPY: CPT

## 2025-03-17 NOTE — PRE-ANESTHESIA EVALUATION ADULT - NSANTHOSAYNRD_GEN_A_CORE
No. JOSE EDUARDO screening performed.  STOP BANG Legend: 0-2 = LOW Risk; 3-4 = INTERMEDIATE Risk; 5-8 = HIGH Risk Yes

## 2025-03-18 NOTE — ED ADULT NURSE NOTE - NS_SISCREENINGSR_GEN_ALL_ED
ED Attending Physician Note      Patient : Luz Jimenes Age: 88 year old Sex: female   MRN: [id number] Encounter Date: 3/18/2025      History     Chief Complaint   Patient presents with    Nausea    Vomiting     HPI: 88 year old female presents with headache. Vomited before but not today.  Reports multiple admissions for headache but persistent symptoms since February. Headache on top of head. No light sensitivity. No fevers, no abd pain. No additional complaints. \"If the headache would go away I could go home.\" On eliquis.     History also obtained from family who contributed to HPI.     Allergies   Allergen Reactions    Morphine Other (See Comments)     Weakness, drowsy and too sleepy       Past Medical History:   Diagnosis Date    Anemia     Arthritis     Bipolar 1 disorder  (CMD)     Blood clot associated with vein wall inflammation 2020    DVT, PE    Depression     Essential (primary) hypertension     Gastroesophageal reflux disease     History of transfusion 1956    Palpitations 12/12/2024    Vertigo        Past Surgical History:   Procedure Laterality Date    ELBOW FRACTURE SURGERY Left 2002    EYE SURGERY Bilateral     cataract with IOL       Family History   Problem Relation Age of Onset    Patient is unaware of any medical problems Mother     Patient is unaware of any medical problems Father        Social History     Tobacco Use    Smoking status: Never    Smokeless tobacco: Never   Vaping Use    Vaping status: never used   Substance Use Topics    Alcohol use: Yes     Comment: rarely    Drug use: Never       Review of symptoms:  General: No fever  Skin: No rash  Eye: No recent vision problems  ENMT: No sore throat  Respiratory: No shortness of breath  CV: No chest pain  GI: No abdominal pain  : No dysuria  MSK: No joint pain  Neurologic:  headache      Physical Exam     ED Triage Vitals [03/18/25 1459]   ED Triage Vitals Group      Temp 97.7 °F (36.5 °C)      Heart Rate 97      Resp 18      /59       SpO2 95 %      EtCO2 mmHg       Height       Weight       Weight Scale Used       BMI (Calculated)       IBW/kg (Calculated)        Pulse Ox is >95% which is normal for this patient  General: Alert, no acute distress  Skin: Warm, dry  Head: Normocephalic atraumatic  Eye: PERRL, EOMI  ENMT: Oral mucosa moist  Neck: Supple, trachea midline  Cardiovascular: Regular rate, rhythm, S1, S2  Respiratory: Lungs CTA, non-labored respirations, symmetrical expansion  GI: Soft, nontender, nondistended  MSK: Normal ROM, no swelling, no deformity  Neuro: Alert, oriented, no focal neuro deficits. CN 2-12 intact. UE/LE strength/sensation intact. Coordination intact.  Psychiatric: Appropriate mood and affect      Lab Results     Results for orders placed or performed during the hospital encounter of 03/18/25   Comprehensive Metabolic Panel    Specimen: Blood, Venous   Result Value Ref Range    Fasting Status      Sodium 130 (L) 135 - 145 mmol/L    Potassium 3.7 3.4 - 5.1 mmol/L    Chloride 94 (L) 97 - 110 mmol/L    Carbon Dioxide 24 21 - 32 mmol/L    Anion Gap 16 7 - 19 mmol/L    Glucose 131 (H) 70 - 99 mg/dL    BUN 21 (H) 6 - 20 mg/dL    Creatinine 0.73 0.51 - 0.95 mg/dL    Glomerular Filtration Rate 80 >=60    BUN/Cr 29 (H) 7 - 25    Calcium 8.6 8.4 - 10.2 mg/dL    Bilirubin, Total 0.3 0.2 - 1.0 mg/dL    GOT/AST 18 <=37 Units/L    GPT/ALT 15 <64 Units/L    Alkaline Phosphatase 64 45 - 117 Units/L    Albumin 3.4 3.4 - 5.0 g/dL    Protein, Total 6.9 6.4 - 8.2 g/dL    Globulin 3.5 2.0 - 4.0 g/dL    A/G Ratio 1.0 1.0 - 2.4   TROPONIN I, HIGH SENSITIVITY    Specimen: Blood, Venous   Result Value Ref Range    Troponin I, High Sensitivity 9 <52 ng/L   Lipase    Specimen: Blood, Venous   Result Value Ref Range    Lipase 34 15 - 77 Units/L   COVID/Flu/RSV panel    Specimen: Nasal, Mid-turbinate; Swab   Result Value Ref Range    Rapid SARS-COV-2 by PCR Not Detected Not Detected    Influenza A by PCR Not Detected Not Detected    Influenza B  by PCR Not Detected Not Detected    RSV BY PCR Not Detected Not Detected    Isolation Guidelines      Procedural Comment     CBC with Automated Differential (performable only)    Specimen: Blood, Venous   Result Value Ref Range    WBC 9.8 4.2 - 11.0 K/mcL    RBC 3.69 (L) 4.00 - 5.20 mil/mcL    HGB 11.4 (L) 12.0 - 15.5 g/dL    HCT 33.4 (L) 36.0 - 46.5 %    MCV 90.5 78.0 - 100.0 fl    MCH 30.9 26.0 - 34.0 pg    MCHC 34.1 32.0 - 36.5 g/dL    RDW-CV 13.8 11.0 - 15.0 %    RDW-SD 45.5 39.0 - 50.0 fL     140 - 450 K/mcL    NRBC 0 <=0 /100 WBC    Neutrophil, Percent 92 %    Lymphocytes, Percent 4 %    Mono, Percent 4 %    Eosinophils, Percent 0 %    Basophils, Percent 0 %    Immature Granulocytes 0 %    Absolute Neutrophils 8.9 (H) 1.8 - 7.7 K/mcL    Absolute Lymphocytes 0.4 (L) 1.0 - 4.0 K/mcL    Absolute Monocytes 0.4 0.3 - 0.9 K/mcL    Absolute Eosinophils  0.0 0.0 - 0.5 K/mcL    Absolute Basophils 0.0 0.0 - 0.3 K/mcL    Absolute Immature Granulocytes 0.0 0.0 - 0.2 K/mcL         Radiology Results     Imaging Results              CT HEAD WO CONTRAST (Final result)  Result time 03/18/25 17:36:53      Final result                   Impression:    No evidence of intracranial hemorrhage or mass effect.      Electronically Signed by: PATRICIA STONE MD   Signed on: 3/18/2025 5:36 PM   Workstation ID: [id number]               Narrative:    CT HEAD WO CONTRAST, 3/18/2025 5:18 PM    CLINICAL INFORMATION: 88 years, Female headaches    COMPARISON: 2/20/2025    TECHNIQUE: CT of the brain without contrast.      FINDINGS:  No intracranial hemorrhage is identified.  No intracranial mass or evidence  of mass-effect.  No midline shift or herniation.  Gray-white  differentiation is maintained. Chronic left basal ganglia lacunar infarct.  Sulci and ventricles are prominent compatible with age-related atrophy,  without evidence of hydrocephalus.  No extra-axial collections.  There are  scattered areas of hypoattenuation in the  periventricular and subcortical  white matter which are nonspecific but favored to represent small vessel  ischemic changes.    The visualized portions of the paranasal sinuses are clear.  Mastoid air  cells are clear.  Calvarium is intact.                                           ED Medications     Medications   ketorolac (TORADOL) injection 15 mg (has no administration in time range)   diphenhydrAMINE (BENADRYL) injection 25 mg (25 mg Intravenous Given 3/18/25 1646)   metoCLOPramide (REGLAN) injection 5 mg (5 mg Intravenous Given 3/18/25 1646)   sodium chloride (NORMAL SALINE) 0.9 % bolus 500 mL (0 mLs Intravenous Completed 3/18/25 1738)       Medical Decision Making   Differential diagnoses to consider in this patient include: migraine, atypical migraine, ich, dehydration, etc.     EKG: Sinus tach, regular rhythm, nonspecific st changes interpreted by self.    Luz ANN Yudy, 88 year old, [id number]  Reviewed recent discharge summary 3/2:  Patient is a 88-year-old female, past medical history of IBS, osteoarthritis, bipolar disorder, DVT/PE, depression, anxiety, hypertension, GERD, vertigo, CVA, chronic venous insufficiency, overactive bladder, IBS, meningioma, venous anomaly along the right inferior and lateral frontal lobe on previous MRI. Recently treated for headaches which was thought to be likely tension related. Presented back to the emergency room due to weakness and lightheadedness and ongoing recurrent headaches.  She was reevaluated by neurology and again recent imaging was reviewed including MRI and CT venogram which was essentially unremarkable other than incidental subcentimeter cribriform meningioma and a small frontal developmental venous anomaly.  She has no jaw claudication.  No vision changes or loss of vision.  She was maintained on Depakote for headache prophylaxis.  Started on a short course of NSAIDs with naproxen.  Given a dose of prednisone as well as magnesium sulfate.  Subsequently  Negative headache now much improved.  She will continue short course of NSAIDs.  Upon discussion with neurology possibility of aseptic meningitis causing her symptoms is on the differential.  At this time care would be supportive, and given she is on anticoagulation neurology not recommending LP at this time given the risk.  Since symptoms are now resolved she was cleared for discharge.  Neurology also recommending to stop Depakote on discharge as it does cause some increased lethargy.     Patient also did complain of lightheadedness and did come back positive for orthostasis.  She was gently hydrated with fluids.  Discussed with patient to keep compression stockings when out of bed.  Discussed with patient to be gentle and cautious upon sitting and standing.  Orthostatics were checked and now improved. Can consider midodrine although would avoid for now given she has some supine hypertension.  She was educated on keeping herself well-hydrated.  Follow-up with PCP within 1 week for reevaluation outpatient.  Discussed with Dr. Nuno her PCP.  She can also follow-up with neurology outpatient and if headache continues to be an issue per Dr. Abdalla she can be referred to specialist with expertise and headache syndrome.    Reviewed neuro note from this month. Kersey to have ct venogram essentially unremarkable, fiorcet prn and improvement. Followup in clinic for headache symptoms, ok to dc    Mainly complains of headache to me. No vomiting, no diarrhea. Had reported some weakness up front but did not report this to me, good strength on exam.     Today labs generally unremarkable. No leukocytosis. No major electrolyte abnormalities aside from mild hyponatremia which she had prior. Given saline and discussed with pt.    Although atypical migraine given benadryl/reglan.    Pt reporting complete improvement. Wants to return home.    Remainder of workup was unremarkable including head CT.      Remains comfortable on re-exam, discussed  borderline tachycardia. States she has some anxiousness. Tolerating po. Improving, wanting DC. Does not want to wait for further re-eval to ensure slowing HR.     Discussed results with patient, they are comfortable returning home and following up as outpatient. Advised on reasons to return to the emergency department or seek immediate medical attention. Discharged.       Clinical Impression     ED Diagnosis   1. Nonintractable headache, unspecified chronicity pattern, unspecified headache type        2. Hyponatremia            Disposition        Discharge 3/18/2025  6:04 PM  Luz Jimenes discharge to home/self care.         Jamie Lay MD  03/18/25 1818       Jamie Lay MD  03/18/25 1826

## 2025-03-24 NOTE — CONSULT NOTE ADULT - CONSULT REQUESTED DATE/TIME
26-Oct-2021 18:26
After Your EGD and Colonoscopy           DIET:  Resume your usual diet.    ACTIVITY:  Rest quietly at home for the remainder of the day. You may resume normal activities tomorrow.  Do not do anything that requires coordination the day of the procedure, such as climbing ladders, using a sharp knife, operating machinery, driving.  May Resume driving and/or operating machinery in 24 hours  May shower tomorrow   Do not drive 24 hours.     WHAT TO EXPECT:  Mild abdominal discomfort, bloating and gas pains.  A scant amount of rectal bleeding is normal following a biopsy, polypectomy or if you have hemorrhoids.  Mild throat soreness.  Warm salt-water gargle or lozenges may relieve your discomfort.  Return of your usual bowel movements in 1-2 days.  A tired feeling after the procedure due to the medications given to help you relax.    PROBLEMS TO WATCH FOR - NOTIFY YOUR SURGEON IF YOU HAVE ANY OF THESE SYMPTOMS:  Severe abdominal pain or bloating.  Difficulty swallowing or breathing  Neck swelling  Excessive pain   Chills or temperature over 101 degrees.  Large amounts of bright red rectal bleeding, blood clots or black colored stool.  Nausea or Vomiting of blood or black colored liquid.      Patient/Family given For your Well Being Teaching Sheet:  __x_ Care After Anesthesia/ Sedation  __x_ Pain Management Tips                             FOLLOW -UP:  If you had a polyp removed or biopsy performed, these specimens will be sent to the pathology laboratory. It takes between 5-7 business days to get results.   Someone will either call or send a letter with your pathology results.  If you have not heard back in 7 days, please call the office your doctor's office.    Follow For Your Well Being Safety (given and explained)  Follow For Your Well Being Care After Anesthesia or Sedation (given and explained)    If you have any questions or concerns, or if you do not hear from your doctor within 30 days please contact the 
27-Oct-2021 11:00
office.    Recommendations:  Awaiting pathology results.  Repeat colonoscopy in 5 years.  Start benifiber daily.  Consider repeating EGD in 6-8 weeks for further dilation        Want to Say “Thank You” to a Nurse?  The FRANTZ Award® was created in memory of WILY Martin by his family to say thank you to bedside nurses who provide an outstanding level of care.  Submit a nomination using any method below.     OR    [email or web address]     
30-Oct-2021 06:20

## 2025-03-26 ENCOUNTER — NON-APPOINTMENT (OUTPATIENT)
Age: 65
End: 2025-03-26

## 2025-03-27 ENCOUNTER — NON-APPOINTMENT (OUTPATIENT)
Age: 65
End: 2025-03-27

## 2025-03-27 ENCOUNTER — APPOINTMENT (OUTPATIENT)
Dept: COLORECTAL SURGERY | Facility: CLINIC | Age: 65
End: 2025-03-27
Payer: COMMERCIAL

## 2025-03-27 VITALS
BODY MASS INDEX: 23.07 KG/M2 | WEIGHT: 147 LBS | HEART RATE: 60 BPM | SYSTOLIC BLOOD PRESSURE: 149 MMHG | HEIGHT: 67 IN | TEMPERATURE: 97.1 F | DIASTOLIC BLOOD PRESSURE: 80 MMHG

## 2025-03-27 DIAGNOSIS — Z80.0 FAMILY HISTORY OF MALIGNANT NEOPLASM OF DIGESTIVE ORGANS: ICD-10-CM

## 2025-03-27 DIAGNOSIS — J44.9 CHRONIC OBSTRUCTIVE PULMONARY DISEASE, UNSPECIFIED: ICD-10-CM

## 2025-03-27 DIAGNOSIS — Z82.49 FAMILY HISTORY OF ISCHEMIC HEART DISEASE AND OTHER DISEASES OF THE CIRCULATORY SYSTEM: ICD-10-CM

## 2025-03-27 DIAGNOSIS — Z83.2 FAMILY HISTORY OF DISEASES OF THE BLOOD AND BLOOD-FORMING ORGANS AND CERTAIN DISORDERS INVOLVING THE IMMUNE MECHANISM: ICD-10-CM

## 2025-03-27 DIAGNOSIS — Z83.438 FAMILY HISTORY OF OTHER DISORDER OF LIPOPROTEIN METABOLISM AND OTHER LIPIDEMIA: ICD-10-CM

## 2025-03-27 DIAGNOSIS — Z86.73 PERSONAL HISTORY OF TRANSIENT ISCHEMIC ATTACK (TIA), AND CEREBRAL INFARCTION W/OUT RESIDUAL DEFICITS: ICD-10-CM

## 2025-03-27 DIAGNOSIS — Z82.3 FAMILY HISTORY OF STROKE: ICD-10-CM

## 2025-03-27 DIAGNOSIS — R22.2 LOCALIZED SWELLING, MASS AND LUMP, TRUNK: ICD-10-CM

## 2025-03-27 DIAGNOSIS — D69.9 HEMORRHAGIC CONDITION, UNSPECIFIED: ICD-10-CM

## 2025-03-27 PROCEDURE — 99203 OFFICE O/P NEW LOW 30 MIN: CPT | Mod: 25

## 2025-03-27 PROCEDURE — 21920 BIOPSY SOFT TISSUE OF BACK: CPT

## 2025-04-02 ENCOUNTER — NON-APPOINTMENT (OUTPATIENT)
Age: 65
End: 2025-04-02

## 2025-05-12 ENCOUNTER — APPOINTMENT (OUTPATIENT)
Dept: NEUROLOGY | Facility: CLINIC | Age: 65
End: 2025-05-12
Payer: COMMERCIAL

## 2025-05-12 VITALS
OXYGEN SATURATION: 96 % | BODY MASS INDEX: 22.91 KG/M2 | DIASTOLIC BLOOD PRESSURE: 83 MMHG | WEIGHT: 146 LBS | SYSTOLIC BLOOD PRESSURE: 147 MMHG | HEIGHT: 67 IN | TEMPERATURE: 96.5 F | HEART RATE: 58 BPM

## 2025-05-12 DIAGNOSIS — F02.80 ALZHEIMER'S DISEASE, UNSPECIFIED: ICD-10-CM

## 2025-05-12 DIAGNOSIS — G30.9 ALZHEIMER'S DISEASE, UNSPECIFIED: ICD-10-CM

## 2025-05-12 DIAGNOSIS — G40.909 EPILEPSY, UNSPECIFIED, NOT INTRACTABLE, W/OUT STATUS EPILEPTICUS: ICD-10-CM

## 2025-05-12 PROCEDURE — 99213 OFFICE O/P EST LOW 20 MIN: CPT

## 2025-05-30 DIAGNOSIS — Z12.11 ENCOUNTER FOR SCREENING FOR MALIGNANT NEOPLASM OF COLON: ICD-10-CM

## 2025-05-30 RX ORDER — SODIUM SULFATE, POTASSIUM SULFATE AND MAGNESIUM SULFATE 1.6; 3.13; 17.5 G/177ML; G/177ML; G/177ML
17.5-3.13-1.6 SOLUTION ORAL
Qty: 1 | Refills: 0 | Status: ACTIVE | COMMUNITY
Start: 2025-05-30 | End: 1900-01-01

## 2025-06-05 ENCOUNTER — NON-APPOINTMENT (OUTPATIENT)
Age: 65
End: 2025-06-05

## 2025-06-05 ENCOUNTER — APPOINTMENT (OUTPATIENT)
Dept: HEMATOLOGY ONCOLOGY | Facility: CLINIC | Age: 65
End: 2025-06-05
Payer: COMMERCIAL

## 2025-06-05 VITALS
OXYGEN SATURATION: 96 % | WEIGHT: 144 LBS | TEMPERATURE: 97.4 F | RESPIRATION RATE: 18 BRPM | DIASTOLIC BLOOD PRESSURE: 74 MMHG | SYSTOLIC BLOOD PRESSURE: 183 MMHG | HEART RATE: 51 BPM | HEIGHT: 67 IN | BODY MASS INDEX: 22.6 KG/M2

## 2025-06-05 DIAGNOSIS — D64.9 ANEMIA, UNSPECIFIED: ICD-10-CM

## 2025-06-05 PROCEDURE — 99214 OFFICE O/P EST MOD 30 MIN: CPT | Mod: 25

## 2025-06-05 PROCEDURE — 36415 COLL VENOUS BLD VENIPUNCTURE: CPT

## 2025-06-06 LAB
FERRITIN SERPL-MCNC: 51 NG/ML
HCT VFR BLD CALC: 41 %
HGB BLD-MCNC: 13.7 G/DL
IRON SATN MFR SERPL: 37 %
IRON SERPL-MCNC: 106 UG/DL
LYMPHOCYTES # BLD AUTO: 1.2 K/UL
LYMPHOCYTES NFR BLD AUTO: 27.9 %
MAN DIFF?: NO
MCHC RBC-ENTMCNC: 30.9 PG
MCHC RBC-ENTMCNC: 33.4 G/DL
MCV RBC AUTO: 92.3 FL
NEUTROPHILS # BLD AUTO: 2.6 K/UL
NEUTROPHILS NFR BLD AUTO: 60.2 %
PLATELET # BLD AUTO: 193 K/UL
RBC # BLD: 4.44 M/UL
RBC # FLD: 15.1 %
TIBC SERPL-MCNC: 285 UG/DL
UIBC SERPL-MCNC: 179 UG/DL
WBC # FLD AUTO: 4.3 K/UL

## 2025-06-11 RX ORDER — POLYETHYLENE GLYCOL 3350 AND ELECTROLYTES WITH LEMON FLAVOR 236; 22.74; 6.74; 5.86; 2.97 G/4L; G/4L; G/4L; G/4L; G/4L
236 POWDER, FOR SOLUTION ORAL
Qty: 1 | Refills: 0 | Status: ACTIVE | COMMUNITY
Start: 2025-06-11 | End: 1900-01-01

## 2025-06-16 ENCOUNTER — APPOINTMENT (OUTPATIENT)
Dept: GASTROENTEROLOGY | Facility: HOSPITAL | Age: 65
End: 2025-06-16

## 2025-06-19 ENCOUNTER — NON-APPOINTMENT (OUTPATIENT)
Age: 65
End: 2025-06-19

## 2025-06-24 NOTE — ED ADULT TRIAGE NOTE - PAIN RATING/NUMBER SCALE (0-10): ACTIVITY
LOV: 1/23/2025    RTO:   Future Appointments   Date Time Provider Department Center   7/23/2025  1:40 PM Alan Hartley APRN - CNP [facility] DEP     LRF: 10/6/24          Controlled Substance Monitoring:    Acute and Chronic Pain Monitoring:   RX Monitoring Attestation Periodic Controlled Substance Monitoring   2/2/2019  10:31 AM The Prescription Monitoring Report for this patient was reviewed today. No signs of potential drug abuse or diversion identified.        4 (moderate pain)

## 2025-07-11 ENCOUNTER — APPOINTMENT (OUTPATIENT)
Dept: DERMATOLOGY | Facility: CLINIC | Age: 65
End: 2025-07-11
Payer: COMMERCIAL

## 2025-07-11 PROBLEM — D48.5 NEOPLASM OF UNCERTAIN BEHAVIOR OF SKIN: Status: ACTIVE | Noted: 2025-07-11

## 2025-07-11 PROCEDURE — 99203 OFFICE O/P NEW LOW 30 MIN: CPT

## 2025-07-25 ENCOUNTER — NON-APPOINTMENT (OUTPATIENT)
Age: 65
End: 2025-07-25

## 2025-07-29 ENCOUNTER — APPOINTMENT (OUTPATIENT)
Dept: COLORECTAL SURGERY | Facility: CLINIC | Age: 65
End: 2025-07-29
Payer: COMMERCIAL

## 2025-07-29 VITALS
HEART RATE: 53 BPM | WEIGHT: 144 LBS | SYSTOLIC BLOOD PRESSURE: 159 MMHG | TEMPERATURE: 97.7 F | DIASTOLIC BLOOD PRESSURE: 78 MMHG | HEIGHT: 67 IN | BODY MASS INDEX: 22.6 KG/M2

## 2025-07-29 DIAGNOSIS — B07.9 VIRAL WART, UNSPECIFIED: ICD-10-CM

## 2025-07-29 PROCEDURE — 99214 OFFICE O/P EST MOD 30 MIN: CPT

## 2025-08-07 ENCOUNTER — APPOINTMENT (OUTPATIENT)
Dept: PLASTIC SURGERY | Facility: CLINIC | Age: 65
End: 2025-08-07
Payer: COMMERCIAL

## 2025-08-07 ENCOUNTER — APPOINTMENT (OUTPATIENT)
Dept: NEUROLOGY | Facility: CLINIC | Age: 65
End: 2025-08-07

## 2025-08-07 VITALS
SYSTOLIC BLOOD PRESSURE: 144 MMHG | HEIGHT: 67 IN | HEART RATE: 44 BPM | DIASTOLIC BLOOD PRESSURE: 80 MMHG | OXYGEN SATURATION: 96 %

## 2025-08-07 DIAGNOSIS — Z86.73 PERSONAL HISTORY OF TRANSIENT ISCHEMIC ATTACK (TIA), AND CEREBRAL INFARCTION W/OUT RESIDUAL DEFICITS: ICD-10-CM

## 2025-08-07 DIAGNOSIS — J44.9 CHRONIC OBSTRUCTIVE PULMONARY DISEASE, UNSPECIFIED: ICD-10-CM

## 2025-08-07 DIAGNOSIS — M79.89 OTHER SPECIFIED SOFT TISSUE DISORDERS: ICD-10-CM

## 2025-08-07 DIAGNOSIS — D69.9 HEMORRHAGIC CONDITION, UNSPECIFIED: ICD-10-CM

## 2025-08-07 PROCEDURE — 95816 EEG AWAKE AND DROWSY: CPT

## 2025-08-07 PROCEDURE — 99204 OFFICE O/P NEW MOD 45 MIN: CPT

## 2025-08-07 RX ORDER — LOSARTAN POTASSIUM 50 MG/1
50 TABLET, FILM COATED ORAL
Refills: 0 | Status: ACTIVE | COMMUNITY

## 2025-08-08 PROCEDURE — 95708 EEG WO VID EA 12-26HR UNMNTR: CPT

## 2025-08-09 PROCEDURE — 95721 EEG PHY/QHP>36<60 HR W/O VID: CPT

## 2025-08-09 PROCEDURE — 95700 EEG CONT REC W/VID EEG TECH: CPT

## 2025-08-09 PROCEDURE — 95708 EEG WO VID EA 12-26HR UNMNTR: CPT

## 2025-08-11 ENCOUNTER — APPOINTMENT (OUTPATIENT)
Dept: NEUROLOGY | Facility: CLINIC | Age: 65
End: 2025-08-11

## 2025-08-27 ENCOUNTER — NON-APPOINTMENT (OUTPATIENT)
Age: 65
End: 2025-08-27

## 2025-09-05 ENCOUNTER — OUTPATIENT (OUTPATIENT)
Dept: OUTPATIENT SERVICES | Facility: HOSPITAL | Age: 65
LOS: 1 days | End: 2025-09-05
Payer: COMMERCIAL

## 2025-09-05 DIAGNOSIS — Z98.890 OTHER SPECIFIED POSTPROCEDURAL STATES: Chronic | ICD-10-CM

## 2025-09-05 PROCEDURE — 71046 X-RAY EXAM CHEST 2 VIEWS: CPT | Mod: 26

## 2025-09-05 PROCEDURE — 71046 X-RAY EXAM CHEST 2 VIEWS: CPT

## 2025-09-11 ENCOUNTER — NON-APPOINTMENT (OUTPATIENT)
Age: 65
End: 2025-09-11

## 2025-09-11 ENCOUNTER — RESULT REVIEW (OUTPATIENT)
Age: 65
End: 2025-09-11

## 2025-09-11 ENCOUNTER — APPOINTMENT (OUTPATIENT)
Dept: GASTROENTEROLOGY | Facility: HOSPITAL | Age: 65
End: 2025-09-11

## 2025-09-11 ENCOUNTER — OUTPATIENT (OUTPATIENT)
Dept: OUTPATIENT SERVICES | Facility: HOSPITAL | Age: 65
LOS: 1 days | Discharge: HOME | End: 2025-09-11
Payer: COMMERCIAL

## 2025-09-11 DIAGNOSIS — Z98.890 OTHER SPECIFIED POSTPROCEDURAL STATES: Chronic | ICD-10-CM

## 2025-09-11 PROCEDURE — 45385 COLONOSCOPY W/LESION REMOVAL: CPT | Mod: GC

## 2025-09-11 PROCEDURE — 45385 COLONOSCOPY W/LESION REMOVAL: CPT

## 2025-09-11 PROCEDURE — 45380 COLONOSCOPY AND BIOPSY: CPT | Mod: XS

## 2025-09-11 PROCEDURE — 88305 TISSUE EXAM BY PATHOLOGIST: CPT

## 2025-09-11 PROCEDURE — 88305 TISSUE EXAM BY PATHOLOGIST: CPT | Mod: 26

## 2025-09-11 PROCEDURE — 45380 COLONOSCOPY AND BIOPSY: CPT | Mod: GC,59

## 2025-09-12 ENCOUNTER — NON-APPOINTMENT (OUTPATIENT)
Age: 65
End: 2025-09-12

## 2025-09-17 ENCOUNTER — TRANSCRIPTION ENCOUNTER (OUTPATIENT)
Age: 65
End: 2025-09-17

## 2025-09-17 ENCOUNTER — RESULT REVIEW (OUTPATIENT)
Age: 65
End: 2025-09-17

## 2025-09-17 ENCOUNTER — APPOINTMENT (OUTPATIENT)
Dept: COLORECTAL SURGERY | Facility: HOSPITAL | Age: 65
End: 2025-09-17

## 2025-09-18 ENCOUNTER — TRANSCRIPTION ENCOUNTER (OUTPATIENT)
Age: 65
End: 2025-09-18

## (undated) DEVICE — FORCEP RADIAL JAW 4 240CM DISP

## (undated) DEVICE — FORCEP RADIAL JAW 4 W NDL 2.2MM 2.8MM 240CM ORANGE DISP

## (undated) DEVICE — SNARE CAPTIVATOR COLD RND STIFF 10X2.4X2.8MM 240CM